# Patient Record
Sex: FEMALE | Race: OTHER | HISPANIC OR LATINO | Employment: UNEMPLOYED | URBAN - METROPOLITAN AREA
[De-identification: names, ages, dates, MRNs, and addresses within clinical notes are randomized per-mention and may not be internally consistent; named-entity substitution may affect disease eponyms.]

---

## 2017-01-03 ENCOUNTER — OFFICE VISIT (OUTPATIENT)
Dept: URGENT CARE | Facility: PHYSICIAN GROUP | Age: 57
End: 2017-01-03
Payer: COMMERCIAL

## 2017-01-03 VITALS
RESPIRATION RATE: 16 BRPM | WEIGHT: 135 LBS | BODY MASS INDEX: 23.92 KG/M2 | HEIGHT: 63 IN | TEMPERATURE: 98.4 F | HEART RATE: 65 BPM | OXYGEN SATURATION: 97 % | SYSTOLIC BLOOD PRESSURE: 132 MMHG | DIASTOLIC BLOOD PRESSURE: 64 MMHG

## 2017-01-03 DIAGNOSIS — H69.92 EUSTACHIAN TUBE DYSFUNCTION, LEFT: ICD-10-CM

## 2017-01-03 PROCEDURE — 99203 OFFICE O/P NEW LOW 30 MIN: CPT | Performed by: NURSE PRACTITIONER

## 2017-01-03 ASSESSMENT — ENCOUNTER SYMPTOMS
MYALGIAS: 0
CHILLS: 0
FEVER: 0
COUGH: 0
PALPITATIONS: 0
DIARRHEA: 0
SORE THROAT: 0
NAUSEA: 0
DIZZINESS: 0
SHORTNESS OF BREATH: 0
VOMITING: 0
HEADACHES: 0

## 2017-01-03 NOTE — PATIENT INSTRUCTIONS
Barotitis media  (Barotitis Media)  La barotitis media es la inflamación del oído medio. Se produce cuando el conducto auditivo (trompa de Valentín) que une la parte posterior de la nariz (nasofaringe) con el tímpano, se obstruye. Esta obstrucción puede ser causada por un resfrío, alergias ambientales o tanya infección en las vías respiratorias superiores. La barotitis media que no se leora puede llevar a tanya lesión o a la pérdida auditiva barotrauma), que podría llegar a ser permanente.  INSTRUCCIONES PARA EL CUIDADO EN EL HOGAR   · [place] todos los medicamentos erin le indicó el médico. Los medicamentos de venta sharonda podrán ayudar a quitar la obstrucción del canal y a favorecer el pasaje de aire.  · No se introduzca nada en el oído para limpiarlo o destaparlo. Las gotas óticas no lo ayudarán.  · No practique natación ni buceo ni viaje en avión hasta que burnham médico le diga que puede hacerlo. Si realizar estas actividades fueran necesario, puede ser útil la goma de mascar, que lo hará tragar con frecuencia. También lo ayudará si se oprime la nariz y sopla suavemente hasta destaparse los oídos para equilibrar los cambios de presión. [place] fuerza el aire en las trompas de Valentín.  · Sólo tome medicamentos de venta sharonda o recetados para calmar el dolor, el malestar o bajar la fiebre, según las indicaciones de burnham médico.  · Un descongestivo puede ayudarlo a descongestionar el oído medio y hacer más fácil el equilibrio de la presión. **sudafed, dayquil**  SOLICITE ATENCIÓN MÉDICA SI:  · Experimenta alguna forma de mareo grave, en el que siente erin si la habitación le diera vueltas y tiene náuseas (vértigo).  · Los síntomas sólo involucran un oído.  SOLICITE ATENCIÓN MÉDICA DE INMEDIATO SI:   · Siente un [place]awanna dolor de stanley, mareos o dolor intenso en el oído.  · Tiene un drenaje sanguinolento o similar a pus por el oído.  · Tiene fiebre.  · Los síntomas no mejoran o empeoran.  ASEGÚRESE DE QUE:   · Comprende estas  instrucciones.  · Controlará burnham afección.  · Recibirá ayuda de inmediato si no mejora o si empeora.     Esta información no tiene erin fin reemplazar el consejo del médico. Asegúrese de hacerle al médico cualquier pregunta que tenga.     Document Released: 12/18/2006 Document Revised: 12/23/2014  Elsevier Interactive Patient Education ©2016 Elsevier Inc.

## 2017-01-03 NOTE — MR AVS SNAPSHOT
"        Savana CANELA CristinaAnaloc   1/3/2017 1:30 PM   Office Visit   MRN: [id number]    Department:  [facility]   Dept Phone:  [phone]    Description:  Female : 1960   Provider:  SHON Child           Reason for Visit     Otalgia x2 weeks. Bilateral ear pain. Popping sensation.      Allergies as of 1/3/2017     No Known Allergies      Vital Signs     Blood Pressure Pulse Temperature Respirations Height Weight    132/64 mmHg 65 36.9 °C (98.4 °F) 16 1.6 m (5' 2.99\") 61.236 kg (135 lb)    Body Mass Index Oxygen Saturation Breastfeeding?             23.92 kg/m2 97% No         Basic Information     Date Of Birth Sex Race Ethnicity Preferred Language    1960 Female  or   Origin (Wolof,Emirati,Iraqi,[place], etc) English      Health Maintenance        Date Due Completion Dates    IMM DTaP/Tdap/Td Vaccine (1 - Tdap) 1980 ---    PAP SMEAR 1982 ---    COLONOSCOPY 2011 ---    IMM INFLUENZA (1) 2016 ---    MAMMOGRAM 2017, 2015, 2014, 2013, 10/3/2011, 2010, 10/29/2007, 10/29/2007            Current Immunizations     Hepatitis B Vaccine Recombivax (Adol/Adult) 2016, 2016, 2016      Below and/or attached are the medications your provider expects you to take. Review all of your home medications and newly ordered medications with your provider and/or pharmacist. Follow medication instructions as directed by your provider and/or pharmacist. Please keep your medication list with you and share with your provider. Update the information when medications are discontinued, doses are changed, or new medications (including over-the-counter products) are added; and carry medication information at all times in the event of emergency situations     Allergies:  No Known Allergies          Medications  Valid as of: 2017 -  2:05 PM    Generic Name Brand Name Tablet Size Instructions for use    Azithromycin " (Tab) ZITHROMAX 250 MG 2 tabs by mouth day 1, 1 tab by mouth days 2-5        Guaifenesin-Codeine (Solution) ROBITUSSIN -10 mg/5mL Take 10 mL by mouth every four hours as needed for Cough.        Ibuprofen (Tab) MOTRIN 200 MG Take 200 mg by mouth every 6 hours as needed.        Pseudoephedrine HCl   Take  by mouth.        .                 Medicines prescribed today were sent to:     [facility] #[id number] - [place], [place] - [address]    [address] [place] [address]    Phone: [phone] Fax: [phone]    Open 24 Hours?: No      Medication refill instructions:       If your prescription bottle indicates you have medication refills left, it is not necessary to call your provider’s office. Please contact your pharmacy and they will refill your medication.    If your prescription bottle indicates you do not have any refills left, you may request refills at any time through one of the following ways: The online [employer] system (except Urgent Care), by calling your provider’s office, or by asking your pharmacy to contact your provider’s office with a refill request. Medication refills are processed only during regular business hours and may not be available until the next business day. Your provider may request additional information or to have a follow-up visit with you prior to refilling your medication.   *Please Note: Medication refills are assigned a new Rx number when refilled electronically. Your pharmacy may indicate that no refills were authorized even though a new prescription for the same medication is available at the pharmacy. Please request the medicine by name with the pharmacy before contacting your provider for a refill.        Instructions    Barotitis media  (Barotitis Media)  La barotitis media es la inflamación del oído medio. Se produce cuando el conducto auditivo (trompa de Valentín) que une la parte posterior de la nariz (nasofaringe) con el tímpano, se obstruye. Esta obstrucción  puede ser causada por un resfrío, alergias ambientales o tanya infección en las vías respiratorias superiores. La barotitis media que no se leora puede llevar a tanya lesión o a la pérdida auditiva barotrauma), que podría llegar a ser permanente.  INSTRUCCIONES PARA EL CUIDADO EN EL HOGAR   · [place] todos los medicamentos erin le indicó el médico. Los medicamentos de venta sharonda podrán ayudar a quitar la obstrucción del canal y a favorecer el pasaje de aire.  · No se introduzca nada en el oído para limpiarlo o destaparlo. Las gotas óticas no lo ayudarán.  · No practique natación ni buceo ni viaje en avión hasta que burnham médico le diga que puede hacerlo. Si realizar estas actividades fueran necesario, puede ser útil la goma de mascar, que lo hará tragar con frecuencia. También lo ayudará si se oprime la nariz y sopla suavemente hasta destaparse los oídos para equilibrar los cambios de presión. [place] fuerza el aire en las trompas de Valentín.  · Sólo tome medicamentos de venta sharonda o recetados para calmar el dolor, el malestar o bajar la fiebre, según las indicaciones de burnham médico.  · Un descongestivo puede ayudarlo a descongestionar el oído medio y hacer más fácil el equilibrio de la presión. **sudafed, dayquil**  SOLICITE ATENCIÓN MÉDICA SI:  · Experimenta alguna forma de mareo grave, en el que siente erin si la habitación le diera vueltas y tiene náuseas (vértigo).  · Los síntomas sólo involucran un oído.  SOLICITE ATENCIÓN MÉDICA DE INMEDIATO SI:   · Siente un [place]awanna dolor de stanley, mareos o dolor intenso en el oído.  · Tiene un drenaje sanguinolento o similar a pus por el oído.  · Tiene fiebre.  · Los síntomas no mejoran o empeoran.  ASEGÚRESE DE QUE:   · Comprende estas instrucciones.  · Controlará burnham afección.  · Recibirá ayuda de inmediato si no mejora o si empeora.     Esta información no tiene erin fin reemplazar el consejo del médico. Asegúrese de hacerle al médico cualquier pregunta que tenga.     Document  Released: 12/18/2006 Document Revised: 12/23/2014  Elsevier Interactive Patient Education ©2016 Elsevier Inc.            MyChart Status: Patient Declined

## 2017-01-03 NOTE — PROGRESS NOTES
"Subjective:      Savana Raza is a 56 y.o. female who presents with Otalgia    Chief Complaint   Patient presents with   • Otalgia     x2 weeks. Bilateral ear pain. Popping sensation.     Having on and off ear pain  Feels her ears pop when she blows her nose  No hearing loss  No hearing drainage            Otalgia   Pertinent negatives include no coughing, diarrhea, headaches, sore throat or vomiting.       Review of Systems   Constitutional: Negative for fever, chills and malaise/fatigue.   HENT: Positive for ear pain. Negative for congestion and sore throat.    Respiratory: Negative for cough and shortness of breath.    Cardiovascular: Negative for chest pain and palpitations.   Gastrointestinal: Negative for nausea, vomiting and diarrhea.   Musculoskeletal: Negative for myalgias.   Neurological: Negative for dizziness and headaches.     No past medical history on file.      Objective:     /64 mmHg  Pulse 65  Temp(Src) 36.9 °C (98.4 °F)  Resp 16  Ht 1.6 m (5' 2.99\")  Wt 61.236 kg (135 lb)  BMI 23.92 kg/m2  SpO2 97%  Breastfeeding? No     Physical Exam   Constitutional: She is oriented to person, place, and time. She appears well-developed and well-nourished. No distress.   HENT:   Head: Normocephalic and atraumatic.   Right Ear: External ear normal.   Left Ear: External ear normal. There is tenderness. Tympanic membrane is not injected. A middle ear effusion is present.   Nose: Nose normal.   Eyes: Conjunctivae are normal.   Pulmonary/Chest: Effort normal.   Musculoskeletal: Normal range of motion.   Neurological: She is alert and oriented to person, place, and time.   Skin: Skin is warm and dry.   Psychiatric: She has a normal mood and affect. Her behavior is normal. Judgment and thought content normal.   Nursing note and vitals reviewed.              Assessment/Plan:      1. Eustachian tube dysfunction, left    Decongestants  Nasal sprays    Please make an appointment for follow up with " your primary care provider or make appointment to establish with a primary care. Return for any perception of change in condition, worsening of symptoms, such as perception of worse pain, worsening fever, not getting better, or any other concerns. Please go to the nearest emergency room for any shortness of breath or chest pain or for any of the emergent conditions we have discussed. Patient and/or family states understanding to plan of care, and discharge instructions. Different diagnosis were discussed and signs/symptoms were discussed to educate on.

## 2017-02-08 ENCOUNTER — HOSPITAL ENCOUNTER (OUTPATIENT)
Dept: LAB | Facility: MEDICAL CENTER | Age: 57
End: 2017-02-08
Attending: PHYSICIAN ASSISTANT
Payer: COMMERCIAL

## 2017-02-08 LAB
25(OH)D3 SERPL-MCNC: 116 NG/ML (ref 30–100)
ALBUMIN SERPL BCP-MCNC: 4.5 G/DL (ref 3.2–4.9)
ALBUMIN/GLOB SERPL: 2 G/DL
ALP SERPL-CCNC: 39 U/L (ref 30–99)
ALT SERPL-CCNC: 11 U/L (ref 2–50)
ANION GAP SERPL CALC-SCNC: 4 MMOL/L (ref 0–11.9)
AST SERPL-CCNC: 17 U/L (ref 12–45)
BASOPHILS # BLD AUTO: 0.03 K/UL (ref 0–0.12)
BASOPHILS NFR BLD AUTO: 0.8 % (ref 0–1.8)
BILIRUB SERPL-MCNC: 1 MG/DL (ref 0.1–1.5)
BUN SERPL-MCNC: 12 MG/DL (ref 8–22)
CALCIUM SERPL-MCNC: 9.4 MG/DL (ref 8.5–10.5)
CHLORIDE SERPL-SCNC: 106 MMOL/L (ref 96–112)
CHOLEST SERPL-MCNC: 133 MG/DL (ref 100–199)
CO2 SERPL-SCNC: 25 MMOL/L (ref 20–33)
CREAT SERPL-MCNC: 0.53 MG/DL (ref 0.5–1.4)
EOSINOPHIL # BLD: 0.1 K/UL (ref 0–0.51)
EOSINOPHIL NFR BLD AUTO: 2.5 % (ref 0–6.9)
ERYTHROCYTE [DISTWIDTH] IN BLOOD BY AUTOMATED COUNT: 43.2 FL (ref 35.9–50)
EST. AVERAGE GLUCOSE BLD GHB EST-MCNC: 108 MG/DL
GLOBULIN SER CALC-MCNC: 2.2 G/DL (ref 1.9–3.5)
GLUCOSE SERPL-MCNC: 86 MG/DL (ref 65–99)
HBA1C MFR BLD: 5.4 % (ref 0–5.6)
HCT VFR BLD AUTO: 41.4 % (ref 37–47)
HDLC SERPL-MCNC: 54 MG/DL
HGB BLD-MCNC: 13.9 G/DL (ref 12–16)
IMM GRANULOCYTES # BLD AUTO: 0 K/UL (ref 0–0.11)
IMM GRANULOCYTES NFR BLD AUTO: 0 % (ref 0–0.9)
LDLC SERPL CALC-MCNC: 66 MG/DL
LYMPHOCYTES # BLD: 1.8 K/UL (ref 1–4.8)
LYMPHOCYTES NFR BLD AUTO: 45.1 % (ref 22–41)
MCH RBC QN AUTO: 32.3 PG (ref 27–33)
MCHC RBC AUTO-ENTMCNC: 33.6 G/DL (ref 33.6–35)
MCV RBC AUTO: 96.3 FL (ref 81.4–97.8)
MONOCYTES # BLD: 0.28 K/UL (ref 0–0.85)
MONOCYTES NFR BLD AUTO: 7 % (ref 0–13.4)
NEUTROPHILS # BLD: 1.78 K/UL (ref 2–7.15)
NEUTROPHILS NFR BLD AUTO: 44.6 % (ref 44–72)
NRBC # BLD AUTO: 0 K/UL
NRBC BLD-RTO: 0 /100 WBC
PLATELET # BLD AUTO: 246 K/UL (ref 164–446)
PMV BLD AUTO: 10.2 FL (ref 9–12.9)
POTASSIUM SERPL-SCNC: 4.3 MMOL/L (ref 3.6–5.5)
PROT SERPL-MCNC: 6.7 G/DL (ref 6–8.2)
RBC # BLD AUTO: 4.3 M/UL (ref 4.2–5.4)
SODIUM SERPL-SCNC: 135 MMOL/L (ref 135–145)
TRIGL SERPL-MCNC: 65 MG/DL (ref 0–149)
WBC # BLD AUTO: 4 K/UL (ref 4.8–10.8)

## 2017-02-08 PROCEDURE — 80061 LIPID PANEL: CPT

## 2017-02-08 PROCEDURE — 82306 VITAMIN D 25 HYDROXY: CPT

## 2017-02-08 PROCEDURE — 85025 COMPLETE CBC W/AUTO DIFF WBC: CPT

## 2017-02-08 PROCEDURE — 80053 COMPREHEN METABOLIC PANEL: CPT

## 2017-02-08 PROCEDURE — 36415 COLL VENOUS BLD VENIPUNCTURE: CPT

## 2017-02-08 PROCEDURE — 83036 HEMOGLOBIN GLYCOSYLATED A1C: CPT

## 2017-05-16 ENCOUNTER — HOSPITAL ENCOUNTER (OUTPATIENT)
Dept: LAB | Facility: MEDICAL CENTER | Age: 57
End: 2017-05-16
Attending: OBSTETRICS & GYNECOLOGY
Payer: COMMERCIAL

## 2017-05-16 PROCEDURE — 87624 HPV HI-RISK TYP POOLED RSLT: CPT

## 2017-05-16 PROCEDURE — 88175 CYTOPATH C/V AUTO FLUID REDO: CPT

## 2017-05-17 LAB
CYTOLOGY REG CYTOL: NORMAL
HPV HR 12 DNA CVX QL NAA+PROBE: NEGATIVE
HPV16 DNA SPEC QL NAA+PROBE: NEGATIVE
HPV18 DNA SPEC QL NAA+PROBE: NEGATIVE
SPECIMEN SOURCE: NORMAL

## 2017-07-17 ENCOUNTER — HOSPITAL ENCOUNTER (OUTPATIENT)
Dept: RADIOLOGY | Facility: MEDICAL CENTER | Age: 57
End: 2017-07-17
Attending: OBSTETRICS & GYNECOLOGY
Payer: COMMERCIAL

## 2017-07-17 DIAGNOSIS — Z12.31 VISIT FOR SCREENING MAMMOGRAM: ICD-10-CM

## 2017-07-17 PROCEDURE — 77063 BREAST TOMOSYNTHESIS BI: CPT

## 2019-03-27 ENCOUNTER — OFFICE VISIT (OUTPATIENT)
Dept: URGENT CARE | Facility: PHYSICIAN GROUP | Age: 59
End: 2019-03-27
Payer: COMMERCIAL

## 2019-03-27 VITALS
HEIGHT: 62 IN | DIASTOLIC BLOOD PRESSURE: 62 MMHG | TEMPERATURE: 99 F | WEIGHT: 136 LBS | SYSTOLIC BLOOD PRESSURE: 108 MMHG | OXYGEN SATURATION: 97 % | RESPIRATION RATE: 14 BRPM | HEART RATE: 74 BPM | BODY MASS INDEX: 25.03 KG/M2

## 2019-03-27 DIAGNOSIS — J02.0 PHARYNGITIS DUE TO STREPTOCOCCUS SPECIES: ICD-10-CM

## 2019-03-27 LAB
INT CON NEG: NORMAL
INT CON POS: NORMAL
S PYO AG THROAT QL: POSITIVE

## 2019-03-27 PROCEDURE — 99214 OFFICE O/P EST MOD 30 MIN: CPT | Performed by: NURSE PRACTITIONER

## 2019-03-27 PROCEDURE — 87880 STREP A ASSAY W/OPTIC: CPT | Performed by: NURSE PRACTITIONER

## 2019-03-27 RX ORDER — ESTRADIOL 0.07 MG/D
1 FILM, EXTENDED RELEASE TRANSDERMAL SEE ADMIN INSTRUCTIONS
COMMUNITY
End: 2023-02-09

## 2019-03-27 RX ORDER — AMOXICILLIN 500 MG/1
500 CAPSULE ORAL 2 TIMES DAILY
Qty: 20 CAP | Refills: 0 | Status: SHIPPED | OUTPATIENT
Start: 2019-03-27 | End: 2019-04-06

## 2019-03-27 RX ORDER — SIMVASTATIN 10 MG
10 TABLET ORAL NIGHTLY
COMMUNITY
End: 2023-02-09

## 2019-03-27 ASSESSMENT — ENCOUNTER SYMPTOMS
MYALGIAS: 0
DIZZINESS: 0
VOMITING: 0
CHILLS: 0
FEVER: 0
SHORTNESS OF BREATH: 0
EYE PAIN: 0
NECK PAIN: 0
TROUBLE SWALLOWING: 0
SORE THROAT: 1
SWOLLEN GLANDS: 0
HEADACHES: 1
COUGH: 0
NAUSEA: 0

## 2019-03-27 NOTE — PROGRESS NOTES
"Subjective:   Savana Raza is a 58 y.o. female who presents for Pharyngitis        Pharyngitis    This is a new problem. Episode onset: 2 days. The problem has been gradually worsening. Neither side of throat is experiencing more pain than the other. There has been no fever. The pain is at a severity of 10/10. The pain is severe. Associated symptoms include ear pain and headaches. Pertinent negatives include no coughing, ear discharge, neck pain, shortness of breath, swollen glands, trouble swallowing or vomiting. She has had no exposure to strep. She has tried acetaminophen for the symptoms. The treatment provided no relief.     Review of Systems   Constitutional: Negative for chills and fever.   HENT: Positive for ear pain and sore throat. Negative for ear discharge and trouble swallowing.    Eyes: Negative for pain.   Respiratory: Negative for cough and shortness of breath.    Cardiovascular: Negative for chest pain.   Gastrointestinal: Negative for nausea and vomiting.   Genitourinary: Negative for hematuria.   Musculoskeletal: Negative for myalgias and neck pain.   Skin: Negative for rash.   Neurological: Positive for headaches. Negative for dizziness.     No Known Allergies   Objective:   /62   Pulse 74   Temp 37.2 °C (99 °F)   Resp 14   Ht 1.575 m (5' 2\")   Wt 61.7 kg (136 lb)   SpO2 97%   BMI 24.87 kg/m²   Physical Exam   Constitutional: She is oriented to person, place, and time. She appears well-developed and well-nourished. No distress.   HENT:   Head: Normocephalic and atraumatic.   Right Ear: Tympanic membrane normal.   Left Ear: Tympanic membrane normal.   Nose: Nose normal. Right sinus exhibits no maxillary sinus tenderness and no frontal sinus tenderness. Left sinus exhibits no maxillary sinus tenderness and no frontal sinus tenderness.   Mouth/Throat: Uvula is midline and mucous membranes are normal. Posterior oropharyngeal erythema present. No posterior oropharyngeal edema " or tonsillar abscesses. No tonsillar exudate.   Eyes: Pupils are equal, round, and reactive to light. Conjunctivae and EOM are normal. Right eye exhibits no discharge. Left eye exhibits no discharge.   Cardiovascular: Normal rate and regular rhythm.    No murmur heard.  Pulmonary/Chest: Effort normal and breath sounds normal. No respiratory distress.   Abdominal: Soft. She exhibits no distension. There is no tenderness.   Neurological: She is alert and oriented to person, place, and time. She has normal reflexes. No sensory deficit.   Skin: Skin is warm, dry and intact.   Psychiatric: She has a normal mood and affect.         Assessment/Plan:     1. Pharyngitis due to Streptococcus species  amoxicillin (AMOXIL) 500 MG Cap    POCT Rapid Strep A     Strep positive  Advised to continue supportive care with Tylenol and/or ibuprofen for fevers and discomfort. Increased fluids and electrolytes.  Patient given precautionary s/sx that mandate immediate follow up and evaluation in the ED. Advised of risks of not doing so.    DDX, Supportive care, and indications for immediate follow-up discussed with patient.    Instructed to return to clinic or nearest emergency department if we are not available for any change in condition, further concerns, or worsening of symptoms.    The patient demonstrated a good understanding and agreed with the treatment plan.

## 2019-07-16 ENCOUNTER — HOSPITAL ENCOUNTER (OUTPATIENT)
Dept: RADIOLOGY | Facility: MEDICAL CENTER | Age: 59
End: 2019-07-16
Attending: FAMILY MEDICINE
Payer: COMMERCIAL

## 2019-07-16 DIAGNOSIS — Z12.31 VISIT FOR SCREENING MAMMOGRAM: ICD-10-CM

## 2019-07-16 PROCEDURE — 77063 BREAST TOMOSYNTHESIS BI: CPT

## 2020-05-29 ENCOUNTER — HOSPITAL ENCOUNTER (OUTPATIENT)
Facility: MEDICAL CENTER | Age: 60
End: 2020-05-29
Attending: INTERNAL MEDICINE
Payer: COMMERCIAL

## 2020-06-02 LAB
SARS-COV-2 RNA SPEC QL NAA+PROBE: NOT DETECTED
SPECIMEN SOURCE: NORMAL

## 2020-07-06 ENCOUNTER — HOSPITAL ENCOUNTER (OUTPATIENT)
Dept: RADIOLOGY | Facility: MEDICAL CENTER | Age: 60
End: 2020-07-06
Attending: FAMILY MEDICINE
Payer: COMMERCIAL

## 2020-07-06 DIAGNOSIS — Z79.890 NEED FOR PROPHYLACTIC HORMONE REPLACEMENT THERAPY (POSTMENOPAUSAL): ICD-10-CM

## 2020-07-06 PROCEDURE — 76830 TRANSVAGINAL US NON-OB: CPT

## 2020-08-03 ENCOUNTER — HOSPITAL ENCOUNTER (OUTPATIENT)
Dept: RADIOLOGY | Facility: MEDICAL CENTER | Age: 60
End: 2020-08-03
Attending: FAMILY MEDICINE
Payer: COMMERCIAL

## 2020-08-03 DIAGNOSIS — Z12.31 ENCOUNTER FOR MAMMOGRAM TO ESTABLISH BASELINE MAMMOGRAM: ICD-10-CM

## 2020-08-03 PROCEDURE — 77067 SCR MAMMO BI INCL CAD: CPT

## 2021-11-23 ENCOUNTER — HOSPITAL ENCOUNTER (OUTPATIENT)
Dept: RADIOLOGY | Facility: MEDICAL CENTER | Age: 61
End: 2021-11-23
Attending: STUDENT IN AN ORGANIZED HEALTH CARE EDUCATION/TRAINING PROGRAM
Payer: COMMERCIAL

## 2021-11-23 DIAGNOSIS — Z12.31 VISIT FOR SCREENING MAMMOGRAM: ICD-10-CM

## 2021-11-23 PROCEDURE — 77063 BREAST TOMOSYNTHESIS BI: CPT

## 2022-08-16 ENCOUNTER — HOSPITAL ENCOUNTER (OUTPATIENT)
Dept: RADIOLOGY | Facility: MEDICAL CENTER | Age: 62
End: 2022-08-16
Attending: STUDENT IN AN ORGANIZED HEALTH CARE EDUCATION/TRAINING PROGRAM
Payer: COMMERCIAL

## 2022-08-16 DIAGNOSIS — M54.6 PAIN IN THORACIC SPINE: ICD-10-CM

## 2022-08-16 PROCEDURE — 72072 X-RAY EXAM THORAC SPINE 3VWS: CPT

## 2023-01-03 ENCOUNTER — HOSPITAL ENCOUNTER (OUTPATIENT)
Dept: RADIOLOGY | Facility: MEDICAL CENTER | Age: 63
End: 2023-01-03
Attending: STUDENT IN AN ORGANIZED HEALTH CARE EDUCATION/TRAINING PROGRAM
Payer: COMMERCIAL

## 2023-01-03 DIAGNOSIS — R60.9 EDEMA, UNSPECIFIED TYPE: ICD-10-CM

## 2023-01-03 PROCEDURE — 93971 EXTREMITY STUDY: CPT | Mod: LT

## 2023-01-12 ENCOUNTER — HOSPITAL ENCOUNTER (OUTPATIENT)
Dept: LAB | Facility: MEDICAL CENTER | Age: 63
End: 2023-01-12
Payer: COMMERCIAL

## 2023-01-12 ENCOUNTER — HOSPITAL ENCOUNTER (OUTPATIENT)
Dept: RADIOLOGY | Facility: MEDICAL CENTER | Age: 63
End: 2023-01-12
Payer: COMMERCIAL

## 2023-01-12 DIAGNOSIS — I82.4Y1 DEEP VEIN THROMBOSIS (DVT) OF PROXIMAL VEIN OF RIGHT LOWER EXTREMITY, UNSPECIFIED CHRONICITY (HCC): ICD-10-CM

## 2023-01-12 DIAGNOSIS — R22.42 MASS OF LOWER LEG, LEFT: ICD-10-CM

## 2023-01-12 LAB
ALBUMIN SERPL BCP-MCNC: 4.3 G/DL (ref 3.2–4.9)
ALBUMIN/GLOB SERPL: 1.7 G/DL
ALP SERPL-CCNC: 53 U/L (ref 30–99)
ALT SERPL-CCNC: 14 U/L (ref 2–50)
ANION GAP SERPL CALC-SCNC: 9 MMOL/L (ref 7–16)
AST SERPL-CCNC: 19 U/L (ref 12–45)
BASOPHILS # BLD AUTO: 0.7 % (ref 0–1.8)
BASOPHILS # BLD: 0.04 K/UL (ref 0–0.12)
BILIRUB SERPL-MCNC: 0.6 MG/DL (ref 0.1–1.5)
BUN SERPL-MCNC: 14 MG/DL (ref 8–22)
CALCIUM ALBUM COR SERPL-MCNC: 9 MG/DL (ref 8.5–10.5)
CALCIUM SERPL-MCNC: 9.2 MG/DL (ref 8.5–10.5)
CHLORIDE SERPL-SCNC: 104 MMOL/L (ref 96–112)
CO2 SERPL-SCNC: 26 MMOL/L (ref 20–33)
CREAT SERPL-MCNC: 0.53 MG/DL (ref 0.5–1.4)
EOSINOPHIL # BLD AUTO: 0.09 K/UL (ref 0–0.51)
EOSINOPHIL NFR BLD: 1.5 % (ref 0–6.9)
ERYTHROCYTE [DISTWIDTH] IN BLOOD BY AUTOMATED COUNT: 43.5 FL (ref 35.9–50)
GFR SERPLBLD CREATININE-BSD FMLA CKD-EPI: 105 ML/MIN/1.73 M 2
GLOBULIN SER CALC-MCNC: 2.5 G/DL (ref 1.9–3.5)
GLUCOSE SERPL-MCNC: 119 MG/DL (ref 65–99)
HCT VFR BLD AUTO: 38.4 % (ref 37–47)
HGB BLD-MCNC: 13.1 G/DL (ref 12–16)
IMM GRANULOCYTES # BLD AUTO: 0.02 K/UL (ref 0–0.11)
IMM GRANULOCYTES NFR BLD AUTO: 0.3 % (ref 0–0.9)
LMWH PPP CHRO-ACNC: 1.5 U/ML
LYMPHOCYTES # BLD AUTO: 1.56 K/UL (ref 1–4.8)
LYMPHOCYTES NFR BLD: 26.9 % (ref 22–41)
MCH RBC QN AUTO: 31.9 PG (ref 27–33)
MCHC RBC AUTO-ENTMCNC: 34.1 G/DL (ref 33.6–35)
MCV RBC AUTO: 93.4 FL (ref 81.4–97.8)
MONOCYTES # BLD AUTO: 0.38 K/UL (ref 0–0.85)
MONOCYTES NFR BLD AUTO: 6.5 % (ref 0–13.4)
NEUTROPHILS # BLD AUTO: 3.72 K/UL (ref 2–7.15)
NEUTROPHILS NFR BLD: 64.1 % (ref 44–72)
NRBC # BLD AUTO: 0 K/UL
NRBC BLD-RTO: 0 /100 WBC
PLATELET # BLD AUTO: 232 K/UL (ref 164–446)
PMV BLD AUTO: 10.8 FL (ref 9–12.9)
POTASSIUM SERPL-SCNC: 4.1 MMOL/L (ref 3.6–5.5)
PROT SERPL-MCNC: 6.8 G/DL (ref 6–8.2)
RBC # BLD AUTO: 4.11 M/UL (ref 4.2–5.4)
SODIUM SERPL-SCNC: 139 MMOL/L (ref 135–145)
WBC # BLD AUTO: 5.8 K/UL (ref 4.8–10.8)

## 2023-01-12 PROCEDURE — 85520 HEPARIN ASSAY: CPT

## 2023-01-12 PROCEDURE — 85025 COMPLETE CBC W/AUTO DIFF WBC: CPT

## 2023-01-12 PROCEDURE — 93971 EXTREMITY STUDY: CPT | Mod: LT

## 2023-01-12 PROCEDURE — 36415 COLL VENOUS BLD VENIPUNCTURE: CPT

## 2023-01-12 PROCEDURE — 80053 COMPREHEN METABOLIC PANEL: CPT

## 2023-01-31 ENCOUNTER — HOSPITAL ENCOUNTER (OUTPATIENT)
Dept: RADIOLOGY | Facility: MEDICAL CENTER | Age: 63
End: 2023-01-31
Payer: COMMERCIAL

## 2023-01-31 ENCOUNTER — APPOINTMENT (OUTPATIENT)
Dept: RADIOLOGY | Facility: MEDICAL CENTER | Age: 63
End: 2023-01-31
Payer: COMMERCIAL

## 2023-01-31 DIAGNOSIS — I82.402 DEEP VEIN THROMBOSIS (DVT) OF LEFT LOWER EXTREMITY, UNSPECIFIED CHRONICITY, UNSPECIFIED VEIN (HCC): ICD-10-CM

## 2023-01-31 DIAGNOSIS — I82.90 THROMBOTIC INFARCTION: ICD-10-CM

## 2023-01-31 PROCEDURE — 93971 EXTREMITY STUDY: CPT | Mod: LT

## 2023-02-08 ENCOUNTER — APPOINTMENT (OUTPATIENT)
Dept: RADIOLOGY | Facility: MEDICAL CENTER | Age: 63
DRG: 841 | End: 2023-02-08
Attending: EMERGENCY MEDICINE
Payer: COMMERCIAL

## 2023-02-08 ENCOUNTER — HOSPITAL ENCOUNTER (INPATIENT)
Facility: MEDICAL CENTER | Age: 63
LOS: 8 days | DRG: 841 | End: 2023-02-17
Attending: EMERGENCY MEDICINE | Admitting: STUDENT IN AN ORGANIZED HEALTH CARE EDUCATION/TRAINING PROGRAM
Payer: COMMERCIAL

## 2023-02-08 DIAGNOSIS — M62.89 MASS OF ILIOPSOAS MUSCLE GROUP: ICD-10-CM

## 2023-02-08 DIAGNOSIS — N13.4 HYDROURETER: ICD-10-CM

## 2023-02-08 DIAGNOSIS — R10.9 FLANK PAIN: ICD-10-CM

## 2023-02-08 DIAGNOSIS — C85.10 B-CELL LYMPHOMA, UNSPECIFIED B-CELL LYMPHOMA TYPE, UNSPECIFIED BODY REGION (HCC): ICD-10-CM

## 2023-02-08 LAB
ALBUMIN SERPL BCP-MCNC: 4.1 G/DL (ref 3.2–4.9)
ALBUMIN/GLOB SERPL: 1.4 G/DL
ALP SERPL-CCNC: 63 U/L (ref 30–99)
ALT SERPL-CCNC: 13 U/L (ref 2–50)
ANION GAP SERPL CALC-SCNC: 13 MMOL/L (ref 7–16)
APPEARANCE UR: ABNORMAL
APTT PPP: 32.4 SEC (ref 24.7–36)
AST SERPL-CCNC: 14 U/L (ref 12–45)
BACTERIA #/AREA URNS HPF: NEGATIVE /HPF
BASOPHILS # BLD AUTO: 0.6 % (ref 0–1.8)
BASOPHILS # BLD: 0.03 K/UL (ref 0–0.12)
BILIRUB SERPL-MCNC: 0.7 MG/DL (ref 0.1–1.5)
BILIRUB UR QL STRIP.AUTO: NEGATIVE
BUN SERPL-MCNC: 17 MG/DL (ref 8–22)
CALCIUM ALBUM COR SERPL-MCNC: 9.3 MG/DL (ref 8.5–10.5)
CALCIUM SERPL-MCNC: 9.4 MG/DL (ref 8.5–10.5)
CHLORIDE SERPL-SCNC: 102 MMOL/L (ref 96–112)
CO2 SERPL-SCNC: 23 MMOL/L (ref 20–33)
COLOR UR: YELLOW
CREAT SERPL-MCNC: 0.94 MG/DL (ref 0.5–1.4)
EOSINOPHIL # BLD AUTO: 0.08 K/UL (ref 0–0.51)
EOSINOPHIL NFR BLD: 1.5 % (ref 0–6.9)
EPI CELLS #/AREA URNS HPF: NORMAL /HPF
ERYTHROCYTE [DISTWIDTH] IN BLOOD BY AUTOMATED COUNT: 41.1 FL (ref 35.9–50)
GFR SERPLBLD CREATININE-BSD FMLA CKD-EPI: 69 ML/MIN/1.73 M 2
GLOBULIN SER CALC-MCNC: 3 G/DL (ref 1.9–3.5)
GLUCOSE SERPL-MCNC: 124 MG/DL (ref 65–99)
GLUCOSE UR STRIP.AUTO-MCNC: NEGATIVE MG/DL
HCT VFR BLD AUTO: 39.3 % (ref 37–47)
HGB BLD-MCNC: 13.4 G/DL (ref 12–16)
HYALINE CASTS #/AREA URNS LPF: NORMAL /LPF
IMM GRANULOCYTES # BLD AUTO: 0.01 K/UL (ref 0–0.11)
IMM GRANULOCYTES NFR BLD AUTO: 0.2 % (ref 0–0.9)
INR PPP: 1.09 (ref 0.87–1.13)
KETONES UR STRIP.AUTO-MCNC: NEGATIVE MG/DL
LEUKOCYTE ESTERASE UR QL STRIP.AUTO: ABNORMAL
LIPASE SERPL-CCNC: 17 U/L (ref 11–82)
LYMPHOCYTES # BLD AUTO: 1.19 K/UL (ref 1–4.8)
LYMPHOCYTES NFR BLD: 22.9 % (ref 22–41)
MCH RBC QN AUTO: 30.5 PG (ref 27–33)
MCHC RBC AUTO-ENTMCNC: 34.1 G/DL (ref 33.6–35)
MCV RBC AUTO: 89.3 FL (ref 81.4–97.8)
MICRO URNS: ABNORMAL
MONOCYTES # BLD AUTO: 0.41 K/UL (ref 0–0.85)
MONOCYTES NFR BLD AUTO: 7.9 % (ref 0–13.4)
NEUTROPHILS # BLD AUTO: 3.48 K/UL (ref 2–7.15)
NEUTROPHILS NFR BLD: 66.9 % (ref 44–72)
NITRITE UR QL STRIP.AUTO: NEGATIVE
NRBC # BLD AUTO: 0 K/UL
NRBC BLD-RTO: 0 /100 WBC
PH UR STRIP.AUTO: 7.5 [PH] (ref 5–8)
PLATELET # BLD AUTO: 233 K/UL (ref 164–446)
PMV BLD AUTO: 10.2 FL (ref 9–12.9)
POTASSIUM SERPL-SCNC: 4.2 MMOL/L (ref 3.6–5.5)
PROT SERPL-MCNC: 7.1 G/DL (ref 6–8.2)
PROT UR QL STRIP: NEGATIVE MG/DL
PROTHROMBIN TIME: 14 SEC (ref 12–14.6)
RBC # BLD AUTO: 4.4 M/UL (ref 4.2–5.4)
RBC # URNS HPF: NORMAL /HPF
RBC UR QL AUTO: NEGATIVE
SODIUM SERPL-SCNC: 138 MMOL/L (ref 135–145)
SP GR UR STRIP.AUTO: 1.01
UROBILINOGEN UR STRIP.AUTO-MCNC: 0.2 MG/DL
WBC # BLD AUTO: 5.2 K/UL (ref 4.8–10.8)
WBC #/AREA URNS HPF: NORMAL /HPF

## 2023-02-08 PROCEDURE — 85025 COMPLETE CBC W/AUTO DIFF WBC: CPT

## 2023-02-08 PROCEDURE — 36415 COLL VENOUS BLD VENIPUNCTURE: CPT

## 2023-02-08 PROCEDURE — 99285 EMERGENCY DEPT VISIT HI MDM: CPT

## 2023-02-08 PROCEDURE — 96375 TX/PRO/DX INJ NEW DRUG ADDON: CPT

## 2023-02-08 PROCEDURE — 700117 HCHG RX CONTRAST REV CODE 255: Performed by: EMERGENCY MEDICINE

## 2023-02-08 PROCEDURE — 85730 THROMBOPLASTIN TIME PARTIAL: CPT

## 2023-02-08 PROCEDURE — 85384 FIBRINOGEN ACTIVITY: CPT

## 2023-02-08 PROCEDURE — 81001 URINALYSIS AUTO W/SCOPE: CPT

## 2023-02-08 PROCEDURE — 85347 COAGULATION TIME ACTIVATED: CPT

## 2023-02-08 PROCEDURE — 85576 BLOOD PLATELET AGGREGATION: CPT | Mod: 91

## 2023-02-08 PROCEDURE — 85610 PROTHROMBIN TIME: CPT

## 2023-02-08 PROCEDURE — 71275 CT ANGIOGRAPHY CHEST: CPT

## 2023-02-08 PROCEDURE — 80053 COMPREHEN METABOLIC PANEL: CPT

## 2023-02-08 PROCEDURE — 96374 THER/PROPH/DIAG INJ IV PUSH: CPT

## 2023-02-08 PROCEDURE — 83690 ASSAY OF LIPASE: CPT

## 2023-02-08 PROCEDURE — 700111 HCHG RX REV CODE 636 W/ 250 OVERRIDE (IP): Performed by: EMERGENCY MEDICINE

## 2023-02-08 PROCEDURE — 74177 CT ABD & PELVIS W/CONTRAST: CPT

## 2023-02-08 PROCEDURE — 96376 TX/PRO/DX INJ SAME DRUG ADON: CPT

## 2023-02-08 RX ORDER — ONDANSETRON 2 MG/ML
4 INJECTION INTRAMUSCULAR; INTRAVENOUS ONCE
Status: COMPLETED | OUTPATIENT
Start: 2023-02-08 | End: 2023-02-08

## 2023-02-08 RX ORDER — ONDANSETRON 2 MG/ML
4 INJECTION INTRAMUSCULAR; INTRAVENOUS ONCE
Status: COMPLETED | OUTPATIENT
Start: 2023-02-09 | End: 2023-02-09

## 2023-02-08 RX ORDER — MORPHINE SULFATE 4 MG/ML
2 INJECTION INTRAVENOUS ONCE
Status: COMPLETED | OUTPATIENT
Start: 2023-02-08 | End: 2023-02-08

## 2023-02-08 RX ORDER — MORPHINE SULFATE 4 MG/ML
4 INJECTION INTRAVENOUS ONCE
Status: COMPLETED | OUTPATIENT
Start: 2023-02-08 | End: 2023-02-08

## 2023-02-08 RX ADMIN — IOHEXOL 90 ML: 350 INJECTION, SOLUTION INTRAVENOUS at 22:30

## 2023-02-08 RX ADMIN — ONDANSETRON 4 MG: 2 INJECTION INTRAMUSCULAR; INTRAVENOUS at 19:21

## 2023-02-08 RX ADMIN — MORPHINE SULFATE 2 MG: 4 INJECTION INTRAVENOUS at 19:21

## 2023-02-08 RX ADMIN — MORPHINE SULFATE 4 MG: 4 INJECTION INTRAVENOUS at 22:33

## 2023-02-08 ASSESSMENT — FIBROSIS 4 INDEX: FIB4 SCORE: 1.34

## 2023-02-08 NOTE — ED TRIAGE NOTES
"Chief Complaint   Patient presents with    Flank Pain     Left sided flank pain starting today. +Nausea. -Fevers. -Dysuria.      Pt notes she is on eliquis for clot in left leg.     Pt amb to triage with steady gait. Protocol ordered. Pt educated to inform staff of any  changes.     BP (!) 144/88   Pulse 72   Temp 37.6 °C (99.6 °F) (Temporal)   Resp 16   Ht 1.575 m (5' 2\")   Wt 61.4 kg (135 lb 5.8 oz)   SpO2 100%   BMI 24.76 kg/m²     "

## 2023-02-09 ENCOUNTER — APPOINTMENT (OUTPATIENT)
Dept: RADIOLOGY | Facility: MEDICAL CENTER | Age: 63
DRG: 841 | End: 2023-02-09
Payer: COMMERCIAL

## 2023-02-09 PROBLEM — N13.30 HYDRONEPHROSIS: Status: ACTIVE | Noted: 2023-02-09

## 2023-02-09 PROBLEM — E78.5 HYPERLIPIDEMIA: Status: ACTIVE | Noted: 2023-02-09

## 2023-02-09 PROBLEM — M62.89: Status: ACTIVE | Noted: 2023-02-09

## 2023-02-09 PROBLEM — E04.1 THYROID NODULE: Status: ACTIVE | Noted: 2023-02-09

## 2023-02-09 PROBLEM — I82.409 DVT (DEEP VENOUS THROMBOSIS) (HCC): Status: ACTIVE | Noted: 2023-02-09

## 2023-02-09 LAB
ALBUMIN SERPL BCP-MCNC: 4.1 G/DL (ref 3.2–4.9)
ALBUMIN/GLOB SERPL: 1.6 G/DL
ALP SERPL-CCNC: 58 U/L (ref 30–99)
ALT SERPL-CCNC: 10 U/L (ref 2–50)
ANION GAP SERPL CALC-SCNC: 12 MMOL/L (ref 7–16)
APTT PPP: 31.1 SEC (ref 24.7–36)
AST SERPL-CCNC: 14 U/L (ref 12–45)
BASOPHILS # BLD AUTO: 0.4 % (ref 0–1.8)
BASOPHILS # BLD: 0.02 K/UL (ref 0–0.12)
BILIRUB SERPL-MCNC: 0.7 MG/DL (ref 0.1–1.5)
BUN SERPL-MCNC: 15 MG/DL (ref 8–22)
CALCIUM ALBUM COR SERPL-MCNC: 8.8 MG/DL (ref 8.5–10.5)
CALCIUM SERPL-MCNC: 8.9 MG/DL (ref 8.5–10.5)
CFT BLD TEG: 5.5 MIN (ref 4.6–9.1)
CFT P HPASE BLD TEG: 5.5 MIN (ref 4.3–8.3)
CHLORIDE SERPL-SCNC: 99 MMOL/L (ref 96–112)
CLOT ANGLE BLD TEG: 73.5 DEGREES (ref 63–78)
CO2 SERPL-SCNC: 22 MMOL/L (ref 20–33)
CREAT SERPL-MCNC: 0.93 MG/DL (ref 0.5–1.4)
CT.EXTRINSIC BLD ROTEM: 1.2 MIN (ref 0.8–2.1)
EOSINOPHIL # BLD AUTO: 0.02 K/UL (ref 0–0.51)
EOSINOPHIL NFR BLD: 0.4 % (ref 0–6.9)
ERYTHROCYTE [DISTWIDTH] IN BLOOD BY AUTOMATED COUNT: 41.1 FL (ref 35.9–50)
GFR SERPLBLD CREATININE-BSD FMLA CKD-EPI: 70 ML/MIN/1.73 M 2
GLOBULIN SER CALC-MCNC: 2.5 G/DL (ref 1.9–3.5)
GLUCOSE SERPL-MCNC: 132 MG/DL (ref 65–99)
HCT VFR BLD AUTO: 37.3 % (ref 37–47)
HGB BLD-MCNC: 12.7 G/DL (ref 12–16)
IMM GRANULOCYTES # BLD AUTO: 0.04 K/UL (ref 0–0.11)
IMM GRANULOCYTES NFR BLD AUTO: 0.7 % (ref 0–0.9)
INR PPP: 1.19 (ref 0.87–1.13)
LDH SERPL L TO P-CCNC: 210 U/L (ref 107–266)
LYMPHOCYTES # BLD AUTO: 0.86 K/UL (ref 1–4.8)
LYMPHOCYTES NFR BLD: 15.2 % (ref 22–41)
MCF BLD TEG: 60.6 MM (ref 52–69)
MCF.PLATELET INHIB BLD ROTEM: 23.7 MM (ref 15–32)
MCH RBC QN AUTO: 30.4 PG (ref 27–33)
MCHC RBC AUTO-ENTMCNC: 34 G/DL (ref 33.6–35)
MCV RBC AUTO: 89.2 FL (ref 81.4–97.8)
MONOCYTES # BLD AUTO: 0.51 K/UL (ref 0–0.85)
MONOCYTES NFR BLD AUTO: 9 % (ref 0–13.4)
NEUTROPHILS # BLD AUTO: 4.22 K/UL (ref 2–7.15)
NEUTROPHILS NFR BLD: 74.3 % (ref 44–72)
NRBC # BLD AUTO: 0 K/UL
NRBC BLD-RTO: 0 /100 WBC
PA AA BLD-ACNC: 4.3 % (ref 0–11)
PA ADP BLD-ACNC: 2.4 % (ref 0–17)
PLATELET # BLD AUTO: 208 K/UL (ref 164–446)
PMV BLD AUTO: 10.5 FL (ref 9–12.9)
POTASSIUM SERPL-SCNC: 3.6 MMOL/L (ref 3.6–5.5)
PROT SERPL-MCNC: 6.6 G/DL (ref 6–8.2)
PROTHROMBIN TIME: 14.9 SEC (ref 12–14.6)
RBC # BLD AUTO: 4.18 M/UL (ref 4.2–5.4)
SODIUM SERPL-SCNC: 133 MMOL/L (ref 135–145)
TEG ALGORITHM TGALG: NORMAL
UFH PPP CHRO-ACNC: 0.4 IU/ML
UFH PPP CHRO-ACNC: 0.7 IU/ML
WBC # BLD AUTO: 5.7 K/UL (ref 4.8–10.8)

## 2023-02-09 PROCEDURE — 80053 COMPREHEN METABOLIC PANEL: CPT

## 2023-02-09 PROCEDURE — 99223 1ST HOSP IP/OBS HIGH 75: CPT | Mod: GC | Performed by: STUDENT IN AN ORGANIZED HEALTH CARE EDUCATION/TRAINING PROGRAM

## 2023-02-09 PROCEDURE — 85610 PROTHROMBIN TIME: CPT

## 2023-02-09 PROCEDURE — 85025 COMPLETE CBC W/AUTO DIFF WBC: CPT

## 2023-02-09 PROCEDURE — A9579 GAD-BASE MR CONTRAST NOS,1ML: HCPCS

## 2023-02-09 PROCEDURE — 700111 HCHG RX REV CODE 636 W/ 250 OVERRIDE (IP)

## 2023-02-09 PROCEDURE — A9270 NON-COVERED ITEM OR SERVICE: HCPCS

## 2023-02-09 PROCEDURE — 700111 HCHG RX REV CODE 636 W/ 250 OVERRIDE (IP): Performed by: INTERNAL MEDICINE

## 2023-02-09 PROCEDURE — 72197 MRI PELVIS W/O & W/DYE: CPT

## 2023-02-09 PROCEDURE — 85730 THROMBOPLASTIN TIME PARTIAL: CPT

## 2023-02-09 PROCEDURE — 83615 LACTATE (LD) (LDH) ENZYME: CPT

## 2023-02-09 PROCEDURE — 770001 HCHG ROOM/CARE - MED/SURG/GYN PRIV*

## 2023-02-09 PROCEDURE — 85520 HEPARIN ASSAY: CPT

## 2023-02-09 PROCEDURE — 700117 HCHG RX CONTRAST REV CODE 255

## 2023-02-09 PROCEDURE — 36415 COLL VENOUS BLD VENIPUNCTURE: CPT

## 2023-02-09 PROCEDURE — 700102 HCHG RX REV CODE 250 W/ 637 OVERRIDE(OP)

## 2023-02-09 RX ORDER — ATORVASTATIN CALCIUM 20 MG/1
20 TABLET, FILM COATED ORAL
Status: DISCONTINUED | OUTPATIENT
Start: 2023-02-09 | End: 2023-02-17 | Stop reason: HOSPADM

## 2023-02-09 RX ORDER — ONDANSETRON 2 MG/ML
4 INJECTION INTRAMUSCULAR; INTRAVENOUS EVERY 4 HOURS PRN
Status: DISCONTINUED | OUTPATIENT
Start: 2023-02-09 | End: 2023-02-17 | Stop reason: HOSPADM

## 2023-02-09 RX ORDER — ACETAMINOPHEN 500 MG
1000 TABLET ORAL EVERY 6 HOURS PRN
Status: ON HOLD | COMMUNITY
End: 2023-04-11

## 2023-02-09 RX ORDER — ACETAMINOPHEN 500 MG
1000 TABLET ORAL EVERY 6 HOURS PRN
Status: DISCONTINUED | OUTPATIENT
Start: 2023-02-14 | End: 2023-02-17 | Stop reason: HOSPADM

## 2023-02-09 RX ORDER — ATORVASTATIN CALCIUM 20 MG/1
20 TABLET, FILM COATED ORAL
COMMUNITY
Start: 2022-11-22

## 2023-02-09 RX ORDER — POLYETHYLENE GLYCOL 3350 17 G/17G
1 POWDER, FOR SOLUTION ORAL
Status: DISCONTINUED | OUTPATIENT
Start: 2023-02-09 | End: 2023-02-17 | Stop reason: HOSPADM

## 2023-02-09 RX ORDER — BISACODYL 10 MG
10 SUPPOSITORY, RECTAL RECTAL
Status: DISCONTINUED | OUTPATIENT
Start: 2023-02-09 | End: 2023-02-17 | Stop reason: HOSPADM

## 2023-02-09 RX ORDER — HEPARIN SODIUM 1000 [USP'U]/ML
40 INJECTION, SOLUTION INTRAVENOUS; SUBCUTANEOUS PRN
Status: DISCONTINUED | OUTPATIENT
Start: 2023-02-09 | End: 2023-02-14

## 2023-02-09 RX ORDER — OXYCODONE HYDROCHLORIDE 5 MG/1
2.5 TABLET ORAL
Status: DISCONTINUED | OUTPATIENT
Start: 2023-02-09 | End: 2023-02-17 | Stop reason: HOSPADM

## 2023-02-09 RX ORDER — AMOXICILLIN 250 MG
2 CAPSULE ORAL 2 TIMES DAILY
Status: DISCONTINUED | OUTPATIENT
Start: 2023-02-09 | End: 2023-02-17 | Stop reason: HOSPADM

## 2023-02-09 RX ORDER — PROMETHAZINE HYDROCHLORIDE 25 MG/1
12.5-25 TABLET ORAL EVERY 4 HOURS PRN
Status: DISCONTINUED | OUTPATIENT
Start: 2023-02-09 | End: 2023-02-17 | Stop reason: HOSPADM

## 2023-02-09 RX ORDER — HEPARIN SODIUM 1000 [USP'U]/ML
30 INJECTION, SOLUTION INTRAVENOUS; SUBCUTANEOUS PRN
Status: DISCONTINUED | OUTPATIENT
Start: 2023-02-09 | End: 2023-02-09

## 2023-02-09 RX ORDER — HEPARIN SODIUM 5000 [USP'U]/100ML
0-30 INJECTION, SOLUTION INTRAVENOUS CONTINUOUS
Status: DISCONTINUED | OUTPATIENT
Start: 2023-02-09 | End: 2023-02-09

## 2023-02-09 RX ORDER — ONDANSETRON 4 MG/1
4 TABLET, ORALLY DISINTEGRATING ORAL EVERY 4 HOURS PRN
Status: DISCONTINUED | OUTPATIENT
Start: 2023-02-09 | End: 2023-02-17 | Stop reason: HOSPADM

## 2023-02-09 RX ORDER — MORPHINE SULFATE 4 MG/ML
2 INJECTION INTRAVENOUS
Status: DISCONTINUED | OUTPATIENT
Start: 2023-02-09 | End: 2023-02-17 | Stop reason: HOSPADM

## 2023-02-09 RX ORDER — HEPARIN SODIUM 5000 [USP'U]/100ML
0-30 INJECTION, SOLUTION INTRAVENOUS CONTINUOUS
Status: DISCONTINUED | OUTPATIENT
Start: 2023-02-09 | End: 2023-02-14

## 2023-02-09 RX ORDER — ACETAMINOPHEN 500 MG
1000 TABLET ORAL EVERY 6 HOURS
Status: DISPENSED | OUTPATIENT
Start: 2023-02-09 | End: 2023-02-13

## 2023-02-09 RX ORDER — CHLORAL HYDRATE 500 MG
1000 CAPSULE ORAL DAILY
COMMUNITY

## 2023-02-09 RX ORDER — PROMETHAZINE HYDROCHLORIDE 25 MG/1
12.5-25 SUPPOSITORY RECTAL EVERY 4 HOURS PRN
Status: DISCONTINUED | OUTPATIENT
Start: 2023-02-09 | End: 2023-02-17 | Stop reason: HOSPADM

## 2023-02-09 RX ORDER — OXYCODONE HYDROCHLORIDE 5 MG/1
5 TABLET ORAL
Status: DISCONTINUED | OUTPATIENT
Start: 2023-02-09 | End: 2023-02-17 | Stop reason: HOSPADM

## 2023-02-09 RX ORDER — PROCHLORPERAZINE EDISYLATE 5 MG/ML
5-10 INJECTION INTRAMUSCULAR; INTRAVENOUS EVERY 4 HOURS PRN
Status: DISCONTINUED | OUTPATIENT
Start: 2023-02-09 | End: 2023-02-17 | Stop reason: HOSPADM

## 2023-02-09 RX ADMIN — PROMETHAZINE HYDROCHLORIDE 25 MG: 25 TABLET ORAL at 04:59

## 2023-02-09 RX ADMIN — ONDANSETRON 4 MG: 2 INJECTION INTRAMUSCULAR; INTRAVENOUS at 01:20

## 2023-02-09 RX ADMIN — HEPARIN SODIUM 18 UNITS/KG/HR: 5000 INJECTION, SOLUTION INTRAVENOUS at 11:51

## 2023-02-09 RX ADMIN — OXYCODONE HYDROCHLORIDE 5 MG: 5 TABLET ORAL at 02:06

## 2023-02-09 RX ADMIN — MORPHINE SULFATE 2 MG: 4 INJECTION INTRAVENOUS at 04:59

## 2023-02-09 RX ADMIN — ACETAMINOPHEN 1000 MG: 500 TABLET, FILM COATED ORAL at 02:06

## 2023-02-09 RX ADMIN — GADOTERIDOL 13 ML: 279.3 INJECTION, SOLUTION INTRAVENOUS at 03:52

## 2023-02-09 RX ADMIN — ATORVASTATIN CALCIUM 20 MG: 20 TABLET, FILM COATED ORAL at 20:37

## 2023-02-09 RX ADMIN — ACETAMINOPHEN 1000 MG: 500 TABLET, FILM COATED ORAL at 14:28

## 2023-02-09 RX ADMIN — ACETAMINOPHEN 1000 MG: 500 TABLET, FILM COATED ORAL at 05:00

## 2023-02-09 ASSESSMENT — COGNITIVE AND FUNCTIONAL STATUS - GENERAL
DAILY ACTIVITIY SCORE: 18
HELP NEEDED FOR BATHING: A LITTLE
PERSONAL GROOMING: A LITTLE
DRESSING REGULAR UPPER BODY CLOTHING: A LITTLE
WALKING IN HOSPITAL ROOM: A LITTLE
TOILETING: A LITTLE
EATING MEALS: A LITTLE
CLIMB 3 TO 5 STEPS WITH RAILING: A LITTLE
MOVING TO AND FROM BED TO CHAIR: A LITTLE
SUGGESTED CMS G CODE MODIFIER DAILY ACTIVITY: CK
STANDING UP FROM CHAIR USING ARMS: A LITTLE
TURNING FROM BACK TO SIDE WHILE IN FLAT BAD: A LITTLE
MOVING FROM LYING ON BACK TO SITTING ON SIDE OF FLAT BED: A LITTLE
SUGGESTED CMS G CODE MODIFIER MOBILITY: CK
DRESSING REGULAR LOWER BODY CLOTHING: A LITTLE
MOBILITY SCORE: 18

## 2023-02-09 ASSESSMENT — ENCOUNTER SYMPTOMS
WHEEZING: 0
CHILLS: 0
BLURRED VISION: 0
ABDOMINAL PAIN: 1
SHORTNESS OF BREATH: 0
VOMITING: 1
NAUSEA: 1
DOUBLE VISION: 0
FEVER: 0

## 2023-02-09 ASSESSMENT — PATIENT HEALTH QUESTIONNAIRE - PHQ9
2. FEELING DOWN, DEPRESSED, IRRITABLE, OR HOPELESS: NOT AT ALL
SUM OF ALL RESPONSES TO PHQ9 QUESTIONS 1 AND 2: 0
1. LITTLE INTEREST OR PLEASURE IN DOING THINGS: NOT AT ALL

## 2023-02-09 ASSESSMENT — FIBROSIS 4 INDEX
FIB4 SCORE: 1.02
FIB4 SCORE: 1.02
FIB4 SCORE: 1.3

## 2023-02-09 NOTE — ASSESSMENT & PLAN NOTE
Obstructive uropathy from left iliac mass.  Urology was consulted, underwent left nephrostomy tube placement by IR on 2/10/2023.  Urology signed off, follow-up with outpatient urology.  Plan for lymphoma as per above

## 2023-02-09 NOTE — CARE PLAN
The patient is Watcher - Medium risk of patient condition declining or worsening    Shift Goals  Clinical Goals: pain management    Progress made toward(s) clinical / shift goals:    Problem: Knowledge Deficit - Standard  Goal: Patient and family/care givers will demonstrate understanding of plan of care, disease process/condition, diagnostic tests and medications  2/9/2023 0624 by Deirdre Soler R.N.  Outcome: Progressing  2/9/2023 0624 by Deirdre Soler R.N.  Outcome: Progressing     Problem: Pain - Standard  Goal: Alleviation of pain or a reduction in pain to the patient’s comfort goal  Outcome: Progressing  Note: Pain medication per MAR       Patient is not progressing towards the following goals:

## 2023-02-09 NOTE — PROGRESS NOTES
I saw Savana Zarco 62 y.o. female who has a history of dyslipidemia on statin, recent DVT on DOAC (she was on estradiol and progesterone which was stopped), presents with Flank Pain (Left sided flank pain starting today. +Nausea. -Fevers. -Dysuria. )   on 2/8/2023.  At the ED afebrile, hemodynamically stable.  CT Abdomen:  1.  Enlargement of the left psoas and iliacus muscle with subtle hyperdensity. Could are present hematoma or early forming abscess versus intramuscular mass. Could be followed up with MRI of the pelvis with contrast for further delineation and   characterization of structures  2.  Left hydronephrosis and hydroureter without visualized obstructing stone with delayed left nephrogram. Appears likely secondary to associated process of the left iliopsoas muscle. There is calcification on the left which appears likely represent   phlebolith, could possibly represent distal ureteral stone within lower inserted ureter.  CTA Chest no PE  No leukocytosis  Normal Cr-  It was felt that it could be a hematoma thus Eliquis was stopped. MRI ordered to clarify pelvic abnormalities.  MRI resulted:  1.  Multilobular LEFT iliac fossa mass extending into the inguinal region.  Additional separate mesenteric masses present in the pelvis and LEFT mid abdomen.  Primary diagnostic considerations are lymphoma and metastatic leiomyosarcoma, however exact   etiology is uncertain.  2.  Heterogeneous mass in the uterine fundus measuring 5.8 cm, fibroid versus leiomyosarcoma.  3.  Moderate LEFT hydroureteronephrosis with apparent compression of the distal LEFT ureter between iliac fossa mass and uterus.  4.  Multiple mildly prominent LEFT groin lymph nodes, nonspecific.  5.  Edema of LEFT thigh musculature suggests venous compression as well.    A/P.  Principal Problem:    Mass of iliopsoas muscle group, L hydronephrosis, h/o DVT/anticoagulation POA: Yes  Active Problems:    DVT (deep venous thrombosis)  (HCC) POA: Unknown    Thyroid nodule POA: Unknown    Hydronephrosis POA: Unknown    Hyperlipidemia POA: Unknown    I spent a lot of time with patient, consultants.  No hematoma so I restarted anticoagulation with hepa gtt  I called Urology myself who will see patient. They are thinking about recommending nephrostomy tubes.  I called GynOnc myself and reviewed the MRI findings  I updated patient     Time spent:  [id number]  [id number]

## 2023-02-09 NOTE — ASSESSMENT & PLAN NOTE
Left-sided  Diagnosed about 3 weeks ago and started on Eliquis.  Apixaban was on hold due to concern for psoas/iliacus trauma.  No hematoma was noted by IR.  She has been on heparin drip for nephrostomy tube placement.   Switched back to apixaban 2/14  DVT likely related to compression from left iliac mass

## 2023-02-09 NOTE — ASSESSMENT & PLAN NOTE
CTA chest showed a right thyroid nodule with recommendations to follow-up with ultrasound.  Recommend outpatient follow-up

## 2023-02-09 NOTE — H&P
[facility] Internal Medicine History & Physical Note    Date of Service  2/9/2023    [facility] Team: Night team  Attending: Iggy Zurita M.d.  Senior Resident: Dr. Martinez  Contact Number: [phone]    Primary Care Physician  Jose Cruz Smith M.D.    Code Status  Full Code    Chief Complaint  Chief Complaint   Patient presents with    Flank Pain     Left sided flank pain starting today. +Nausea. -Fevers. -Dysuria.        History of Presenting Illness (HPI):          Savana Zarco is a 62 y.o. female with past medical history of hyperlipidemia on atorvastatin, recent left lower extremity DVT diagnosed 01/12/2023 on Eliquis who presented 2/8/2023 with left flank pain.  Patient notes left flank pain started day before admission suddenly. No inciting trauma, initially pain was 3/10 progressing to 9/10 left flak pain prompting her to come into the emergency department. She denies any dysuria, urgency or frequency. She notes occasionally feeling cold, but no fever or chills. She has had nausea for 2 days with 1 episode of non-bloody vomiting in the ED. She notes difficulty with deep inhalation because of the left flank pain. She has not missed any doses of her Eliquis. No palpitations, tachycardia, shortness of breath or chest pain.          In the ED, she was found to be afebrile at 99.6F, heart rate of 72, respiratory rate of 16, blood pressure of 144/88 mmHg, satting 100% on room air. White blood cell count normal at 5.2. Hemoglobin at 13.4. Platelets at 233. Complete metabolic panel unremarkable. CT chest showed no pulmonary embolism. It did show right thyroid nodule with recommendation for follow-up sonography due to size of 1.1 cm. CT abdomen/pelvis showed enlargement of left psoas and iliacus muscle (hematoma vs. Early abscess vs. Intramuscular mass) with left hydronephrosis and hydroureter without evidence of a stone. Patient noted to have abdominal tenderness during exam with tenderness to palpation  and rebound tenderness. Discussed case with on-call surgeon, Dr. Schneider, about need for surgical intervention with concern for hematoma. Recommended further imaging with MRI and with location of mass, patient would not go to surgery if found to be a hematoma. Patient to be admitted for further imaging work-up for left flank pain.    I discussed the plan of care with via Frisian interpretor Harshal ([id number]) and with  nocturnist, Dr. Zurita .    Review of Systems  Review of Systems   Constitutional:  Negative for chills and fever.   Eyes:  Negative for blurred vision and double vision.   Respiratory:  Negative for shortness of breath and wheezing.    Cardiovascular:  Negative for chest pain.   Gastrointestinal:  Positive for abdominal pain, nausea and vomiting.   Genitourinary:  Negative for dysuria and urgency.   All other systems reviewed and are negative.    Past Medical History   has no past medical history on file.  DVT of left leg, started on apixaban in January 2023    Surgical History   has no past surgical history on file.     Family History  family history is not on file.   Denies history of blood clots.     Social History  Tobacco: None.  Alcohol: None.  Recreational drugs (illegal or prescription): None.  Employment:  at [employer]    Allergies  No Known Allergies    Medications  Prior to Admission Medications   Prescriptions Last Dose Informant Patient Reported? Taking?   Pseudoephedrine HCl (SUDAFED 12 HOUR PO)   Yes No   Sig: Take  by mouth.   estradiol (VIVELLE-DOT) 0.075 MG/24HR PATCH BIWEEKLY   Yes No   Sig: Apply 1 Patch to skin as directed See Admin Instructions.   ibuprofen (MOTRIN) 200 MG TABS   Yes No   Sig: Take 200 mg by mouth every 6 hours as needed.   simvastatin (ZOCOR) 10 MG Tab   Yes No   Sig: Take 10 mg by mouth every evening.      Facility-Administered Medications: None       Physical Exam  Temp:  [37.6 °C (99.6 °F)] 37.6 °C (99.6 °F)  Pulse:  [58-76] 66  Resp:  [16]  16  BP: (127-144)/(60-88) 130/67  SpO2:  [94 %-100 %] 98 %  Blood Pressure: 130/67   Temperature: 37.6 °C (99.6 °F)   Pulse: 66   Respiration: 16   Pulse Oximetry: 98 %       Physical Exam  Constitutional:       Appearance: Normal appearance. She is ill-appearing. She is not toxic-appearing.   HENT:      Head: Normocephalic and atraumatic.   Cardiovascular:      Rate and Rhythm: Normal rate and regular rhythm.      Heart sounds: No murmur heard.  Pulmonary:      Effort: Pulmonary effort is normal.      Breath sounds: Normal breath sounds. No wheezing.   Abdominal:      Palpations: Abdomen is soft.      Tenderness: There is abdominal tenderness. There is guarding and rebound.   Musculoskeletal:      Right lower leg: No edema.      Left lower leg: No edema.   Skin:     General: Skin is warm and dry.   Neurological:      Mental Status: She is alert and oriented to person, place, and time. Mental status is at baseline.   Psychiatric:         Mood and Affect: Mood normal.         Behavior: Behavior normal.       Laboratory:  Recent Labs     02/08/23  1606   WBC 5.2   RBC 4.40   HEMOGLOBIN 13.4   HEMATOCRIT 39.3   MCV 89.3   MCH 30.5   MCHC 34.1   RDW 41.1   PLATELETCT 233   MPV 10.2     Recent Labs     02/08/23  1606   SODIUM 138   POTASSIUM 4.2   CHLORIDE 102   CO2 23   GLUCOSE 124*   BUN 17   CREATININE 0.94   CALCIUM 9.4     Recent Labs     02/08/23  1606   ALTSGPT 13   ASTSGOT 14   ALKPHOSPHAT 63   TBILIRUBIN 0.7   LIPASE 17   GLUCOSE 124*     Recent Labs     02/08/23  1903   APTT 32.4   INR 1.09     No results for input(s): NTPROBNP in the last 72 hours.      No results for input(s): TROPONINT in the last 72 hours.    Imaging:  CT-CTA CHEST PULMONARY ARTERY W/ RECONS   Final Result         1.  No pulmonary embolus appreciated.   2.  Right thyroid nodule, recommend follow-up thyroid sonography due to nodule size.      CT-ABDOMEN-PELVIS WITH   Final Result         1.  Enlargement of the left psoas and iliacus muscle  with subtle hyperdensity. Could are present hematoma or early forming abscess versus intramuscular mass. Could be followed up with MRI of the pelvis with contrast for further delineation and    characterization of structures   2.  Left hydronephrosis and hydroureter without visualized obstructing stone with delayed left nephrogram. Appears likely secondary to associated process of the left iliopsoas muscle. There is calcification on the left which appears likely represent    phlebolith, could possibly represent distal ureteral stone within lower inserted ureter.      MR-ABDOMEN-WITH & W/O    (Results Pending)     Assessment/Plan:  Problem Representation:  62 year old woman with past medical history of left leg DVT started on apixaban in past 3 weeks who presents with acute left flank pain. Found to have psoas/iliacus mass (hematoma vs. Abscess vs. Mass) on CT. Admit for pain control and further work-up of left flank pain.     I anticipate this patient will require at least two midnights for appropriate medical management, necessitating inpatient admission because imaging work-up for mass and flank pain    Patient will need a Telemetry bed on MEDICAL service .  The need is secondary to medical work-up for left flank pain and mass.    * Mass of iliopsoas muscle group- (present on admission)  Assessment & Plan  Patient notes acute left flank pain started 1 day prior to admission.   No history of trauma. Recently started on apixaban in last 3 weeks for left leg DVT. With sudden flank pain without obvious obstructing stone also showing hydroureter/hydronephrosis concern for impinging mass.   CT difficult to differentiate between intramuscular mass vs. Hematoma vs. Early abscess. Patient currently afebrile without leukocytosis, abscess less likely. Hematoma possible, though no trauma, but has recently started blood thinner. Patient denies any weight loss recently, though did have recent DVT concerning for possible  malignancy.   Plan:  -MRI abdomen/pelvis stat for further work-up.   -Hold apixaban in setting of possible hematoma.   -Pain control oxycodone and morphine    Hyperlipidemia  Assessment & Plan  -Continue statin    Hydronephrosis  Assessment & Plan  Left sided hydronephrosis and hydroureter on CT.   No stone seen. Likely mass impingement.   -MRI to evaluate mass etiology as above.     Thyroid nodule  Assessment & Plan  right thyroid nodule with recommendation for follow-up sonography due to size of 1.1 cm.    DVT (deep venous thrombosis) (HCC)  Assessment & Plan  DVT of left lower extremity diagnosed 3 weeks ago. No pulmonary embolism on CT scan today. Concern for psoas/iliacus hematoma per CT scan.   Plan:  -Hold apixaban  -MRI as above for further evaluation of mass before restarting anticoagulation for DVT        VTE prophylaxis: SCDs/TEDs

## 2023-02-09 NOTE — ED PROVIDER NOTES
"ED Provider Note    CHIEF COMPLAINT  Chief Complaint   Patient presents with    Flank Pain     Left sided flank pain starting today. +Nausea. -Fevers. -Dysuria.        EXTERNAL RECORDS REVIEWED  Outpatient labs & studies ultrasound of the left leg positive for DVT from January 12, 2023    HPI/ROS  LIMITATION TO HISTORY   Select: : None  OUTSIDE HISTORIAN(S):  Family at bedside to help with history    Savana Zarco is a 62 y.o. female who presents with left flank pain and nausea onset today.  Medical history significant for recent DVT currently taking Eliquis as an anticoagulant.  Pain in the left flank increases with breath.  It also is worse with movement.  She denies trauma.  No abnormal bruising.  No hematuria or dysuria, no fever.  Patient denies similar discomfort in the past.    PAST MEDICAL HISTORY   DVT left leg    SURGICAL HISTORY  patient denies any surgical history    FAMILY HISTORY  History reviewed. No pertinent family history.    SOCIAL HISTORY  Social History     Tobacco Use    Smoking status: Never    Smokeless tobacco: Never   Vaping Use    Vaping Use: Never used   Substance and Sexual Activity    Alcohol use: Not Currently    Drug use: Never    Sexual activity: Not on file       CURRENT MEDICATIONS  Home Medications       Reviewed by Sunshine Echeverria R.N. (Registered Nurse) on 02/08/23 at 1558  Med List Status: Not Addressed     Medication Last Dose Status   estradiol (VIVELLE-DOT) 0.075 MG/24HR PATCH BIWEEKLY  Active   ibuprofen (MOTRIN) 200 MG TABS  Active   Pseudoephedrine HCl (SUDAFED 12 HOUR PO)  Active   simvastatin (ZOCOR) 10 MG Tab  Active                    ALLERGIES  No Known Allergies    PHYSICAL EXAM  VITAL SIGNS: BP (!) 144/88   Pulse 72   Temp 37.6 °C (99.6 °F) (Temporal)   Resp 16   Ht 1.575 m (5' 2\")   Wt 61.4 kg (135 lb 5.8 oz)   SpO2 100%   BMI 24.76 kg/m²    Skin: Swelling and mild discoloration left leg she states improved since onset of the DVT.  No " cellulitis, no shingles, no flank bruising  Musculoskeletal: Left flank tenderness.  Vascular: Normal pulse left foot  Respiratory: Clear lung sounds, no crackles or wheezing  Cardiac: Heart is regular no murmur  GI: Abdomen soft, nontender, no guarding  Psychiatric: Patient is calm and cooperative    DIAGNOSTIC STUDIES / PROCEDURES    LABS  Results for orders placed or performed during the hospital encounter of 02/08/23   CBC with Differential   Result Value Ref Range    WBC 5.2 4.8 - 10.8 K/uL    RBC 4.40 4.20 - 5.40 M/uL    Hemoglobin 13.4 12.0 - 16.0 g/dL    Hematocrit 39.3 37.0 - 47.0 %    MCV 89.3 81.4 - 97.8 fL    MCH 30.5 27.0 - 33.0 pg    MCHC 34.1 33.6 - 35.0 g/dL    RDW 41.1 35.9 - 50.0 fL    Platelet Count 233 164 - 446 K/uL    MPV 10.2 9.0 - 12.9 fL    Neutrophils-Polys 66.90 44.00 - 72.00 %    Lymphocytes 22.90 22.00 - 41.00 %    Monocytes 7.90 0.00 - 13.40 %    Eosinophils 1.50 0.00 - 6.90 %    Basophils 0.60 0.00 - 1.80 %    Immature Granulocytes 0.20 0.00 - 0.90 %    Nucleated RBC 0.00 /100 WBC    Neutrophils (Absolute) 3.48 2.00 - 7.15 K/uL    Lymphs (Absolute) 1.19 1.00 - 4.80 K/uL    Monos (Absolute) 0.41 0.00 - 0.85 K/uL    Eos (Absolute) 0.08 0.00 - 0.51 K/uL    Baso (Absolute) 0.03 0.00 - 0.12 K/uL    Immature Granulocytes (abs) 0.01 0.00 - 0.11 K/uL    NRBC (Absolute) 0.00 K/uL   Complete Metabolic Panel   Result Value Ref Range    Sodium 138 135 - 145 mmol/L    Potassium 4.2 3.6 - 5.5 mmol/L    Chloride 102 96 - 112 mmol/L    Co2 23 20 - 33 mmol/L    Anion Gap 13.0 7.0 - 16.0    Glucose 124 (H) 65 - 99 mg/dL    Bun 17 8 - 22 mg/dL    Creatinine 0.94 0.50 - 1.40 mg/dL    Calcium 9.4 8.5 - 10.5 mg/dL    AST(SGOT) 14 12 - 45 U/L    ALT(SGPT) 13 2 - 50 U/L    Alkaline Phosphatase 63 30 - 99 U/L    Total Bilirubin 0.7 0.1 - 1.5 mg/dL    Albumin 4.1 3.2 - 4.9 g/dL    Total Protein 7.1 6.0 - 8.2 g/dL    Globulin 3.0 1.9 - 3.5 g/dL    A-G Ratio 1.4 g/dL   Lipase   Result Value Ref Range    Lipase 17  11 - 82 U/L   Urinalysis    Specimen: Urine, Clean Catch   Result Value Ref Range    Color Yellow     Character Cloudy (A)     Specific Gravity 1.011 <1.035    Ph 7.5 5.0 - 8.0    Glucose Negative Negative mg/dL    Ketones Negative Negative mg/dL    Protein Negative Negative mg/dL    Bilirubin Negative Negative    Urobilinogen, Urine 0.2 Negative    Nitrite Negative Negative    Leukocyte Esterase Moderate (A) Negative    Occult Blood Negative Negative    Micro Urine Req Microscopic    CORRECTED CALCIUM   Result Value Ref Range    Correct Calcium 9.3 8.5 - 10.5 mg/dL   ESTIMATED GFR   Result Value Ref Range    GFR (CKD-EPI) 69 >60 mL/min/1.73 m 2   URINE MICROSCOPIC (W/UA)   Result Value Ref Range    WBC 2-5 /hpf    RBC 0-2 /hpf    Bacteria Negative None /hpf    Epithelial Cells Few /hpf    Hyaline Cast 0-2 /lpf   APTT   Result Value Ref Range    APTT 32.4 24.7 - 36.0 sec   PROTHROMBIN TIME (INR)   Result Value Ref Range    PT 14.0 12.0 - 14.6 sec    INR 1.09 0.87 - 1.13         RADIOLOGY    Radiologist interpretation:   CT-CTA CHEST PULMONARY ARTERY W/ RECONS   Final Result         1.  No pulmonary embolus appreciated.   2.  Right thyroid nodule, recommend follow-up thyroid sonography due to nodule size.      CT-ABDOMEN-PELVIS WITH   Final Result         1.  Enlargement of the left psoas and iliacus muscle with subtle hyperdensity. Could are present hematoma or early forming abscess versus intramuscular mass. Could be followed up with MRI of the pelvis with contrast for further delineation and    characterization of structures   2.  Left hydronephrosis and hydroureter without visualized obstructing stone with delayed left nephrogram. Appears likely secondary to associated process of the left iliopsoas muscle. There is calcification on the left which appears likely represent    phlebolith, could possibly represent distal ureteral stone within lower inserted ureter.            COURSE & MEDICAL DECISION MAKING    ED  Observation Status? Yes; I am placing the patient in to an observation status due to a diagnostic uncertainty as well as therapeutic intensity. Patient placed in observation status at 6:26 PM, 2/8/2023.     Observation plan is as follows: Serial abdominal exam, CT imaging, blood work    Upon Reevaluation, the patient's condition has: not improved; and will be escalated to hospitalization.    Patient discharged from ED Observation status at 11 PM (Time) February 2022 (Date).     INITIAL ASSESSMENT, COURSE AND PLAN  Care Narrative: Patient presents with left flank pain of unknown etiology.  There is a pleuritic component to the pain, concerning given recent DVT.  She states she is compliant with her medication, Eliquis.  CT scan of the chest with contrast was negative for PE.  Also on blood thinners, acute atraumatic flank pain, concerning for retroperitoneal process, CT scan of the abdomen pelvis with IV contrast obtained revealing possible iliopsoas mass or abscess with associated hydroureter of unknown etiology.  No obvious stone.  Patient has normal white blood cell count, normal pulse and blood pressure, no evidence of sepsis.  Radiology has recommended MRI with contrast to better delineate the problem.  Case discussed with R internal medicine admitter's.  Patient is requiring repeat doses of morphine for pain control.        ADDITIONAL PROBLEM LIST  Hydroureter, potentially involved with iliopsoas mass, pending MRI evaluation    Flank pain: Likely secondary to iliopsoas mass, treated with morphine      DISPOSITION AND DISCUSSIONS  I have discussed management of the patient with the following physicians and WOO's: Hospitalist admission service      FINAL DIAGNOSIS  1. Flank pain    2. Mass of iliopsoas muscle group    3. Hydroureter           Electronically signed by: Zoran Richardson M.D., 2/8/2023 6:26 PM

## 2023-02-09 NOTE — ASSESSMENT & PLAN NOTE
MRI pelvis from 2/9/2023 showed a multilobular left iliac fossa mass extending into the inguinal region, with additional separate mesenteric masses in the pelvis and left mid abdomen.  She underwent left pelvic external peritoneal mass biopsy by IR on 2/10/2023.  Pathology showed high-grade B-cell lymphoma  Oncology consulted  Staging CTs reviewed  Status post bone marrow biopsy 2/16

## 2023-02-09 NOTE — DISCHARGE PLANNING
Case Management Discharge Planning    Admission Date: 2/8/2023  GMLOS: 2.5  ALOS: 0    6-Clicks ADL Score: 18  6-Clicks Mobility Score: 18      Anticipated Discharge Dispo: Discharge Disposition: Discharged to home/self care (01)    Medically Clear: No    Next Steps: f/u with pt and medical team to discuss dc needs and barriers.    Barriers to Discharge: Medical clearance    RNCM spoke to pt and son bedside for assessment info: pt states she'll need a work note at DC. Pt son states he will provide transportation to home at DC.    Care Transition Team Assessment    Information Source  Information Given By: Patient  Who is responsible for making decisions for patient? : Patient    Readmission Evaluation  Is this a readmission?: No    Elopement Risk  Legal Hold: No  Ambulatory or Self Mobile in Wheelchair: Yes    Interdisciplinary Discharge Planning  Primary Care Physician: Jose Cruz Miller  Support Systems: Children    Discharge Preparedness  What is your plan after discharge?: Home with help  What are your discharge supports?: Child  Prior Functional Level: Ambulatory, Independent with Activities of Daily Living, Independent with Medication Management, Drives Self  Difficulity with ADLs: None  Difficulity with IADLs: None    Functional Assesment  Prior Functional Level: Ambulatory, Independent with Activities of Daily Living, Independent with Medication Management, Drives Self    Finances  Financial Barriers to Discharge: No  Prescription Coverage: Yes    Vision / Hearing Impairment  Right Eye Vision: Wears Glasses  Left Eye Vision: Wears Glasses    Advance Directive  Advance Directive?: None    Domestic Abuse  Have you ever been the victim of abuse or violence?: No  Physical Abuse or Sexual Abuse: No  Verbal Abuse or Emotional Abuse: No    Psychological Assessment  History of Substance Abuse: None  History of Psychiatric Problems: No    Discharge Risks or Barriers  Discharge risks or barriers?: Complex medical  needs  Patient risk factors: Complex medical needs    Anticipated Discharge Information  Discharge Disposition: Discharged to home/self care (01)

## 2023-02-09 NOTE — PROGRESS NOTES
4 Eyes Skin Assessment Completed by RUSS Gilmore and DEWAYNE Ewing.    Head WDL  Ears WDL  Nose WDL  Mouth WDL  Neck WDL  Breast/Chest WDL  Shoulder Blades WDL  Spine WDL  (R) Arm/Elbow/Hand WDL  (L) Arm/Elbow/Hand WDL  Abdomen WDL  Groin WDL  Scrotum/Coccyx/Buttocks WDL  (R) Leg WDL  (L) Leg WDL  (R) Heel/Foot/Toe WDL  (L) Heel/Foot/Toe WDL          Devices In Places ECG, Tele Box, Blood Pressure Cuff, and Pulse Ox      Interventions In Place N/A    Possible Skin Injury No    Pictures Uploaded Into Epic N/A  Wound Consult Placed N/A  RN Wound Prevention Protocol Ordered No

## 2023-02-09 NOTE — ED NOTES
Med Rec complete per patient with family @ bedside  No oral antibiotics in the last 30 days   Allergies reviewed  Preferred Pharmacy: [facility] on [facility]

## 2023-02-10 ENCOUNTER — APPOINTMENT (OUTPATIENT)
Dept: RADIOLOGY | Facility: MEDICAL CENTER | Age: 63
DRG: 841 | End: 2023-02-10
Attending: STUDENT IN AN ORGANIZED HEALTH CARE EDUCATION/TRAINING PROGRAM
Payer: COMMERCIAL

## 2023-02-10 ENCOUNTER — APPOINTMENT (OUTPATIENT)
Dept: RADIOLOGY | Facility: MEDICAL CENTER | Age: 63
DRG: 841 | End: 2023-02-10
Attending: PHYSICIAN ASSISTANT
Payer: COMMERCIAL

## 2023-02-10 LAB
ALBUMIN SERPL BCP-MCNC: 4 G/DL (ref 3.2–4.9)
BUN SERPL-MCNC: 15 MG/DL (ref 8–22)
CALCIUM ALBUM COR SERPL-MCNC: 8.7 MG/DL (ref 8.5–10.5)
CALCIUM SERPL-MCNC: 8.7 MG/DL (ref 8.5–10.5)
CHLORIDE SERPL-SCNC: 105 MMOL/L (ref 96–112)
CO2 SERPL-SCNC: 26 MMOL/L (ref 20–33)
CREAT SERPL-MCNC: 0.9 MG/DL (ref 0.5–1.4)
ERYTHROCYTE [DISTWIDTH] IN BLOOD BY AUTOMATED COUNT: 42.7 FL (ref 35.9–50)
GFR SERPLBLD CREATININE-BSD FMLA CKD-EPI: 72 ML/MIN/1.73 M 2
GLUCOSE SERPL-MCNC: 106 MG/DL (ref 65–99)
HCT VFR BLD AUTO: 37.8 % (ref 37–47)
HGB BLD-MCNC: 12.5 G/DL (ref 12–16)
LACTATE SERPL-SCNC: 0.8 MMOL/L (ref 0.5–2)
LDH SERPL L TO P-CCNC: 176 U/L (ref 107–266)
MCH RBC QN AUTO: 30.4 PG (ref 27–33)
MCHC RBC AUTO-ENTMCNC: 33.1 G/DL (ref 33.6–35)
MCV RBC AUTO: 92 FL (ref 81.4–97.8)
PHOSPHATE SERPL-MCNC: 4.2 MG/DL (ref 2.5–4.5)
PLATELET # BLD AUTO: 216 K/UL (ref 164–446)
PMV BLD AUTO: 10.2 FL (ref 9–12.9)
POTASSIUM SERPL-SCNC: 4.2 MMOL/L (ref 3.6–5.5)
RBC # BLD AUTO: 4.11 M/UL (ref 4.2–5.4)
SODIUM SERPL-SCNC: 140 MMOL/L (ref 135–145)
UFH PPP CHRO-ACNC: 0.68 IU/ML
WBC # BLD AUTO: 4 K/UL (ref 4.8–10.8)

## 2023-02-10 PROCEDURE — 85027 COMPLETE CBC AUTOMATED: CPT

## 2023-02-10 PROCEDURE — 99232 SBSQ HOSP IP/OBS MODERATE 35: CPT | Performed by: INTERNAL MEDICINE

## 2023-02-10 PROCEDURE — 700102 HCHG RX REV CODE 250 W/ 637 OVERRIDE(OP)

## 2023-02-10 PROCEDURE — 77012 CT SCAN FOR NEEDLE BIOPSY: CPT

## 2023-02-10 PROCEDURE — 85520 HEPARIN ASSAY: CPT

## 2023-02-10 PROCEDURE — 88360 TUMOR IMMUNOHISTOCHEM/MANUAL: CPT

## 2023-02-10 PROCEDURE — 700111 HCHG RX REV CODE 636 W/ 250 OVERRIDE (IP): Performed by: INTERNAL MEDICINE

## 2023-02-10 PROCEDURE — 88305 TISSUE EXAM BY PATHOLOGIST: CPT

## 2023-02-10 PROCEDURE — 700111 HCHG RX REV CODE 636 W/ 250 OVERRIDE (IP)

## 2023-02-10 PROCEDURE — 770001 HCHG ROOM/CARE - MED/SURG/GYN PRIV*

## 2023-02-10 PROCEDURE — 700117 HCHG RX CONTRAST REV CODE 255: Performed by: PHYSICIAN ASSISTANT

## 2023-02-10 PROCEDURE — 83615 LACTATE (LD) (LDH) ENZYME: CPT

## 2023-02-10 PROCEDURE — 88341 IMHCHEM/IMCYTCHM EA ADD ANTB: CPT | Mod: 91

## 2023-02-10 PROCEDURE — 80069 RENAL FUNCTION PANEL: CPT

## 2023-02-10 PROCEDURE — 700111 HCHG RX REV CODE 636 W/ 250 OVERRIDE (IP): Performed by: RADIOLOGY

## 2023-02-10 PROCEDURE — 83605 ASSAY OF LACTIC ACID: CPT

## 2023-02-10 PROCEDURE — 36415 COLL VENOUS BLD VENIPUNCTURE: CPT

## 2023-02-10 PROCEDURE — 99153 MOD SED SAME PHYS/QHP EA: CPT

## 2023-02-10 PROCEDURE — A9270 NON-COVERED ITEM OR SERVICE: HCPCS

## 2023-02-10 PROCEDURE — 0T9430Z DRAINAGE OF LEFT KIDNEY PELVIS WITH DRAINAGE DEVICE, PERCUTANEOUS APPROACH: ICD-10-PCS | Performed by: RADIOLOGY

## 2023-02-10 PROCEDURE — 0WBH3ZX EXCISION OF RETROPERITONEUM, PERCUTANEOUS APPROACH, DIAGNOSTIC: ICD-10-PCS | Performed by: RADIOLOGY

## 2023-02-10 PROCEDURE — 88342 IMHCHEM/IMCYTCHM 1ST ANTB: CPT

## 2023-02-10 RX ORDER — ONDANSETRON 2 MG/ML
4 INJECTION INTRAMUSCULAR; INTRAVENOUS PRN
Status: ACTIVE | OUTPATIENT
Start: 2023-02-10 | End: 2023-02-10

## 2023-02-10 RX ORDER — MIDAZOLAM HYDROCHLORIDE 1 MG/ML
.5-2 INJECTION INTRAMUSCULAR; INTRAVENOUS PRN
Status: ACTIVE | OUTPATIENT
Start: 2023-02-10 | End: 2023-02-10

## 2023-02-10 RX ORDER — MIDAZOLAM HYDROCHLORIDE 1 MG/ML
INJECTION INTRAMUSCULAR; INTRAVENOUS
Status: COMPLETED
Start: 2023-02-10 | End: 2023-02-10

## 2023-02-10 RX ORDER — SODIUM CHLORIDE 9 MG/ML
500 INJECTION, SOLUTION INTRAVENOUS
Status: ACTIVE | OUTPATIENT
Start: 2023-02-10 | End: 2023-02-10

## 2023-02-10 RX ADMIN — FENTANYL CITRATE 50 MCG: 50 INJECTION, SOLUTION INTRAMUSCULAR; INTRAVENOUS at 17:03

## 2023-02-10 RX ADMIN — MIDAZOLAM HYDROCHLORIDE 2 MG: 1 INJECTION, SOLUTION INTRAMUSCULAR; INTRAVENOUS at 17:03

## 2023-02-10 RX ADMIN — FENTANYL CITRATE 25 MCG: 50 INJECTION, SOLUTION INTRAMUSCULAR; INTRAVENOUS at 17:07

## 2023-02-10 RX ADMIN — FENTANYL CITRATE 50 MCG: 50 INJECTION, SOLUTION INTRAMUSCULAR; INTRAVENOUS at 17:58

## 2023-02-10 RX ADMIN — CEFTRIAXONE SODIUM 2000 MG: 10 INJECTION, POWDER, FOR SOLUTION INTRAVENOUS at 20:20

## 2023-02-10 RX ADMIN — ACETAMINOPHEN 1000 MG: 500 TABLET, FILM COATED ORAL at 18:46

## 2023-02-10 RX ADMIN — MIDAZOLAM HYDROCHLORIDE 1 MG: 1 INJECTION, SOLUTION INTRAMUSCULAR; INTRAVENOUS at 17:13

## 2023-02-10 RX ADMIN — FENTANYL CITRATE 25 MCG: 50 INJECTION, SOLUTION INTRAMUSCULAR; INTRAVENOUS at 17:13

## 2023-02-10 RX ADMIN — IOHEXOL 10 ML: 300 INJECTION, SOLUTION INTRAVENOUS at 19:45

## 2023-02-10 RX ADMIN — HEPARIN SODIUM 18 UNITS/KG/HR: 5000 INJECTION, SOLUTION INTRAVENOUS at 13:43

## 2023-02-10 RX ADMIN — MIDAZOLAM HYDROCHLORIDE 1 MG: 1 INJECTION, SOLUTION INTRAMUSCULAR; INTRAVENOUS at 17:07

## 2023-02-10 RX ADMIN — ATORVASTATIN CALCIUM 20 MG: 20 TABLET, FILM COATED ORAL at 20:21

## 2023-02-10 ASSESSMENT — ENCOUNTER SYMPTOMS
INSOMNIA: 0
TREMORS: 0
FLANK PAIN: 1
ABDOMINAL PAIN: 0
PALPITATIONS: 0
HEADACHES: 0
NAUSEA: 0
BLOOD IN STOOL: 0
CHILLS: 0
WEAKNESS: 0
COUGH: 0
FALLS: 0
WHEEZING: 0
MYALGIAS: 0
DIARRHEA: 0
EYE PAIN: 0
VOMITING: 0
SEIZURES: 0
SHORTNESS OF BREATH: 0
LOSS OF CONSCIOUSNESS: 0
NERVOUS/ANXIOUS: 0
DIZZINESS: 0
HEMOPTYSIS: 0
CONSTIPATION: 0
EYE REDNESS: 0
FOCAL WEAKNESS: 0
ABDOMINAL PAIN: 1
FEVER: 0

## 2023-02-10 ASSESSMENT — FIBROSIS 4 INDEX: FIB4 SCORE: 1.25

## 2023-02-10 NOTE — PROGRESS NOTES
[facility] Medicine Daily Progress Note    Date of Service  2/10/2023    Chief Complaint  Savana Zarco is a 62 y.o. female admitted 2/8/2023 with Flank Pain (Left sided flank pain starting today. +Nausea. -Fevers. -Dysuria. )        Hospital Course  No notes on file  I saw Savana Zarco 62 y.o. female who has a history of dyslipidemia on statin, recent DVT on DOAC (she was on estradiol and progesterone which was stopped), presents with Flank Pain (Left sided flank pain starting today. +Nausea. -Fevers. -Dysuria. )   on 2/8/2023.  At the ED afebrile, hemodynamically stable.  CT Abdomen:  1.  Enlargement of the left psoas and iliacus muscle with subtle hyperdensity. Could are present hematoma or early forming abscess versus intramuscular mass. Could be followed up with MRI of the pelvis with contrast for further delineation and   characterization of structures  2.  Left hydronephrosis and hydroureter without visualized obstructing stone with delayed left nephrogram. Appears likely secondary to associated process of the left iliopsoas muscle. There is calcification on the left which appears likely represent   phlebolith, could possibly represent distal ureteral stone within lower inserted ureter.  CTA Chest no PE  No leukocytosis  Normal Cr-  It was felt that it could be a hematoma thus Eliquis was stopped. MRI ordered to clarify pelvic abnormalities.  MRI resulted:  1.  Multilobular LEFT iliac fossa mass extending into the inguinal region.  Additional separate mesenteric masses present in the pelvis and LEFT mid abdomen.  Primary diagnostic considerations are lymphoma and metastatic leiomyosarcoma, however exact   etiology is uncertain.  2.  Heterogeneous mass in the uterine fundus measuring 5.8 cm, fibroid versus leiomyosarcoma.  3.  Moderate LEFT hydroureteronephrosis with apparent compression of the distal LEFT ureter between iliac fossa mass and uterus.  4.  Multiple mildly  prominent LEFT groin lymph nodes, nonspecific.  5.  Edema of LEFT thigh musculature suggests venous compression as well.     Principal Problem:    Mass of iliopsoas muscle group, L hydronephrosis, h/o DVT/anticoagulation POA: Yes  Active Problems:    DVT (deep venous thrombosis) (HCC) POA: Unknown    Thyroid nodule POA: Unknown    Hydronephrosis POA: Unknown    Hyperlipidemia POA: Unknown       No hematoma so I restarted anticoagulation with hepa gtt  I called Urology myself who will see patient. They are thinking about recommending nephrostomy tubes.  I called GynOnc myself and reviewed the MRI findings    Interval Problem Update  2/10. Biopsy of mass scheduled today and L nephrostomy tube tomorrow.  Patient otherwise no other issues or concerns.    I have discussed this patient's plan of care and discharge plan at IDT rounds today with Case Management, Nursing, Nursing leadership, and other members of the IDT team.    Consultants/Specialty  Gyn Onc  Urology  IR    Code Status  Full Code    Disposition  Patient is not medically cleared for discharge.   Anticipate discharge to to home with close outpatient follow-up.  I have placed the appropriate orders for post-discharge needs.    Review of Systems  Review of Systems   Constitutional:  Negative for chills and fever.   HENT:  Negative for congestion, hearing loss and nosebleeds.    Eyes:  Negative for pain and redness.   Respiratory:  Negative for cough, hemoptysis, shortness of breath and wheezing.    Cardiovascular:  Negative for chest pain and palpitations.   Gastrointestinal:  Negative for abdominal pain, blood in stool, constipation, diarrhea, nausea and vomiting.   Genitourinary:  Positive for flank pain. Negative for dysuria, frequency and hematuria.   Musculoskeletal:  Negative for falls, joint pain and myalgias.   Skin:  Negative for rash.   Neurological:  Negative for dizziness, tremors, focal weakness, seizures, loss of consciousness, weakness and  headaches.   Psychiatric/Behavioral:  The patient is not nervous/anxious and does not have insomnia.    All other systems reviewed and are negative.     Physical Exam  Temp:  [36 °C (96.8 °F)-36.8 °C (98.2 °F)] 36.8 °C (98.2 °F)  Pulse:  [61-68] 61  Resp:  [15-18] 15  BP: (102-127)/(46-79) 103/56  SpO2:  [92 %-99 %] 95 %    Physical Exam  Vitals and nursing note reviewed.   Constitutional:       General: She is not in acute distress.  HENT:      Head: Normocephalic and atraumatic.      Right Ear: External ear normal.      Left Ear: External ear normal.      Nose: Nose normal.      Mouth/Throat:      Mouth: Mucous membranes are moist.   Eyes:      General: No scleral icterus.     Conjunctiva/sclera: Conjunctivae normal.   Cardiovascular:      Rate and Rhythm: Normal rate and regular rhythm.      Pulses: Normal pulses.      Heart sounds: No murmur heard.    No friction rub. No gallop.   Pulmonary:      Effort: Pulmonary effort is normal. No respiratory distress.      Breath sounds: Normal breath sounds. No stridor. No wheezing, rhonchi or rales.   Chest:      Chest wall: No tenderness.   Abdominal:      General: Abdomen is flat. Bowel sounds are normal. There is no distension.      Palpations: Abdomen is soft.      Tenderness: There is no abdominal tenderness. There is no guarding or rebound.   Musculoskeletal:         General: No swelling, tenderness or deformity. Normal range of motion.      Cervical back: Normal range of motion and neck supple. No rigidity.   Skin:     General: Skin is warm.      Coloration: Skin is not jaundiced.   Neurological:      General: No focal deficit present.      Mental Status: She is alert and oriented to person, place, and time. Mental status is at baseline.   Psychiatric:         Mood and Affect: Mood normal.         Behavior: Behavior normal.         Thought Content: Thought content normal.         Judgment: Judgment normal.       Fluids    Intake/Output Summary (Last 24 hours) at  2/10/2023 0759  Last data filed at 2/10/2023 0500  Gross per 24 hour   Intake 600 ml   Output 300 ml   Net 300 ml       Laboratory  Recent Labs     02/08/23  1606 02/09/23  0225 02/10/23  0010   WBC 5.2 5.7 4.0*   RBC 4.40 4.18* 4.11*   HEMOGLOBIN 13.4 12.7 12.5   HEMATOCRIT 39.3 37.3 37.8   MCV 89.3 89.2 92.0   MCH 30.5 30.4 30.4   MCHC 34.1 34.0 33.1*   RDW 41.1 41.1 42.7   PLATELETCT 233 208 216   MPV 10.2 10.5 10.2     Recent Labs     02/08/23  1606 02/09/23  0225 02/10/23  0010   SODIUM 138 133* 140   POTASSIUM 4.2 3.6 4.2   CHLORIDE 102 99 105   CO2 23 22 26   GLUCOSE 124* 132* 106*   BUN 17 15 15   CREATININE 0.94 0.93 0.90   CALCIUM 9.4 8.9 8.7     Recent Labs     02/08/23  1903 02/09/23  1010   APTT 32.4 31.1   INR 1.09 1.19*               Imaging  MR-PELVIS-WITH & W/O AND SEQUENCES   Final Result      1.  Multilobular LEFT iliac fossa mass extending into the inguinal region.  Additional separate mesenteric masses present in the pelvis and LEFT mid abdomen.  Primary diagnostic considerations are lymphoma and metastatic leiomyosarcoma, however exact    etiology is uncertain.   2.  Heterogeneous mass in the uterine fundus measuring 5.8 cm, fibroid versus leiomyosarcoma.   3.  Moderate LEFT hydroureteronephrosis with apparent compression of the distal LEFT ureter between iliac fossa mass and uterus.   4.  Multiple mildly prominent LEFT groin lymph nodes, nonspecific.   5.  Edema of LEFT thigh musculature suggests venous compression as well.      CT-CTA CHEST PULMONARY ARTERY W/ RECONS   Final Result         1.  No pulmonary embolus appreciated.   2.  Right thyroid nodule, recommend follow-up thyroid sonography due to nodule size.      CT-ABDOMEN-PELVIS WITH   Final Result         1.  Enlargement of the left psoas and iliacus muscle with subtle hyperdensity. Could are present hematoma or early forming abscess versus intramuscular mass. Could be followed up with MRI of the pelvis with contrast for further  "delineation and    characterization of structures   2.  Left hydronephrosis and hydroureter without visualized obstructing stone with delayed left nephrogram. Appears likely secondary to associated process of the left iliopsoas muscle. There is calcification on the left which appears likely represent    phlebolith, could possibly represent distal ureteral stone within lower inserted ureter.      IR-PERQ NEPH CATH NEW ACCESS (ALL RADIOLOGY) LEFT    (Results Pending)   IR-NEEDLE BX-ABD-RETROPERITONEAL    (Results Pending)        Assessment/Plan  * Mass of iliopsoas muscle group, L hydronephrosis, h/o DVT/anticoagulation- (present on admission)  Assessment & Plan  Patient notes acute left flank pain started 1 day prior to admission.   No history of trauma. Recently started on apixaban in last 3 weeks for left leg DVT. With sudden flank pain without obvious obstructing stone also showing hydroureter/hydronephrosis concern for impinging mass.   CT difficult to differentiate between intramuscular mass vs. Hematoma vs. Early abscess. Patient currently afebrile without leukocytosis, abscess less likely. Hematoma possible, though no trauma, but has recently started blood thinner. Patient denies any weight loss recently, though did have recent DVT concerning for possible malignancy.   Plan:  -MRI abdomen/pelvis stat for further work-up.   -Hold apixaban in setting of possible hematoma.   -Pain control oxycodone and morphine\"    Biopsy of uterine mass today  L nephrostomy tube planned for tomorrow  Urinalysis came back with leuk est, given hydronephrosis and infiltrating mass I will cover empirically with antibiotics.    Hyperlipidemia  Assessment & Plan  -Continue statin    Hydronephrosis  Assessment & Plan  Left sided hydronephrosis and hydroureter on CT.   No stone seen. Likely mass impingement.   -MRI to evaluate mass etiology as above. \"    I consulted Urology  L nephrostomytube planned tomorrow.    Thyroid " "nodule  Assessment & Plan  right thyroid nodule with recommendation for follow-up sonography due to size of 1.1 cm.    DVT (deep venous thrombosis) (HCC)  Assessment & Plan  DVT of left lower extremity diagnosed 3 weeks ago. No pulmonary embolism on CT scan today. Concern for psoas/iliacus hematoma per CT scan.   Plan:  -Hold apixaban  -MRI as above for further evaluation of mass before restarting anticoagulation for DVT\"    No hematoma.  Will need acute anticoagulation so I started heparin gtt bridge which can be held a few hours before procedure.         VTE prophylaxis: therapeutic anticoagulation with hepa gtt    I have performed a physical exam and reviewed and updated ROS and Plan today (2/10/2023). In review of yesterday's note (2/9/2023), there are no changes except as documented above.        "

## 2023-02-10 NOTE — CONSULTS
[facility] Consult/H&P Note      Attending: Tiburcio Falcon M.D.  Patient's Name/MRN: Savana Zarco, [id number]    Admit Date:2/8/2023  Today's Date: 2/10/2023   Length of stay:  LOS: 1 day   Room #: T713/02      Reason for consult/chief complaint: left flank pain  ID/HPI: Savana Zarco is a 62 y.o. female patient presented to the ED 2/8 for worsening 9/10 left flank pain that began a few days prior. CT scan at admission demonstrated enlargement of left psoas and iliacus muscle concerning how abscess versus mass. An MRI was obtained for further evaluation and revealed a multilobular left iliac fossa mass extending into the inguinal region.  Additional separate mesenteric masses present in the pelvis and left abdomen, a heterogeneous mass in the uterine fundus measuring 5.8 cm, fibroid versus leiomyosarcoma, multiple mildly prominent left pelvic lymph nodes and moderate left hydroureteronephrosis with apparent compression of the distal left ureter between iliac fossa mass and uterus. Thus, we were consulted. Her kidney function appears to be within normal limits.     HPI obtained with [facility] , Ricki Mane.        Past Medical History:   History reviewed. No pertinent past medical history.     Past Surgical History:   History reviewed. No pertinent surgical history.     Family History:   History reviewed. No pertinent family history.      Social History:   Social History     Tobacco Use    Smoking status: Never    Smokeless tobacco: Never   Vaping Use    Vaping Use: Never used   Substance Use Topics    Alcohol use: Not Currently    Drug use: Never      Social History     Social History Narrative    Not on file        Allergies: she Patient has no known allergies.    Medications:   Medications Prior to Admission   Medication Sig Dispense Refill Last Dose    apixaban (ELIQUIS) 5mg Tab Take 5 mg by mouth 2 times a day.   2/8/2023 at AM    acetaminophen (TYLENOL)  "500 MG Tab Take 1,000 mg by mouth every 6 hours as needed for Mild Pain.   \"FEW DAYS AGO\" at PRN    CALCIUM PO Take 1 Tablet by mouth every day.   2/8/2023 at AM    B Complex Vitamins (B COMPLEX PO) Take 1 Tablet by mouth every day.   2/8/2023 at AM    vitamin D3 (CHOLECALCIFEROL) 5000 Unit (125 mcg) Tab Take 5,000 Units by mouth every day.   2/8/2023 at AM    Omega-3 Fatty Acids (FISH OIL) 1000 MG Cap capsule Take 1,000 mg by mouth every day.   2/8/2023 at AM    GLUCOSAMINE-CHONDROITIN PO Take 1 Tablet by mouth every day.   2/8/2023 at AM    atorvastatin (LIPITOR) 20 MG Tab Take 20 mg by mouth at bedtime.   2/7/2023 at PM         Review of Systems  Review of Systems   Constitutional:  Negative for chills and fever.   Gastrointestinal:  Positive for abdominal pain. Negative for nausea and vomiting.   Genitourinary:  Positive for flank pain.      Physical Exam  VITAL SIGNS: /56   Pulse 61   Temp 36.8 °C (98.2 °F) (Temporal)   Resp 15   Ht 1.575 m (5' 2\")   Wt 62 kg (136 lb 11 oz)   SpO2 95%   BMI 25.00 kg/m²   Physical Exam  Vitals and nursing note reviewed.   Constitutional:       Appearance: Normal appearance.   HENT:      Head: Normocephalic and atraumatic.      Nose: Nose normal.   Neurological:      General: No focal deficit present.      Mental Status: She is alert and oriented to person, place, and time.   Psychiatric:         Mood and Affect: Mood normal.         Behavior: Behavior normal.         Labs:  Recent Labs     02/08/23  1606 02/09/23  0225 02/10/23  0010   WBC 5.2 5.7 4.0*   RBC 4.40 4.18* 4.11*   HEMOGLOBIN 13.4 12.7 12.5   HEMATOCRIT 39.3 37.3 37.8   MCV 89.3 89.2 92.0   MCH 30.5 30.4 30.4   MCHC 34.1 34.0 33.1*   RDW 41.1 41.1 42.7   PLATELETCT 233 208 216   MPV 10.2 10.5 10.2     Recent Labs     02/08/23  1606 02/09/23  0225 02/10/23  0010   SODIUM 138 133* 140   POTASSIUM 4.2 3.6 4.2   CHLORIDE 102 99 105   CO2 23 22 26   GLUCOSE 124* 132* 106*   BUN 17 15 15   CREATININE 0.94 0.93 " 0.90   CALCIUM 9.4 8.9 8.7     Recent Labs     02/08/23  1903 02/09/23  1010   APTT 32.4 31.1   INR 1.09 1.19*     Glucose:  Recent Labs     02/08/23  1606 02/09/23  0225 02/10/23  0010   GLUCOSE 124* 132* 106*     Coags:  Recent Labs     02/09/23  1010   INR 1.19*         Urinalysis:   Recent Labs     02/08/23  1734   COLORURINE Yellow   CLARITY Cloudy*   SPECGRAVITY 1.011   PHURINE 7.5   GLUCOSEUR Negative   KETONES Negative   NITRITE Negative   OCCULTBLOOD Negative   RBCURINE 0-2   BACTERIA Negative   EPITHELCELL Few       Imaging:  MR-PELVIS-WITH & W/O AND SEQUENCES   Final Result      1.  Multilobular LEFT iliac fossa mass extending into the inguinal region.  Additional separate mesenteric masses present in the pelvis and LEFT mid abdomen.  Primary diagnostic considerations are lymphoma and metastatic leiomyosarcoma, however exact    etiology is uncertain.   2.  Heterogeneous mass in the uterine fundus measuring 5.8 cm, fibroid versus leiomyosarcoma.   3.  Moderate LEFT hydroureteronephrosis with apparent compression of the distal LEFT ureter between iliac fossa mass and uterus.   4.  Multiple mildly prominent LEFT groin lymph nodes, nonspecific.   5.  Edema of LEFT thigh musculature suggests venous compression as well.      CT-CTA CHEST PULMONARY ARTERY W/ RECONS   Final Result         1.  No pulmonary embolus appreciated.   2.  Right thyroid nodule, recommend follow-up thyroid sonography due to nodule size.      CT-ABDOMEN-PELVIS WITH   Final Result         1.  Enlargement of the left psoas and iliacus muscle with subtle hyperdensity. Could are present hematoma or early forming abscess versus intramuscular mass. Could be followed up with MRI of the pelvis with contrast for further delineation and    characterization of structures   2.  Left hydronephrosis and hydroureter without visualized obstructing stone with delayed left nephrogram. Appears likely secondary to associated process of the left iliopsoas  muscle. There is calcification on the left which appears likely represent    phlebolith, could possibly represent distal ureteral stone within lower inserted ureter.      CT-NEEDLE BX-ABD-RETROPERITONL    (Results Pending)       @lastct@     Assessment/Recommendation   62 y.o.F with psoas/illiac mass and resulting left hydronephrosis.     Patient is NPO for placement of left nephrostomy tube with interventional radiology. We would recommend percutaneous biopsy of pelvic mass as well.   We will continue to trend renal function.    Urology will continue to follow. Thank you for this consult. Plan of care directed by Dr. Clancy. Case discussed with patient and urology. Please contact us with questions       Liudmila Noonan P.A.-C.   [id number]  [place] [id number]   [phone]

## 2023-02-10 NOTE — PROGRESS NOTES
Called IR and confirmed they will call me when I need to stop the heparin drip. Pt does not have a confirmed biopsy time for today.

## 2023-02-10 NOTE — CARE PLAN
The patient is Watcher - Medium risk of patient condition declining or worsening    Shift Goals  Clinical Goals: clinical workup  Patient Goals: comfort  Family Goals: plan of care    Progress made toward(s) clinical / shift goals:    Problem: Knowledge Deficit - Standard  Goal: Patient and family/care givers will demonstrate understanding of plan of care, disease process/condition, diagnostic tests and medications  Outcome: Progressing     Problem: Pain - Standard  Goal: Alleviation of pain or a reduction in pain to the patient’s comfort goal  Outcome: Progressing       Patient is not progressing towards the following goals:

## 2023-02-10 NOTE — CARE PLAN
The patient is Stable - Low risk of patient condition declining or worsening    Shift Goals  Clinical Goals: monitor pain, n/v  Patient Goals: comfort  Family Goals: plan of care    Progress made toward(s) clinical / shift goals:    Problem: Knowledge Deficit - Standard  Goal: Patient and family/care givers will demonstrate understanding of plan of care, disease process/condition, diagnostic tests and medications  Outcome: Progressing     Problem: Pain - Standard  Goal: Alleviation of pain or a reduction in pain to the patient’s comfort goal  Outcome: Progressing       Patient is not progressing towards the following goals:

## 2023-02-10 NOTE — CONSULTS
Gynecologic Oncology Consultation    Date: 2023    Requesting Physician: Dr. Tiburcio Falcon    Consulting Physician: Marge Holly P.A.-C. /Dr. Kasi Uribe     Reason for consultation:Pelvic Mass     CC: Flank pain     HPI: This is a  62 y.o. [id number] with a past medical history of hyperlipidemia and LLE DVT on Eliquis, diagnosed on 2023. She has past surgical history of  x 2. She presented to the ED for worsening left flank pain. She states the pain began 3 days ago and then become progressively worse. Her work up was significant for a CT scan with enlargement of left psoas and iliacus muscle, and MRI revealed a multilobular left iliac fossa mass extending into the inguinal region.  Additional separate mesenteric masses present in the pelvis and left abdomen, a heterogeneous mass in the uterine fundus measuring 5.8 cm, fibroid versus leiomyosarcoma, multiple mildly prominent left pelvic lymph nodes and moderate left hydroureteronephrosis with apparent compression of the distal left ureter between iliac fossa mass and uterus. Due to these findings were asked to consult.     She was seen at bedside today with translators service. She endorses the above history. She states she has been in her normal state of health until 3 days ago, when she developed flank pain. She denies pelvic or abdominal pain. She notes foul smelling urine after starting Eliquis, but denies any urinary symptoms. She denies vaginal bleeding or discharge. She is having normal bowel movements. She denies abdominal distension or early satiety. She denies nausea or vomiting. She denies fever or chills. She states she had left leg swelling prior to diagnosis of DVT, but this has since resolved.     Review of Systems:  Constitutional: Negative for fever, chills, weight loss, malaise/fatigue and diaphoresis.   HENT: Negative for hearing loss, ear pain, nosebleeds, congestion, sore throat, neck pain, tinnitus and ear discharge.     Eyes: Negative for blurred vision, double vision, photophobia, pain, discharge and redness.   Respiratory: Negative for cough, hemoptysis, sputum production, shortness of breath, wheezing and stridor.    Cardiovascular: Negative for chest pain, palpitations, orthopnea, claudication, leg swelling and PND.   Gastrointestinal: Negative for heartburn, nausea, vomiting, abdominal pain, diarrhea, constipation, blood in stool and melena.   Genitourinary: Negative for dysuria, urgency, frequency, hematuria. She endorses left flank pain.   Musculoskeletal: Negative for myalgias, back pain, joint pain and falls.   Skin: Negative for itching and rash.  Neurological: Negative for dizziness, tingling, tremors, sensory change, speech change, focal weakness, seizures, loss of consciousness, weakness and headaches.   Endo/Heme/Allergies: Negative for environmental allergies and polydipsia. Does not bruise/bleed easily.   Psychiatric/Behavioral: Negative for depression, suicidal ideas, hallucinations, memory loss and substance abuse. The patient is not nervous/anxious and does not have insomnia.    Past medical history:   Hyperlipidemia     Surgical History:   C - section x 2     OB/GYN History: ,  x  2  LMP at age 55    No history of STD  Last pap in 2022, no history of abnormal PAP  History of fibroids, diagnosed at age 39, asymptomatic     Current Facility-Administered Medications   Medication Dose Route Frequency Provider Last Rate Last Admin    senna-docusate (PERICOLACE or SENOKOT S) 8.6-50 MG per tablet 2 Tablet  2 Tablet Oral BID Vicente Martinez M.D.        And    polyethylene glycol/lytes (MIRALAX) PACKET 1 Packet  1 Packet Oral QDAY PRN Vicente Martinez M.D.        And    magnesium hydroxide (MILK OF MAGNESIA) suspension 30 mL  30 mL Oral QDAY PRN Vicente Martinez M.D.        And    bisacodyl (DULCOLAX) suppository 10 mg  10 mg Rectal QDAY PRN Vicente Martinez M.D.        ondansetron (ZOFRAN)  syringe/vial injection 4 mg  4 mg Intravenous Q4HRS PRN Vicente Martinez M.D.        ondansetron (ZOFRAN ODT) dispertab 4 mg  4 mg Oral Q4HRS PRHALEY Martinez M.D.        promethazine (PHENERGAN) tablet 12.5-25 mg  12.5-25 mg Oral Q4HRS PRHALEY Martinez M.D.   25 mg at 02/09/23 0459    promethazine (PHENERGAN) suppository 12.5-25 mg  12.5-25 mg Rectal Q4HRS PRN Vicente Martinez M.D.        prochlorperazine (COMPAZINE) injection 5-10 mg  5-10 mg Intravenous Q4HRS PRHALEY Martinez M.D.        Pharmacy Consult Request ...Pain Management Review 1 Each  1 Each Other PHARMACY TO DOSE Vicente Martinez M.D.        acetaminophen (TYLENOL) tablet 1,000 mg  1,000 mg Oral Q6HRS Vicente Martinez M.D.   1,000 mg at 02/09/23 1428    Followed by    [START ON 2/14/2023] acetaminophen (TYLENOL) tablet 1,000 mg  1,000 mg Oral Q6HRS PRHALEY Martinez M.D.        oxyCODONE immediate-release (ROXICODONE) tablet 2.5 mg  2.5 mg Oral Q3HRS PRHALEY Martinez M.D.        Or    oxyCODONE immediate-release (ROXICODONE) tablet 5 mg  5 mg Oral Q3HRS PRHALEY Martinez M.D.   5 mg at 02/09/23 0206    Or    morphine 4 MG/ML injection 2 mg  2 mg Intravenous Q3HRS PRHALEY Martinez M.D.   2 mg at 02/09/23 0459    atorvastatin (LIPITOR) tablet 20 mg  20 mg Oral QHS Vicente Martinez M.D.        heparin infusion 25,000 units in 500 mL 0.45% NACL  0-30 Units/kg/hr Intravenous Continuous Tiburcio Falcon M.D. 21.5 mL/hr at 02/09/23 1151 18 Units/kg/hr at 02/09/23 1151    heparin injection 2,400 Units  40 Units/kg Intravenous PRN Tiburcio Falcon M.D.           Allergies:  Patient has no known allergies.    Social History     Socioeconomic History    Marital status:      Spouse name: Not on file    Number of children: Not on file    Years of education: Not on file    Highest education level: Not on file   Occupational History    Not on file   Tobacco Use    Smoking status: Never    Smokeless tobacco: Never   Vaping Use     "Vaping Use: Never used   Substance and Sexual Activity    Alcohol use: Not Currently    Drug use: Never    Sexual activity: Not on file   Other Topics Concern    Not on file   Social History Narrative    Not on file     Social Determinants of Health     Financial Resource Strain: Not on file   Food Insecurity: Not on file   Transportation Needs: Not on file   Physical Activity: Not on file   Stress: Not on file   Social Connections: Not on file   Intimate Partner Violence: Not on file   Housing Stability: Not on file       Family History significant for sister with unknown malignancy       Physical Exam:  /55   Pulse 68   Temp 36.8 °C (98.2 °F) (Temporal)   Resp 18   Ht 1.575 m (5' 2\")   Wt 59.8 kg (131 lb 13.4 oz)   SpO2 99%       Physical Exam  Constitutional:       General: She is not in acute distress.     Appearance: She is not ill-appearing.   HENT:      Head: Normocephalic and atraumatic.      Mouth/Throat:      Mouth: Mucous membranes are moist.   Eyes:      Pupils: Pupils are equal, round, and reactive to light.   Cardiovascular:      Rate and Rhythm: Normal rate and regular rhythm.      Pulses: Normal pulses.   Pulmonary:      Effort: Pulmonary effort is normal.      Breath sounds: Normal breath sounds.   Abdominal:      General: Abdomen is flat. There is no distension.      Palpations: Abdomen is soft. There is no mass.      Tenderness: There is no abdominal tenderness.   Musculoskeletal:         General: Normal range of motion.   Skin:     General: Skin is warm and dry.   Neurological:      Mental Status: She is alert and oriented to person, place, and time.   Psychiatric:         Mood and Affect: Mood normal.        Labs:  Recent Labs     02/08/23  1606 02/09/23  0225   WBC 5.2 5.7   RBC 4.40 4.18*   HEMOGLOBIN 13.4 12.7   HEMATOCRIT 39.3 37.3   MCV 89.3 89.2   MCH 30.5 30.4   MCHC 34.1 34.0   RDW 41.1 41.1   PLATELETCT 233 208   MPV 10.2 10.5     Recent Labs     02/08/23  1606 " 02/09/23  0225   SODIUM 138 133*   POTASSIUM 4.2 3.6   CHLORIDE 102 99   CO2 23 22   GLUCOSE 124* 132*   BUN 17 15   CREATININE 0.94 0.93   CALCIUM 9.4 8.9     Recent Labs     02/08/23  1903 02/09/23  1010   APTT 32.4 31.1   INR 1.09 1.19*     Recent Labs     02/08/23  1606 02/08/23  1903 02/09/23  0225 02/09/23  1010   ASTSGOT 14  --  14  --    ALTSGPT 13  --  10  --    TBILIRUBIN 0.7  --  0.7  --    ALKPHOSPHAT 63  --  58  --    GLOBULIN 3.0  --  2.5  --    INR  --  1.09  --  1.19*       Radiology:    CTA  IMPRESSION:   1.  No pulmonary embolus appreciated.  2.  Right thyroid nodule, recommend follow-up thyroid sonography due to nodule size.      CT abd/pelvis  IMPRESSION:  1.  Enlargement of the left psoas and iliacus muscle with subtle hyperdensity. Could are present hematoma or early forming abscess versus intramuscular mass. Could be followed up with MRI of the pelvis with contrast for further delineation and   characterization of structures  2.  Left hydronephrosis and hydroureter without visualized obstructing stone with delayed left nephrogram. Appears likely secondary to associated process of the left iliopsoas muscle. There is calcification on the left which appears likely represent   phlebolith, could possibly represent distal ureteral stone within lower inserted ureter.    MRI Pelvis:   IMPRESSION:  1.  Multilobular LEFT iliac fossa mass extending into the inguinal region.  Additional separate mesenteric masses present in the pelvis and LEFT mid abdomen.  Primary diagnostic considerations are lymphoma and metastatic leiomyosarcoma, however exact etiology is uncertain.  2.  Heterogeneous mass in the uterine fundus measuring 5.8 cm, fibroid versus leiomyosarcoma.  3.  Moderate LEFT hydroureteronephrosis with apparent compression of the distal LEFT ureter between iliac fossa mass and uterus.  4.  Multiple mildly prominent LEFT groin lymph nodes, nonspecific.  5.  Edema of LEFT thigh musculature suggests  venous compression as well.    Assessment: This is a 62 y.o.  female who presented to the ED with flank pain, found to have a left iliac chantelle mass, mesenteric mass,     Plan:   Left pelvic mass: multilobular iliac fossa mass with mesenteric masses and multiple prominent lymph nodes. Concerning for malignancy, lymphoma vs  less likely leiomyosarcoma. Uterus mass heterogeneous, likely fibroid.   Recommend imaged guided biopsy of left pelvic side wall.  Will order LDH to evaluate as well.   Left Hydronephrosis: likely obstructive secondary to above. Creatinine wnl. Urology consulted, will defer to their assessment.  DVT: in LLE, continue anticoagulation.     Case and plan discussed with Dr. Uribe, who reviewed imaging     Thank you for for allowing us to participate in this patient's care. We will continue to follow. Please feel free to contact us for any questions or if we can be of any further assistance.      Marge Holly PA-C  Gynecologic Oncology  [facility]

## 2023-02-10 NOTE — PROGRESS NOTES
Received call for Tammie in IR and said ok to stop heparin drip. Pt will go to biopsy in about 2 hours and will try to insert nephrostomy tube today.   Pt aware and agreeable

## 2023-02-10 NOTE — CARE PLAN
The patient is Stable - Low risk of patient condition declining or worsening    Shift Goals  Clinical Goals: pain control  Patient Goals: comfort  Family Goals: plan of care    Progress made toward(s) clinical / shift goals: states pain has been tolerable   Problem: Knowledge Deficit - Standard  Goal: Patient and family/care givers will demonstrate understanding of plan of care, disease process/condition, diagnostic tests and medications  Description: Target End Date:  1-3 days or as soon as patient condition allows    Document in Patient Education    1.  Patient and family/caregiver oriented to unit, equipment, visitation policy and means for communicating concern  2.  Complete/review Learning Assessment  3.  Assess knowledge level of disease process/condition, treatment plan, diagnostic tests and medications  4.  Explain disease process/condition, treatment plan, diagnostic tests and medications  Outcome: Progressing     Problem: Pain - Standard  Goal: Alleviation of pain or a reduction in pain to the patient’s comfort goal  Description: Target End Date:  Prior to discharge or change in level of care    Document on Vitals flowsheet    1.  Document pain using the appropriate pain scale per order or unit policy  2.  Educate and implement non-pharmacologic comfort measures (i.e. relaxation, distraction, massage, cold/heat therapy, etc.)  3.  Pain management medications as ordered  4.  Reassess pain after pain med administration per policy  5.  If opiods administered assess patient's response to pain medication is appropriate per POSS sedation scale  6.  Follow pain management plan developed in collaboration with patient and interdisciplinary team (including palliative care or pain specialists if applicable)  Outcome: Progressing     Patient is not progressing towards the following goals:

## 2023-02-10 NOTE — PROGRESS NOTES
Per Urology PA, Liudmila, Pt may eat after biopsy and be NPO again at midnight for nephrostomy tube placement on 2/11.   Pt updated

## 2023-02-11 LAB
ALBUMIN SERPL BCP-MCNC: 3.5 G/DL (ref 3.2–4.9)
BUN SERPL-MCNC: 16 MG/DL (ref 8–22)
CALCIUM ALBUM COR SERPL-MCNC: 8.9 MG/DL (ref 8.5–10.5)
CALCIUM SERPL-MCNC: 8.5 MG/DL (ref 8.5–10.5)
CHLORIDE SERPL-SCNC: 106 MMOL/L (ref 96–112)
CO2 SERPL-SCNC: 22 MMOL/L (ref 20–33)
CREAT SERPL-MCNC: 0.65 MG/DL (ref 0.5–1.4)
ERYTHROCYTE [DISTWIDTH] IN BLOOD BY AUTOMATED COUNT: 41.3 FL (ref 35.9–50)
GFR SERPLBLD CREATININE-BSD FMLA CKD-EPI: 100 ML/MIN/1.73 M 2
GLUCOSE SERPL-MCNC: 105 MG/DL (ref 65–99)
HCT VFR BLD AUTO: 34.5 % (ref 37–47)
HGB BLD-MCNC: 11.8 G/DL (ref 12–16)
MCH RBC QN AUTO: 30.7 PG (ref 27–33)
MCHC RBC AUTO-ENTMCNC: 34.2 G/DL (ref 33.6–35)
MCV RBC AUTO: 89.8 FL (ref 81.4–97.8)
PATHOLOGY CONSULT NOTE: NORMAL
PHOSPHATE SERPL-MCNC: 4.6 MG/DL (ref 2.5–4.5)
PLATELET # BLD AUTO: 206 K/UL (ref 164–446)
PMV BLD AUTO: 10.2 FL (ref 9–12.9)
POTASSIUM SERPL-SCNC: 3.8 MMOL/L (ref 3.6–5.5)
RBC # BLD AUTO: 3.84 M/UL (ref 4.2–5.4)
SODIUM SERPL-SCNC: 137 MMOL/L (ref 135–145)
UFH PPP CHRO-ACNC: 0.28 IU/ML
UFH PPP CHRO-ACNC: 0.52 IU/ML
UFH PPP CHRO-ACNC: 0.98 IU/ML
WBC # BLD AUTO: 3.9 K/UL (ref 4.8–10.8)

## 2023-02-11 PROCEDURE — 80069 RENAL FUNCTION PANEL: CPT

## 2023-02-11 PROCEDURE — 700111 HCHG RX REV CODE 636 W/ 250 OVERRIDE (IP): Performed by: INTERNAL MEDICINE

## 2023-02-11 PROCEDURE — A9270 NON-COVERED ITEM OR SERVICE: HCPCS

## 2023-02-11 PROCEDURE — 700102 HCHG RX REV CODE 250 W/ 637 OVERRIDE(OP)

## 2023-02-11 PROCEDURE — 770004 HCHG ROOM/CARE - ONCOLOGY PRIVATE *

## 2023-02-11 PROCEDURE — 99232 SBSQ HOSP IP/OBS MODERATE 35: CPT | Performed by: INTERNAL MEDICINE

## 2023-02-11 PROCEDURE — 85520 HEPARIN ASSAY: CPT | Mod: 91

## 2023-02-11 PROCEDURE — 85027 COMPLETE CBC AUTOMATED: CPT

## 2023-02-11 PROCEDURE — 36415 COLL VENOUS BLD VENIPUNCTURE: CPT

## 2023-02-11 RX ADMIN — ACETAMINOPHEN 1000 MG: 500 TABLET, FILM COATED ORAL at 00:23

## 2023-02-11 RX ADMIN — ACETAMINOPHEN 1000 MG: 500 TABLET, FILM COATED ORAL at 11:52

## 2023-02-11 RX ADMIN — ATORVASTATIN CALCIUM 20 MG: 20 TABLET, FILM COATED ORAL at 20:44

## 2023-02-11 RX ADMIN — HEPARIN SODIUM 2400 UNITS: 1000 INJECTION INTRAVENOUS; SUBCUTANEOUS at 08:35

## 2023-02-11 RX ADMIN — ACETAMINOPHEN 1000 MG: 500 TABLET, FILM COATED ORAL at 05:05

## 2023-02-11 RX ADMIN — CEFTRIAXONE SODIUM 2000 MG: 10 INJECTION, POWDER, FOR SOLUTION INTRAVENOUS at 18:01

## 2023-02-11 RX ADMIN — ACETAMINOPHEN 1000 MG: 500 TABLET, FILM COATED ORAL at 18:01

## 2023-02-11 RX ADMIN — ACETAMINOPHEN 1000 MG: 500 TABLET, FILM COATED ORAL at 23:33

## 2023-02-11 RX ADMIN — HEPARIN SODIUM 18 UNITS/KG/HR: 5000 INJECTION, SOLUTION INTRAVENOUS at 20:46

## 2023-02-11 RX ADMIN — SENNOSIDES AND DOCUSATE SODIUM 2 TABLET: 50; 8.6 TABLET ORAL at 05:06

## 2023-02-11 ASSESSMENT — ENCOUNTER SYMPTOMS
RESPIRATORY NEGATIVE: 1
SHORTNESS OF BREATH: 0
NEUROLOGICAL NEGATIVE: 1
FEVER: 0
FLANK PAIN: 1
COUGH: 0
PSYCHIATRIC NEGATIVE: 1
CHILLS: 0
MUSCULOSKELETAL NEGATIVE: 1
VOMITING: 0
NAUSEA: 0
CARDIOVASCULAR NEGATIVE: 1
GASTROINTESTINAL NEGATIVE: 1
DIZZINESS: 0
MYALGIAS: 0
EYES NEGATIVE: 1

## 2023-02-11 ASSESSMENT — PAIN DESCRIPTION - PAIN TYPE
TYPE: ACUTE PAIN

## 2023-02-11 NOTE — CARE PLAN
The patient is Stable - Low risk of patient condition declining or worsening    Shift Goals  Clinical Goals: Pain control, Abx  Patient Goals: Comfort, Rest  Family Goals: Pt comfort, Plan of care    Progress made toward(s) clinical / shift goals:    Problem: Knowledge Deficit - Standard  Goal: Patient and family/care givers will demonstrate understanding of plan of care, disease process/condition, diagnostic tests and medications  Outcome: Progressing  Note: POC reviewed with pt who is agreeable and understanding of plan.      Problem: Pain - Standard  Goal: Alleviation of pain or a reduction in pain to the patient’s comfort goal  Outcome: Progressing  Note: Pt able to notify staff of pain, and agreeable to interventions.      Problem: Hemodynamics  Goal: Patient's hemodynamics, fluid balance and neurologic status will be stable or improve  Outcome: Progressing  Note: VS and mental status remain stable throughout shift.      Problem: Urinary - Renal Perfusion  Goal: Ability to achieve and maintain adequate renal perfusion and functioning will improve  Outcome: Progressing  Note: Nephrostomy tube placed on prior shift, showing adequate output during shift.      Problem: Respiratory  Goal: Patient will achieve/maintain optimum respiratory ventilation and gas exchange  Outcome: Progressing  Note: Pt O2 sats remain stable, pt saturations >90% on RA.        Patient is not progressing towards the following goals:

## 2023-02-11 NOTE — PROGRESS NOTES
4 Eyes Skin Assessment Completed by Coby DE LEON, RN and Caprice ORONA RN.    Head WDL  Ears WDL  Nose WDL  Mouth WDL  Neck WDL  Breast/Chest WDL  Shoulder Blades WDL  Spine WDL  (R) Arm/Elbow/Hand Bruising, old IV site  (L) Arm/Elbow/Hand WDL  Abdomen WDL  Groin WDL  Scrotum/Coccyx/Buttocks WDL  (R) Leg WDL  (L) Leg WDL  (R) Heel/Foot/Toe WDL  (L) Heel/Foot/Toe WDL          Devices In Places Blood Pressure Cuff, Left Nephrostomy tube      Interventions In Place N/A    Possible Skin Injury No    Pictures Uploaded Into Epic N/A  Wound Consult Placed N/A  RN Wound Prevention Protocol Ordered Yes

## 2023-02-11 NOTE — PROGRESS NOTES
Hospital Medicine Daily Progress Note    Date of Service  2/11/2023    Chief Complaint  Savana Zarco is a 62 y.o. female admitted 2/8/2023 with left flank pain and nausea. She has a history of recent DVT (started on Eliquis ~ 3 weeks ago), dyslipidemia.     Hospital Course  CT abdomen showed enlargement of the left psoas and iliacus muscle with subtle hypodensity unclear if hematoma or early forming abscess versus intramuscular mass, left hydronephrosis and hydroureter without visualized obstructing stone with delayed left nephrogram.     CTA chest showed no evidence of pulmonary embolism.  She was noted to have a right thyroid nodule with recommendations to follow-up thyroid sonography due to size.    MRI pelvis from 2/9/2023 showed a multilobular left iliac fossa mass extending into the inguinal region, with additional separate mesenteric masses in the pelvis and left mid abdomen.  She has a heterogeneous 5.8 cm mass in the uterine fundus (fibrous versus leiomyosarcoma), moderate left hydroureteronephrosis with compression of the distal left ureter between the iliac fossa mass and uterus, multiple nonspecific mildly prominent left groin lymph nodes.     UA showed moderate leukocyte esterase.  Ceftriaxone was empirically started.    Interval Problem Update  Patient underwent left pelvic extraperitoneal mass biopsy by IR on 2/10/2023, results are pending. Left percutaneous nephrostomy was placed by IR on 2/10/2023. She is afebrile and hemodynamically stable.  WBCs are 3.9.    I have discussed this patient's plan of care and discharge plan at IDT rounds today with Case Management, Nursing, Nursing leadership, and other members of the IDT team.    Consultants/Specialty  urology    Code Status  Full Code    Disposition  Patient is not medically cleared for discharge.   Anticipate discharge to to home with close outpatient follow-up.  I have placed the appropriate orders for post-discharge  needs.    Review of Systems  Review of Systems   Constitutional:  Positive for malaise/fatigue.   HENT: Negative.     Eyes: Negative.    Respiratory: Negative.     Cardiovascular: Negative.    Gastrointestinal: Negative.    Genitourinary: Negative.    Musculoskeletal: Negative.    Skin: Negative.    Neurological: Negative.    Endo/Heme/Allergies: Negative.    Psychiatric/Behavioral: Negative.        Physical Exam  Temp:  [36.6 °C (97.9 °F)-37.1 °C (98.8 °F)] 37.1 °C (98.8 °F)  Pulse:  [51-82] 55  Resp:  [12-23] 16  BP: (100-146)/(51-82) 109/62  SpO2:  [72 %-99 %] 97 %    Physical Exam  HENT:      Head: Normocephalic.      Mouth/Throat:      Mouth: Mucous membranes are moist.   Eyes:      Pupils: Pupils are equal, round, and reactive to light.   Cardiovascular:      Rate and Rhythm: Bradycardia present.   Pulmonary:      Effort: Pulmonary effort is normal.   Abdominal:      Palpations: Abdomen is soft.   Musculoskeletal:      Cervical back: Normal range of motion.      Left lower leg: Edema present.   Skin:     General: Skin is warm.   Neurological:      General: No focal deficit present.      Mental Status: She is alert.   Psychiatric:         Mood and Affect: Mood normal.       Fluids    Intake/Output Summary (Last 24 hours) at 2/11/2023 1138  Last data filed at 2/11/2023 0600  Gross per 24 hour   Intake --   Output 600 ml   Net -600 ml       Laboratory  Recent Labs     02/09/23  0225 02/10/23  0010 02/11/23  0112   WBC 5.7 4.0* 3.9*   RBC 4.18* 4.11* 3.84*   HEMOGLOBIN 12.7 12.5 11.8*   HEMATOCRIT 37.3 37.8 34.5*   MCV 89.2 92.0 89.8   MCH 30.4 30.4 30.7   MCHC 34.0 33.1* 34.2   RDW 41.1 42.7 41.3   PLATELETCT 208 216 206   MPV 10.5 10.2 10.2     Recent Labs     02/09/23  0225 02/10/23  0010 02/11/23  0112   SODIUM 133* 140 137   POTASSIUM 3.6 4.2 3.8   CHLORIDE 99 105 106   CO2 22 26 22   GLUCOSE 132* 106* 105*   BUN 15 15 16   CREATININE 0.93 0.90 0.65   CALCIUM 8.9 8.7 8.5     Recent Labs     02/08/23  1906  02/09/23  1010   APTT 32.4 31.1   INR 1.09 1.19*               Imaging  IR-NEEDLE BX-ABD-RETROPERITONEAL   Final Result      Image guided biopsy of LEFT pelvic sidewall extraperitoneal mass utilizing 3-D rotational cone beam CT in the IR suite.                  INTERPRETING LOCATION: [address] [facility], [place], [address]      IR-PERQ NEPH CATH NEW ACCESS (ALL RADIOLOGY) LEFT   Final Result      1. ULTRASOUND AND FLUOROSCOPIC GUIDED PLACEMENT OF A LEFT 8 Telugu  PIGTAIL LOCKING LOOP PERCUTANEOUS NEPHROSTOMY CATHETER.      2. NEPHROSTOGRAM SHOWING SATISFACTORY CATHETER POSITION WITHOUT EXTRAVASATION.      MR-PELVIS-WITH & W/O AND SEQUENCES   Final Result      1.  Multilobular LEFT iliac fossa mass extending into the inguinal region.  Additional separate mesenteric masses present in the pelvis and LEFT mid abdomen.  Primary diagnostic considerations are lymphoma and metastatic leiomyosarcoma, however exact    etiology is uncertain.   2.  Heterogeneous mass in the uterine fundus measuring 5.8 cm, fibroid versus leiomyosarcoma.   3.  Moderate LEFT hydroureteronephrosis with apparent compression of the distal LEFT ureter between iliac fossa mass and uterus.   4.  Multiple mildly prominent LEFT groin lymph nodes, nonspecific.   5.  Edema of LEFT thigh musculature suggests venous compression as well.      CT-CTA CHEST PULMONARY ARTERY W/ RECONS   Final Result         1.  No pulmonary embolus appreciated.   2.  Right thyroid nodule, recommend follow-up thyroid sonography due to nodule size.      CT-ABDOMEN-PELVIS WITH   Final Result         1.  Enlargement of the left psoas and iliacus muscle with subtle hyperdensity. Could are present hematoma or early forming abscess versus intramuscular mass. Could be followed up with MRI of the pelvis with contrast for further delineation and    characterization of structures   2.  Left hydronephrosis and hydroureter without visualized obstructing stone with delayed left nephrogram. Appears  likely secondary to associated process of the left iliopsoas muscle. There is calcification on the left which appears likely represent    phlebolith, could possibly represent distal ureteral stone within lower inserted ureter.           Assessment/Plan  * Mass of iliopsoas muscle group, L hydronephrosis, h/o DVT/anticoagulation- (present on admission)  Assessment & Plan  MRI pelvis from 2/9/2023 showed a multilobular left iliac fossa mass extending into the inguinal region, with additional separate mesenteric masses in the pelvis and left mid abdomen.  She underwent left pelvic external peritoneal mass biopsy by IR on 2/10/2023.  Pathology is pending.    DVT (deep venous thrombosis) (HCC)  Assessment & Plan  Diagnosed about 3 weeks ago and started on Eliquis.  Apixaban was on hold due to concern for psoas/iliacus trauma.  No hematoma was noted by IR.  On heparin drip for nephrostomy tube placement    Hyperlipidemia  Assessment & Plan  Continue home statin.    Hydronephrosis  Assessment & Plan  Secondary to mass.  Urology were consulted, plan for left nephrostomy tube placement    Thyroid nodule  Assessment & Plan  CTA chest showed a right thyroid nodule with recommendations to follow-up with ultrasound.  Recommend outpatient follow-up         VTE prophylaxis: therapeutic anticoagulation with Heparin

## 2023-02-11 NOTE — PROGRESS NOTES
Report received from Suma SOLANO, assumed care of patient. Pt A&O x 4, Macedonian speaking but able to understand basic conversational English. Pt's family in room, updated on POC and agreeable. Pt's dtr to bring [facility] paper work tomorrow. Meal provided to pt. IV Rocephin administered on this shift, mild nausea during administration. Biopsy puncture site WNL, no drainage noted. Nephrostomy tube draining without issue to gravity. Fall precautions in place, call light and belongings within reach, bed in lowest position. All concerns addressed, no signs of distress.

## 2023-02-11 NOTE — PROGRESS NOTES
Note to reader: this note follows the APSO format rather than the historical SOAP format. Assessment and plan located at the top of the note for ease of use.    Chief Complaint  62 y.o. year old female here with flank pain. Pelvic mass with left hydroureteronephrosis.     Assessment/Plan  Interval History   Left hydroureteronephrosis  Pelvic mass      Left NPT to remain  Await biopsy results      Case discussed with patient and urology-Dr. Young SAXENA  Patient seen and examined    2/11. Biopsy of mass completed 2/10. Left NPT placed. Pain has improved           Review of Systems  Physical Exam   Review of Systems   Constitutional:  Negative for chills and fever.   Genitourinary:  Positive for flank pain. Negative for hematuria.   Vitals:    02/10/23 1921 02/11/23 0454 02/11/23 0628 02/11/23 0810   BP: 133/61 109/54 112/68 109/62   Pulse: (!) 51 (!) 51 (!) 53 (!) 55   Resp: 12 16 16 16   Temp: 36.6 °C (97.9 °F) 36.8 °C (98.2 °F) 36.9 °C (98.5 °F) 37.1 °C (98.8 °F)   TempSrc: Temporal Temporal Temporal Temporal   SpO2: 96% 96% 98% 97%   Weight: 61.7 kg (136 lb 0.4 oz)      Height:         Physical Exam  Vitals and nursing note reviewed.   Constitutional:       Appearance: Normal appearance.   HENT:      Head: Normocephalic and atraumatic.      Nose: Nose normal.      Mouth/Throat:      Mouth: Mucous membranes are dry.   Eyes:      Pupils: Pupils are equal, round, and reactive to light.   Pulmonary:      Effort: Pulmonary effort is normal.   Abdominal:      General: Abdomen is flat. There is no distension.      Palpations: Abdomen is soft.      Tenderness: There is no abdominal tenderness.   Genitourinary:     Comments: Left NPT in place draining light cranberry urine  Skin:     General: Skin is warm and dry.   Neurological:      General: No focal deficit present.      Mental Status: She is alert and oriented to person, place, and time.   Psychiatric:         Mood and Affect: Mood normal.         Behavior: Behavior  normal.        Hematology Chemistry   Lab Results   Component Value Date/Time    WBC 3.9 (L) 02/11/2023 01:12 AM    HEMOGLOBIN 11.8 (L) 02/11/2023 01:12 AM    HEMATOCRIT 34.5 (L) 02/11/2023 01:12 AM    PLATELETCT 206 02/11/2023 01:12 AM     Lab Results   Component Value Date/Time    SODIUM 137 02/11/2023 01:12 AM    POTASSIUM 3.8 02/11/2023 01:12 AM    CHLORIDE 106 02/11/2023 01:12 AM    CO2 22 02/11/2023 01:12 AM    GLUCOSE 105 (H) 02/11/2023 01:12 AM    BUN 16 02/11/2023 01:12 AM    CREATININE 0.65 02/11/2023 01:12 AM         Labs not explicitly included in this progress note were reviewed by the author.   Radiology/imaging not explicitly included in this progress note was reviewed by the author.     Radiology images reviewed, Labs reviewed and Medications reviewed

## 2023-02-11 NOTE — PROGRESS NOTES
IR Nursing Note    Left Pelvic Mass Biopsy by Dr. Stallworth assisted by RT 's Sam and Salvador, left lower quandrant of abdomen access site.  Patient was consented by MD and confirmed by this RN. Procedure site was marked by MD and verified using imaging guidance.  Patient was placed in a supine position.  Patient was prepped and draped in a sterile fashion. Vitals were taken every 5 minutes and remained stable during procedure (see doc flow sheet for results).  CO2 waveform capnography was monitored and remained stable throughout procedure.  A gauze and tegaderm dressing placed over surgical site. 6 cores in formalin, 2 cores placed in RPMI      Report given to RUSS Moise.  Patient transported to  via IR RN monitored then transferred care to report RN.

## 2023-02-11 NOTE — OR SURGEON
Immediate Post- Operative Note        Findings: NEEDLE CONFIRMED IN LEFT PELVIC SIDEWALL EXTRAPERITONEAL MASS      Procedure(s): IR - CONE BEAM CT GUIDED BX PELVIC SIDEWALL EXTRAPERITONEAL MASS    18G CORES X 8. FORMALIN X 6. RPMI X 2      Estimated Blood Loss: Less than 1 ml        Complications: None            2/10/2023     6:22 PM     Desmond Stallworth M.D.

## 2023-02-11 NOTE — PROGRESS NOTES
Ir note:  left nephrostomy tube placed by Dr. Stallworth, pt tolerated well and still in room for second procedure.  BS FLEXIMA 8FR 25CM REF [id number]

## 2023-02-11 NOTE — OR SURGEON
Immediate Post- Operative Note    PostOp Diagnosis: RENAL OBSTRUCTION, LEFT PELVIC MASS PROBABLY CAUSING URETERAL OBSTRUCTION      Procedure(s): LEFT PERCUTANEOUS NEPHROSTOMY PLACEMENT AND NEPHROSTOGRAM WITH U/S AND FLUOROSCOPIC GUIDANCE    8F LOCKING LOOP PIGTAIL IN RENAL PELVIS    MILD-MODERATE LEFT HYDRO      Estimated Blood Loss: <10CC       FINDINGS: 8F LOCKING LOOP PIGTAIL IN RENAL PELVIS    MILD-MODERATE LEFT HYDRO        Complications: NONE          2/10/2023     5:23 PM     Desmond Stallworth M.D.

## 2023-02-11 NOTE — PROGRESS NOTES
Heparin gtt stopped on previous shift at 1423 for procedure. Gtt restarted at same rate, 18u/kg/hr, at 1855. Heparin Xa check at 0100 is therapeutic at 0.52, no changes made to heparin gtt at this time. This will be first therapeutic since restart, next Heparin Xa lab scheduled for 0700.

## 2023-02-12 LAB
ANION GAP SERPL CALC-SCNC: 8 MMOL/L (ref 7–16)
BASOPHILS # BLD AUTO: 0.8 % (ref 0–1.8)
BASOPHILS # BLD: 0.03 K/UL (ref 0–0.12)
BUN SERPL-MCNC: 12 MG/DL (ref 8–22)
CALCIUM SERPL-MCNC: 9.2 MG/DL (ref 8.5–10.5)
CHLORIDE SERPL-SCNC: 106 MMOL/L (ref 96–112)
CO2 SERPL-SCNC: 26 MMOL/L (ref 20–33)
CREAT SERPL-MCNC: 0.66 MG/DL (ref 0.5–1.4)
EOSINOPHIL # BLD AUTO: 0.18 K/UL (ref 0–0.51)
EOSINOPHIL NFR BLD: 4.7 % (ref 0–6.9)
ERYTHROCYTE [DISTWIDTH] IN BLOOD BY AUTOMATED COUNT: 40.8 FL (ref 35.9–50)
GFR SERPLBLD CREATININE-BSD FMLA CKD-EPI: 99 ML/MIN/1.73 M 2
GLUCOSE SERPL-MCNC: 103 MG/DL (ref 65–99)
HCT VFR BLD AUTO: 36.1 % (ref 37–47)
HGB BLD-MCNC: 12 G/DL (ref 12–16)
IMM GRANULOCYTES # BLD AUTO: 0.01 K/UL (ref 0–0.11)
IMM GRANULOCYTES NFR BLD AUTO: 0.3 % (ref 0–0.9)
LYMPHOCYTES # BLD AUTO: 1.56 K/UL (ref 1–4.8)
LYMPHOCYTES NFR BLD: 40.6 % (ref 22–41)
MCH RBC QN AUTO: 30 PG (ref 27–33)
MCHC RBC AUTO-ENTMCNC: 33.2 G/DL (ref 33.6–35)
MCV RBC AUTO: 90.3 FL (ref 81.4–97.8)
MONOCYTES # BLD AUTO: 0.39 K/UL (ref 0–0.85)
MONOCYTES NFR BLD AUTO: 10.2 % (ref 0–13.4)
NEUTROPHILS # BLD AUTO: 1.67 K/UL (ref 2–7.15)
NEUTROPHILS NFR BLD: 43.4 % (ref 44–72)
NRBC # BLD AUTO: 0 K/UL
NRBC BLD-RTO: 0 /100 WBC
PLATELET # BLD AUTO: 245 K/UL (ref 164–446)
PMV BLD AUTO: 10.1 FL (ref 9–12.9)
POTASSIUM SERPL-SCNC: 3.9 MMOL/L (ref 3.6–5.5)
RBC # BLD AUTO: 4 M/UL (ref 4.2–5.4)
SODIUM SERPL-SCNC: 140 MMOL/L (ref 135–145)
UFH PPP CHRO-ACNC: 0.52 IU/ML
UFH PPP CHRO-ACNC: 0.55 IU/ML
UFH PPP CHRO-ACNC: 0.74 IU/ML
UFH PPP CHRO-ACNC: 0.86 IU/ML
WBC # BLD AUTO: 3.8 K/UL (ref 4.8–10.8)

## 2023-02-12 PROCEDURE — 36415 COLL VENOUS BLD VENIPUNCTURE: CPT

## 2023-02-12 PROCEDURE — 700102 HCHG RX REV CODE 250 W/ 637 OVERRIDE(OP)

## 2023-02-12 PROCEDURE — 80048 BASIC METABOLIC PNL TOTAL CA: CPT

## 2023-02-12 PROCEDURE — 700111 HCHG RX REV CODE 636 W/ 250 OVERRIDE (IP): Performed by: INTERNAL MEDICINE

## 2023-02-12 PROCEDURE — 85025 COMPLETE CBC W/AUTO DIFF WBC: CPT

## 2023-02-12 PROCEDURE — 85520 HEPARIN ASSAY: CPT

## 2023-02-12 PROCEDURE — 770004 HCHG ROOM/CARE - ONCOLOGY PRIVATE *

## 2023-02-12 PROCEDURE — A9270 NON-COVERED ITEM OR SERVICE: HCPCS

## 2023-02-12 PROCEDURE — 99232 SBSQ HOSP IP/OBS MODERATE 35: CPT | Performed by: INTERNAL MEDICINE

## 2023-02-12 RX ADMIN — ACETAMINOPHEN 1000 MG: 500 TABLET, FILM COATED ORAL at 11:36

## 2023-02-12 RX ADMIN — ACETAMINOPHEN 1000 MG: 500 TABLET, FILM COATED ORAL at 17:16

## 2023-02-12 RX ADMIN — ACETAMINOPHEN 1000 MG: 500 TABLET, FILM COATED ORAL at 04:19

## 2023-02-12 RX ADMIN — HEPARIN SODIUM 14 UNITS/KG/HR: 5000 INJECTION, SOLUTION INTRAVENOUS at 23:15

## 2023-02-12 RX ADMIN — ACETAMINOPHEN 1000 MG: 500 TABLET, FILM COATED ORAL at 23:13

## 2023-02-12 RX ADMIN — CEFTRIAXONE SODIUM 2000 MG: 10 INJECTION, POWDER, FOR SOLUTION INTRAVENOUS at 17:17

## 2023-02-12 RX ADMIN — ATORVASTATIN CALCIUM 20 MG: 20 TABLET, FILM COATED ORAL at 20:00

## 2023-02-12 ASSESSMENT — ENCOUNTER SYMPTOMS
NEUROLOGICAL NEGATIVE: 1
CHILLS: 0
GASTROINTESTINAL NEGATIVE: 1
RESPIRATORY NEGATIVE: 1
CARDIOVASCULAR NEGATIVE: 1
FEVER: 0
FLANK PAIN: 1
EYES NEGATIVE: 1
MUSCULOSKELETAL NEGATIVE: 1
PSYCHIATRIC NEGATIVE: 1

## 2023-02-12 ASSESSMENT — PAIN DESCRIPTION - PAIN TYPE
TYPE: ACUTE PAIN
TYPE: ACUTE PAIN

## 2023-02-12 NOTE — PROGRESS NOTES
Gynecology Oncology Progress Note               Author: Marge Holly P.A.-C. Date & Time created: 2/11/2023  4:07 PM     Interval History:  Doing well. Had LNT and biospy yesterday. Minimal pain, well controlled. No nausea or vomiting.     Review of Systems:  Review of Systems   Constitutional:  Negative for chills and fever.   Respiratory:  Negative for cough and shortness of breath.    Cardiovascular:  Negative for chest pain and leg swelling.   Gastrointestinal:  Negative for nausea and vomiting.   Genitourinary:  Positive for flank pain.   Musculoskeletal:  Negative for myalgias.   Neurological:  Negative for dizziness.     Physical Exam:  Physical Exam  HENT:      Head: Normocephalic.      Mouth/Throat:      Mouth: Mucous membranes are moist.   Cardiovascular:      Rate and Rhythm: Normal rate.   Pulmonary:      Effort: Pulmonary effort is normal.   Abdominal:      General: Abdomen is flat. Bowel sounds are normal.      Palpations: Abdomen is soft.      Comments: LNT with koolaide colored urine    Skin:     General: Skin is warm and dry.   Neurological:      Mental Status: She is alert and oriented to person, place, and time.       Labs:          Recent Labs     02/09/23  0225 02/10/23  0010 02/11/23  0112   SODIUM 133* 140 137   POTASSIUM 3.6 4.2 3.8   CHLORIDE 99 105 106   CO2 22 26 22   BUN 15 15 16   CREATININE 0.93 0.90 0.65   PHOSPHORUS  --  4.2 4.6*   CALCIUM 8.9 8.7 8.5     Recent Labs     02/09/23  0225 02/10/23  0010 02/11/23  0112   ALTSGPT 10  --   --    ASTSGOT 14  --   --    ALKPHOSPHAT 58  --   --    TBILIRUBIN 0.7  --   --    GLUCOSE 132* 106* 105*     Recent Labs     02/08/23  1903 02/09/23 0225 02/09/23  1010 02/10/23  0010 02/11/23  0112   RBC  --  4.18*  --  4.11* 3.84*   HEMOGLOBIN  --  12.7  --  12.5 11.8*   HEMATOCRIT  --  37.3  --  37.8 34.5*   PLATELETCT  --  208  --  216 206   PROTHROMBTM 14.0  --  14.9*  --   --    APTT 32.4  --  31.1  --   --    INR 1.09  --  1.19*  --   --       Recent Labs     02/09/23  0225 02/10/23  0010 02/11/23  011   WBC 5.7 4.0* 3.9*   NEUTSPOLYS 74.30*  --   --    LYMPHOCYTES 15.20*  --   --    MONOCYTES 9.00  --   --    EOSINOPHILS 0.40  --   --    BASOPHILS 0.40  --   --    ASTSGOT 14  --   --    ALTSGPT 10  --   --    ALKPHOSPHAT 58  --   --    TBILIRUBIN 0.7  --   --      Recent Labs     02/09/23  0225 02/10/23  0010 02/11/23  0112   SODIUM 133* 140 137   POTASSIUM 3.6 4.2 3.8   CHLORIDE 99 105 106   CO2 22 26 22   GLUCOSE 132* 106* 105*   BUN 15 15 16   CREATININE 0.93 0.90 0.65   CALCIUM 8.9 8.7 8.5     Hemodynamics:  Temp (24hrs), Av.9 °C (98.4 °F), Min:36.6 °C (97.9 °F), Max:37.1 °C (98.8 °F)  Temperature: 37.1 °C (98.8 °F)  Pulse  Av.3  Min: 51  Max: 82   Blood Pressure: 109/62     Respiratory:    Respiration: 16, Pulse Oximetry: 97 %           Fluids:    Intake/Output Summary (Last 24 hours) at 2023 1607  Last data filed at 2023 0600  Gross per 24 hour   Intake --   Output 600 ml   Net -600 ml     Weight: 61.7 kg (136 lb 0.4 oz)  GI/Nutrition:  Orders Placed This Encounter   Procedures    Diet Order Diet: Level 7 - Easy to Chew; Liquid level: Level 0 - Thin     Standing Status:   Standing     Number of Occurrences:   1     Order Specific Question:   Diet:     Answer:   Level 7 - Easy to Chew [22]     Order Specific Question:   Liquid level     Answer:   Level 0 - Thin     Medical Decision Making, by Problem:  Active Hospital Problems    Diagnosis     *Mass of iliopsoas muscle group, L hydronephrosis, h/o DVT/anticoagulation [M62.89]     DVT (deep venous thrombosis) (HCC) [I82.409]     Thyroid nodule [E04.1]     Hydronephrosis [N13.30]     Hyperlipidemia [E78.5]        Plan:  This is a 62 y.o.  female who presented to the ED with flank pain, found to have a left iliac chantelle mass, mesenteric mass:      Plan:   Left pelvic mass: multilobular iliac fossa mass with mesenteric masses and multiple prominent lymph nodes. Concerning for  malignancy, lymphoma vs  less likely leiomyosarcoma. Uterus mass heterogeneous, likely fibroid. LDH within normal limits. Biopsy yesterday, pathology pending.   Left Hydronephrosis: likely obstructive secondary to above. Creatinine wnl. Urology consulted, L NT placed yesterday.  DVT: in LLE, continue anticoagulation    Case discussed with

## 2023-02-12 NOTE — CARE PLAN
The patient is Watcher - Medium risk of patient condition declining or worsening    Shift Goals  Clinical Goals:  (abx, pain managment)  Patient Goals: rest  Family Goals: Pt comfort, Plan of care    Progress made toward(s) clinical / shift goals:    Problem: Knowledge Deficit - Standard  Goal: Patient and family/care givers will demonstrate understanding of plan of care, disease process/condition, diagnostic tests and medications  Outcome: Progressing  Note: Patient had complaints of left back discomfort managed with routine tylenol, On heparin drip, Left nephrostomy tube noted with some continued hematuria, Ambulates with standby, tolerating diet, daughter at bedside.        Patient is not progressing towards the following goals:

## 2023-02-12 NOTE — CARE PLAN
The patient is Watcher - Medium risk of patient condition declining or worsening    Shift Goals  Clinical Goals: Monitor Xa labs  Patient Goals: Rest  Family Goals: Pt comfort, Plan of care    Patient was updated on plan of care. New heparin bag hung per MAR. Xa draw was done, results pending. Patient states her pain is well controlled with tylenol at this time.    Progress made toward(s) clinical / shift goals:      Problem: Knowledge Deficit - Standard  Goal: Patient and family/care givers will demonstrate understanding of plan of care, disease process/condition, diagnostic tests and medications  Outcome: Progressing     Problem: Pain - Standard  Goal: Alleviation of pain or a reduction in pain to the patient’s comfort goal  Outcome: Progressing       Patient is not progressing towards the following goals:

## 2023-02-12 NOTE — PROGRESS NOTES
Hospital Medicine Daily Progress Note    Date of Service  2/12/2023    Chief Complaint  Savana Zarco is a 62 y.o. female admitted 2/8/2023 with left flank pain and nausea. She has a history of recent DVT (started on Eliquis ~ 3 weeks ago), dyslipidemia.     Hospital Course  CT abdomen showed enlargement of the left psoas and iliacus muscle with subtle hypodensity unclear if hematoma or early forming abscess versus intramuscular mass, left hydronephrosis and hydroureter without visualized obstructing stone with delayed left nephrogram.     CTA chest showed no evidence of pulmonary embolism.  She was noted to have a right thyroid nodule with recommendations to follow-up thyroid sonography due to size.    MRI pelvis from 2/9/2023 showed a multilobular left iliac fossa mass extending into the inguinal region, with additional separate mesenteric masses in the pelvis and left mid abdomen.  She has a heterogeneous 5.8 cm mass in the uterine fundus (fibrous versus leiomyosarcoma), moderate left hydroureteronephrosis with compression of the distal left ureter between the iliac fossa mass and uterus, multiple nonspecific mildly prominent left groin lymph nodes.     UA showed moderate leukocyte esterase.  Ceftriaxone was empirically started.    Patient underwent left pelvic extraperitoneal mass biopsy and left percutaneous nephrostomy tube placement by IR on 2/10/2023. Extraperitoneal mass biopsy results results are pending.     Interval Problem Update  Afebrile and hemodynamically stable.  Left nephrostomy with hematuria and good urine output.  Hemoglobin is stable (12.0).  Extraperitoneal mass biopsy results are pending.    I have discussed this patient's plan of care and discharge plan at IDT rounds today with Case Management, Nursing, Nursing leadership, and other members of the IDT team.    Consultants/Specialty  urology    Code Status  Full Code    Disposition  Patient is not medically cleared for  discharge.   Anticipate discharge to to home with close outpatient follow-up.  I have placed the appropriate orders for post-discharge needs.    Review of Systems  Review of Systems   Constitutional:  Positive for malaise/fatigue.   HENT: Negative.     Eyes: Negative.    Respiratory: Negative.     Cardiovascular: Negative.    Gastrointestinal: Negative.    Genitourinary: Negative.    Musculoskeletal: Negative.    Skin: Negative.    Neurological: Negative.    Endo/Heme/Allergies: Negative.    Psychiatric/Behavioral: Negative.        Physical Exam  Temp:  [36.6 °C (97.9 °F)-37.1 °C (98.8 °F)] 37 °C (98.6 °F)  Pulse:  [60-76] 60  Resp:  [17-18] 18  BP: (107-133)/(60-67) 133/67  SpO2:  [97 %-99 %] 99 %    Physical Exam  HENT:      Head: Normocephalic.      Mouth/Throat:      Mouth: Mucous membranes are moist.   Eyes:      Pupils: Pupils are equal, round, and reactive to light.   Cardiovascular:      Rate and Rhythm: Bradycardia present.   Pulmonary:      Effort: Pulmonary effort is normal.   Abdominal:      Palpations: Abdomen is soft.   Musculoskeletal:      Cervical back: Normal range of motion.      Left lower leg: Edema present.   Skin:     General: Skin is warm.   Neurological:      General: No focal deficit present.      Mental Status: She is alert.   Psychiatric:         Mood and Affect: Mood normal.       Fluids    Intake/Output Summary (Last 24 hours) at 2/12/2023 1140  Last data filed at 2/12/2023 1133  Gross per 24 hour   Intake 120 ml   Output 1835 ml   Net -1715 ml       Laboratory  Recent Labs     02/10/23  0010 02/11/23 0112 02/12/23  0523   WBC 4.0* 3.9* 3.8*   RBC 4.11* 3.84* 4.00*   HEMOGLOBIN 12.5 11.8* 12.0   HEMATOCRIT 37.8 34.5* 36.1*   MCV 92.0 89.8 90.3   MCH 30.4 30.7 30.0   MCHC 33.1* 34.2 33.2*   RDW 42.7 41.3 40.8   PLATELETCT 216 206 245   MPV 10.2 10.2 10.1     Recent Labs     02/10/23  0010 02/11/23 0112 02/12/23  0523   SODIUM 140 137 140   POTASSIUM 4.2 3.8 3.9   CHLORIDE 105 106 106    CO2 26 22 26   GLUCOSE 106* 105* 103*   BUN 15 16 12   CREATININE 0.90 0.65 0.66   CALCIUM 8.7 8.5 9.2                     Imaging  IR-NEEDLE BX-ABD-RETROPERITONEAL   Final Result      Image guided biopsy of LEFT pelvic sidewall extraperitoneal mass utilizing 3-D rotational cone beam CT in the IR suite.                  INTERPRETING LOCATION: [address] [facility], [place], [address]      IR-PERQ NEPH CATH NEW ACCESS (ALL RADIOLOGY) LEFT   Final Result      1. ULTRASOUND AND FLUOROSCOPIC GUIDED PLACEMENT OF A LEFT 8 [place]  PIGTAIL LOCKING LOOP PERCUTANEOUS NEPHROSTOMY CATHETER.      2. NEPHROSTOGRAM SHOWING SATISFACTORY CATHETER POSITION WITHOUT EXTRAVASATION.      MR-PELVIS-WITH & W/O AND SEQUENCES   Final Result      1.  Multilobular LEFT iliac fossa mass extending into the inguinal region.  Additional separate mesenteric masses present in the pelvis and LEFT mid abdomen.  Primary diagnostic considerations are lymphoma and metastatic leiomyosarcoma, however exact    etiology is uncertain.   2.  Heterogeneous mass in the uterine fundus measuring 5.8 cm, fibroid versus leiomyosarcoma.   3.  Moderate LEFT hydroureteronephrosis with apparent compression of the distal LEFT ureter between iliac fossa mass and uterus.   4.  Multiple mildly prominent LEFT groin lymph nodes, nonspecific.   5.  Edema of LEFT thigh musculature suggests venous compression as well.      CT-CTA CHEST PULMONARY ARTERY W/ RECONS   Final Result         1.  No pulmonary embolus appreciated.   2.  Right thyroid nodule, recommend follow-up thyroid sonography due to nodule size.      CT-ABDOMEN-PELVIS WITH   Final Result         1.  Enlargement of the left psoas and iliacus muscle with subtle hyperdensity. Could are present hematoma or early forming abscess versus intramuscular mass. Could be followed up with MRI of the pelvis with contrast for further delineation and    characterization of structures   2.  Left hydronephrosis and hydroureter without visualized  obstructing stone with delayed left nephrogram. Appears likely secondary to associated process of the left iliopsoas muscle. There is calcification on the left which appears likely represent    phlebolith, could possibly represent distal ureteral stone within lower inserted ureter.           Assessment/Plan  * Mass of iliopsoas muscle group, L hydronephrosis, h/o DVT/anticoagulation- (present on admission)  Assessment & Plan  MRI pelvis from 2/9/2023 showed a multilobular left iliac fossa mass extending into the inguinal region, with additional separate mesenteric masses in the pelvis and left mid abdomen.  She underwent left pelvic external peritoneal mass biopsy by IR on 2/10/2023.  Pathology is pending.    DVT (deep venous thrombosis) (HCC)  Assessment & Plan  Diagnosed about 3 weeks ago and started on Eliquis.  Apixaban was on hold due to concern for psoas/iliacus trauma.  No hematoma was noted by IR.  On heparin drip for nephrostomy tube placement    Hyperlipidemia  Assessment & Plan  Continue home statin.    Hydronephrosis  Assessment & Plan  Secondary to mass.  Urology were consulted, underwent left nephrostomy tube placement by IR on 2/10/2023.    Thyroid nodule  Assessment & Plan  CTA chest showed a right thyroid nodule with recommendations to follow-up with ultrasound.  Recommend outpatient follow-up         VTE prophylaxis: therapeutic anticoagulation with Heparin

## 2023-02-12 NOTE — PROGRESS NOTES
Note to reader: this note follows the APSO format rather than the historical SOAP format. Assessment and plan located at the top of the note for ease of use.    Chief Complaint  62 y.o. year old female here with flank pain. Pelvic mass with left hydroureteronephrosis.     Assessment/Plan  Interval History   Left hydroureteronephrosis  Pelvic mass      Left NPT to remain  Await biopsy results  Plan otherwise per GYN Oncology  Nothing further from  standpoint. Urology signing off      Case discussed with patient and urology-Dr. Young SAXENA  Patient seen and examined    2/12. Awaiting biopsy results. Good UOP via NPT with some hematuria. Mild pain.     2/11. Biopsy of mass completed 2/10. Left NPT placed. Pain has improved           Review of Systems  Physical Exam   Review of Systems   Constitutional:  Negative for chills and fever.   Genitourinary:  Positive for flank pain. Negative for hematuria.   Vitals:    02/11/23 1759 02/11/23 2013 02/12/23 0341 02/12/23 0817   BP: 123/63 107/67 110/60 133/67   Pulse: 60 62 76 60   Resp: 17 18 18 18   Temp: 36.7 °C (98.1 °F) 37.1 °C (98.8 °F) 36.6 °C (97.9 °F) 37 °C (98.6 °F)   TempSrc: Temporal Temporal Temporal Temporal   SpO2: 98% 98% 97% 99%   Weight:       Height:         Physical Exam  Vitals and nursing note reviewed.   Constitutional:       Appearance: Normal appearance.   HENT:      Head: Normocephalic and atraumatic.      Nose: Nose normal.      Mouth/Throat:      Mouth: Mucous membranes are dry.   Eyes:      Pupils: Pupils are equal, round, and reactive to light.   Pulmonary:      Effort: Pulmonary effort is normal.   Abdominal:      General: Abdomen is flat. There is no distension.      Palpations: Abdomen is soft.      Tenderness: There is no abdominal tenderness.   Genitourinary:     Comments: Left NPT in place draining strawberry colored urine  Skin:     General: Skin is warm and dry.   Neurological:      General: No focal deficit present.      Mental Status: She  is alert and oriented to person, place, and time.   Psychiatric:         Mood and Affect: Mood normal.         Behavior: Behavior normal.        Hematology Chemistry   Lab Results   Component Value Date/Time    WBC 3.8 (L) 02/12/2023 05:23 AM    HEMOGLOBIN 12.0 02/12/2023 05:23 AM    HEMATOCRIT 36.1 (L) 02/12/2023 05:23 AM    PLATELETCT 245 02/12/2023 05:23 AM     Lab Results   Component Value Date/Time    SODIUM 140 02/12/2023 05:23 AM    POTASSIUM 3.9 02/12/2023 05:23 AM    CHLORIDE 106 02/12/2023 05:23 AM    CO2 26 02/12/2023 05:23 AM    GLUCOSE 103 (H) 02/12/2023 05:23 AM    BUN 12 02/12/2023 05:23 AM    CREATININE 0.66 02/12/2023 05:23 AM         Labs not explicitly included in this progress note were reviewed by the author.   Radiology/imaging not explicitly included in this progress note was reviewed by the author.     Labs reviewed and Medications reviewed

## 2023-02-13 LAB
ANION GAP SERPL CALC-SCNC: 12 MMOL/L (ref 7–16)
BASOPHILS # BLD AUTO: 0.7 % (ref 0–1.8)
BASOPHILS # BLD: 0.02 K/UL (ref 0–0.12)
BUN SERPL-MCNC: 11 MG/DL (ref 8–22)
CALCIUM SERPL-MCNC: 9.5 MG/DL (ref 8.5–10.5)
CHLORIDE SERPL-SCNC: 105 MMOL/L (ref 96–112)
CO2 SERPL-SCNC: 24 MMOL/L (ref 20–33)
CREAT SERPL-MCNC: 0.65 MG/DL (ref 0.5–1.4)
EOSINOPHIL # BLD AUTO: 0.13 K/UL (ref 0–0.51)
EOSINOPHIL NFR BLD: 4.3 % (ref 0–6.9)
ERYTHROCYTE [DISTWIDTH] IN BLOOD BY AUTOMATED COUNT: 41.4 FL (ref 35.9–50)
GFR SERPLBLD CREATININE-BSD FMLA CKD-EPI: 100 ML/MIN/1.73 M 2
GLUCOSE SERPL-MCNC: 115 MG/DL (ref 65–99)
HCT VFR BLD AUTO: 39.1 % (ref 37–47)
HGB BLD-MCNC: 13.2 G/DL (ref 12–16)
IMM GRANULOCYTES # BLD AUTO: 0.01 K/UL (ref 0–0.11)
IMM GRANULOCYTES NFR BLD AUTO: 0.3 % (ref 0–0.9)
LYMPHOCYTES # BLD AUTO: 1.14 K/UL (ref 1–4.8)
LYMPHOCYTES NFR BLD: 37.7 % (ref 22–41)
MCH RBC QN AUTO: 30.6 PG (ref 27–33)
MCHC RBC AUTO-ENTMCNC: 33.8 G/DL (ref 33.6–35)
MCV RBC AUTO: 90.5 FL (ref 81.4–97.8)
MONOCYTES # BLD AUTO: 0.2 K/UL (ref 0–0.85)
MONOCYTES NFR BLD AUTO: 6.6 % (ref 0–13.4)
NEUTROPHILS # BLD AUTO: 1.52 K/UL (ref 2–7.15)
NEUTROPHILS NFR BLD: 50.4 % (ref 44–72)
NRBC # BLD AUTO: 0 K/UL
NRBC BLD-RTO: 0 /100 WBC
PLATELET # BLD AUTO: 255 K/UL (ref 164–446)
PMV BLD AUTO: 9.8 FL (ref 9–12.9)
POTASSIUM SERPL-SCNC: 3.8 MMOL/L (ref 3.6–5.5)
RBC # BLD AUTO: 4.32 M/UL (ref 4.2–5.4)
SODIUM SERPL-SCNC: 141 MMOL/L (ref 135–145)
UFH PPP CHRO-ACNC: 0.46 IU/ML
WBC # BLD AUTO: 3 K/UL (ref 4.8–10.8)

## 2023-02-13 PROCEDURE — 770004 HCHG ROOM/CARE - ONCOLOGY PRIVATE *

## 2023-02-13 PROCEDURE — 85025 COMPLETE CBC W/AUTO DIFF WBC: CPT

## 2023-02-13 PROCEDURE — A9270 NON-COVERED ITEM OR SERVICE: HCPCS

## 2023-02-13 PROCEDURE — 700102 HCHG RX REV CODE 250 W/ 637 OVERRIDE(OP)

## 2023-02-13 PROCEDURE — 85520 HEPARIN ASSAY: CPT

## 2023-02-13 PROCEDURE — 99232 SBSQ HOSP IP/OBS MODERATE 35: CPT | Performed by: INTERNAL MEDICINE

## 2023-02-13 PROCEDURE — 36415 COLL VENOUS BLD VENIPUNCTURE: CPT

## 2023-02-13 PROCEDURE — 700111 HCHG RX REV CODE 636 W/ 250 OVERRIDE (IP): Performed by: INTERNAL MEDICINE

## 2023-02-13 PROCEDURE — 700111 HCHG RX REV CODE 636 W/ 250 OVERRIDE (IP)

## 2023-02-13 PROCEDURE — 80048 BASIC METABOLIC PNL TOTAL CA: CPT

## 2023-02-13 RX ADMIN — ACETAMINOPHEN 1000 MG: 500 TABLET, FILM COATED ORAL at 12:15

## 2023-02-13 RX ADMIN — ONDANSETRON 4 MG: 4 TABLET, ORALLY DISINTEGRATING ORAL at 18:13

## 2023-02-13 RX ADMIN — ACETAMINOPHEN 1000 MG: 500 TABLET, FILM COATED ORAL at 04:34

## 2023-02-13 RX ADMIN — ACETAMINOPHEN 1000 MG: 500 TABLET, FILM COATED ORAL at 18:10

## 2023-02-13 RX ADMIN — ATORVASTATIN CALCIUM 20 MG: 20 TABLET, FILM COATED ORAL at 20:59

## 2023-02-13 RX ADMIN — CEFTRIAXONE SODIUM 2000 MG: 10 INJECTION, POWDER, FOR SOLUTION INTRAVENOUS at 18:11

## 2023-02-13 ASSESSMENT — ENCOUNTER SYMPTOMS
PSYCHIATRIC NEGATIVE: 1
CARDIOVASCULAR NEGATIVE: 1
EYES NEGATIVE: 1
NEUROLOGICAL NEGATIVE: 1
GASTROINTESTINAL NEGATIVE: 1
RESPIRATORY NEGATIVE: 1
MUSCULOSKELETAL NEGATIVE: 1

## 2023-02-13 ASSESSMENT — PAIN DESCRIPTION - PAIN TYPE
TYPE: ACUTE PAIN
TYPE: ACUTE PAIN;CHRONIC PAIN

## 2023-02-13 NOTE — DISCHARGE PLANNING
Case Management Discharge Planning    Admission Date: 2/8/2023  GMLOS: 2.5  ALOS: 4    6-Clicks ADL Score: 18  6-Clicks Mobility Score: 18      Anticipated Discharge Dispo: Discharge Disposition: Discharged to home/self care (01)    DME Needed: No    Action(s) Taken: Updated Provider/Nurse on Discharge Plan    Escalations Completed: None    Medically Clear: No    Next Steps: RNCM participated in IDR and conducted a chart review.  Per Dr. Padilla, plan is for patient to discharge home with no case management needs identified at this time.      Bedside RN, Nicolette Payne, shared patient's [place] paperwork.  RNCM emailed paperwork to Opal Leos for coordination.      Case management will continue to follow for discharge planning needs.      Barriers to Discharge: Medical clearance

## 2023-02-13 NOTE — PROGRESS NOTES
Hospital Medicine Daily Progress Note    Date of Service  2/13/2023    Chief Complaint  Savana Zarco is a 62 y.o. female admitted 2/8/2023 with left flank pain and nausea. She has a history of recent DVT (started on Eliquis ~ 3 weeks ago), dyslipidemia.     Hospital Course  CT abdomen showed enlargement of the left psoas and iliacus muscle with subtle hypodensity unclear if hematoma or early forming abscess versus intramuscular mass, left hydronephrosis and hydroureter without visualized obstructing stone with delayed left nephrogram.     CTA chest showed no evidence of pulmonary embolism.  She was noted to have a right thyroid nodule with recommendations to follow-up thyroid sonography due to size.    MRI pelvis from 2/9/2023 showed a multilobular left iliac fossa mass extending into the inguinal region, with additional separate mesenteric masses in the pelvis and left mid abdomen.  She has a heterogeneous 5.8 cm mass in the uterine fundus (fibrous versus leiomyosarcoma), moderate left hydroureteronephrosis with compression of the distal left ureter between the iliac fossa mass and uterus, multiple nonspecific mildly prominent left groin lymph nodes.     UA showed moderate leukocyte esterase.  Ceftriaxone was empirically started.    Patient underwent left pelvic extraperitoneal mass biopsy and left percutaneous nephrostomy tube placement by IR on 2/10/2023.    Interval Problem Update  Afebrile and hemodynamically stable.  Left nephrostomy with hematuria and good urine output.  Hemoglobin improved to 13.2.  Extraperitoneal mass biopsy results are pending.    I have discussed this patient's plan of care and discharge plan at IDT rounds today with Case Management, Nursing, Nursing leadership, and other members of the IDT team.    Consultants/Specialty  urology    Code Status  Full Code    Disposition  Patient is not medically cleared for discharge.   Anticipate discharge to to home with close  outpatient follow-up.  I have placed the appropriate orders for post-discharge needs.    Review of Systems  Review of Systems   Constitutional:  Positive for malaise/fatigue.   HENT: Negative.     Eyes: Negative.    Respiratory: Negative.     Cardiovascular: Negative.    Gastrointestinal: Negative.    Genitourinary: Negative.    Musculoskeletal: Negative.    Skin: Negative.    Neurological: Negative.    Endo/Heme/Allergies: Negative.    Psychiatric/Behavioral: Negative.        Physical Exam  Temp:  [36.8 °C (98.3 °F)-37.5 °C (99.5 °F)] 36.8 °C (98.3 °F)  Pulse:  [64-90] 90  Resp:  [16-18] 18  BP: (112-122)/(54-66) 114/61  SpO2:  [95 %-99 %] 97 %    Physical Exam  HENT:      Head: Normocephalic.      Mouth/Throat:      Mouth: Mucous membranes are moist.   Eyes:      Pupils: Pupils are equal, round, and reactive to light.   Cardiovascular:      Rate and Rhythm: Bradycardia present.   Pulmonary:      Effort: Pulmonary effort is normal.   Abdominal:      Palpations: Abdomen is soft.   Musculoskeletal:      Cervical back: Normal range of motion.      Left lower leg: Edema present.   Skin:     General: Skin is warm.   Neurological:      General: No focal deficit present.      Mental Status: She is alert.   Psychiatric:         Mood and Affect: Mood normal.       Fluids    Intake/Output Summary (Last 24 hours) at 2/13/2023 1311  Last data filed at 2/13/2023 1200  Gross per 24 hour   Intake 490 ml   Output 2100 ml   Net -1610 ml       Laboratory  Recent Labs     02/11/23 0112 02/12/23  0523 02/13/23  1103   WBC 3.9* 3.8* 3.0*   RBC 3.84* 4.00* 4.32   HEMOGLOBIN 11.8* 12.0 13.2   HEMATOCRIT 34.5* 36.1* 39.1   MCV 89.8 90.3 90.5   MCH 30.7 30.0 30.6   MCHC 34.2 33.2* 33.8   RDW 41.3 40.8 41.4   PLATELETCT 206 245 255   MPV 10.2 10.1 9.8     Recent Labs     02/11/23  0112 02/12/23  0523 02/13/23  1103   SODIUM 137 140 141   POTASSIUM 3.8 3.9 3.8   CHLORIDE 106 106 105   CO2 22 26 24   GLUCOSE 105* 103* 115*   BUN 16 12 11    CREATININE 0.65 0.66 0.65   CALCIUM 8.5 9.2 9.5                     Imaging  IR-NEEDLE BX-ABD-RETROPERITONEAL   Final Result      Image guided biopsy of LEFT pelvic sidewall extraperitoneal mass utilizing 3-D rotational cone beam CT in the IR suite.                  INTERPRETING LOCATION: [address] [facility], [place], [address]      IR-PERQ NEPH CATH NEW ACCESS (ALL RADIOLOGY) LEFT   Final Result      1. ULTRASOUND AND FLUOROSCOPIC GUIDED PLACEMENT OF A LEFT 8 [place]  PIGTAIL LOCKING LOOP PERCUTANEOUS NEPHROSTOMY CATHETER.      2. NEPHROSTOGRAM SHOWING SATISFACTORY CATHETER POSITION WITHOUT EXTRAVASATION.      MR-PELVIS-WITH & W/O AND SEQUENCES   Final Result      1.  Multilobular LEFT iliac fossa mass extending into the inguinal region.  Additional separate mesenteric masses present in the pelvis and LEFT mid abdomen.  Primary diagnostic considerations are lymphoma and metastatic leiomyosarcoma, however exact    etiology is uncertain.   2.  Heterogeneous mass in the uterine fundus measuring 5.8 cm, fibroid versus leiomyosarcoma.   3.  Moderate LEFT hydroureteronephrosis with apparent compression of the distal LEFT ureter between iliac fossa mass and uterus.   4.  Multiple mildly prominent LEFT groin lymph nodes, nonspecific.   5.  Edema of LEFT thigh musculature suggests venous compression as well.      CT-CTA CHEST PULMONARY ARTERY W/ RECONS   Final Result         1.  No pulmonary embolus appreciated.   2.  Right thyroid nodule, recommend follow-up thyroid sonography due to nodule size.      CT-ABDOMEN-PELVIS WITH   Final Result         1.  Enlargement of the left psoas and iliacus muscle with subtle hyperdensity. Could are present hematoma or early forming abscess versus intramuscular mass. Could be followed up with MRI of the pelvis with contrast for further delineation and    characterization of structures   2.  Left hydronephrosis and hydroureter without visualized obstructing stone with delayed left nephrogram.  Appears likely secondary to associated process of the left iliopsoas muscle. There is calcification on the left which appears likely represent    phlebolith, could possibly represent distal ureteral stone within lower inserted ureter.           Assessment/Plan  * Mass of iliopsoas muscle group, L hydronephrosis, h/o DVT/anticoagulation- (present on admission)  Assessment & Plan  MRI pelvis from 2/9/2023 showed a multilobular left iliac fossa mass extending into the inguinal region, with additional separate mesenteric masses in the pelvis and left mid abdomen.  She underwent left pelvic external peritoneal mass biopsy by IR on 2/10/2023.  Pathology is pending.    DVT (deep venous thrombosis) (HCC)  Assessment & Plan  Diagnosed about 3 weeks ago and started on Eliquis.  Apixaban was on hold due to concern for psoas/iliacus trauma.  No hematoma was noted by IR.  She has been on heparin drip for nephrostomy tube placement.  Hemoglobin improved overnight, switch back to apixaban if hemoglobin remains stable.    Hyperlipidemia  Assessment & Plan  Continue home statin.    Hydronephrosis  Assessment & Plan  Secondary to mass.  Urology were consulted, underwent left nephrostomy tube placement by IR on 2/10/2023.  Urology signed off, follow-up with outpatient urology.    Thyroid nodule  Assessment & Plan  CTA chest showed a right thyroid nodule with recommendations to follow-up with ultrasound.  Recommend outpatient follow-up         VTE prophylaxis: therapeutic anticoagulation with Heparin

## 2023-02-13 NOTE — CARE PLAN
The patient is Watcher - Medium risk of patient condition declining or worsening    Shift Goals  Clinical Goals: IV abx, pain managment  Patient Goals: rest  Family Goals: Pt comfort, Plan of care    Progress made toward(s) clinical / shift goals:    Problem: Knowledge Deficit - Standard  Goal: Patient and family/care givers will demonstrate understanding of plan of care, disease process/condition, diagnostic tests and medications  Outcome: Progressing  Note:   Patient had complaints of left back discomfort managed with routine tylenol, On heparin drip, Left nephrostomy tube noted with some continued hematuria, Ambulates with standby, tolerating diet, daughter at bedside.       Patient is not progressing towards the following goals:

## 2023-02-13 NOTE — CARE PLAN
The patient is Watcher - Medium risk of patient condition declining or worsening    Shift Goals  Clinical Goals: Monitor neph output, Monitor Xa labs  Patient Goals: Rest  Family Goals: Pt comfort, Plan of care    Patient was updated on plan of care. Patient stated her pain is controlled with tylenol per MAR. Patient denies any other concerns at this time.    Progress made toward(s) clinical / shift goals:      Problem: Knowledge Deficit - Standard  Goal: Patient and family/care givers will demonstrate understanding of plan of care, disease process/condition, diagnostic tests and medications  Outcome: Progressing     Problem: Pain - Standard  Goal: Alleviation of pain or a reduction in pain to the patient’s comfort goal  Outcome: Progressing       Patient is not progressing towards the following goals:

## 2023-02-14 LAB
ANION GAP SERPL CALC-SCNC: 8 MMOL/L (ref 7–16)
BASOPHILS # BLD AUTO: 0.8 % (ref 0–1.8)
BASOPHILS # BLD: 0.03 K/UL (ref 0–0.12)
BUN SERPL-MCNC: 12 MG/DL (ref 8–22)
CALCIUM SERPL-MCNC: 9.5 MG/DL (ref 8.5–10.5)
CHLORIDE SERPL-SCNC: 106 MMOL/L (ref 96–112)
CO2 SERPL-SCNC: 27 MMOL/L (ref 20–33)
CREAT SERPL-MCNC: 0.67 MG/DL (ref 0.5–1.4)
EOSINOPHIL # BLD AUTO: 0.21 K/UL (ref 0–0.51)
EOSINOPHIL NFR BLD: 5.6 % (ref 0–6.9)
ERYTHROCYTE [DISTWIDTH] IN BLOOD BY AUTOMATED COUNT: 41.4 FL (ref 35.9–50)
GFR SERPLBLD CREATININE-BSD FMLA CKD-EPI: 99 ML/MIN/1.73 M 2
GLUCOSE SERPL-MCNC: 100 MG/DL (ref 65–99)
HCT VFR BLD AUTO: 36.1 % (ref 37–47)
HGB BLD-MCNC: 12.3 G/DL (ref 12–16)
IMM GRANULOCYTES # BLD AUTO: 0.01 K/UL (ref 0–0.11)
IMM GRANULOCYTES NFR BLD AUTO: 0.3 % (ref 0–0.9)
LYMPHOCYTES # BLD AUTO: 1.64 K/UL (ref 1–4.8)
LYMPHOCYTES NFR BLD: 44 % (ref 22–41)
MCH RBC QN AUTO: 30.7 PG (ref 27–33)
MCHC RBC AUTO-ENTMCNC: 34.1 G/DL (ref 33.6–35)
MCV RBC AUTO: 90 FL (ref 81.4–97.8)
MONOCYTES # BLD AUTO: 0.38 K/UL (ref 0–0.85)
MONOCYTES NFR BLD AUTO: 10.2 % (ref 0–13.4)
NEUTROPHILS # BLD AUTO: 1.46 K/UL (ref 2–7.15)
NEUTROPHILS NFR BLD: 39.1 % (ref 44–72)
NRBC # BLD AUTO: 0 K/UL
NRBC BLD-RTO: 0 /100 WBC
PLATELET # BLD AUTO: 242 K/UL (ref 164–446)
PMV BLD AUTO: 9.7 FL (ref 9–12.9)
POTASSIUM SERPL-SCNC: 5 MMOL/L (ref 3.6–5.5)
RBC # BLD AUTO: 4.01 M/UL (ref 4.2–5.4)
SODIUM SERPL-SCNC: 141 MMOL/L (ref 135–145)
UFH PPP CHRO-ACNC: 0.35 IU/ML
WBC # BLD AUTO: 3.7 K/UL (ref 4.8–10.8)

## 2023-02-14 PROCEDURE — A9270 NON-COVERED ITEM OR SERVICE: HCPCS

## 2023-02-14 PROCEDURE — A9270 NON-COVERED ITEM OR SERVICE: HCPCS | Performed by: INTERNAL MEDICINE

## 2023-02-14 PROCEDURE — 770004 HCHG ROOM/CARE - ONCOLOGY PRIVATE *

## 2023-02-14 PROCEDURE — 700101 HCHG RX REV CODE 250: Performed by: INTERNAL MEDICINE

## 2023-02-14 PROCEDURE — 99232 SBSQ HOSP IP/OBS MODERATE 35: CPT | Performed by: INTERNAL MEDICINE

## 2023-02-14 PROCEDURE — 700111 HCHG RX REV CODE 636 W/ 250 OVERRIDE (IP): Performed by: INTERNAL MEDICINE

## 2023-02-14 PROCEDURE — 85025 COMPLETE CBC W/AUTO DIFF WBC: CPT

## 2023-02-14 PROCEDURE — 700102 HCHG RX REV CODE 250 W/ 637 OVERRIDE(OP): Performed by: INTERNAL MEDICINE

## 2023-02-14 PROCEDURE — 36415 COLL VENOUS BLD VENIPUNCTURE: CPT

## 2023-02-14 PROCEDURE — 700102 HCHG RX REV CODE 250 W/ 637 OVERRIDE(OP)

## 2023-02-14 PROCEDURE — 80048 BASIC METABOLIC PNL TOTAL CA: CPT

## 2023-02-14 PROCEDURE — 85520 HEPARIN ASSAY: CPT

## 2023-02-14 RX ORDER — METHOCARBAMOL 500 MG/1
500 TABLET, FILM COATED ORAL 4 TIMES DAILY
Status: DISCONTINUED | OUTPATIENT
Start: 2023-02-14 | End: 2023-02-17 | Stop reason: HOSPADM

## 2023-02-14 RX ADMIN — METHOCARBAMOL 500 MG: 500 TABLET ORAL at 17:08

## 2023-02-14 RX ADMIN — CEFTRIAXONE SODIUM 2000 MG: 10 INJECTION, POWDER, FOR SOLUTION INTRAVENOUS at 17:08

## 2023-02-14 RX ADMIN — HEPARIN SODIUM 14 UNITS/KG/HR: 5000 INJECTION, SOLUTION INTRAVENOUS at 04:54

## 2023-02-14 RX ADMIN — APIXABAN 5 MG: 5 TABLET, FILM COATED ORAL at 17:08

## 2023-02-14 RX ADMIN — ATORVASTATIN CALCIUM 20 MG: 20 TABLET, FILM COATED ORAL at 20:50

## 2023-02-14 RX ADMIN — METHOCARBAMOL 500 MG: 500 TABLET ORAL at 20:50

## 2023-02-14 ASSESSMENT — ENCOUNTER SYMPTOMS
RESPIRATORY NEGATIVE: 1
GASTROINTESTINAL NEGATIVE: 1
MUSCULOSKELETAL NEGATIVE: 1
CARDIOVASCULAR NEGATIVE: 1
EYES NEGATIVE: 1
PSYCHIATRIC NEGATIVE: 1
NEUROLOGICAL NEGATIVE: 1

## 2023-02-14 ASSESSMENT — COGNITIVE AND FUNCTIONAL STATUS - GENERAL
SUGGESTED CMS G CODE MODIFIER MOBILITY: CH
DAILY ACTIVITIY SCORE: 24
MOBILITY SCORE: 24
SUGGESTED CMS G CODE MODIFIER DAILY ACTIVITY: CH

## 2023-02-14 ASSESSMENT — PATIENT HEALTH QUESTIONNAIRE - PHQ9
1. LITTLE INTEREST OR PLEASURE IN DOING THINGS: NOT AT ALL
2. FEELING DOWN, DEPRESSED, IRRITABLE, OR HOPELESS: NOT AT ALL
SUM OF ALL RESPONSES TO PHQ9 QUESTIONS 1 AND 2: 0

## 2023-02-14 ASSESSMENT — PAIN DESCRIPTION - PAIN TYPE: TYPE: ACUTE PAIN

## 2023-02-14 NOTE — CARE PLAN
The patient is Watcher - Medium risk of patient condition declining or worsening    Shift Goals  Clinical Goals: Monitor nephrostomy output, Monitor labs  Patient Goals: Rest  Family Goals: Pt comfort, Plan of care    Patient states her pain is controlled. Nephrostomy tube output is pink-tinged.    Progress made toward(s) clinical / shift goals:      Problem: Pain - Standard  Goal: Alleviation of pain or a reduction in pain to the patient’s comfort goal  Outcome: Progressing     Problem: Hemodynamics  Goal: Patient's hemodynamics, fluid balance and neurologic status will be stable or improve  Outcome: Progressing       Patient is not progressing towards the following goals:

## 2023-02-14 NOTE — PROGRESS NOTES
Hospital Medicine Daily Progress Note    Date of Service  2/14/2023    Chief Complaint  Savana Zarco is a 62 y.o. female admitted 2/8/2023 with left flank pain and nausea. She has a history of recent DVT (started on Eliquis ~ 3 weeks ago), dyslipidemia.     Hospital Course  CT abdomen showed enlargement of the left psoas and iliacus muscle with subtle hypodensity unclear if hematoma or early forming abscess versus intramuscular mass, left hydronephrosis and hydroureter without visualized obstructing stone with delayed left nephrogram.     CTA chest showed no evidence of pulmonary embolism.  She was noted to have a right thyroid nodule with recommendations to follow-up thyroid sonography due to size.    MRI pelvis from 2/9/2023 showed a multilobular left iliac fossa mass extending into the inguinal region, with additional separate mesenteric masses in the pelvis and left mid abdomen.  She has a heterogeneous 5.8 cm mass in the uterine fundus (fibrous versus leiomyosarcoma), moderate left hydroureteronephrosis with compression of the distal left ureter between the iliac fossa mass and uterus, multiple nonspecific mildly prominent left groin lymph nodes.     UA showed moderate leukocyte esterase.  Ceftriaxone was empirically started.    Patient underwent left pelvic extraperitoneal mass biopsy and left percutaneous nephrostomy tube placement by IR on 2/10/2023.    Interval Problem Update  Patient was seen and examined at bedside.  I have personally reviewed and interpreted vitals, labs, and imaging.    2/14.  Afebrile.  Stable vitals.  On room air.  Denies fevers, chills, chest pains, shortness of breath.  She does report some left heel pain which is acute on chronic.  Also reports some back pain when she puts her feet up.  Completes course of ceftriaxone today.  Discussed with GYN oncology.  Switch from heparin drip back to apixaban.  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   Discussed with oncology.  Ordered echocardiogram.    I have discussed this patient's plan of care and discharge plan at IDT rounds today with Case Management, Nursing, Nursing leadership, and other members of the IDT team.    Consultants/Specialty  urology    Code Status  Full Code    Disposition  Patient is not medically cleared for discharge.   Anticipate discharge to to home with close outpatient follow-up.  I have placed the appropriate orders for post-discharge needs.    Review of Systems  Review of Systems   Constitutional:  Positive for malaise/fatigue.   HENT: Negative.     Eyes: Negative.    Respiratory: Negative.     Cardiovascular: Negative.    Gastrointestinal: Negative.    Genitourinary: Negative.    Musculoskeletal: Negative.    Skin: Negative.    Neurological: Negative.    Endo/Heme/Allergies: Negative.    Psychiatric/Behavioral: Negative.        Physical Exam  Temp:  [36.6 °C (97.9 °F)-37.3 °C (99.2 °F)] 36.9 °C (98.4 °F)  Pulse:  [62-98] 62  Resp:  [16-18] 18  BP: ()/(57-75) 107/57  SpO2:  [93 %-98 %] 96 %    Physical Exam  HENT:      Head: Normocephalic.      Mouth/Throat:      Mouth: Mucous membranes are moist.   Eyes:      Pupils: Pupils are equal, round, and reactive to light.   Cardiovascular:      Rate and Rhythm: Bradycardia present.   Pulmonary:      Effort: Pulmonary effort is normal.   Abdominal:      Palpations: Abdomen is soft.   Musculoskeletal:      Cervical back: Normal range of motion.      Left lower leg: Edema present.      Comments: Left nephrostomy tube.   Skin:     General: Skin is warm.   Neurological:      General: No focal deficit present.      Mental Status: She is alert.   Psychiatric:         Mood and Affect: Mood normal.       Fluids    Intake/Output Summary (Last 24 hours) at 2/14/2023 0923  Last data filed at 2/14/2023 0600  Gross per 24 hour   Intake 370 ml   Output 1675 ml   Net -1305 ml         Laboratory  Recent Labs     02/12/23  0523 02/13/23  1103  02/14/23  0601   WBC 3.8* 3.0* 3.7*   RBC 4.00* 4.32 4.01*   HEMOGLOBIN 12.0 13.2 12.3   HEMATOCRIT 36.1* 39.1 36.1*   MCV 90.3 90.5 90.0   MCH 30.0 30.6 30.7   MCHC 33.2* 33.8 34.1   RDW 40.8 41.4 41.4   PLATELETCT 245 255 242   MPV 10.1 9.8 9.7       Recent Labs     02/12/23  0523 02/13/23  1103 02/14/23  0601   SODIUM 140 141 141   POTASSIUM 3.9 3.8 5.0   CHLORIDE 106 105 106   CO2 26 24 27   GLUCOSE 103* 115* 100*   BUN 12 11 12   CREATININE 0.66 0.65 0.67   CALCIUM 9.2 9.5 9.5                       Imaging  IR-NEEDLE BX-ABD-RETROPERITONEAL   Final Result      Image guided biopsy of LEFT pelvic sidewall extraperitoneal mass utilizing 3-D rotational cone beam CT in the IR suite.                  INTERPRETING LOCATION: [address] [place] [address], [facility]      IR-PERQ NEPH CATH NEW ACCESS (ALL RADIOLOGY) LEFT   Final Result      1. ULTRASOUND AND FLUOROSCOPIC GUIDED PLACEMENT OF A LEFT 8 [place]  PIGTAIL LOCKING LOOP PERCUTANEOUS NEPHROSTOMY CATHETER.      2. NEPHROSTOGRAM SHOWING SATISFACTORY CATHETER POSITION WITHOUT EXTRAVASATION.      MR-PELVIS-WITH & W/O AND SEQUENCES   Final Result      1.  Multilobular LEFT iliac fossa mass extending into the inguinal region.  Additional separate mesenteric masses present in the pelvis and LEFT mid abdomen.  Primary diagnostic considerations are lymphoma and metastatic leiomyosarcoma, however exact    etiology is uncertain.   2.  Heterogeneous mass in the uterine fundus measuring 5.8 cm, fibroid versus leiomyosarcoma.   3.  Moderate LEFT hydroureteronephrosis with apparent compression of the distal LEFT ureter between iliac fossa mass and uterus.   4.  Multiple mildly prominent LEFT groin lymph nodes, nonspecific.   5.  Edema of LEFT thigh musculature suggests venous compression as well.      CT-CTA CHEST PULMONARY ARTERY W/ RECONS   Final Result         1.  No pulmonary embolus appreciated.   2.  Right thyroid nodule, recommend follow-up thyroid sonography due to nodule size.       CT-ABDOMEN-PELVIS WITH   Final Result         1.  Enlargement of the left psoas and iliacus muscle with subtle hyperdensity. Could are present hematoma or early forming abscess versus intramuscular mass. Could be followed up with MRI of the pelvis with contrast for further delineation and    characterization of structures   2.  Left hydronephrosis and hydroureter without visualized obstructing stone with delayed left nephrogram. Appears likely secondary to associated process of the left iliopsoas muscle. There is calcification on the left which appears likely represent    phlebolith, could possibly represent distal ureteral stone within lower inserted ureter.             Assessment/Plan  * Mass of iliopsoas muscle group, L hydronephrosis, h/o DVT/anticoagulation- (present on admission)  Assessment & Plan  MRI pelvis from 2/9/2023 showed a multilobular left iliac fossa mass extending into the inguinal region, with additional separate mesenteric masses in the pelvis and left mid abdomen.  She underwent left pelvic external peritoneal mass biopsy by IR on 2/10/2023.  Pathology showed high-grade B-cell lymphoma    Hyperlipidemia  Assessment & Plan  Continue home statin.    Hydronephrosis  Assessment & Plan  Secondary to mass.  Urology were consulted, underwent left nephrostomy tube placement by IR on 2/10/2023.  Urology signed off, follow-up with outpatient urology.    Thyroid nodule  Assessment & Plan  CTA chest showed a right thyroid nodule with recommendations to follow-up with ultrasound.  Recommend outpatient follow-up    DVT (deep venous thrombosis) (HCC)  Assessment & Plan  Diagnosed about 3 weeks ago and started on Eliquis.  Apixaban was on hold due to concern for psoas/iliacus trauma.  No hematoma was noted by IR.  She has been on heparin drip for nephrostomy tube placement.  Hemoglobin improved overnight, switch back to apixaban if hemoglobin remains stable.    Switched back to apixaban 2/14         VTE  prophylaxis: therapeutic anticoagulation with apixaban

## 2023-02-14 NOTE — CARE PLAN
The patient is Stable - Low risk of patient condition declining or worsening    Shift Goals  Clinical Goals: Monitor nephrostomy output, Monitor labs  Patient Goals: REst  Family Goals: Pt comfort, Plan of care    Progress made toward(s) clinical / shift goals:  pain well controlled, education done on POC, all questions and concerns were addressed.     Patient is not progressing towards the following goals:      Problem: Knowledge Deficit - Standard  Goal: Patient and family/care givers will demonstrate understanding of plan of care, disease process/condition, diagnostic tests and medications  Outcome: Progressing     Problem: Pain - Standard  Goal: Alleviation of pain or a reduction in pain to the patient’s comfort goal  Outcome: Progressing     Problem: Fluid Volume  Goal: Fluid volume balance will be maintained  Outcome: Progressing

## 2023-02-15 ENCOUNTER — APPOINTMENT (OUTPATIENT)
Dept: CARDIOLOGY | Facility: MEDICAL CENTER | Age: 63
DRG: 841 | End: 2023-02-15
Attending: INTERNAL MEDICINE
Payer: COMMERCIAL

## 2023-02-15 LAB
ALBUMIN SERPL BCP-MCNC: 3.8 G/DL (ref 3.2–4.9)
ALBUMIN/GLOB SERPL: 1.6 G/DL
ALP SERPL-CCNC: 60 U/L (ref 30–99)
ALT SERPL-CCNC: 76 U/L (ref 2–50)
ANION GAP SERPL CALC-SCNC: 13 MMOL/L (ref 7–16)
AST SERPL-CCNC: 81 U/L (ref 12–45)
BASOPHILS # BLD AUTO: 0.3 % (ref 0–1.8)
BASOPHILS # BLD: 0.01 K/UL (ref 0–0.12)
BILIRUB SERPL-MCNC: 0.3 MG/DL (ref 0.1–1.5)
BUN SERPL-MCNC: 14 MG/DL (ref 8–22)
CALCIUM ALBUM COR SERPL-MCNC: 9.7 MG/DL (ref 8.5–10.5)
CALCIUM SERPL-MCNC: 9.5 MG/DL (ref 8.5–10.5)
CHLORIDE SERPL-SCNC: 105 MMOL/L (ref 96–112)
CO2 SERPL-SCNC: 22 MMOL/L (ref 20–33)
CREAT SERPL-MCNC: 0.57 MG/DL (ref 0.5–1.4)
EOSINOPHIL # BLD AUTO: 0.21 K/UL (ref 0–0.51)
EOSINOPHIL NFR BLD: 5.5 % (ref 0–6.9)
ERYTHROCYTE [DISTWIDTH] IN BLOOD BY AUTOMATED COUNT: 42.5 FL (ref 35.9–50)
GFR SERPLBLD CREATININE-BSD FMLA CKD-EPI: 103 ML/MIN/1.73 M 2
GLOBULIN SER CALC-MCNC: 2.4 G/DL (ref 1.9–3.5)
GLUCOSE SERPL-MCNC: 93 MG/DL (ref 65–99)
HAV IGM SERPL QL IA: NORMAL
HBV CORE IGM SER QL: NORMAL
HBV SURFACE AG SER QL: NORMAL
HCT VFR BLD AUTO: 36.7 % (ref 37–47)
HCV AB SER QL: NORMAL
HGB BLD-MCNC: 12.1 G/DL (ref 12–16)
HIV 1+2 AB+HIV1 P24 AG SERPL QL IA: NORMAL
IMM GRANULOCYTES # BLD AUTO: 0.01 K/UL (ref 0–0.11)
IMM GRANULOCYTES NFR BLD AUTO: 0.3 % (ref 0–0.9)
LDH SERPL L TO P-CCNC: 214 U/L (ref 107–266)
LV EJECT FRACT  99904: 65
LV EJECT FRACT MOD 2C 99903: 60.44
LV EJECT FRACT MOD 4C 99902: 66.25
LV EJECT FRACT MOD BP 99901: 62.5
LYMPHOCYTES # BLD AUTO: 1.45 K/UL (ref 1–4.8)
LYMPHOCYTES NFR BLD: 37.7 % (ref 22–41)
MAGNESIUM SERPL-MCNC: 1.8 MG/DL (ref 1.5–2.5)
MCH RBC QN AUTO: 30.3 PG (ref 27–33)
MCHC RBC AUTO-ENTMCNC: 33 G/DL (ref 33.6–35)
MCV RBC AUTO: 91.8 FL (ref 81.4–97.8)
MONOCYTES # BLD AUTO: 0.38 K/UL (ref 0–0.85)
MONOCYTES NFR BLD AUTO: 9.9 % (ref 0–13.4)
NEUTROPHILS # BLD AUTO: 1.79 K/UL (ref 2–7.15)
NEUTROPHILS NFR BLD: 46.3 % (ref 44–72)
NRBC # BLD AUTO: 0 K/UL
NRBC BLD-RTO: 0 /100 WBC
PHOSPHATE SERPL-MCNC: 5.4 MG/DL (ref 2.5–4.5)
PLATELET # BLD AUTO: 240 K/UL (ref 164–446)
PMV BLD AUTO: 9.9 FL (ref 9–12.9)
POTASSIUM SERPL-SCNC: 4.1 MMOL/L (ref 3.6–5.5)
PROT SERPL-MCNC: 6.2 G/DL (ref 6–8.2)
RBC # BLD AUTO: 4 M/UL (ref 4.2–5.4)
SODIUM SERPL-SCNC: 140 MMOL/L (ref 135–145)
URATE SERPL-MCNC: 3.7 MG/DL (ref 1.9–8.2)
WBC # BLD AUTO: 3.9 K/UL (ref 4.8–10.8)

## 2023-02-15 PROCEDURE — 700101 HCHG RX REV CODE 250: Performed by: INTERNAL MEDICINE

## 2023-02-15 PROCEDURE — 83735 ASSAY OF MAGNESIUM: CPT

## 2023-02-15 PROCEDURE — 85025 COMPLETE CBC W/AUTO DIFF WBC: CPT

## 2023-02-15 PROCEDURE — 93306 TTE W/DOPPLER COMPLETE: CPT | Mod: 26 | Performed by: INTERNAL MEDICINE

## 2023-02-15 PROCEDURE — 93306 TTE W/DOPPLER COMPLETE: CPT

## 2023-02-15 PROCEDURE — 700102 HCHG RX REV CODE 250 W/ 637 OVERRIDE(OP)

## 2023-02-15 PROCEDURE — 80053 COMPREHEN METABOLIC PANEL: CPT

## 2023-02-15 PROCEDURE — 80074 ACUTE HEPATITIS PANEL: CPT

## 2023-02-15 PROCEDURE — 84550 ASSAY OF BLOOD/URIC ACID: CPT

## 2023-02-15 PROCEDURE — 36415 COLL VENOUS BLD VENIPUNCTURE: CPT

## 2023-02-15 PROCEDURE — 87389 HIV-1 AG W/HIV-1&-2 AB AG IA: CPT

## 2023-02-15 PROCEDURE — 84100 ASSAY OF PHOSPHORUS: CPT

## 2023-02-15 PROCEDURE — 770004 HCHG ROOM/CARE - ONCOLOGY PRIVATE *

## 2023-02-15 PROCEDURE — 99232 SBSQ HOSP IP/OBS MODERATE 35: CPT | Performed by: INTERNAL MEDICINE

## 2023-02-15 PROCEDURE — A9270 NON-COVERED ITEM OR SERVICE: HCPCS

## 2023-02-15 PROCEDURE — A9270 NON-COVERED ITEM OR SERVICE: HCPCS | Performed by: INTERNAL MEDICINE

## 2023-02-15 PROCEDURE — 83615 LACTATE (LD) (LDH) ENZYME: CPT

## 2023-02-15 PROCEDURE — 700102 HCHG RX REV CODE 250 W/ 637 OVERRIDE(OP): Performed by: INTERNAL MEDICINE

## 2023-02-15 RX ORDER — LANOLIN ALCOHOL/MO/W.PET/CERES
400 CREAM (GRAM) TOPICAL 2 TIMES DAILY
Status: COMPLETED | OUTPATIENT
Start: 2023-02-15 | End: 2023-02-16

## 2023-02-15 RX ADMIN — METHOCARBAMOL 500 MG: 500 TABLET ORAL at 20:37

## 2023-02-15 RX ADMIN — DICLOFENAC SODIUM 2 G: 10 GEL TOPICAL at 21:43

## 2023-02-15 RX ADMIN — SENNOSIDES AND DOCUSATE SODIUM 2 TABLET: 50; 8.6 TABLET ORAL at 17:15

## 2023-02-15 RX ADMIN — METHOCARBAMOL 500 MG: 500 TABLET ORAL at 13:49

## 2023-02-15 RX ADMIN — METHOCARBAMOL 500 MG: 500 TABLET ORAL at 08:42

## 2023-02-15 RX ADMIN — APIXABAN 5 MG: 5 TABLET, FILM COATED ORAL at 04:51

## 2023-02-15 RX ADMIN — SENNOSIDES AND DOCUSATE SODIUM 2 TABLET: 50; 8.6 TABLET ORAL at 04:51

## 2023-02-15 RX ADMIN — Medication 400 MG: at 17:15

## 2023-02-15 RX ADMIN — METHOCARBAMOL 500 MG: 500 TABLET ORAL at 17:15

## 2023-02-15 RX ADMIN — ATORVASTATIN CALCIUM 20 MG: 20 TABLET, FILM COATED ORAL at 20:37

## 2023-02-15 RX ADMIN — OXYCODONE HYDROCHLORIDE 5 MG: 5 TABLET ORAL at 01:59

## 2023-02-15 ASSESSMENT — ENCOUNTER SYMPTOMS
DIZZINESS: 0
FEVER: 0
HEADACHES: 0
EYES NEGATIVE: 1
NAUSEA: 0
SHORTNESS OF BREATH: 0
ABDOMINAL PAIN: 0
CARDIOVASCULAR NEGATIVE: 1
NEUROLOGICAL NEGATIVE: 1
VOMITING: 0
MUSCULOSKELETAL NEGATIVE: 1
COUGH: 0
RESPIRATORY NEGATIVE: 1
PSYCHIATRIC NEGATIVE: 1
GASTROINTESTINAL NEGATIVE: 1
CHILLS: 0

## 2023-02-15 ASSESSMENT — PAIN DESCRIPTION - PAIN TYPE
TYPE: ACUTE PAIN

## 2023-02-15 NOTE — CARE PLAN
The patient is Watcher - Medium risk of patient condition declining or worsening    Shift Goals  Clinical Goals: monitor neph output, safety, echo  Patient Goals: Rest  Family Goals: Pt comfort, Plan of care    Progress made toward(s) clinical / shift goals:    Problem: Knowledge Deficit - Standard  Goal: Patient and family/care givers will demonstrate understanding of plan of care, disease process/condition, diagnostic tests and medications  Outcome: Progressing  Patient A&Ox4. Patient and family updated on plan of care, agreeable to plan at this time. Patient calling to make needs known appropriately.   Problem: Pain - Standard  Goal: Alleviation of pain or a reduction in pain to the patient’s comfort goal  Outcome: Progressing  Patient reports L flank pain, medicated per MAR. Patient reports relief with intervention. Hourly rounding in place.    Problem: Hemodynamics  Goal: Patient's hemodynamics, fluid balance and neurologic status will be stable or improve  Outcome: Progressing       Patient is not progressing towards the following goals:

## 2023-02-15 NOTE — PROGRESS NOTES
Hospital Medicine Daily Progress Note    Date of Service  2/15/2023    Chief Complaint  Savana Zarco is a 62 y.o. female admitted 2/8/2023 with left flank pain and nausea. She has a history of recent DVT (started on Eliquis ~ 3 weeks ago), dyslipidemia.     Hospital Course  CT abdomen showed enlargement of the left psoas and iliacus muscle with subtle hypodensity unclear if hematoma or early forming abscess versus intramuscular mass, left hydronephrosis and hydroureter without visualized obstructing stone with delayed left nephrogram.     CTA chest showed no evidence of pulmonary embolism.  She was noted to have a right thyroid nodule with recommendations to follow-up thyroid sonography due to size.    MRI pelvis from 2/9/2023 showed a multilobular left iliac fossa mass extending into the inguinal region, with additional separate mesenteric masses in the pelvis and left mid abdomen.  She has a heterogeneous 5.8 cm mass in the uterine fundus (fibrous versus leiomyosarcoma), moderate left hydroureteronephrosis with compression of the distal left ureter between the iliac fossa mass and uterus, multiple nonspecific mildly prominent left groin lymph nodes.     UA showed moderate leukocyte esterase.  Ceftriaxone was empirically started.    Patient underwent left pelvic extraperitoneal mass biopsy and left percutaneous nephrostomy tube placement by IR on 2/10/2023.    Interval Problem Update  Patient was seen and examined at bedside.  I have personally reviewed and interpreted vitals, labs, and imaging.    2/14.  Afebrile.  Stable vitals.  On room air.  Denies fevers, chills, chest pains, shortness of breath.  She does report some left heel pain which is acute on chronic.  Also reports some back pain when she puts her feet up.  Completes course of ceftriaxone today.  Discussed with GYN oncology.  Switch from heparin drip back to apixaban.  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   Discussed with oncology.  Ordered echocardiogram.  2/15.  Afebrile.  Stable vitals.  On room air.  Denies fevers, chills, chest pains, shortness of breath.  Back pain is improved.  Discussed with oncology.  We will try for inpatient bone marrow biopsy followed by outpatient PET scan to complete work-up prior to chemotherapy.  Hold apixaban for now.  Had extensive discussion about plan of care and overall prognosis of lymphoma.  Final pathology pending    I have discussed this patient's plan of care and discharge plan at IDT rounds today with Case Management, Nursing, Nursing leadership, and other members of the IDT team.    Consultants/Specialty  urology    Code Status  Full Code    Disposition  Patient is not medically cleared for discharge.   Anticipate discharge to to home with close outpatient follow-up.  I have placed the appropriate orders for post-discharge needs.    Review of Systems  Review of Systems   Constitutional:  Positive for malaise/fatigue.   HENT: Negative.     Eyes: Negative.    Respiratory: Negative.     Cardiovascular: Negative.    Gastrointestinal: Negative.    Genitourinary: Negative.    Musculoskeletal: Negative.    Skin: Negative.    Neurological: Negative.    Endo/Heme/Allergies: Negative.    Psychiatric/Behavioral: Negative.        Physical Exam  Temp:  [36.8 °C (98.3 °F)-37 °C (98.6 °F)] 36.8 °C (98.3 °F)  Pulse:  [62-70] 64  Resp:  [14-18] 14  BP: (104-136)/(45-62) 111/58  SpO2:  [96 %-98 %] 96 %    Physical Exam  HENT:      Head: Normocephalic.      Mouth/Throat:      Mouth: Mucous membranes are moist.   Eyes:      Pupils: Pupils are equal, round, and reactive to light.   Cardiovascular:      Rate and Rhythm: Bradycardia present.   Pulmonary:      Effort: Pulmonary effort is normal.   Abdominal:      Palpations: Abdomen is soft.   Musculoskeletal:      Cervical back: Normal range of motion.      Left lower leg: Edema present.      Comments: Left nephrostomy tube.   Skin:     General:  Skin is warm.   Neurological:      General: No focal deficit present.      Mental Status: She is alert.   Psychiatric:         Mood and Affect: Mood normal.       Fluids    Intake/Output Summary (Last 24 hours) at 2/15/2023 0812  Last data filed at 2/15/2023 0400  Gross per 24 hour   Intake 490 ml   Output 1750 ml   Net -1260 ml         Laboratory  Recent Labs     02/13/23  1103 02/14/23  0601 02/15/23  0658   WBC 3.0* 3.7* 3.9*   RBC 4.32 4.01* 4.00*   HEMOGLOBIN 13.2 12.3 12.1   HEMATOCRIT 39.1 36.1* 36.7*   MCV 90.5 90.0 91.8   MCH 30.6 30.7 30.3   MCHC 33.8 34.1 33.0*   RDW 41.4 41.4 42.5   PLATELETCT 255 242 240   MPV 9.8 9.7 9.9       Recent Labs     02/13/23  1103 02/14/23  0601   SODIUM 141 141   POTASSIUM 3.8 5.0   CHLORIDE 105 106   CO2 24 27   GLUCOSE 115* 100*   BUN 11 12   CREATININE 0.65 0.67   CALCIUM 9.5 9.5                       Imaging  IR-NEEDLE BX-ABD-RETROPERITONEAL   Final Result      Image guided biopsy of LEFT pelvic sidewall extraperitoneal mass utilizing 3-D rotational cone beam CT in the IR suite.                  INTERPRETING LOCATION: [address] [place] [address], [facility]      IR-PERQ NEPH CATH NEW ACCESS (ALL RADIOLOGY) LEFT   Final Result      1. ULTRASOUND AND FLUOROSCOPIC GUIDED PLACEMENT OF A LEFT 8 [place]  PIGTAIL LOCKING LOOP PERCUTANEOUS NEPHROSTOMY CATHETER.      2. NEPHROSTOGRAM SHOWING SATISFACTORY CATHETER POSITION WITHOUT EXTRAVASATION.      MR-PELVIS-WITH & W/O AND SEQUENCES   Final Result      1.  Multilobular LEFT iliac fossa mass extending into the inguinal region.  Additional separate mesenteric masses present in the pelvis and LEFT mid abdomen.  Primary diagnostic considerations are lymphoma and metastatic leiomyosarcoma, however exact    etiology is uncertain.   2.  Heterogeneous mass in the uterine fundus measuring 5.8 cm, fibroid versus leiomyosarcoma.   3.  Moderate LEFT hydroureteronephrosis with apparent compression of the distal LEFT ureter between iliac fossa mass and  uterus.   4.  Multiple mildly prominent LEFT groin lymph nodes, nonspecific.   5.  Edema of LEFT thigh musculature suggests venous compression as well.      CT-CTA CHEST PULMONARY ARTERY W/ RECONS   Final Result         1.  No pulmonary embolus appreciated.   2.  Right thyroid nodule, recommend follow-up thyroid sonography due to nodule size.      CT-ABDOMEN-PELVIS WITH   Final Result         1.  Enlargement of the left psoas and iliacus muscle with subtle hyperdensity. Could are present hematoma or early forming abscess versus intramuscular mass. Could be followed up with MRI of the pelvis with contrast for further delineation and    characterization of structures   2.  Left hydronephrosis and hydroureter without visualized obstructing stone with delayed left nephrogram. Appears likely secondary to associated process of the left iliopsoas muscle. There is calcification on the left which appears likely represent    phlebolith, could possibly represent distal ureteral stone within lower inserted ureter.      EC-ECHOCARDIOGRAM COMPLETE W/O CONT    (Results Pending)          Assessment/Plan  * Mass of iliopsoas muscle group, L hydronephrosis, h/o DVT/anticoagulation- (present on admission)  Assessment & Plan  MRI pelvis from 2/9/2023 showed a multilobular left iliac fossa mass extending into the inguinal region, with additional separate mesenteric masses in the pelvis and left mid abdomen.  She underwent left pelvic external peritoneal mass biopsy by IR on 2/10/2023.  Pathology showed high-grade B-cell lymphoma    Hyperlipidemia  Assessment & Plan  Continue home statin.    Hydronephrosis  Assessment & Plan  Obstructive uropathy from left iliac mass.  Urology was consulted, underwent left nephrostomy tube placement by IR on 2/10/2023.  Urology signed off, follow-up with outpatient urology.  Plan for lymphoma as per above    Thyroid nodule  Assessment & Plan  CTA chest showed a right thyroid nodule with recommendations  to follow-up with ultrasound.  Recommend outpatient follow-up    DVT (deep venous thrombosis) (HCC)  Assessment & Plan  Left-sided  Diagnosed about 3 weeks ago and started on Eliquis.  Apixaban was on hold due to concern for psoas/iliacus trauma.  No hematoma was noted by IR.  She has been on heparin drip for nephrostomy tube placement.   Switched back to apixaban 2/14  DVT likely related to compression from left iliac mass         VTE prophylaxis: therapeutic anticoagulation with apixaban hold with plan for bone marrow

## 2023-02-15 NOTE — PROGRESS NOTES
Gynecologic Oncology Progress Note    Author: Sam Clark P.A.-C. Date & Time created: 2/15/2023   9:08 AM     Interval Events:  Patient tearful upon my evaluation. She has no complaints today, stating the muscle relaxer given by primary team has helped with the calf/heel pain she was experiencing.   Review of Systems   Constitutional:  Negative for chills and fever.   Respiratory:  Negative for cough and shortness of breath.    Cardiovascular:  Negative for chest pain.   Gastrointestinal:  Negative for abdominal pain, nausea and vomiting.   Skin:  Negative for rash.   Neurological:  Negative for dizziness and headaches.   Hemodynamics:  Temp (24hrs), Av.9 °C (98.4 °F), Min:36.7 °C (98.1 °F), Max:37 °C (98.6 °F)  Temperature: 36.7 °C (98.1 °F)  Pulse  Av  Min: 51  Max: 98   Blood Pressure: 106/56     Respiratory:    Respiration: 18, Pulse Oximetry: 97 %                Fluids:    Intake/Output Summary (Last 24 hours) at 2/15/2023 0908  Last data filed at 2/15/2023 0400  Gross per 24 hour   Intake 490 ml   Output 1750 ml   Net -1260 ml        Current Diet Order   Procedures    Diet Order Diet: Level 7 - Easy to Chew; Liquid level: Level 0 - Thin     Physical Exam  Constitutional:       Comments: tearful   HENT:      Right Ear: External ear normal.      Left Ear: External ear normal.      Nose: Nose normal.      Mouth/Throat:      Mouth: Mucous membranes are moist.      Pharynx: Oropharynx is clear.   Eyes:      Pupils: Pupils are equal, round, and reactive to light.   Cardiovascular:      Rate and Rhythm: Normal rate.      Pulses: Normal pulses.   Pulmonary:      Effort: Pulmonary effort is normal.   Abdominal:      General: Abdomen is flat. There is no distension.      Palpations: Abdomen is soft.      Tenderness: There is no abdominal tenderness.   Musculoskeletal:         General: Normal range of motion.   Skin:     General: Skin is warm and dry.      Capillary Refill: Capillary refill takes 2 to 3  seconds.   Neurological:      Mental Status: She is alert and oriented to person, place, and time.   Psychiatric:         Behavior: Behavior normal.     Labs:  Recent Results (from the past 24 hour(s))   CBC WITH DIFFERENTIAL    Collection Time: 02/15/23  6:58 AM   Result Value Ref Range    WBC 3.9 (L) 4.8 - 10.8 K/uL    RBC 4.00 (L) 4.20 - 5.40 M/uL    Hemoglobin 12.1 12.0 - 16.0 g/dL    Hematocrit 36.7 (L) 37.0 - 47.0 %    MCV 91.8 81.4 - 97.8 fL    MCH 30.3 27.0 - 33.0 pg    MCHC 33.0 (L) 33.6 - 35.0 g/dL    RDW 42.5 35.9 - 50.0 fL    Platelet Count 240 164 - 446 K/uL    MPV 9.9 9.0 - 12.9 fL    Neutrophils-Polys 46.30 44.00 - 72.00 %    Lymphocytes 37.70 22.00 - 41.00 %    Monocytes 9.90 0.00 - 13.40 %    Eosinophils 5.50 0.00 - 6.90 %    Basophils 0.30 0.00 - 1.80 %    Immature Granulocytes 0.30 0.00 - 0.90 %    Nucleated RBC 0.00 /100 WBC    Neutrophils (Absolute) 1.79 (L) 2.00 - 7.15 K/uL    Lymphs (Absolute) 1.45 1.00 - 4.80 K/uL    Monos (Absolute) 0.38 0.00 - 0.85 K/uL    Eos (Absolute) 0.21 0.00 - 0.51 K/uL    Baso (Absolute) 0.01 0.00 - 0.12 K/uL    Immature Granulocytes (abs) 0.01 0.00 - 0.11 K/uL    NRBC (Absolute) 0.00 K/uL   Comp Metabolic Panel    Collection Time: 02/15/23  6:58 AM   Result Value Ref Range    Sodium 140 135 - 145 mmol/L    Potassium 4.1 3.6 - 5.5 mmol/L    Chloride 105 96 - 112 mmol/L    Co2 22 20 - 33 mmol/L    Anion Gap 13.0 7.0 - 16.0    Glucose 93 65 - 99 mg/dL    Bun 14 8 - 22 mg/dL    Creatinine 0.57 0.50 - 1.40 mg/dL    Calcium 9.5 8.5 - 10.5 mg/dL    AST(SGOT) 81 (H) 12 - 45 U/L    ALT(SGPT) 76 (H) 2 - 50 U/L    Alkaline Phosphatase 60 30 - 99 U/L    Total Bilirubin 0.3 0.1 - 1.5 mg/dL    Albumin 3.8 3.2 - 4.9 g/dL    Total Protein 6.2 6.0 - 8.2 g/dL    Globulin 2.4 1.9 - 3.5 g/dL    A-G Ratio 1.6 g/dL   MAGNESIUM    Collection Time: 02/15/23  6:58 AM   Result Value Ref Range    Magnesium 1.8 1.5 - 2.5 mg/dL   PHOSPHORUS    Collection Time: 02/15/23  6:58 AM   Result Value  Ref Range    Phosphorus 5.4 (H) 2.5 - 4.5 mg/dL   HIV AG/AB COMBO ASSAY SCREENING    Collection Time: 02/15/23  6:58 AM   Result Value Ref Range    HIV Ag/Ab Combo Assay Non-Reactive Non Reactive   HEPATITIS PANEL ACUTE(4 COMPONENTS)    Collection Time: 02/15/23  6:58 AM   Result Value Ref Range    Hepatitis B Surface Antigen Non-Reactive Non-Reactive    Hepatitis B Cors Ab,IgM Non-Reactive Non-Reactive    Hepatitis A Virus Ab, IgM Non-Reactive Non-Reactive    Hepatitis C Antibody Non-Reactive Non-Reactive   URIC ACID    Collection Time: 02/15/23  6:58 AM   Result Value Ref Range    Uric Acid 3.7 1.9 - 8.2 mg/dL   LDH    Collection Time: 02/15/23  6:58 AM   Result Value Ref Range    LDH Total 214 107 - 266 U/L   CORRECTED CALCIUM    Collection Time: 02/15/23  6:58 AM   Result Value Ref Range    Correct Calcium 9.7 8.5 - 10.5 mg/dL   ESTIMATED GFR    Collection Time: 02/15/23  6:58 AM   Result Value Ref Range    GFR (CKD-EPI) 103 >60 mL/min/1.73 m 2     Medical Decision Making, by Problem:  Active Hospital Problems    Diagnosis     Mass of iliopsoas muscle group, L hydronephrosis, h/o DVT/anticoagulation [M62.89]     DVT (deep venous thrombosis) (HCC) [I82.409]     Thyroid nodule [E04.1]     Hydronephrosis [N13.30]     Hyperlipidemia [E78.5]      Plan:    This is a 62 y.o.  female who presented to the ED with flank pain, found to have a left iliac chantelle mass, mesenteric mass    Left pelvic mass: multilobular iliac fossa mass with mesenteric masses and multiple prominent lymph nodes. Concerning for malignancy, lymphoma vs  less likely leiomyosarcoma. Uterus mass heterogeneous, likely fibroid. S/p biopsy: high grade B-cell lymphoma, CD30 and CD20+, FISH to follow.  2.   Left Hydronephrosis: urology rec Lt NT. S/p Lt NT, Cr:0.57, per urology.   3.   DVT: in LLE, continue anticoagulation.      Case and plan discussed with Dr. Uribe, due to the results of the biopsy revealing high grade B-cell lymphoma, we would  recommend the patient follow up with Medical Oncology for further management.     Thank you for for allowing us to participate in this patient's care. We will sign off at this time, with the above recommendations. Please feel free to contact us for any questions or if we can be of any further assistance.     Sam Clark PA-C  Gynecologic Oncology  [facility]

## 2023-02-15 NOTE — CONSULTS
Consult Note: Hematology/Oncology    Date of consultation: 2/15/2023 11:07 AM    Referring provider: Dr. James    Reason for consultation: Lymphoma      History of presenting illness:     62-year-old postmenopausal female [place] who does speak some English but the nurses for translation.  It seems as though some of her symptoms in his left leg started prior to January.  Patient did come to [facility] and was diagnosed with a left lower extremity DVT back on January 12, 2023.  She was placed on Eliquis.  She came back to [facility] on 2/8/2023 with left flank pain.  This it started a couple days prior to admission.  She was found to have a temperature in the emergency room.  She had CT scan of the chest abdomen pelvis.  She had no pulmonary embolus.  She did have a thyroid nodule.  It was 1.1 cm.  CT of the abdomen pelvis did show enlargement of the left psoas muscle as well as iliac us.  They are questioning mass versus abscess versus hematoma.  She also had left-sided hydronephrosis.  She did not have any stones.  she did have a MRI on 2/9/2023 to further evaluate this area.  This showed a mass in the uterine fundus measuring 5.8 cm fibroid versus leiomyosarcoma she also had additional separate mesenteric masses in the left pelvis mid abdomen.  She also had some left groin nodes.  She had edema in the left thigh musculature.  She has had a known history of fibroids documented on ultrasounds back in 2020 and 2007.  She ended up having a needle biopsy on 2/10/2023.  This has come back preliminarily as a high-grade B-cell lymphoma.  Final testing is pending.          Past Medical History:      She does not report any other medical history      Past surgical history:      She does not report any other surgical history    Allergies:  Patient has no known allergies.    Medications:    Current Facility-Administered Medications   Medication Dose Route Frequency Provider Last Rate Last Admin     apixaban (ELIQUIS) tablet 5 mg  5 mg Oral BID Santiago James D.O.   5 mg at 02/15/23 0451    diclofenac sodium (Voltaren) 1 % gel 2 g  2 g Topical 4X/DAY PRN Santiago James D.O.        methocarbamol (ROBAXIN) tablet 500 mg  500 mg Oral 4X/DAY Santiago James D.O.   500 mg at 02/15/23 0842    senna-docusate (PERICOLACE or SENOKOT S) 8.6-50 MG per tablet 2 Tablet  2 Tablet Oral BID Vicente Martinez M.D.   2 Tablet at 02/15/23 0451    And    polyethylene glycol/lytes (MIRALAX) PACKET 1 Packet  1 Packet Oral QDAY PRN Vicente Martinez M.D.        And    magnesium hydroxide (MILK OF MAGNESIA) suspension 30 mL  30 mL Oral QDAY PRN Vicente Martinez M.D.        And    bisacodyl (DULCOLAX) suppository 10 mg  10 mg Rectal QDAY PRN Vicente Martinez M.D.        ondansetron (ZOFRAN) syringe/vial injection 4 mg  4 mg Intravenous Q4HRS PRN Vicente Martinez M.D.        ondansetron (ZOFRAN ODT) dispertab 4 mg  4 mg Oral Q4HRS PRN Vicente Martinez M.D.   4 mg at 02/13/23 1813    promethazine (PHENERGAN) tablet 12.5-25 mg  12.5-25 mg Oral Q4HRS PRN Vicente Martinez M.D.   25 mg at 02/09/23 0459    promethazine (PHENERGAN) suppository 12.5-25 mg  12.5-25 mg Rectal Q4HRS PRN Vicente Martinez M.D.        prochlorperazine (COMPAZINE) injection 5-10 mg  5-10 mg Intravenous Q4HRS PRN Vicente Martinez M.D.        Pharmacy Consult Request ...Pain Management Review 1 Each  1 Each Other PHARMACY TO DOSE Vicente Martinez M.D.        acetaminophen (TYLENOL) tablet 1,000 mg  1,000 mg Oral Q6HRS PRN Vicente Martinez M.D.        oxyCODONE immediate-release (ROXICODONE) tablet 2.5 mg  2.5 mg Oral Q3HRS PRN Vicente Martinez M.D.        Or    oxyCODONE immediate-release (ROXICODONE) tablet 5 mg  5 mg Oral Q3HRS PRN Vicente Martinez M.D.   5 mg at 02/15/23 0159    Or    morphine 4 MG/ML injection 2 mg  2 mg Intravenous Q3HRS PRHALEY Martinez M.D.   2 mg at 02/09/23 0459    atorvastatin (LIPITOR) tablet 20 mg  20 mg Oral QHS Vicente Martinez M.D.  "  20 mg at 02/14/23 2050       Social History:     Social History     Socioeconomic History    Marital status:      Spouse name: Not on file    Number of children: Not on file    Years of education: Not on file    Highest education level: Not on file   Occupational History    Not on file   Tobacco Use    Smoking status: Never    Smokeless tobacco: Never   Vaping Use    Vaping Use: Never used   Substance and Sexual Activity    Alcohol use: Not Currently    Drug use: Never    Sexual activity: Not on file   Other Topics Concern    Not on file   Social History Narrative    Not on file     Social Determinants of Health     Financial Resource Strain: Not on file   Food Insecurity: Not on file   Transportation Needs: Not on file   Physical Activity: Not on file   Stress: Not on file   Social Connections: Not on file   Intimate Partner Violence: Not on file   Housing Stability: Not on file       Family History:   History reviewed. No pertinent family history.    Review of Systems:   All other review of systems are negative except what was mentioned above in the HPI.    Constitutional: No fever, chills, weight loss ,malaise/fatigue.    HEENT: No new auditory or visual complaints. No sore throat and neck pain.     Respiratory:No new cough, sputum production, shortness of breath and wheezing.    Cardiovascular: No new chest pain, palpitations, orthopnea and leg swelling.    Gastrointestinal: No heartburn, nausea, vomiting ,abdominal pain, hematochezia or melena     Genitourinary: she did have flank pain   Musculoskeletal: No new arthralgias or myalgias   Skin: Negative for rash and itching.    Neurological: Negative for focal weakness or headaches.    Endo/Heme/Allergies: No abnormal bleed/bruise.    Psychiatric/Behavioral: depression/anxiety.    Physical Exam:   Vitals:   /56   Pulse 75   Temp 36.7 °C (98.1 °F) (Temporal)   Resp 18   Ht 1.575 m (5' 2\")   Wt 61.7 kg (136 lb 0.4 oz)   SpO2 97%   " Breastfeeding No   BMI 24.88 kg/m²     General: Not in acute distress, alert and oriented x 3  HEENT: No pallor, icterus.  Neck: Supple, no palpable masses.  Lymph nodes: No palpable cervical, supraclavicular, axillary   CVS: regular rate and rhythm, no rubs or gallops  RESP: Clear to auscultate bilaterally, no wheezing or crackles.   ABD: Soft, non tender, non distended, positive bowel sounds, no palpable organomegaly  EXT:  left leg edema with no cyanosis  CNS: Alert and oriented x3, No focal deficits.  Skin- No rash      Labs:   Recent Labs     02/13/23  1103 02/14/23  0601 02/15/23  0658   RBC 4.32 4.01* 4.00*   HEMOGLOBIN 13.2 12.3 12.1   HEMATOCRIT 39.1 36.1* 36.7*   PLATELETCT 255 242 240     Lab Results   Component Value Date/Time    SODIUM 140 02/15/2023 06:58 AM    POTASSIUM 4.1 02/15/2023 06:58 AM    CHLORIDE 105 02/15/2023 06:58 AM    CO2 22 02/15/2023 06:58 AM    GLUCOSE 93 02/15/2023 06:58 AM    BUN 14 02/15/2023 06:58 AM    CREATININE 0.57 02/15/2023 06:58 AM        Assessment and Plan:    High-grade B-cell lymphoma pending final reports to see if that double expresser, double hit, or otherwise.  I will discuss with pathology.  Left lower extremity DVT on Eliquis  Thyroid nodule: Outpatient ultrasound  Hydronephrosis left status post nephrostomy    I had a long discussion with the patient.  She understands that lymphoma is a malignancy.  We talked about she needs further staging.  She will need a bone marrow biopsy.  She will need a PET/CT as an outpatient.  She understands that lymphoma is a cancer that can originate from the lymph nodes.  She understands that a PET/CT and bone marrow will help to stage her better.  We discussed in generalities that she will require chemotherapy.  She did get mildly emotional after that.  She understands that the primary service feels she is stable.  We discussed that typically once we have all the staging and final pathology we can decide on the best treatment for  her.  I told her it could potentially be inpatient versus outpatient.  She will also eventually need a port versus a PICC line.    We can see if we can get her bone marrow biopsy done while she is here since she is on anticoagulation.      Please note that this dictation was created using voice recognition software. I have made every reasonable attempt to correct obvious errors, but I expect that there are errors of grammar and possibly content that I did not discover before finalizing the note.      SIGNATURES:  Poli Macias M.D.    CC:  Jose Cruz Smith M.D.

## 2023-02-16 LAB
ALBUMIN SERPL BCP-MCNC: 4.1 G/DL (ref 3.2–4.9)
ALBUMIN/GLOB SERPL: 1.6 G/DL
ALP SERPL-CCNC: 59 U/L (ref 30–99)
ALT SERPL-CCNC: 92 U/L (ref 2–50)
ANION GAP SERPL CALC-SCNC: 12 MMOL/L (ref 7–16)
AST SERPL-CCNC: 82 U/L (ref 12–45)
BASOPHILS # BLD AUTO: 0.8 % (ref 0–1.8)
BASOPHILS # BLD: 0.04 K/UL (ref 0–0.12)
BILIRUB SERPL-MCNC: 0.4 MG/DL (ref 0.1–1.5)
BUN SERPL-MCNC: 13 MG/DL (ref 8–22)
CALCIUM ALBUM COR SERPL-MCNC: 9.2 MG/DL (ref 8.5–10.5)
CALCIUM SERPL-MCNC: 9.3 MG/DL (ref 8.5–10.5)
CHLORIDE SERPL-SCNC: 105 MMOL/L (ref 96–112)
CO2 SERPL-SCNC: 23 MMOL/L (ref 20–33)
CREAT SERPL-MCNC: 0.47 MG/DL (ref 0.5–1.4)
EOSINOPHIL # BLD AUTO: 0.17 K/UL (ref 0–0.51)
EOSINOPHIL NFR BLD: 3.5 % (ref 0–6.9)
ERYTHROCYTE [DISTWIDTH] IN BLOOD BY AUTOMATED COUNT: 42.4 FL (ref 35.9–50)
GFR SERPLBLD CREATININE-BSD FMLA CKD-EPI: 108 ML/MIN/1.73 M 2
GLOBULIN SER CALC-MCNC: 2.6 G/DL (ref 1.9–3.5)
GLUCOSE SERPL-MCNC: 105 MG/DL (ref 65–99)
HCT VFR BLD AUTO: 36.4 % (ref 37–47)
HGB BLD-MCNC: 12.3 G/DL (ref 12–16)
HGB RETIC QN AUTO: 38.6 PG/CELL (ref 29–35)
IMM GRANULOCYTES # BLD AUTO: 0.02 K/UL (ref 0–0.11)
IMM GRANULOCYTES NFR BLD AUTO: 0.4 % (ref 0–0.9)
IMM RETICS NFR: 8.6 % (ref 9.3–17.4)
LYMPHOCYTES # BLD AUTO: 1.3 K/UL (ref 1–4.8)
LYMPHOCYTES NFR BLD: 26.6 % (ref 22–41)
MAGNESIUM SERPL-MCNC: 2 MG/DL (ref 1.5–2.5)
MCH RBC QN AUTO: 30.1 PG (ref 27–33)
MCHC RBC AUTO-ENTMCNC: 33.8 G/DL (ref 33.6–35)
MCV RBC AUTO: 89.2 FL (ref 81.4–97.8)
MONOCYTES # BLD AUTO: 0.44 K/UL (ref 0–0.85)
MONOCYTES NFR BLD AUTO: 9 % (ref 0–13.4)
NEUTROPHILS # BLD AUTO: 2.92 K/UL (ref 2–7.15)
NEUTROPHILS NFR BLD: 59.7 % (ref 44–72)
NRBC # BLD AUTO: 0 K/UL
NRBC BLD-RTO: 0 /100 WBC
PATHOLOGY CONSULT NOTE: NORMAL
PHOSPHATE SERPL-MCNC: 5.1 MG/DL (ref 2.5–4.5)
PLATELET # BLD AUTO: 257 K/UL (ref 164–446)
PMV BLD AUTO: 10.1 FL (ref 9–12.9)
POTASSIUM SERPL-SCNC: 3.9 MMOL/L (ref 3.6–5.5)
PROT SERPL-MCNC: 6.7 G/DL (ref 6–8.2)
RBC # BLD AUTO: 4.08 M/UL (ref 4.2–5.4)
RETICS # AUTO: 0.04 M/UL (ref 0.04–0.06)
RETICS/RBC NFR: 1.1 % (ref 0.8–2.1)
SODIUM SERPL-SCNC: 140 MMOL/L (ref 135–145)
WBC # BLD AUTO: 4.9 K/UL (ref 4.8–10.8)

## 2023-02-16 PROCEDURE — 700102 HCHG RX REV CODE 250 W/ 637 OVERRIDE(OP): Performed by: INTERNAL MEDICINE

## 2023-02-16 PROCEDURE — 88311 DECALCIFY TISSUE: CPT

## 2023-02-16 PROCEDURE — 160002 HCHG RECOVERY MINUTES (STAT): Performed by: HOSPITALIST

## 2023-02-16 PROCEDURE — 770004 HCHG ROOM/CARE - ONCOLOGY PRIVATE *

## 2023-02-16 PROCEDURE — 38222 DX BONE MARROW BX & ASPIR: CPT | Performed by: HOSPITALIST

## 2023-02-16 PROCEDURE — A9270 NON-COVERED ITEM OR SERVICE: HCPCS | Performed by: INTERNAL MEDICINE

## 2023-02-16 PROCEDURE — 88313 SPECIAL STAINS GROUP 2: CPT

## 2023-02-16 PROCEDURE — 99152 MOD SED SAME PHYS/QHP 5/>YRS: CPT | Performed by: HOSPITALIST

## 2023-02-16 PROCEDURE — 700101 HCHG RX REV CODE 250: Performed by: INTERNAL MEDICINE

## 2023-02-16 PROCEDURE — 88342 IMHCHEM/IMCYTCHM 1ST ANTB: CPT

## 2023-02-16 PROCEDURE — 88305 TISSUE EXAM BY PATHOLOGIST: CPT

## 2023-02-16 PROCEDURE — 85025 COMPLETE CBC W/AUTO DIFF WBC: CPT

## 2023-02-16 PROCEDURE — 700102 HCHG RX REV CODE 250 W/ 637 OVERRIDE(OP)

## 2023-02-16 PROCEDURE — 83735 ASSAY OF MAGNESIUM: CPT

## 2023-02-16 PROCEDURE — 160027 HCHG SURGERY MINUTES - 1ST 30 MINS LEVEL 2: Performed by: HOSPITALIST

## 2023-02-16 PROCEDURE — 99232 SBSQ HOSP IP/OBS MODERATE 35: CPT | Mod: 25 | Performed by: INTERNAL MEDICINE

## 2023-02-16 PROCEDURE — 85046 RETICYTE/HGB CONCENTRATE: CPT

## 2023-02-16 PROCEDURE — 36415 COLL VENOUS BLD VENIPUNCTURE: CPT

## 2023-02-16 PROCEDURE — 88341 IMHCHEM/IMCYTCHM EA ADD ANTB: CPT | Mod: 91

## 2023-02-16 PROCEDURE — 160048 HCHG OR STATISTICAL LEVEL 1-5: Performed by: HOSPITALIST

## 2023-02-16 PROCEDURE — 07DR3ZX EXTRACTION OF ILIAC BONE MARROW, PERCUTANEOUS APPROACH, DIAGNOSTIC: ICD-10-PCS | Performed by: STUDENT IN AN ORGANIZED HEALTH CARE EDUCATION/TRAINING PROGRAM

## 2023-02-16 PROCEDURE — 80053 COMPREHEN METABOLIC PANEL: CPT

## 2023-02-16 PROCEDURE — 700105 HCHG RX REV CODE 258: Performed by: HOSPITALIST

## 2023-02-16 PROCEDURE — 700111 HCHG RX REV CODE 636 W/ 250 OVERRIDE (IP): Performed by: HOSPITALIST

## 2023-02-16 PROCEDURE — 84100 ASSAY OF PHOSPHORUS: CPT

## 2023-02-16 PROCEDURE — 160035 HCHG PACU - 1ST 60 MINS PHASE I: Performed by: HOSPITALIST

## 2023-02-16 PROCEDURE — A9270 NON-COVERED ITEM OR SERVICE: HCPCS

## 2023-02-16 RX ORDER — SODIUM CHLORIDE, SODIUM LACTATE, POTASSIUM CHLORIDE, CALCIUM CHLORIDE 600; 310; 30; 20 MG/100ML; MG/100ML; MG/100ML; MG/100ML
INJECTION, SOLUTION INTRAVENOUS CONTINUOUS
Status: ACTIVE | OUTPATIENT
Start: 2023-02-16 | End: 2023-02-16

## 2023-02-16 RX ORDER — SODIUM CHLORIDE 9 MG/ML
500 INJECTION, SOLUTION INTRAVENOUS
Status: DISCONTINUED | OUTPATIENT
Start: 2023-02-16 | End: 2023-02-16 | Stop reason: HOSPADM

## 2023-02-16 RX ORDER — MIDAZOLAM HYDROCHLORIDE 1 MG/ML
INJECTION INTRAMUSCULAR; INTRAVENOUS
Status: DISPENSED
Start: 2023-02-16 | End: 2023-02-16

## 2023-02-16 RX ORDER — MIDAZOLAM HYDROCHLORIDE 1 MG/ML
.5-2 INJECTION INTRAMUSCULAR; INTRAVENOUS PRN
Status: DISCONTINUED | OUTPATIENT
Start: 2023-02-16 | End: 2023-02-16 | Stop reason: HOSPADM

## 2023-02-16 RX ADMIN — SENNOSIDES AND DOCUSATE SODIUM 2 TABLET: 50; 8.6 TABLET ORAL at 04:45

## 2023-02-16 RX ADMIN — DICLOFENAC SODIUM 2 G: 10 GEL TOPICAL at 20:06

## 2023-02-16 RX ADMIN — DICLOFENAC SODIUM 2 G: 10 GEL TOPICAL at 09:03

## 2023-02-16 RX ADMIN — ACETAMINOPHEN 1000 MG: 500 TABLET, FILM COATED ORAL at 15:31

## 2023-02-16 RX ADMIN — SENNOSIDES AND DOCUSATE SODIUM 2 TABLET: 50; 8.6 TABLET ORAL at 17:16

## 2023-02-16 RX ADMIN — ATORVASTATIN CALCIUM 20 MG: 20 TABLET, FILM COATED ORAL at 20:06

## 2023-02-16 RX ADMIN — Medication 400 MG: at 04:44

## 2023-02-16 RX ADMIN — APIXABAN 5 MG: 5 TABLET, FILM COATED ORAL at 17:16

## 2023-02-16 RX ADMIN — POLYETHYLENE GLYCOL 3350 1 PACKET: 17 POWDER, FOR SOLUTION ORAL at 04:44

## 2023-02-16 RX ADMIN — DICLOFENAC SODIUM 2 G: 10 GEL TOPICAL at 15:25

## 2023-02-16 RX ADMIN — METHOCARBAMOL 500 MG: 500 TABLET ORAL at 17:16

## 2023-02-16 RX ADMIN — METHOCARBAMOL 500 MG: 500 TABLET ORAL at 09:02

## 2023-02-16 RX ADMIN — METHOCARBAMOL 500 MG: 500 TABLET ORAL at 20:06

## 2023-02-16 ASSESSMENT — ENCOUNTER SYMPTOMS
CARDIOVASCULAR NEGATIVE: 1
PSYCHIATRIC NEGATIVE: 1
GASTROINTESTINAL NEGATIVE: 1
RESPIRATORY NEGATIVE: 1
EYES NEGATIVE: 1
NEUROLOGICAL NEGATIVE: 1
MUSCULOSKELETAL NEGATIVE: 1

## 2023-02-16 ASSESSMENT — PAIN DESCRIPTION - PAIN TYPE
TYPE: SURGICAL PAIN
TYPE: ACUTE PAIN
TYPE: ACUTE PAIN
TYPE: SURGICAL PAIN
TYPE: ACUTE PAIN
TYPE: SURGICAL PAIN

## 2023-02-16 NOTE — PROGRESS NOTES
[facility] Medicine Daily Progress Note    Date of Service  2/16/2023    Chief Complaint  Savana Zarco is a 62 y.o. female admitted 2/8/2023 with left flank pain and nausea. She has a history of recent DVT (started on Eliquis ~ 3 weeks ago), dyslipidemia.     Hospital Course  CT abdomen showed enlargement of the left psoas and iliacus muscle with subtle hypodensity unclear if hematoma or early forming abscess versus intramuscular mass, left hydronephrosis and hydroureter without visualized obstructing stone with delayed left nephrogram.     CTA chest showed no evidence of pulmonary embolism.  She was noted to have a right thyroid nodule with recommendations to follow-up thyroid sonography due to size.    MRI pelvis from 2/9/2023 showed a multilobular left iliac fossa mass extending into the inguinal region, with additional separate mesenteric masses in the pelvis and left mid abdomen.  She has a heterogeneous 5.8 cm mass in the uterine fundus (fibrous versus leiomyosarcoma), moderate left hydroureteronephrosis with compression of the distal left ureter between the iliac fossa mass and uterus, multiple nonspecific mildly prominent left groin lymph nodes.     UA showed moderate leukocyte esterase.  Patient completed 5 days of ceftriaxone.    Patient underwent left pelvic extraperitoneal mass biopsy and left percutaneous nephrostomy tube placement by IR on 2/10/2023.  Pathology showed lymphoma.  Oncology was consulted.    Echocardiogram showed ejection fraction 65%, mild aortic insufficiency.    Status post bone marrow biopsy on 2/16.  Pathology is pending.    Patient was started on apixaban for left lower extremity deep venous thrombosis.  This is likely related to compressive symptoms from mass.  She will need an outpatient ultrasound for her thyroid nodule.  Also plan for an outpatient PET/CT.  A port versus PICC can be placed as outpatient.  Oncology    Patient can follow with urology as  outpatient for her nephrostomy tube.  Obstructive uropathy likely to improve once started on chemotherapy    Interval Problem Update  Patient was seen and examined at bedside.  I have personally reviewed and interpreted vitals, labs, and imaging.    2/14.  Afebrile.  Stable vitals.  On room air.  Denies fevers, chills, chest pains, shortness of breath.  She does report some left heel pain which is acute on chronic.  Also reports some back pain when she puts her feet up.  Completes course of ceftriaxone today.  Discussed with GYN oncology.  Switch from heparin drip back to apixaban.  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  Discussed with oncology.  Ordered echocardiogram.  2/15.  Afebrile.  Stable vitals.  On room air.  Denies fevers, chills, chest pains, shortness of breath.  Back pain is improved.  Discussed with oncology.  We will try for inpatient bone marrow biopsy followed by outpatient PET scan to complete work-up prior to chemotherapy.  Hold apixaban for now.  Had extensive discussion about plan of care and overall prognosis of lymphoma.  Final pathology pending  2/16.  Afebrile.  Stable vitals.  On room air.  Denies fevers, chills, chest pain, shortness of breath.  Back pain and hip pain are improved.  Plan for bone marrow today.  Restart anticoagulation    I have discussed this patient's plan of care and discharge plan at IDT rounds today with Case Management, Nursing, Nursing leadership, and other members of the IDT team.    Consultants/Specialty  urology    Code Status  Full Code    Disposition  Patient is not medically cleared for discharge.   Anticipate discharge to to home with close outpatient follow-up.  I have placed the appropriate orders for post-discharge needs.    Review of Systems  Review of Systems   Constitutional:  Positive for malaise/fatigue.   HENT: Negative.     Eyes: Negative.    Respiratory: Negative.     Cardiovascular: Negative.    Gastrointestinal: Negative.     Genitourinary: Negative.    Musculoskeletal: Negative.    Skin: Negative.    Neurological: Negative.    Endo/Heme/Allergies: Negative.    Psychiatric/Behavioral: Negative.        Physical Exam  Temp:  [37.1 °C (98.8 °F)-37.7 °C (99.8 °F)] 37.2 °C (99 °F)  Pulse:  [66-87] 66  Resp:  [16-18] 16  BP: (117-127)/(63-71) 127/70  SpO2:  [94 %-99 %] 95 %    Physical Exam  HENT:      Head: Normocephalic.      Mouth/Throat:      Mouth: Mucous membranes are moist.   Eyes:      Pupils: Pupils are equal, round, and reactive to light.   Cardiovascular:      Rate and Rhythm: Bradycardia present.   Pulmonary:      Effort: Pulmonary effort is normal.   Abdominal:      Palpations: Abdomen is soft.   Musculoskeletal:      Cervical back: Normal range of motion.      Left lower leg: Edema present.      Comments: Left nephrostomy tube.   Skin:     General: Skin is warm.   Neurological:      General: No focal deficit present.      Mental Status: She is alert.   Psychiatric:         Mood and Affect: Mood normal.       Fluids    Intake/Output Summary (Last 24 hours) at 2/16/2023 0830  Last data filed at 2/15/2023 1819  Gross per 24 hour   Intake 200 ml   Output 1375 ml   Net -1175 ml         Laboratory  Recent Labs     02/14/23  0601 02/15/23  0658 02/16/23  0125   WBC 3.7* 3.9* 4.9   RBC 4.01* 4.00* 4.08*   HEMOGLOBIN 12.3 12.1 12.3   HEMATOCRIT 36.1* 36.7* 36.4*   MCV 90.0 91.8 89.2   MCH 30.7 30.3 30.1   MCHC 34.1 33.0* 33.8   RDW 41.4 42.5 42.4   PLATELETCT 242 240 257   MPV 9.7 9.9 10.1       Recent Labs     02/14/23  0601 02/15/23  0658 02/16/23  0125   SODIUM 141 140 140   POTASSIUM 5.0 4.1 3.9   CHLORIDE 106 105 105   CO2 27 22 23   GLUCOSE 100* 93 105*   BUN 12 14 13   CREATININE 0.67 0.57 0.47*   CALCIUM 9.5 9.5 9.3                       Imaging  EC-ECHOCARDIOGRAM COMPLETE W/O CONT   Final Result      IR-NEEDLE BX-ABD-RETROPERITONEAL   Final Result      Image guided biopsy of LEFT pelvic sidewall extraperitoneal mass utilizing  3-D rotational cone beam CT in the IR suite.                  INTERPRETING LOCATION: [address] [facility], [place], [address]      IR-PERQ NEPH CATH NEW ACCESS (ALL RADIOLOGY) LEFT   Final Result      1. ULTRASOUND AND FLUOROSCOPIC GUIDED PLACEMENT OF A LEFT 8 Frisian  PIGTAIL LOCKING LOOP PERCUTANEOUS NEPHROSTOMY CATHETER.      2. NEPHROSTOGRAM SHOWING SATISFACTORY CATHETER POSITION WITHOUT EXTRAVASATION.      MR-PELVIS-WITH & W/O AND SEQUENCES   Final Result      1.  Multilobular LEFT iliac fossa mass extending into the inguinal region.  Additional separate mesenteric masses present in the pelvis and LEFT mid abdomen.  Primary diagnostic considerations are lymphoma and metastatic leiomyosarcoma, however exact    etiology is uncertain.   2.  Heterogeneous mass in the uterine fundus measuring 5.8 cm, fibroid versus leiomyosarcoma.   3.  Moderate LEFT hydroureteronephrosis with apparent compression of the distal LEFT ureter between iliac fossa mass and uterus.   4.  Multiple mildly prominent LEFT groin lymph nodes, nonspecific.   5.  Edema of LEFT thigh musculature suggests venous compression as well.      CT-CTA CHEST PULMONARY ARTERY W/ RECONS   Final Result         1.  No pulmonary embolus appreciated.   2.  Right thyroid nodule, recommend follow-up thyroid sonography due to nodule size.      CT-ABDOMEN-PELVIS WITH   Final Result         1.  Enlargement of the left psoas and iliacus muscle with subtle hyperdensity. Could are present hematoma or early forming abscess versus intramuscular mass. Could be followed up with MRI of the pelvis with contrast for further delineation and    characterization of structures   2.  Left hydronephrosis and hydroureter without visualized obstructing stone with delayed left nephrogram. Appears likely secondary to associated process of the left iliopsoas muscle. There is calcification on the left which appears likely represent    phlebolith, could possibly represent distal ureteral stone within  lower inserted ureter.             Assessment/Plan  * Mass of iliopsoas muscle group, L hydronephrosis, h/o DVT/anticoagulation- (present on admission)  Assessment & Plan  MRI pelvis from 2/9/2023 showed a multilobular left iliac fossa mass extending into the inguinal region, with additional separate mesenteric masses in the pelvis and left mid abdomen.  She underwent left pelvic external peritoneal mass biopsy by IR on 2/10/2023.  Pathology showed high-grade B-cell lymphoma  Oncology consulted  Staging CTs reviewed  Status post bone marrow biopsy 2/16    Hyperlipidemia- (present on admission)  Assessment & Plan  Continue home statin.    Hydronephrosis- (present on admission)  Assessment & Plan  Obstructive uropathy from left iliac mass.  Urology was consulted, underwent left nephrostomy tube placement by IR on 2/10/2023.  Urology signed off, follow-up with outpatient urology.  Plan for lymphoma as per above    Thyroid nodule- (present on admission)  Assessment & Plan  CTA chest showed a right thyroid nodule with recommendations to follow-up with ultrasound.  Recommend outpatient follow-up    DVT (deep venous thrombosis) (HCC)- (present on admission)  Assessment & Plan  Left-sided  Diagnosed about 3 weeks ago and started on Eliquis.  Apixaban was on hold due to concern for psoas/iliacus trauma.  No hematoma was noted by IR.  She has been on heparin drip for nephrostomy tube placement.   Switched back to apixaban 2/14  DVT likely related to compression from left iliac mass         VTE prophylaxis: therapeutic anticoagulation with apixaban

## 2023-02-16 NOTE — OR NURSING
1241 received patient from OR. Report from Anesthesiologist and OR RN. Patient on RA at 97 % O2. VSS. Monitor connected. No airway in place. S/P BMA, site on L hip dressing clean and dry, states no pain or nausea.     1302 Report given to Coby SOLANO on CNU. Awaiting transport     1310 Escorted back to room with patient transport

## 2023-02-16 NOTE — PROCEDURES
Bone Marrow Biopsy/Aspiration    Date/Time: 2/16/2023 1:15 PM  Performed by: Miguel Braswell M.D.  Authorized by: Chris León M.D.     Consent:     Consent obtained:  Written and verbal    Consent given by:  Patient    Risks discussed:  Bleeding, infection, nerve damage and pain    Alternatives discussed:  No treatment  Universal protocol:     Procedure explained and questions answered to patient or proxy's satisfaction: yes      Immediately prior to procedure a time out was called: yes      Site/side marked: yes      Patient identity confirmed:  Verbally with patient  Pre-procedure details:     Procedure type:  Aspiration and biopsy    Requesting physician:  Poli Macias M.D.    Indications:  High-grade B-cell lymphoma    Position:  Prone    Buttock laterality:  Left    Local anesthetic:  1% Lidocaine    Subcutaneous volume:  1 mL    Periosteum anesthetic volume:  4 mL    Preparation: Patient was prepped and draped in usual sterile fashion    Sedation:     Patient Sedated: Yes      Sedation type: anxiolysis      Sedation type comment:  Versed 3 mg, Fentanyl 75 mcg  Procedure details:     Aspirate obtained:  5 mL followed by 5 mL    Blood cultures collected:  None    Biopsy performed:  2 cores    Number of attempts:  2    Estimated blood loss (mL):  15  Post-procedure:     Puncture site:  Adhesive bandage applied and direct pressure applied    Patient tolerance of procedure:  Tolerated well, no immediate complications  Comments:      Proctored by Dr. Chris León.

## 2023-02-16 NOTE — CARE PLAN
The patient is Watcher - Medium risk of patient condition declining or worsening    Shift Goals  Clinical Goals: monitor neph output, safety, echo  Patient Goals: rest  Family Goals: Pt comfort, Plan of care    Progress made toward(s) clinical / shift goals:    Problem: Knowledge Deficit - Standard  Goal: Patient and family/care givers will demonstrate understanding of plan of care, disease process/condition, diagnostic tests and medications  Outcome: Progressing  Note: Patient is tearful d/t new diagnosis, son at bedside. Denied pain and discomfort, ambulates with standby assist, Nephrostomy noted with clear yellow output. Tolerating diet. Eliquis on hold d/t pending bone marrow biopsy.        Patient is not progressing towards the following goals:

## 2023-02-16 NOTE — CARE PLAN
The patient is Watcher - Medium risk of patient condition declining or worsening    Shift Goals  Clinical Goals: safety, monitor nephtube output, pain control, emotional support  Patient Goals: Rest  Family Goals: Pt comfort, Plan of care    Progress made toward(s) clinical / shift goals:    Problem: Knowledge Deficit - Standard  Goal: Patient and family/care givers will demonstrate understanding of plan of care, disease process/condition, diagnostic tests and medications  Outcome: Progressing  Patient A&Ox4, tearful. Patient updated on plan of care. Patient demonstrates understanding. All questions answered at this time.    Problem: Pain - Standard  Goal: Alleviation of pain or a reduction in pain to the patient’s comfort goal  Outcome: Progressing  Patient denies pain at this time.   Problem: Hemodynamics  Goal: Patient's hemodynamics, fluid balance and neurologic status will be stable or improve  Outcome: Progressing  Problem: Respiratory  Goal: Patient will achieve/maintain optimum respiratory ventilation and gas exchange  Outcome: Progressing       Patient is not progressing towards the following goals:

## 2023-02-17 VITALS
HEIGHT: 62 IN | RESPIRATION RATE: 17 BRPM | DIASTOLIC BLOOD PRESSURE: 63 MMHG | WEIGHT: 136.02 LBS | SYSTOLIC BLOOD PRESSURE: 110 MMHG | OXYGEN SATURATION: 99 % | TEMPERATURE: 99 F | HEART RATE: 70 BPM | BODY MASS INDEX: 25.03 KG/M2

## 2023-02-17 LAB
ANION GAP SERPL CALC-SCNC: 13 MMOL/L (ref 7–16)
BASOPHILS # BLD AUTO: 0.8 % (ref 0–1.8)
BASOPHILS # BLD: 0.03 K/UL (ref 0–0.12)
BUN SERPL-MCNC: 13 MG/DL (ref 8–22)
CALCIUM SERPL-MCNC: 10 MG/DL (ref 8.5–10.5)
CHLORIDE SERPL-SCNC: 102 MMOL/L (ref 96–112)
CO2 SERPL-SCNC: 26 MMOL/L (ref 20–33)
CREAT SERPL-MCNC: 0.59 MG/DL (ref 0.5–1.4)
EOSINOPHIL # BLD AUTO: 0.19 K/UL (ref 0–0.51)
EOSINOPHIL NFR BLD: 4.9 % (ref 0–6.9)
ERYTHROCYTE [DISTWIDTH] IN BLOOD BY AUTOMATED COUNT: 42 FL (ref 35.9–50)
GFR SERPLBLD CREATININE-BSD FMLA CKD-EPI: 102 ML/MIN/1.73 M 2
GLUCOSE SERPL-MCNC: 108 MG/DL (ref 65–99)
HCT VFR BLD AUTO: 41.2 % (ref 37–47)
HGB BLD-MCNC: 13.9 G/DL (ref 12–16)
IMM GRANULOCYTES # BLD AUTO: 0.01 K/UL (ref 0–0.11)
IMM GRANULOCYTES NFR BLD AUTO: 0.3 % (ref 0–0.9)
LYMPHOCYTES # BLD AUTO: 1.33 K/UL (ref 1–4.8)
LYMPHOCYTES NFR BLD: 34.5 % (ref 22–41)
MAGNESIUM SERPL-MCNC: 2.4 MG/DL (ref 1.5–2.5)
MCH RBC QN AUTO: 30.4 PG (ref 27–33)
MCHC RBC AUTO-ENTMCNC: 33.7 G/DL (ref 33.6–35)
MCV RBC AUTO: 90.2 FL (ref 81.4–97.8)
MONOCYTES # BLD AUTO: 0.32 K/UL (ref 0–0.85)
MONOCYTES NFR BLD AUTO: 8.3 % (ref 0–13.4)
NEUTROPHILS # BLD AUTO: 1.98 K/UL (ref 2–7.15)
NEUTROPHILS NFR BLD: 51.2 % (ref 44–72)
NRBC # BLD AUTO: 0 K/UL
NRBC BLD-RTO: 0 /100 WBC
PHOSPHATE SERPL-MCNC: 3.7 MG/DL (ref 2.5–4.5)
PLATELET # BLD AUTO: 296 K/UL (ref 164–446)
PMV BLD AUTO: 9.7 FL (ref 9–12.9)
POTASSIUM SERPL-SCNC: 3.8 MMOL/L (ref 3.6–5.5)
RBC # BLD AUTO: 4.57 M/UL (ref 4.2–5.4)
SODIUM SERPL-SCNC: 141 MMOL/L (ref 135–145)
WBC # BLD AUTO: 3.9 K/UL (ref 4.8–10.8)

## 2023-02-17 PROCEDURE — 700102 HCHG RX REV CODE 250 W/ 637 OVERRIDE(OP): Performed by: INTERNAL MEDICINE

## 2023-02-17 PROCEDURE — 700102 HCHG RX REV CODE 250 W/ 637 OVERRIDE(OP)

## 2023-02-17 PROCEDURE — 99239 HOSP IP/OBS DSCHRG MGMT >30: CPT | Performed by: INTERNAL MEDICINE

## 2023-02-17 PROCEDURE — 36415 COLL VENOUS BLD VENIPUNCTURE: CPT

## 2023-02-17 PROCEDURE — 83735 ASSAY OF MAGNESIUM: CPT

## 2023-02-17 PROCEDURE — A9270 NON-COVERED ITEM OR SERVICE: HCPCS

## 2023-02-17 PROCEDURE — 80048 BASIC METABOLIC PNL TOTAL CA: CPT

## 2023-02-17 PROCEDURE — 85025 COMPLETE CBC W/AUTO DIFF WBC: CPT

## 2023-02-17 PROCEDURE — A9270 NON-COVERED ITEM OR SERVICE: HCPCS | Performed by: INTERNAL MEDICINE

## 2023-02-17 PROCEDURE — 84100 ASSAY OF PHOSPHORUS: CPT

## 2023-02-17 RX ADMIN — SENNOSIDES AND DOCUSATE SODIUM 2 TABLET: 50; 8.6 TABLET ORAL at 04:58

## 2023-02-17 RX ADMIN — MAGNESIUM HYDROXIDE 30 ML: 400 SUSPENSION ORAL at 04:57

## 2023-02-17 RX ADMIN — APIXABAN 5 MG: 5 TABLET, FILM COATED ORAL at 04:58

## 2023-02-17 RX ADMIN — METHOCARBAMOL 500 MG: 500 TABLET ORAL at 09:09

## 2023-02-17 RX ADMIN — ACETAMINOPHEN 1000 MG: 500 TABLET, FILM COATED ORAL at 00:32

## 2023-02-17 RX ADMIN — DICLOFENAC SODIUM 2 G: 10 GEL TOPICAL at 09:35

## 2023-02-17 NOTE — PROGRESS NOTES
Hematology/oncology progress note    Patient has no new issues.  She is done okay overnight.  Her daughter Ela's on the telephone.    Review of systems negative for anything new  Vitals are stable she is alert and x3  No new swelling  Neurologically nonfocal    Impression high-grade B-cell lymphoma pending further FISH testing, bone marrow biopsy pending, PET scan stat as an outpatient, laboratory stable.  She continues on anticoagulation for thrombosis.    Had a long discussion with her and her daughter.  I gave them clear instructions if they do get discharged.  She knows to call the office on Tuesday.  We will get her set up for outpatient follow-up.  I spoke with the primary service we will order a PET/CT so it could be ordered stat for outpatient.  The patient and her daughter understand we are trying to stage her.  We will have some of the bone marrow results and the final pathology outpatient.  We can then decide best treatment for her.  They know to come back to the hospital if anything changes.    High complexity

## 2023-02-17 NOTE — CARE PLAN
The patient is Stable - Low risk of patient condition declining or worsening    Shift Goals  Clinical Goals: monitor neph output, emotional support  Patient Goals: sleep  Family Goals: Pt comfort, Plan of care    Progress made toward(s) clinical / shift goals:      Problem: Knowledge Deficit - Standard  Goal: Patient and family/care givers will demonstrate understanding of plan of care, disease process/condition, diagnostic tests and medications  Outcome: Progressing  Note: A/Ox4, pt is able to understand plan of care. All questions answered at the moment.       Problem: Pain - Standard  Goal: Alleviation of pain or a reduction in pain to the patient’s comfort goal  Outcome: Progressing     Problem: Hemodynamics  Goal: Patient's hemodynamics, fluid balance and neurologic status will be stable or improve  Outcome: Progressing     Problem: Respiratory  Goal: Patient will achieve/maintain optimum respiratory ventilation and gas exchange  Outcome: Progressing       Patient is not progressing towards the following goals:

## 2023-02-17 NOTE — CARE PLAN
The patient is Watcher - Medium risk of patient condition declining or worsening    Shift Goals  Clinical Goals: safety, monitor nephtube output, bone marrow bx, emotional support  Patient Goals: rest  Family Goals: Pt comfort, Plan of care    Progress made toward(s) clinical / shift goals:    Problem: Knowledge Deficit - Standard  Goal: Patient and family/care givers will demonstrate understanding of plan of care, disease process/condition, diagnostic tests and medications  Outcome: Progressing  Note: Patient had bone marrow Bx site is CDI with complaints of discomfort PRN tylenol given per request 3/10 pain. Educated patient and son on how to drain nephrostomy tube, noted with clear yellow urine.. Patient is Aox4, stand by assist, tearful over current diagnosis but hopeful, has strong home support system.        Patient is not progressing towards the following goals:

## 2023-02-17 NOTE — DISCHARGE INSTRUCTIONS
Discharge Instructions per QUINN WilkesO.    DIET: Diet Order Diet: Level 7 - Easy to Chew; Liquid level: Level 0 - Thin    ACTIVITY: As tolerated    A proper diet that is low in grease, fat, and salt, along with 30 minutes of exercise per day will lead to weight loss, and better controlled blood sugar and blood pressure.    DIAGNOSIS: Mass of iliopsoas muscle group    Follow up with your Primary Care Provider Jose Cruz Smith M.D. as scheduled or sooner if your symptoms persist or worsen.  Return to Emergency Room for sever chest pain, shortness of breath, signs of a stroke, or any other emergencies.

## 2023-02-26 ENCOUNTER — APPOINTMENT (OUTPATIENT)
Dept: RADIOLOGY | Facility: MEDICAL CENTER | Age: 63
DRG: 698 | End: 2023-02-26
Attending: EMERGENCY MEDICINE
Payer: COMMERCIAL

## 2023-02-26 ENCOUNTER — APPOINTMENT (OUTPATIENT)
Dept: RADIOLOGY | Facility: MEDICAL CENTER | Age: 63
DRG: 698 | End: 2023-02-26
Attending: INTERNAL MEDICINE
Payer: COMMERCIAL

## 2023-02-26 ENCOUNTER — HOSPITAL ENCOUNTER (INPATIENT)
Facility: MEDICAL CENTER | Age: 63
LOS: 3 days | DRG: 698 | End: 2023-03-01
Attending: EMERGENCY MEDICINE | Admitting: INTERNAL MEDICINE
Payer: COMMERCIAL

## 2023-02-26 DIAGNOSIS — N10 ACUTE PYELONEPHRITIS: ICD-10-CM

## 2023-02-26 DIAGNOSIS — M62.89 MASS OF ILIOPSOAS MUSCLE GROUP: ICD-10-CM

## 2023-02-26 DIAGNOSIS — D64.9 NORMOCYTIC ANEMIA: ICD-10-CM

## 2023-02-26 DIAGNOSIS — N39.0 ENTEROCOCCUS UTI: ICD-10-CM

## 2023-02-26 DIAGNOSIS — C85.16 B-CELL LYMPHOMA OF INTRAPELVIC LYMPH NODES, UNSPECIFIED B-CELL LYMPHOMA TYPE (HCC): ICD-10-CM

## 2023-02-26 DIAGNOSIS — B95.2 ENTEROCOCCUS UTI: ICD-10-CM

## 2023-02-26 DIAGNOSIS — Q62.11 HYDRONEPHROSIS WITH URETEROPELVIC JUNCTION (UPJ) OBSTRUCTION: ICD-10-CM

## 2023-02-26 DIAGNOSIS — A41.9 SEPSIS WITHOUT ACUTE ORGAN DYSFUNCTION, DUE TO UNSPECIFIED ORGANISM (HCC): ICD-10-CM

## 2023-02-26 PROBLEM — E87.6 HYPOKALEMIA: Status: ACTIVE | Noted: 2023-02-26

## 2023-02-26 LAB
ALBUMIN SERPL BCP-MCNC: 4.1 G/DL (ref 3.2–4.9)
ALBUMIN/GLOB SERPL: 1.5 G/DL
ALP SERPL-CCNC: 53 U/L (ref 30–99)
ALT SERPL-CCNC: 22 U/L (ref 2–50)
ANION GAP SERPL CALC-SCNC: 12 MMOL/L (ref 7–16)
APPEARANCE UR: CLEAR
APPEARANCE UR: CLEAR
APTT PPP: 39.8 SEC (ref 24.7–36)
AST SERPL-CCNC: 21 U/L (ref 12–45)
BACTERIA #/AREA URNS HPF: ABNORMAL /HPF
BACTERIA #/AREA URNS HPF: ABNORMAL /HPF
BASOPHILS # BLD AUTO: 0.3 % (ref 0–1.8)
BASOPHILS # BLD: 0.02 K/UL (ref 0–0.12)
BILIRUB SERPL-MCNC: 0.8 MG/DL (ref 0.1–1.5)
BILIRUB UR QL STRIP.AUTO: NEGATIVE
BILIRUB UR QL STRIP.AUTO: NEGATIVE
BUN SERPL-MCNC: 11 MG/DL (ref 8–22)
CALCIUM ALBUM COR SERPL-MCNC: 9 MG/DL (ref 8.5–10.5)
CALCIUM SERPL-MCNC: 9.1 MG/DL (ref 8.5–10.5)
CHLORIDE SERPL-SCNC: 102 MMOL/L (ref 96–112)
CO2 SERPL-SCNC: 19 MMOL/L (ref 20–33)
COLOR UR: YELLOW
COLOR UR: YELLOW
CREAT SERPL-MCNC: 0.53 MG/DL (ref 0.5–1.4)
EOSINOPHIL # BLD AUTO: 0 K/UL (ref 0–0.51)
EOSINOPHIL NFR BLD: 0 % (ref 0–6.9)
EPI CELLS #/AREA URNS HPF: ABNORMAL /HPF
EPI CELLS #/AREA URNS HPF: ABNORMAL /HPF
ERYTHROCYTE [DISTWIDTH] IN BLOOD BY AUTOMATED COUNT: 42.2 FL (ref 35.9–50)
FLUAV RNA SPEC QL NAA+PROBE: NEGATIVE
FLUBV RNA SPEC QL NAA+PROBE: NEGATIVE
GFR SERPLBLD CREATININE-BSD FMLA CKD-EPI: 105 ML/MIN/1.73 M 2
GLOBULIN SER CALC-MCNC: 2.8 G/DL (ref 1.9–3.5)
GLUCOSE SERPL-MCNC: 130 MG/DL (ref 65–99)
GLUCOSE UR STRIP.AUTO-MCNC: NEGATIVE MG/DL
GLUCOSE UR STRIP.AUTO-MCNC: NEGATIVE MG/DL
HCT VFR BLD AUTO: 35.1 % (ref 37–47)
HGB BLD-MCNC: 12.1 G/DL (ref 12–16)
HYALINE CASTS #/AREA URNS LPF: ABNORMAL /LPF
HYALINE CASTS #/AREA URNS LPF: ABNORMAL /LPF
IMM GRANULOCYTES # BLD AUTO: 0.03 K/UL (ref 0–0.11)
IMM GRANULOCYTES NFR BLD AUTO: 0.4 % (ref 0–0.9)
INR PPP: 1.46 (ref 0.87–1.13)
KETONES UR STRIP.AUTO-MCNC: NEGATIVE MG/DL
KETONES UR STRIP.AUTO-MCNC: NEGATIVE MG/DL
LACTATE SERPL-SCNC: 1.2 MMOL/L (ref 0.5–2)
LACTATE SERPL-SCNC: 2.3 MMOL/L (ref 0.5–2)
LEUKOCYTE ESTERASE UR QL STRIP.AUTO: ABNORMAL
LEUKOCYTE ESTERASE UR QL STRIP.AUTO: ABNORMAL
LYMPHOCYTES # BLD AUTO: 0.59 K/UL (ref 1–4.8)
LYMPHOCYTES NFR BLD: 8 % (ref 22–41)
MCH RBC QN AUTO: 30.3 PG (ref 27–33)
MCHC RBC AUTO-ENTMCNC: 34.5 G/DL (ref 33.6–35)
MCV RBC AUTO: 88 FL (ref 81.4–97.8)
MICRO URNS: ABNORMAL
MICRO URNS: ABNORMAL
MONOCYTES # BLD AUTO: 0.5 K/UL (ref 0–0.85)
MONOCYTES NFR BLD AUTO: 6.8 % (ref 0–13.4)
NEUTROPHILS # BLD AUTO: 6.25 K/UL (ref 2–7.15)
NEUTROPHILS NFR BLD: 84.5 % (ref 44–72)
NITRITE UR QL STRIP.AUTO: NEGATIVE
NITRITE UR QL STRIP.AUTO: NEGATIVE
NRBC # BLD AUTO: 0 K/UL
NRBC BLD-RTO: 0 /100 WBC
PH UR STRIP.AUTO: 6.5 [PH] (ref 5–8)
PH UR STRIP.AUTO: 7 [PH] (ref 5–8)
PLATELET # BLD AUTO: 240 K/UL (ref 164–446)
PMV BLD AUTO: 9.8 FL (ref 9–12.9)
POTASSIUM SERPL-SCNC: 3.4 MMOL/L (ref 3.6–5.5)
PROT SERPL-MCNC: 6.9 G/DL (ref 6–8.2)
PROT UR QL STRIP: 30 MG/DL
PROT UR QL STRIP: NEGATIVE MG/DL
PROTHROMBIN TIME: 17.5 SEC (ref 12–14.6)
RBC # BLD AUTO: 3.99 M/UL (ref 4.2–5.4)
RBC # URNS HPF: ABNORMAL /HPF
RBC # URNS HPF: ABNORMAL /HPF
RBC UR QL AUTO: ABNORMAL
RBC UR QL AUTO: ABNORMAL
RENAL EPI CELLS #/AREA URNS HPF: ABNORMAL /HPF
RSV RNA SPEC QL NAA+PROBE: NEGATIVE
SARS-COV-2 RNA RESP QL NAA+PROBE: NOTDETECTED
SODIUM SERPL-SCNC: 133 MMOL/L (ref 135–145)
SP GR UR STRIP.AUTO: 1.01
SP GR UR STRIP.AUTO: 1.01
SPECIMEN SOURCE: NORMAL
UROBILINOGEN UR STRIP.AUTO-MCNC: 0.2 MG/DL
UROBILINOGEN UR STRIP.AUTO-MCNC: 0.2 MG/DL
WBC # BLD AUTO: 7.4 K/UL (ref 4.8–10.8)
WBC #/AREA URNS HPF: ABNORMAL /HPF
WBC #/AREA URNS HPF: ABNORMAL /HPF

## 2023-02-26 PROCEDURE — 85025 COMPLETE CBC W/AUTO DIFF WBC: CPT

## 2023-02-26 PROCEDURE — 700102 HCHG RX REV CODE 250 W/ 637 OVERRIDE(OP): Performed by: INTERNAL MEDICINE

## 2023-02-26 PROCEDURE — 700101 HCHG RX REV CODE 250: Performed by: INTERNAL MEDICINE

## 2023-02-26 PROCEDURE — 85730 THROMBOPLASTIN TIME PARTIAL: CPT

## 2023-02-26 PROCEDURE — 700102 HCHG RX REV CODE 250 W/ 637 OVERRIDE(OP): Performed by: EMERGENCY MEDICINE

## 2023-02-26 PROCEDURE — A9270 NON-COVERED ITEM OR SERVICE: HCPCS | Performed by: EMERGENCY MEDICINE

## 2023-02-26 PROCEDURE — 700111 HCHG RX REV CODE 636 W/ 250 OVERRIDE (IP): Performed by: EMERGENCY MEDICINE

## 2023-02-26 PROCEDURE — 87186 SC STD MICRODIL/AGAR DIL: CPT

## 2023-02-26 PROCEDURE — 81001 URINALYSIS AUTO W/SCOPE: CPT | Mod: 91

## 2023-02-26 PROCEDURE — 770020 HCHG ROOM/CARE - TELE (206)

## 2023-02-26 PROCEDURE — 74177 CT ABD & PELVIS W/CONTRAST: CPT

## 2023-02-26 PROCEDURE — 96375 TX/PRO/DX INJ NEW DRUG ADDON: CPT

## 2023-02-26 PROCEDURE — 87077 CULTURE AEROBIC IDENTIFY: CPT

## 2023-02-26 PROCEDURE — 99285 EMERGENCY DEPT VISIT HI MDM: CPT

## 2023-02-26 PROCEDURE — 700111 HCHG RX REV CODE 636 W/ 250 OVERRIDE (IP)

## 2023-02-26 PROCEDURE — 700117 HCHG RX CONTRAST REV CODE 255: Performed by: INTERNAL MEDICINE

## 2023-02-26 PROCEDURE — 87086 URINE CULTURE/COLONY COUNT: CPT | Mod: 91

## 2023-02-26 PROCEDURE — 0241U HCHG SARS-COV-2 COVID-19 NFCT DS RESP RNA 4 TRGT MIC: CPT

## 2023-02-26 PROCEDURE — 99223 1ST HOSP IP/OBS HIGH 75: CPT | Performed by: INTERNAL MEDICINE

## 2023-02-26 PROCEDURE — 71045 X-RAY EXAM CHEST 1 VIEW: CPT

## 2023-02-26 PROCEDURE — 700105 HCHG RX REV CODE 258: Performed by: EMERGENCY MEDICINE

## 2023-02-26 PROCEDURE — 83605 ASSAY OF LACTIC ACID: CPT

## 2023-02-26 PROCEDURE — 85610 PROTHROMBIN TIME: CPT

## 2023-02-26 PROCEDURE — A9270 NON-COVERED ITEM OR SERVICE: HCPCS | Performed by: INTERNAL MEDICINE

## 2023-02-26 PROCEDURE — 87040 BLOOD CULTURE FOR BACTERIA: CPT

## 2023-02-26 PROCEDURE — 80053 COMPREHEN METABOLIC PANEL: CPT

## 2023-02-26 PROCEDURE — C9803 HOPD COVID-19 SPEC COLLECT: HCPCS | Performed by: EMERGENCY MEDICINE

## 2023-02-26 PROCEDURE — 36415 COLL VENOUS BLD VENIPUNCTURE: CPT

## 2023-02-26 PROCEDURE — 700105 HCHG RX REV CODE 258: Performed by: INTERNAL MEDICINE

## 2023-02-26 PROCEDURE — 96365 THER/PROPH/DIAG IV INF INIT: CPT

## 2023-02-26 RX ORDER — SODIUM CHLORIDE, SODIUM LACTATE, POTASSIUM CHLORIDE, AND CALCIUM CHLORIDE .6; .31; .03; .02 G/100ML; G/100ML; G/100ML; G/100ML
1000 INJECTION, SOLUTION INTRAVENOUS ONCE
Status: COMPLETED | OUTPATIENT
Start: 2023-02-26 | End: 2023-02-26

## 2023-02-26 RX ORDER — ATORVASTATIN CALCIUM 20 MG/1
20 TABLET, FILM COATED ORAL
Status: DISCONTINUED | OUTPATIENT
Start: 2023-02-26 | End: 2023-03-01 | Stop reason: HOSPADM

## 2023-02-26 RX ORDER — ACETAMINOPHEN 325 MG/1
650 TABLET ORAL ONCE
Status: COMPLETED | OUTPATIENT
Start: 2023-02-26 | End: 2023-02-26

## 2023-02-26 RX ORDER — CEFTRIAXONE 2 G/1
2000 INJECTION, POWDER, FOR SOLUTION INTRAMUSCULAR; INTRAVENOUS ONCE
Status: DISCONTINUED | OUTPATIENT
Start: 2023-02-26 | End: 2023-02-26

## 2023-02-26 RX ORDER — AMOXICILLIN 250 MG
2 CAPSULE ORAL 2 TIMES DAILY
Status: DISCONTINUED | OUTPATIENT
Start: 2023-02-27 | End: 2023-03-01 | Stop reason: HOSPADM

## 2023-02-26 RX ORDER — MORPHINE SULFATE 4 MG/ML
2 INJECTION INTRAVENOUS
Status: DISCONTINUED | OUTPATIENT
Start: 2023-02-26 | End: 2023-03-01 | Stop reason: HOSPADM

## 2023-02-26 RX ORDER — SODIUM CHLORIDE 9 MG/ML
1000 INJECTION, SOLUTION INTRAVENOUS ONCE
Status: COMPLETED | OUTPATIENT
Start: 2023-02-26 | End: 2023-02-26

## 2023-02-26 RX ORDER — ACETAMINOPHEN 325 MG/1
650 TABLET ORAL EVERY 6 HOURS PRN
Status: DISCONTINUED | OUTPATIENT
Start: 2023-02-26 | End: 2023-03-01 | Stop reason: HOSPADM

## 2023-02-26 RX ORDER — POLYETHYLENE GLYCOL 3350 17 G/17G
1 POWDER, FOR SOLUTION ORAL
Status: DISCONTINUED | OUTPATIENT
Start: 2023-02-26 | End: 2023-03-01 | Stop reason: HOSPADM

## 2023-02-26 RX ORDER — PROMETHAZINE HYDROCHLORIDE 25 MG/1
12.5-25 SUPPOSITORY RECTAL EVERY 4 HOURS PRN
Status: DISCONTINUED | OUTPATIENT
Start: 2023-02-26 | End: 2023-03-01 | Stop reason: HOSPADM

## 2023-02-26 RX ORDER — SODIUM CHLORIDE, SODIUM LACTATE, POTASSIUM CHLORIDE, AND CALCIUM CHLORIDE .6; .31; .03; .02 G/100ML; G/100ML; G/100ML; G/100ML
1000 INJECTION, SOLUTION INTRAVENOUS
Status: DISCONTINUED | OUTPATIENT
Start: 2023-02-26 | End: 2023-03-01 | Stop reason: HOSPADM

## 2023-02-26 RX ORDER — PROMETHAZINE HYDROCHLORIDE 25 MG/1
12.5-25 TABLET ORAL EVERY 4 HOURS PRN
Status: DISCONTINUED | OUTPATIENT
Start: 2023-02-26 | End: 2023-03-01 | Stop reason: HOSPADM

## 2023-02-26 RX ORDER — OXYCODONE HYDROCHLORIDE 5 MG/1
2.5 TABLET ORAL
Status: DISCONTINUED | OUTPATIENT
Start: 2023-02-26 | End: 2023-03-01 | Stop reason: HOSPADM

## 2023-02-26 RX ORDER — ONDANSETRON 2 MG/ML
4 INJECTION INTRAMUSCULAR; INTRAVENOUS EVERY 4 HOURS PRN
Status: DISCONTINUED | OUTPATIENT
Start: 2023-02-26 | End: 2023-03-01 | Stop reason: HOSPADM

## 2023-02-26 RX ORDER — ENOXAPARIN SODIUM 100 MG/ML
40 INJECTION SUBCUTANEOUS DAILY
Status: DISCONTINUED | OUTPATIENT
Start: 2023-02-26 | End: 2023-02-26

## 2023-02-26 RX ORDER — POTASSIUM CHLORIDE 20 MEQ/1
40 TABLET, EXTENDED RELEASE ORAL ONCE
Status: COMPLETED | OUTPATIENT
Start: 2023-02-26 | End: 2023-02-26

## 2023-02-26 RX ORDER — PROCHLORPERAZINE EDISYLATE 5 MG/ML
5-10 INJECTION INTRAMUSCULAR; INTRAVENOUS EVERY 4 HOURS PRN
Status: DISCONTINUED | OUTPATIENT
Start: 2023-02-26 | End: 2023-03-01 | Stop reason: HOSPADM

## 2023-02-26 RX ORDER — IBUPROFEN 600 MG/1
600 TABLET ORAL ONCE
Status: COMPLETED | OUTPATIENT
Start: 2023-02-26 | End: 2023-02-26

## 2023-02-26 RX ORDER — VITAMIN B COMPLEX
1 CAPSULE ORAL DAILY
Status: DISCONTINUED | OUTPATIENT
Start: 2023-02-27 | End: 2023-02-26

## 2023-02-26 RX ORDER — CEFTRIAXONE 2 G/1
2000 INJECTION, POWDER, FOR SOLUTION INTRAMUSCULAR; INTRAVENOUS ONCE
Status: COMPLETED | OUTPATIENT
Start: 2023-02-26 | End: 2023-02-26

## 2023-02-26 RX ORDER — SODIUM CHLORIDE AND POTASSIUM CHLORIDE 300; 900 MG/100ML; MG/100ML
INJECTION, SOLUTION INTRAVENOUS CONTINUOUS
Status: DISCONTINUED | OUTPATIENT
Start: 2023-02-26 | End: 2023-02-28

## 2023-02-26 RX ORDER — OXYCODONE HYDROCHLORIDE 5 MG/1
5 TABLET ORAL
Status: DISCONTINUED | OUTPATIENT
Start: 2023-02-26 | End: 2023-03-01 | Stop reason: HOSPADM

## 2023-02-26 RX ORDER — ONDANSETRON 2 MG/ML
4 INJECTION INTRAMUSCULAR; INTRAVENOUS ONCE
Status: COMPLETED | OUTPATIENT
Start: 2023-02-26 | End: 2023-02-26

## 2023-02-26 RX ORDER — ONDANSETRON 4 MG/1
4 TABLET, ORALLY DISINTEGRATING ORAL EVERY 4 HOURS PRN
Status: DISCONTINUED | OUTPATIENT
Start: 2023-02-26 | End: 2023-03-01 | Stop reason: HOSPADM

## 2023-02-26 RX ORDER — BISACODYL 10 MG
10 SUPPOSITORY, RECTAL RECTAL
Status: DISCONTINUED | OUTPATIENT
Start: 2023-02-26 | End: 2023-03-01 | Stop reason: HOSPADM

## 2023-02-26 RX ADMIN — APIXABAN 5 MG: 5 TABLET, FILM COATED ORAL at 22:25

## 2023-02-26 RX ADMIN — SODIUM CHLORIDE, POTASSIUM CHLORIDE, SODIUM LACTATE AND CALCIUM CHLORIDE 1000 ML: 600; 310; 30; 20 INJECTION, SOLUTION INTRAVENOUS at 18:04

## 2023-02-26 RX ADMIN — CEFEPIME 2 G: 2 INJECTION, POWDER, FOR SOLUTION INTRAVENOUS at 17:20

## 2023-02-26 RX ADMIN — SODIUM CHLORIDE, POTASSIUM CHLORIDE, SODIUM LACTATE AND CALCIUM CHLORIDE 1000 ML: 600; 310; 30; 20 INJECTION, SOLUTION INTRAVENOUS at 19:38

## 2023-02-26 RX ADMIN — IBUPROFEN 600 MG: 600 TABLET, FILM COATED ORAL at 17:36

## 2023-02-26 RX ADMIN — POTASSIUM CHLORIDE 40 MEQ: 1500 TABLET, EXTENDED RELEASE ORAL at 21:43

## 2023-02-26 RX ADMIN — SODIUM CHLORIDE 1000 ML: 9 INJECTION, SOLUTION INTRAVENOUS at 20:38

## 2023-02-26 RX ADMIN — ATORVASTATIN CALCIUM 20 MG: 20 TABLET, FILM COATED ORAL at 22:25

## 2023-02-26 RX ADMIN — ACETAMINOPHEN 650 MG: 325 TABLET, FILM COATED ORAL at 17:36

## 2023-02-26 RX ADMIN — CEFTRIAXONE SODIUM 2000 MG: 2 INJECTION, POWDER, FOR SOLUTION INTRAMUSCULAR; INTRAVENOUS at 19:34

## 2023-02-26 RX ADMIN — POTASSIUM CHLORIDE AND SODIUM CHLORIDE: 900; 300 INJECTION, SOLUTION INTRAVENOUS at 22:32

## 2023-02-26 RX ADMIN — SODIUM CHLORIDE 1000 ML: 9 INJECTION, SOLUTION INTRAVENOUS at 17:35

## 2023-02-26 RX ADMIN — ONDANSETRON 4 MG: 2 INJECTION INTRAMUSCULAR; INTRAVENOUS at 17:35

## 2023-02-26 RX ADMIN — IOHEXOL 98 ML: 350 INJECTION, SOLUTION INTRAVENOUS at 20:45

## 2023-02-26 ASSESSMENT — ENCOUNTER SYMPTOMS
COUGH: 0
HALLUCINATIONS: 0
NECK PAIN: 0
HEMOPTYSIS: 0
DOUBLE VISION: 0
PALPITATIONS: 0
FOCAL WEAKNESS: 0
SPUTUM PRODUCTION: 0
BRUISES/BLEEDS EASILY: 0
TREMORS: 0
POLYDIPSIA: 0
FEVER: 0
CHILLS: 0
NERVOUS/ANXIOUS: 0
HEADACHES: 0
NAUSEA: 0
FLANK PAIN: 0
VOMITING: 0
BLURRED VISION: 0
ORTHOPNEA: 0
SPEECH CHANGE: 0
HEARTBURN: 0
PHOTOPHOBIA: 0
BACK PAIN: 0
WEIGHT LOSS: 0

## 2023-02-26 ASSESSMENT — FIBROSIS 4 INDEX: FIB4 SCORE: 1.76

## 2023-02-26 ASSESSMENT — LIFESTYLE VARIABLES: SUBSTANCE_ABUSE: 0

## 2023-02-27 PROBLEM — D64.9 NORMOCYTIC ANEMIA: Status: ACTIVE | Noted: 2023-02-27

## 2023-02-27 PROBLEM — N39.0 ENTEROCOCCUS UTI: Status: ACTIVE | Noted: 2023-02-27

## 2023-02-27 PROBLEM — B95.2 ENTEROCOCCUS UTI: Status: ACTIVE | Noted: 2023-02-27

## 2023-02-27 LAB
ALBUMIN SERPL BCP-MCNC: 3.3 G/DL (ref 3.2–4.9)
ALBUMIN/GLOB SERPL: 1.4 G/DL
ALP SERPL-CCNC: 43 U/L (ref 30–99)
ALT SERPL-CCNC: 20 U/L (ref 2–50)
ANION GAP SERPL CALC-SCNC: 9 MMOL/L (ref 7–16)
AST SERPL-CCNC: 19 U/L (ref 12–45)
BASOPHILS # BLD AUTO: 0.5 % (ref 0–1.8)
BASOPHILS # BLD: 0.03 K/UL (ref 0–0.12)
BILIRUB SERPL-MCNC: 0.4 MG/DL (ref 0.1–1.5)
BUN SERPL-MCNC: 7 MG/DL (ref 8–22)
CALCIUM ALBUM COR SERPL-MCNC: 8.7 MG/DL (ref 8.5–10.5)
CALCIUM SERPL-MCNC: 8.1 MG/DL (ref 8.5–10.5)
CHLORIDE SERPL-SCNC: 111 MMOL/L (ref 96–112)
CO2 SERPL-SCNC: 19 MMOL/L (ref 20–33)
CREAT SERPL-MCNC: 0.34 MG/DL (ref 0.5–1.4)
EOSINOPHIL # BLD AUTO: 0 K/UL (ref 0–0.51)
EOSINOPHIL NFR BLD: 0 % (ref 0–6.9)
ERYTHROCYTE [DISTWIDTH] IN BLOOD BY AUTOMATED COUNT: 44.2 FL (ref 35.9–50)
GFR SERPLBLD CREATININE-BSD FMLA CKD-EPI: 116 ML/MIN/1.73 M 2
GLOBULIN SER CALC-MCNC: 2.4 G/DL (ref 1.9–3.5)
GLUCOSE SERPL-MCNC: 128 MG/DL (ref 65–99)
HCT VFR BLD AUTO: 31 % (ref 37–47)
HGB BLD-MCNC: 10.5 G/DL (ref 12–16)
IMM GRANULOCYTES # BLD AUTO: 0.03 K/UL (ref 0–0.11)
IMM GRANULOCYTES NFR BLD AUTO: 0.5 % (ref 0–0.9)
LACTATE SERPL-SCNC: 0.8 MMOL/L (ref 0.5–2)
LYMPHOCYTES # BLD AUTO: 0.54 K/UL (ref 1–4.8)
LYMPHOCYTES NFR BLD: 8.4 % (ref 22–41)
MCH RBC QN AUTO: 30.4 PG (ref 27–33)
MCHC RBC AUTO-ENTMCNC: 33.9 G/DL (ref 33.6–35)
MCV RBC AUTO: 89.9 FL (ref 81.4–97.8)
MONOCYTES # BLD AUTO: 0.48 K/UL (ref 0–0.85)
MONOCYTES NFR BLD AUTO: 7.5 % (ref 0–13.4)
NEUTROPHILS # BLD AUTO: 5.34 K/UL (ref 2–7.15)
NEUTROPHILS NFR BLD: 83.1 % (ref 44–72)
NRBC # BLD AUTO: 0 K/UL
NRBC BLD-RTO: 0 /100 WBC
PLATELET # BLD AUTO: 185 K/UL (ref 164–446)
PMV BLD AUTO: 9.7 FL (ref 9–12.9)
POTASSIUM SERPL-SCNC: 4 MMOL/L (ref 3.6–5.5)
PROT SERPL-MCNC: 5.7 G/DL (ref 6–8.2)
RBC # BLD AUTO: 3.45 M/UL (ref 4.2–5.4)
SCCMEC + MECA PNL NOSE NAA+PROBE: NEGATIVE
SODIUM SERPL-SCNC: 139 MMOL/L (ref 135–145)
VIT B12 SERPL-MCNC: 587 PG/ML (ref 211–911)
WBC # BLD AUTO: 6.4 K/UL (ref 4.8–10.8)

## 2023-02-27 PROCEDURE — A9270 NON-COVERED ITEM OR SERVICE: HCPCS | Performed by: INTERNAL MEDICINE

## 2023-02-27 PROCEDURE — 700102 HCHG RX REV CODE 250 W/ 637 OVERRIDE(OP): Performed by: INTERNAL MEDICINE

## 2023-02-27 PROCEDURE — 84425 ASSAY OF VITAMIN B-1: CPT

## 2023-02-27 PROCEDURE — 36415 COLL VENOUS BLD VENIPUNCTURE: CPT

## 2023-02-27 PROCEDURE — 700101 HCHG RX REV CODE 250: Performed by: INTERNAL MEDICINE

## 2023-02-27 PROCEDURE — 96366 THER/PROPH/DIAG IV INF ADDON: CPT

## 2023-02-27 PROCEDURE — 700111 HCHG RX REV CODE 636 W/ 250 OVERRIDE (IP): Performed by: INTERNAL MEDICINE

## 2023-02-27 PROCEDURE — 87641 MR-STAPH DNA AMP PROBE: CPT

## 2023-02-27 PROCEDURE — 99233 SBSQ HOSP IP/OBS HIGH 50: CPT | Performed by: NURSE PRACTITIONER

## 2023-02-27 PROCEDURE — 80053 COMPREHEN METABOLIC PANEL: CPT

## 2023-02-27 PROCEDURE — 83605 ASSAY OF LACTIC ACID: CPT

## 2023-02-27 PROCEDURE — 770020 HCHG ROOM/CARE - TELE (206)

## 2023-02-27 PROCEDURE — 82607 VITAMIN B-12: CPT

## 2023-02-27 PROCEDURE — 85025 COMPLETE CBC W/AUTO DIFF WBC: CPT

## 2023-02-27 RX ADMIN — ATORVASTATIN CALCIUM 20 MG: 20 TABLET, FILM COATED ORAL at 21:09

## 2023-02-27 RX ADMIN — ACETAMINOPHEN 650 MG: 325 TABLET, FILM COATED ORAL at 05:56

## 2023-02-27 RX ADMIN — ACETAMINOPHEN 650 MG: 325 TABLET, FILM COATED ORAL at 12:58

## 2023-02-27 RX ADMIN — APIXABAN 5 MG: 5 TABLET, FILM COATED ORAL at 05:10

## 2023-02-27 RX ADMIN — ONDANSETRON 4 MG: 2 INJECTION INTRAMUSCULAR; INTRAVENOUS at 21:13

## 2023-02-27 RX ADMIN — APIXABAN 5 MG: 5 TABLET, FILM COATED ORAL at 16:28

## 2023-02-27 RX ADMIN — CEFTRIAXONE SODIUM 1000 MG: 10 INJECTION, POWDER, FOR SOLUTION INTRAVENOUS at 16:20

## 2023-02-27 RX ADMIN — POTASSIUM CHLORIDE AND SODIUM CHLORIDE: 900; 300 INJECTION, SOLUTION INTRAVENOUS at 19:20

## 2023-02-27 RX ADMIN — CHOLECALCIFEROL TAB 125 MCG (5000 UNIT) 5000 UNITS: 125 TAB at 05:10

## 2023-02-27 RX ADMIN — ACETAMINOPHEN 650 MG: 325 TABLET, FILM COATED ORAL at 21:13

## 2023-02-27 RX ADMIN — POTASSIUM CHLORIDE AND SODIUM CHLORIDE: 900; 300 INJECTION, SOLUTION INTRAVENOUS at 09:10

## 2023-02-27 ASSESSMENT — ENCOUNTER SYMPTOMS
FLANK PAIN: 1
HEADACHES: 0
WHEEZING: 0
CHILLS: 1
DIARRHEA: 0
VOMITING: 0
SORE THROAT: 0
SHORTNESS OF BREATH: 0
INSOMNIA: 0
PALPITATIONS: 0
MYALGIAS: 0
NAUSEA: 0
FEVER: 1
COUGH: 0
ABDOMINAL PAIN: 0
DIZZINESS: 0
EYE PAIN: 0
DEPRESSION: 0
WEAKNESS: 0

## 2023-02-27 ASSESSMENT — LIFESTYLE VARIABLES
HOW MANY TIMES IN THE PAST YEAR HAVE YOU HAD 5 OR MORE DRINKS IN A DAY: 0
HAVE PEOPLE ANNOYED YOU BY CRITICIZING YOUR DRINKING: NO
ALCOHOL_USE: NO
TOTAL SCORE: 0
TOTAL SCORE: 0
ON A TYPICAL DAY WHEN YOU DRINK ALCOHOL HOW MANY DRINKS DO YOU HAVE: 0
HAVE YOU EVER FELT YOU SHOULD CUT DOWN ON YOUR DRINKING: NO
TOTAL SCORE: 0
EVER FELT BAD OR GUILTY ABOUT YOUR DRINKING: NO
AVERAGE NUMBER OF DAYS PER WEEK YOU HAVE A DRINK CONTAINING ALCOHOL: 0
EVER HAD A DRINK FIRST THING IN THE MORNING TO STEADY YOUR NERVES TO GET RID OF A HANGOVER: NO
CONSUMPTION TOTAL: NEGATIVE

## 2023-02-27 ASSESSMENT — PATIENT HEALTH QUESTIONNAIRE - PHQ9
1. LITTLE INTEREST OR PLEASURE IN DOING THINGS: NOT AT ALL
SUM OF ALL RESPONSES TO PHQ9 QUESTIONS 1 AND 2: 0
SUM OF ALL RESPONSES TO PHQ9 QUESTIONS 1 AND 2: 0
2. FEELING DOWN, DEPRESSED, IRRITABLE, OR HOPELESS: NOT AT ALL
2. FEELING DOWN, DEPRESSED, IRRITABLE, OR HOPELESS: NOT AT ALL
1. LITTLE INTEREST OR PLEASURE IN DOING THINGS: NOT AT ALL
1. LITTLE INTEREST OR PLEASURE IN DOING THINGS: NOT AT ALL
SUM OF ALL RESPONSES TO PHQ9 QUESTIONS 1 AND 2: 0

## 2023-02-27 ASSESSMENT — FIBROSIS 4 INDEX
FIB4 SCORE: 1.4
FIB4 SCORE: 1.4

## 2023-02-27 NOTE — ED TRIAGE NOTES
Chief Complaint   Patient presents with    Fever    Flank Pain     Pt with recent diagnosis of lymphoma. Nephrostomy tube in place. Reports increased fever, weakness.  +sepsis protocol, + neutropenia fever protocol.  Charge RN notified.   Pt to family room.

## 2023-02-27 NOTE — CARE PLAN
Problem: Knowledge Deficit - Standard  Goal: Patient and family/care givers will demonstrate understanding of plan of care, disease process/condition, diagnostic tests and medications  Outcome: Progressing     Problem: Pain - Standard  Goal: Alleviation of pain or a reduction in pain to the patient’s comfort goal  Outcome: Progressing   The patient is Stable - Low risk of patient condition declining or worsening    Shift Goals  Clinical Goals: monitor s/s of infection, iv antibiotics  Patient Goals: rest and food  Family Goals: YAO    Progress made toward(s) clinical / shift goals:      Pt educated on plan of care verbalizes understanding; fall risk precautions in place, pt oriented to surroundings and educated on fall precautions, pt remains free of falls; pt educated on pain scales and pain relieving measures, pain is controlled at this time. Pt educated on importance to turn and reposition, pt verbalizes understanding, pt remains free of further skin breakdown.

## 2023-02-27 NOTE — ED NOTES
Med Rec complete per patient with family translating  No oral antibiotics in the last 30 days per patient  Allergies reviewed  Preferred Pharmacy: [facility]

## 2023-02-27 NOTE — H&P
Hospital Medicine History & Physical Note    Date of Service  2/26/2023    Primary Care Physician  Jose Cruz Smith M.D.        Code Status  Full Code    Chief Complaint  Chief Complaint   Patient presents with    Fever    Flank Pain       History of Presenting Illness  Savana Zarco is a 62 y.o. 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  Nephrostomy tube was draining clear light urine.  Vitals in ER temperature 100.4, heart rate initially 117, blood pressure 110/63  Lactic acid 2.3, improved to 1.2.    Patient started on fluids and antibiotics for sepsis.  UA positive for moderate leukocyte esterase, 5-10 WBC, few bacteria, small occult blood, RBC 2-5.  Fluid boluses and ceftriaxone is being administered.  I discussed the plan of care with patient.    Review of Systems  Review of Systems   Constitutional:  Negative for chills, fever and weight loss.   HENT:  Negative for ear pain, hearing loss and tinnitus.    Eyes:  Negative for blurred vision, double vision and photophobia.   Respiratory:  Negative for cough, hemoptysis and sputum production.    Cardiovascular:  Negative for chest pain, palpitations and orthopnea.   Gastrointestinal:  Negative for heartburn, nausea and vomiting.   Genitourinary:  Negative for dysuria, flank pain, frequency and hematuria.   Musculoskeletal:  Positive for joint pain. Negative for back pain and neck pain.   Skin:  Negative for itching and rash.   Neurological:  Negative for tremors, speech change, focal weakness and headaches.   Endo/Heme/Allergies:  Negative for environmental allergies and polydipsia. Does not bruise/bleed easily.    Psychiatric/Behavioral:  Negative for hallucinations and substance abuse. The patient is not nervous/anxious.      Past Medical History   has no past medical history on file.    Surgical History   has a past surgical history that includes pr dx bone marrow aspirations (Left, 2/16/2023) and pr dx bone marrow biopsies (Left, 2/16/2023).     Family History     Family history reviewed with patient. There is no family history that is pertinent to the chief complaint.     Social History   reports that she has never smoked. She has never used smokeless tobacco. She reports that she does not currently use alcohol. She reports that she does not use drugs.    Allergies  No Known Allergies    Medications  Prior to Admission Medications   Prescriptions Last Dose Informant Patient Reported? Taking?   B Complex Vitamins (B COMPLEX PO)  Patient Yes No   Sig: Take 1 Tablet by mouth every day.   CALCIUM PO  Patient Yes No   Sig: Take 1 Tablet by mouth every day.   GLUCOSAMINE-CHONDROITIN PO  Patient Yes No   Sig: Take 1 Tablet by mouth every day.   Omega-3 Fatty Acids (FISH OIL) 1000 MG Cap capsule  Patient Yes No   Sig: Take 1,000 mg by mouth every day.   acetaminophen (TYLENOL) 500 MG Tab  Patient Yes No   Sig: Take 1,000 mg by mouth every 6 hours as needed for Mild Pain.   apixaban (ELIQUIS) 5mg Tab  Patient Yes No   Sig: Take 5 mg by mouth 2 times a day.   atorvastatin (LIPITOR) 20 MG Tab  Patient Yes No   Sig: Take 20 mg by mouth at bedtime.   vitamin D3 (CHOLECALCIFEROL) 5000 Unit (125 mcg) Tab  Patient Yes No   Sig: Take 5,000 Units by mouth every day.      Facility-Administered Medications: None       Physical Exam  Temp:  [37.8 °C (100.1 °F)-38 °C (100.4 °F)] 37.8 °C (100.1 °F)  Pulse:  [] 82  Resp:  [16-26] 16  BP: ()/(52-63) 80/52  SpO2:  [91 %-98 %] 96 %  Blood Pressure: (!) 80/52   Temperature: 37.8 °C (100.1 °F)   Pulse: 82   Respiration: 16   Pulse Oximetry: 96 %       Physical Exam  Vitals and nursing  note reviewed.   Constitutional:       General: She is not in acute distress.     Appearance: Normal appearance.   HENT:      Head: Normocephalic and atraumatic.      Nose: Nose normal.      Mouth/Throat:      Mouth: Mucous membranes are moist.   Eyes:      Extraocular Movements: Extraocular movements intact.      Pupils: Pupils are equal, round, and reactive to light.   Cardiovascular:      Rate and Rhythm: Regular rhythm. Tachycardia present.   Pulmonary:      Effort: Pulmonary effort is normal.      Breath sounds: Normal breath sounds.   Abdominal:      General: Abdomen is flat. There is no distension.      Tenderness: There is no abdominal tenderness.   Genitourinary:     Comments: Nephrostomy tube on the left side draining clear light yellow urine  Musculoskeletal:         General: No swelling or deformity. Normal range of motion.      Cervical back: Normal range of motion and neck supple.      Left lower leg: Edema present.   Skin:     General: Skin is warm and dry.   Neurological:      General: No focal deficit present.      Mental Status: She is alert and oriented to person, place, and time.   Psychiatric:         Mood and Affect: Mood normal.         Behavior: Behavior normal.       Laboratory:  Recent Labs     02/26/23  1700   WBC 7.4   RBC 3.99*   HEMOGLOBIN 12.1   HEMATOCRIT 35.1*   MCV 88.0   MCH 30.3   MCHC 34.5   RDW 42.2   PLATELETCT 240   MPV 9.8     Recent Labs     02/26/23  1700   SODIUM 133*   POTASSIUM 3.4*   CHLORIDE 102   CO2 19*   GLUCOSE 130*   BUN 11   CREATININE 0.53   CALCIUM 9.1     Recent Labs     02/26/23  1700   ALTSGPT 22   ASTSGOT 21   ALKPHOSPHAT 53   TBILIRUBIN 0.8   GLUCOSE 130*     Recent Labs     02/26/23  1700   APTT 39.8*   INR 1.46*     No results for input(s): NTPROBNP in the last 72 hours.      No results for input(s): TROPONINT in the last 72 hours.    Imaging:  DX-CHEST-PORTABLE (1 VIEW)   Final Result      No acute cardiopulmonary disease evident.      CT-ABDOMEN-PELVIS  WITH    (Results Pending)       X-Ray:  I have personally reviewed the images and compared with prior images.    Assessment/Plan:  Justification for Admission Status  I anticipate this patient will require at least two midnights for appropriate medical management, necessitating inpatient admission because sepsis, UTI    Patient will need a Med/Surg bed on MEDICAL service .  The need is secondary to sepsis, UTI.    * Sepsis (HCC)- (present on admission)  Assessment & Plan  This is Sepsis Present on admission  SIRS criteria identified on my evaluation include: Fever, with temperature greater than 101 deg F and Tachycardia, with heart rate greater than 90 BPM  Source is UTI, rule out intra-abdominal abscess  Sepsis protocol initiated  Fluid resuscitation ordered per protocol  Crystalloid Fluid Administration: Fluid resuscitation ordered per standard protocol - 30 mL/kg per current or ideal body weight  IV antibiotics as appropriate for source of sepsis  Reassessment: I have reassessed the patient's hemodynamic status    CT of the abdomen pelvis with contrast pending        Hypokalemia  Assessment & Plan  Replaced    DVT (deep venous thrombosis) (HCC)- (present on admission)  Assessment & Plan  Mild left lower extremity swelling slightly improved according to the patient  Continue apixaban    Mass of iliopsoas muscle group, L hydronephrosis, h/o DVT/anticoagulation- (present on admission)  Assessment & Plan  Follow-up with oncology for treatment of lymphoma.  Treatment has not been started yet  Follow-up with repeat CT, pending        VTE prophylaxis: therapeutic anticoagulation with apixaban

## 2023-02-27 NOTE — PROGRESS NOTES
4 Eyes Skin Assessment Completed by RUSS Fleming and RUSS Baker.    Head WDL  Ears WDL  Nose WDL  Mouth WDL  Neck WDL  Breast/Chest WDL  Shoulder Blades WDL  Spine WDL  (R) Arm/Elbow/Hand WDL  (L) Arm/Elbow/Hand WDL  Abdomen WDL  Groin WDL  Scrotum/Coccyx/Buttocks WDL  (R) Leg Edema  (L) Leg Edema  (R) Heel/Foot/Toe Edema  (L) Heel/Foot/Toe Edema          Devices In Places Tele Box and Pulse Ox      Interventions In Place Pillows, Heels Loaded W/Pillows, and Pressure Redistribution Mattress    Possible Skin Injury No    Pictures Uploaded Into Epic N/A  Wound Consult Placed N/A  RN Wound Prevention Protocol Ordered No

## 2023-02-27 NOTE — ED NOTES
Patient ambulatory to restroom and back with a steady gait and provided urine sample that was sent to lab. Second fluid bolus running, will continue to monitor

## 2023-02-27 NOTE — ASSESSMENT & PLAN NOTE
-Stable.  - iron deficient, denies any blood in stool   - will need outpatient colonoscopy, is due this year per pt   - hold on IV iron given active infection   - stool occult blood ordered   -Closely monitor.

## 2023-02-27 NOTE — ED NOTES
PT WHEELED TO RESTROOM. PLACED BACK IN BED.  BREATHING IS EVEN AND UNLABORED, SKIN IS WARM AND DRY, VSS, NAD, WILL CONTINUE TO MONITOR. FAMILY REMAINS AT BEDSIDE.

## 2023-02-27 NOTE — ED NOTES
Patient medicated per MAR. COVID swab collected and sent to lab, urine sample from nephrostomy tube sent to lab. Family at bedside, call bell within reach

## 2023-02-27 NOTE — PROGRESS NOTES
Received in bed, aaox4, thirsty, denies any pain, left neph tube dressing CDI, bag to dd with adeq clear UO, POC discussed, needs attended.

## 2023-02-27 NOTE — ED NOTES
PT AMBULATED TO RESTROOM WITH STAND BY ASSIST. PATIENT WITH STEADY GAIT. PATIENT REPOSITIONED IN BED AND FAMILY REMAINS AT BEDSIDE. BREATHING IS EVEN AND UNLABORED, SKIN IS WARM AND DRY, VSS, NAD, WILL CONTINUE TO MONITOR.

## 2023-02-27 NOTE — HOSPITAL COURSE
Savana Zarco is a 62 y.o. 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     Vitals in ER temperature 100.4, heart rate initially 117, blood pressure 110/63  Lactic acid 2.3, improved to 1.2.  Patient started on fluids and antibiotics for sepsis.  UA positive for moderate leukocyte esterase, 5-10 WBC, few bacteria, small occult blood, RBC 2-5.      Sepsis protocol was initiated at presentation.  IV fluids and IV Rocephin initiated.  No acute findings on CT abdomen pelvis, previously noted left hydro has resolved and no abscess seen.  Chest x-ray negative.  COVID, influenza A/B, and RSV negative.  No evidence of erythema or drainage from nephrostomy tube insertion site.

## 2023-02-27 NOTE — ED PROVIDER NOTES
ER Provider Note    Scribed for Santiago Heard M.d. by Augustus Vincent. 2/26/2023  4:55 PM    Primary Care Provider: Jose Cruz Smith M.D.    CHIEF COMPLAINT  Chief Complaint   Patient presents with    Fever    Flank Pain     LIMITATION TO HISTORY   Select: : None    HPI/ROS  OUTSIDE HISTORIAN(S):  Family patient's daughter states patient was recently diagnosed with lymphoma.  That she has not started chemotherapy.    EXTERNAL RECORDS REVIEWED  Inpatient Notes  She was seen for flank pain in mid-February and found to have lymphoma with obstruction of ureter because a mass in her pelvis was pushing against ureter and uterus. Nephrostomy was placed.     Savana Zarco is a 62 y.o. female with recent diagnosis of lymphoma who presents to the ED for fever (100.4 °F) and flank pain onset early this morning. Per triage note, she has a nephrostomy tube in place. Family member at bedside states she woke up in the middle of the night sweating with chills and shakes. She admits to associated symptoms of increased weakness, generalized body aches, nausea, dry heaving, but denies cough, sore throat, vomiting, diarrhea, dysuria, or hematuria. Family member reports she last took Tylenol at 9:00 AM this morning. No alleviating factors were reported. Family member at bedside reports she has not yet started treatment for her lymphoma and notes they have an appointment this week to find out more. She denies any known drug allergies.       PAST MEDICAL HISTORY  History reviewed. No pertinent past medical history.    SURGICAL HISTORY  Past Surgical History:   Procedure Laterality Date    ME DX BONE MARROW ASPIRATIONS Left 2/16/2023    Procedure: BONE MARROW ASPIRATION -DR. LEÓN;  Surgeon: Chris León M.D.;  Location: SURGERY SAME DAY [facility];  Service: Orthopedics    ME DX BONE MARROW BIOPSIES Left 2/16/2023    Procedure: BIOPSY, BONE MARROW, USING NEEDLE OR TROCAR;  Surgeon: Chris  VICKEY León;  Location: SURGERY SAME DAY [facility];  Service: Orthopedics       FAMILY HISTORY  None reported    SOCIAL HISTORY   reports that she has never smoked. She has never used smokeless tobacco. She reports that she does not currently use alcohol. She reports that she does not use drugs.    CURRENT MEDICATIONS  Previous Medications    ACETAMINOPHEN (TYLENOL) 500 MG TAB    Take 1,000 mg by mouth every 6 hours as needed for Mild Pain.    APIXABAN (ELIQUIS) 5MG TAB    Take 5 mg by mouth 2 times a day.    ATORVASTATIN (LIPITOR) 20 MG TAB    Take 20 mg by mouth at bedtime.    B COMPLEX VITAMINS (B COMPLEX PO)    Take 1 Tablet by mouth every day.    CALCIUM PO    Take 1 Tablet by mouth every day.    GLUCOSAMINE-CHONDROITIN PO    Take 1 Tablet by mouth every day.    OMEGA-3 FATTY ACIDS (FISH OIL) 1000 MG CAP CAPSULE    Take 1,000 mg by mouth every day.    VITAMIN D3 (CHOLECALCIFEROL) 5000 UNIT (125 MCG) TAB    Take 5,000 Units by mouth every day.       ALLERGIES  Patient has no known allergies.    PHYSICAL EXAM  /63   Pulse (!) 117   Temp 38 °C (100.4 °F) (Temporal)   Resp 18   Wt 60.1 kg (132 lb 7.9 oz)   SpO2 98%   BMI 24.23 kg/m²     Constitutional: Well developed, Well nourished, mild distress.   HENT: Normocephalic, Atraumatic, Oropharynx moist, No oral exudates.   Eyes: Conjunctiva normal, No discharge.   Cardiovascular: Tachycardic heart rate, Normal rhythm, No murmurs, equal pulses.   Pulmonary: Normal breath sounds, No respiratory distress, No wheezing, No rales, No rhonchi.  Chest: No chest wall tenderness or deformity.   Abdomen:Soft, No tenderness, No masses, no rebound, no guarding.   Back: No CVA tenderness. Left nephrostomy site clean, dry, and intact. No surrounding erythema.  : Urine is yellow. Not cloudy  Musculoskeletal: No major deformities noted, No tenderness.   Skin: Warm, Dry, No erythema, No rash.   Neurologic: Alert & oriented x 3, Normal motor function,  No focal  deficits noted.   Psychiatric: Affect normal, Judgment normal, Mood normal.       DIAGNOSTIC STUDIES & PROCEDURES    Labs:   Results for orders placed or performed during the hospital encounter of 02/26/23   Lactic acid (lactate)   Result Value Ref Range    Lactic Acid 2.3 (H) 0.5 - 2.0 mmol/L   CBC With Differential   Result Value Ref Range    WBC 7.4 4.8 - 10.8 K/uL    RBC 3.99 (L) 4.20 - 5.40 M/uL    Hemoglobin 12.1 12.0 - 16.0 g/dL    Hematocrit 35.1 (L) 37.0 - 47.0 %    MCV 88.0 81.4 - 97.8 fL    MCH 30.3 27.0 - 33.0 pg    MCHC 34.5 33.6 - 35.0 g/dL    RDW 42.2 35.9 - 50.0 fL    Platelet Count 240 164 - 446 K/uL    MPV 9.8 9.0 - 12.9 fL    Neutrophils-Polys 84.50 (H) 44.00 - 72.00 %    Lymphocytes 8.00 (L) 22.00 - 41.00 %    Monocytes 6.80 0.00 - 13.40 %    Eosinophils 0.00 0.00 - 6.90 %    Basophils 0.30 0.00 - 1.80 %    Immature Granulocytes 0.40 0.00 - 0.90 %    Nucleated RBC 0.00 /100 WBC    Neutrophils (Absolute) 6.25 2.00 - 7.15 K/uL    Lymphs (Absolute) 0.59 (L) 1.00 - 4.80 K/uL    Monos (Absolute) 0.50 0.00 - 0.85 K/uL    Eos (Absolute) 0.00 0.00 - 0.51 K/uL    Baso (Absolute) 0.02 0.00 - 0.12 K/uL    Immature Granulocytes (abs) 0.03 0.00 - 0.11 K/uL    NRBC (Absolute) 0.00 K/uL   Comp Metabolic Panel   Result Value Ref Range    Sodium 133 (L) 135 - 145 mmol/L    Potassium 3.4 (L) 3.6 - 5.5 mmol/L    Chloride 102 96 - 112 mmol/L    Co2 19 (L) 20 - 33 mmol/L    Anion Gap 12.0 7.0 - 16.0    Glucose 130 (H) 65 - 99 mg/dL    Bun 11 8 - 22 mg/dL    Creatinine 0.53 0.50 - 1.40 mg/dL    Calcium 9.1 8.5 - 10.5 mg/dL    AST(SGOT) 21 12 - 45 U/L    ALT(SGPT) 22 2 - 50 U/L    Alkaline Phosphatase 53 30 - 99 U/L    Total Bilirubin 0.8 0.1 - 1.5 mg/dL    Albumin 4.1 3.2 - 4.9 g/dL    Total Protein 6.9 6.0 - 8.2 g/dL    Globulin 2.8 1.9 - 3.5 g/dL    A-G Ratio 1.5 g/dL   Urinalysis    Specimen: Urine   Result Value Ref Range    Color Yellow     Character Clear     Specific Gravity 1.007 <1.035    Ph 7.0 5.0 - 8.0     Glucose Negative Negative mg/dL    Ketones Negative Negative mg/dL    Protein 30 (A) Negative mg/dL    Bilirubin Negative Negative    Urobilinogen, Urine 0.2 Negative    Nitrite Negative Negative    Leukocyte Esterase Moderate (A) Negative    Occult Blood Small (A) Negative    Micro Urine Req Microscopic    Lactic acid (lactate) 2 hours after STAT X1   Result Value Ref Range    Lactic Acid 1.2 0.5 - 2.0 mmol/L   Prothrombin Time (INR)   Result Value Ref Range    PT 17.5 (H) 12.0 - 14.6 sec    INR 1.46 (H) 0.87 - 1.13   APTT (PTT)   Result Value Ref Range    APTT 39.8 (H) 24.7 - 36.0 sec   CoV-2, FLU A/B, and RSV by PCR (2-4 Hours CEPHEID) : Collect NP swab in VTM    Specimen: Respirate   Result Value Ref Range    Influenza virus A RNA Negative Negative    Influenza virus B, PCR Negative Negative    RSV, PCR Negative Negative    SARS-CoV-2 by PCR NotDetected     SARS-CoV-2 Source NP Swab    ESTIMATED GFR   Result Value Ref Range    GFR (CKD-EPI) 105 >60 mL/min/1.73 m 2   CORRECTED CALCIUM   Result Value Ref Range    Correct Calcium 9.0 8.5 - 10.5 mg/dL   URINE MICROSCOPIC (W/UA)   Result Value Ref Range    WBC 5-10 (A) /hpf    RBC 5-10 (A) /hpf    Bacteria Few (A) None /hpf    Epithelial Cells Few /hpf    Hyaline Cast 0-2 /lpf   URINALYSIS,CULTURE IF INDICATED    Specimen: Urine, Clean Catch   Result Value Ref Range    Color Yellow     Character Clear     Specific Gravity 1.009 <1.035    Ph 6.5 5.0 - 8.0    Glucose Negative Negative mg/dL    Ketones Negative Negative mg/dL    Protein Negative Negative mg/dL    Bilirubin Negative Negative    Urobilinogen, Urine 0.2 Negative    Nitrite Negative Negative    Leukocyte Esterase Moderate (A) Negative    Occult Blood Small (A) Negative    Micro Urine Req Microscopic    URINE MICROSCOPIC (W/UA)   Result Value Ref Range    WBC 5-10 (A) /hpf    RBC 2-5 (A) /hpf    Bacteria Few (A) None /hpf    Epithelial Cells Few /hpf    Epithelial Cells Renal Few /hpf    Hyaline Cast 0-2  /lpf       All labs reviewed by me.      Radiology:   The attending Emergency Physician has independently interpreted the diagnostic imaging associated with this visit and is awaiting the final reading from the radiologist, which will be displayed below.    Preliminary interpretation is a follows: Chest x-ray is negative for pneumonia  Radiologist interpretation:   CT-ABDOMEN-PELVIS WITH   Final Result      1.  There is no evidence of abdominal or pelvic abscess.   2.  Interval placement of left nephrostomy tube satisfactory in position with relatively symmetric enhancement of the kidneys and no hydronephrosis.   3.  No change in the enlarged left iliopsoas muscle possibly related to the history of lymphoma.   4.  There is a left common iliac enlarged lymph node which could be reactive or could be related to lymphoma. Other retroperitoneal nodes are normal in size.      DX-CHEST-PORTABLE (1 VIEW)   Final Result      No acute cardiopulmonary disease evident.           COURSE & MEDICAL DECISION MAKING    ED Observation Status? No; Patient does not meet criteria for ED Observation.     INITIAL ASSESSMENT AND PLAN  Care Narrative:       4:55 PM - Patient seen and evaluated at bedside.  Patient will be treated with cefepime 2g INJ, acetaminophen 650 mg tablet, ibuprofen 600 mg tablet, ondansetron 4 mg INJ, and NS infusion 1,000 mL for her symptoms. Ordered DX-Chest, blood culture x2, urine culture, UA, CMP, CBC w/ diff, lactic acid (lactate) once and every 2 hours, APTT (PTT), Prothrombin Time (INR) to evaluate. She understands and agrees to the plan of care. Differential diagnoses include but are not limited to: sepsis, urinary tract infection, pyelonephritis, COVID.      Reexamined the patient ed imaging results as well as labs.  At this point time patient's blood pressure did come down to 90 systolic.  Given this I was concerned about sepsis from a urinary tract infection.  Discussed patient staying in the hospital  which she agreed to.    Discussed with Dr. Aguilar agreed to consult on hospitalization    HYDRATION: Based on the patient's presentation of Tachycardia and Other possible sepsis the patient was given IV fluids. IV Hydration was used because oral hydration was not as rapid as required. Upon recheck following hydration, the patient was in his heart rate slightly improved her blood pressure also improved.    PROBLEM LIST AND DISPOSITION  #1 fever.  Patient presents with fever at this point time I think this is secondary to pyelonephritis from a urinary tract infection.  Patient has been started on broad-spectrum antibiotics.  Given her tachycardia, tachypnea and urinary tract infection she meets criteria for sepsis.  She has been started on IV bolus for sepsis.  She does not show any signs of end organ damage.    #2 B-cell lymphoma.  Patient has not started chemotherapy.  No signs of worsening disease on CT.  No intra-abdominal abscess               DISPOSITION AND DISCUSSIONS  I have discussed management of the patient with the following physicians and WOO's: Dr. Aguilar    Decision tools and prescription drugs considered including, but not limited to: Antibiotics for sepsis and urinary tract infection .    DISPOSITION:  Patient will be hospitalized by Dr. Aguilar in guarded condition.      FINAL IMPRESSION   1. Acute pyelonephritis    2. Sepsis without acute organ dysfunction, due to unspecified organism (HCC)    3. B-cell lymphoma of intrapelvic lymph nodes, unspecified B-cell lymphoma type (HCC)         Augustus CRUZ (Maneibanne), am scribing for, and in the presence of, TERESA Garnica*.    Electronically signed by: Augustus Vincent (Lisandro), 2/26/2023    Santiago CRUZ M.* personally performed the services described in this documentation, as scribed by Augustus Vincent in my presence, and it is both accurate and complete.    The note accurately reflects work and decisions made by me.   Santiago Heard M.D.  2/27/2023  12:20 AM

## 2023-02-27 NOTE — ASSESSMENT & PLAN NOTE
- nephrostomy prior to admission for hydro, CT neg for any new findings   -Positive UA with culture growing Enterococcus antibiotics changed to ampicillin  -Likely discharge with Augmentin tomorrow

## 2023-02-27 NOTE — ED NOTES
ERP NOTIFIED THAT PATIENT BP DROPPED, BOLUS PLACED ON PRESSURE BAG AND PATIENT REPOSITIONED. HOSPITALIST WANTS PATIENT TO EAT TO AID WITH BP SO PATIENT GIVEN DINNER TRAY AND SHE IS ATTEMPTING TO EAT.     PT STATES SHE FEELS OK AND DENIES ANY DIZZINESS. PATIENT IS AAOX3, BREATHING IS EVEN AND UNLABORED, SKIN IS WARM AND DRY FAMILY REMAINS AT BEDSIDE, WILL CONTINUE TO MONITOR.

## 2023-02-27 NOTE — ASSESSMENT & PLAN NOTE
-Sepsis Present on admission.  -SIRS criteria identified on my evaluation include: Fever, with temperature greater than 101 deg F and Tachycardia, with heart rate greater than 90 BPM.  -Source is UTI.  Culture positive for Enterococcus faecalis     -No acute findings on CT A/P.   -CXR negative.     -COVID, influenza A/B, and RSV negative.   -No evidence of erythema or drainage from nephrostomy tube insertion site.  -Sepsis protocol initiated.  -Fluid resuscitation ordered per protocol.  -Crystalloid Fluid Administration: Fluid resuscitation ordered per standard protocol-30 mL/kg per current or ideal body weight.  -IV abx changed to ampicillin to cover enterococcus, likely change to augmentin on dc   -Reassessment: I have reassessed the patient's hemodynamic status.  -Lactic acidosis resolved.

## 2023-02-27 NOTE — PROGRESS NOTES
[facility] Medicine Daily Progress Note    Date of Service  2/27/2023    Chief Complaint  Savana Zarco is a 62 y.o. female admitted 2/26/2023 with chills, fever, generalized weakness, and mild discomfort to left flank.    Hospital Course  Savana Zarco is a 62 y.o. 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  Nephrostomy tube was draining clear light urine.    Vitals in ER temperature 100.4, heart rate initially 117, blood pressure 110/63  Lactic acid 2.3, improved to 1.2.  Patient started on fluids and antibiotics for sepsis.  UA positive for moderate leukocyte esterase, 5-10 WBC, few bacteria, small occult blood, RBC 2-5.      Sepsis protocol was initiated at presentation.  IV fluids and IV Rocephin initiated.  No acute findings on CT abdomen pelvis.  Chest x-ray negative.  COVID, influenza AMB, and RSV negative.  No evidence of erythema or drainage from nephrostomy tube insertion site.    Urine culture showed enterococcus faecalis.  Sensitivities pending.      Normocytic anemia stable.  Pending stool culture and anemia panel.      Interval Problem Update  -Urine culture showed Enterococcus Faecalis.  -Sensitivities pending.  -Continue IV Rocephin.    I have discussed this patient's plan of care and discharge plan at IDT rounds today with Case Management, Nursing, Nursing leadership, and other members of the IDT team.    Consultants/Specialty  None    Code Status  Full Code    Disposition  Patient is not medically cleared for discharge.   Anticipate discharge to to home with close outpatient follow-up.  I have placed the appropriate orders for post-discharge  needs.    Review of Systems  Review of Systems   Constitutional:  Positive for chills, fever and malaise/fatigue.   HENT:  Negative for congestion and sore throat.    Eyes:  Negative for pain.   Respiratory:  Negative for cough, shortness of breath and wheezing.    Cardiovascular:  Negative for chest pain, palpitations and leg swelling.   Gastrointestinal:  Negative for abdominal pain, diarrhea, nausea and vomiting.   Genitourinary:  Positive for flank pain. Negative for dysuria, frequency and urgency.   Musculoskeletal:  Negative for myalgias.        Left lower extremity pain.   Skin:  Negative for rash.   Neurological:  Negative for dizziness, weakness and headaches.   Psychiatric/Behavioral:  Negative for depression. The patient does not have insomnia.       Physical Exam  Temp:  [36.8 °C (98.2 °F)-38.3 °C (100.9 °F)] 38.1 °C (100.5 °F)  Pulse:  [] 101  Resp:  [15-26] 22  BP: ()/(52-71) 133/55  SpO2:  [91 %-98 %] 92 %    Physical Exam  Vitals and nursing note reviewed.   Constitutional:       General: She is not in acute distress.     Appearance: Normal appearance. She is not toxic-appearing.   HENT:      Head: Normocephalic and atraumatic.      Right Ear: External ear normal.      Left Ear: External ear normal.      Nose: Nose normal.      Mouth/Throat:      Mouth: Mucous membranes are moist.      Pharynx: Oropharynx is clear.   Eyes:      General: No scleral icterus.     Conjunctiva/sclera: Conjunctivae normal.   Cardiovascular:      Rate and Rhythm: Regular rhythm. Tachycardia present.      Pulses: Normal pulses.      Heart sounds: No murmur heard.  Pulmonary:      Effort: Pulmonary effort is normal. No respiratory distress.      Breath sounds: Normal breath sounds.   Abdominal:      General: Bowel sounds are normal. There is no distension.      Palpations: Abdomen is soft.      Tenderness: There is no abdominal tenderness. There is no rebound.   Musculoskeletal:         General: Normal range of  motion.      Cervical back: Normal range of motion.      Right lower leg: No edema.      Left lower leg: Tenderness present. Edema present.      Left ankle: Swelling present. Tenderness present.      Comments: Nephrostomy tube on the left side draining clear, light yellow urine.   Skin:     General: Skin is warm and dry.      Coloration: Skin is not jaundiced.   Neurological:      General: No focal deficit present.      Mental Status: She is alert and oriented to person, place, and time. Mental status is at baseline.   Psychiatric:         Mood and Affect: Mood normal.         Behavior: Behavior normal.         Thought Content: Thought content normal.         Judgment: Judgment normal.     Fluids    Intake/Output Summary (Last 24 hours) at 2/27/2023 1301  Last data filed at 2/27/2023 1200  Gross per 24 hour   Intake 4000 ml   Output 1000 ml   Net 3000 ml     Laboratory  Recent Labs     02/26/23  1700 02/27/23  0410   WBC 7.4 6.4   RBC 3.99* 3.45*   HEMOGLOBIN 12.1 10.5*   HEMATOCRIT 35.1* 31.0*   MCV 88.0 89.9   MCH 30.3 30.4   MCHC 34.5 33.9   RDW 42.2 44.2   PLATELETCT 240 185   MPV 9.8 9.7     Recent Labs     02/26/23  1700 02/27/23  0410   SODIUM 133* 139   POTASSIUM 3.4* 4.0   CHLORIDE 102 111   CO2 19* 19*   GLUCOSE 130* 128*   BUN 11 7*   CREATININE 0.53 0.34*   CALCIUM 9.1 8.1*     Recent Labs     02/26/23  1700   APTT 39.8*   INR 1.46*     Imaging  CT-ABDOMEN-PELVIS WITH   Final Result      1.  There is no evidence of abdominal or pelvic abscess.   2.  Interval placement of left nephrostomy tube satisfactory in position with relatively symmetric enhancement of the kidneys and no hydronephrosis.   3.  No change in the enlarged left iliopsoas muscle possibly related to the history of lymphoma.   4.  There is a left common iliac enlarged lymph node which could be reactive or could be related to lymphoma. Other retroperitoneal nodes are normal in size.      DX-CHEST-PORTABLE (1 VIEW)   Final Result      No  acute cardiopulmonary disease evident.         Assessment/Plan  * Sepsis (HCC)- (present on admission)  Assessment & Plan  -Sepsis Present on admission.  -SIRS criteria identified on my evaluation include: Fever, with temperature greater than 101 deg F and Tachycardia, with heart rate greater than 90 BPM.  -Source is UTI.  Culture positive for Enterococcus faecalis (awaiting sensitivities).       -No acute findings on CT A/P.   -CXR negative.     -COVID, influenza A/B, and RSV negative.   -No evidence of erythema or drainage from nephrostomy tube insertion site.  -Sepsis protocol initiated.  -Fluid resuscitation ordered per protocol.  -Crystalloid Fluid Administration: Fluid resuscitation ordered per standard protocol-30 mL/kg per current or ideal body weight.  -IV antibiotics as appropriate-Rocephin.  -Reassessment: I have reassessed the patient's hemodynamic status.  -Lactic acidosis resolved.    Enterococcus UTI  Assessment & Plan  -S/p nephrostomy.  -Continue IV Rocephin.  -Sensitivities pending.    Normocytic anemia  Assessment & Plan  -Stable.  -Pending stool occult and anemia panel.  -Closely monitor.    Hypokalemia  Assessment & Plan  -Resolved with repletion.    DVT (deep venous thrombosis) (HCC)- (present on admission)  Assessment & Plan  -Diagnosed in Jan 2023.    -Left lower extremity.    -Continue outpatient Eliquis.    Mass of iliopsoas muscle group, L hydronephrosis, h/o DVT/anticoagulation- (present on admission)  Assessment & Plan  -Has not initiated treatment at this time.    -CT A/P showed enlarged left iliopsoas muscle (possibly related to the history of lymphoma) and left common iliac enlarged lymph node which (could be reactive or could be related to lymphoma).   -Recommend close follow-up with Oncology.      VTE prophylaxis: therapeutic anticoagulation with Eliquis.    I have performed a physical exam and reviewed and updated ROS and Plan today (2/27/2023). In review of yesterday's note  (2/26/2023), there are no changes except as documented above.

## 2023-02-27 NOTE — ASSESSMENT & PLAN NOTE
-Has not initiated treatment at this time.    -CT A/P showed enlarged left iliopsoas muscle (possibly related to the history of lymphoma) and left common iliac enlarged lymph node which (could be reactive or could be related to lymphoma).   -Recommend close follow-up with Oncology.

## 2023-02-27 NOTE — PROGRESS NOTES
Pt retrieved from ED at 1400 via bed on Zoll monitor. Report was called to writer by ED RN prior to picking up pt. PT was A&Ox4, and febrile 100.5F per ED, when pt arrived to floor T: 102.5 but retake was done and pt was 100.6. Pt was SR on the monitor. PT was placed on unit telemetry monitor upon arrival to unit. Pt stated she had mild pain of 2/10 in her left leg.

## 2023-02-28 LAB
ANION GAP SERPL CALC-SCNC: 9 MMOL/L (ref 7–16)
BACTERIA UR CULT: ABNORMAL
BACTERIA UR CULT: ABNORMAL
BACTERIA UR CULT: NORMAL
BASOPHILS # BLD AUTO: 0.7 % (ref 0–1.8)
BASOPHILS # BLD: 0.03 K/UL (ref 0–0.12)
BUN SERPL-MCNC: 6 MG/DL (ref 8–22)
CALCIUM SERPL-MCNC: 8.5 MG/DL (ref 8.5–10.5)
CHLORIDE SERPL-SCNC: 111 MMOL/L (ref 96–112)
CO2 SERPL-SCNC: 20 MMOL/L (ref 20–33)
CREAT SERPL-MCNC: 0.42 MG/DL (ref 0.5–1.4)
EOSINOPHIL # BLD AUTO: 0.09 K/UL (ref 0–0.51)
EOSINOPHIL NFR BLD: 2.2 % (ref 0–6.9)
ERYTHROCYTE [DISTWIDTH] IN BLOOD BY AUTOMATED COUNT: 45.3 FL (ref 35.9–50)
FERRITIN SERPL-MCNC: 163 NG/ML (ref 10–291)
GFR SERPLBLD CREATININE-BSD FMLA CKD-EPI: 111 ML/MIN/1.73 M 2
GLUCOSE SERPL-MCNC: 106 MG/DL (ref 65–99)
HCT VFR BLD AUTO: 30.6 % (ref 37–47)
HGB BLD-MCNC: 10.2 G/DL (ref 12–16)
IMM GRANULOCYTES # BLD AUTO: 0.02 K/UL (ref 0–0.11)
IMM GRANULOCYTES NFR BLD AUTO: 0.5 % (ref 0–0.9)
IRON SATN MFR SERPL: 7 % (ref 15–55)
IRON SERPL-MCNC: 16 UG/DL (ref 40–170)
LYMPHOCYTES # BLD AUTO: 0.95 K/UL (ref 1–4.8)
LYMPHOCYTES NFR BLD: 22.8 % (ref 22–41)
MCH RBC QN AUTO: 30.4 PG (ref 27–33)
MCHC RBC AUTO-ENTMCNC: 33.3 G/DL (ref 33.6–35)
MCV RBC AUTO: 91.1 FL (ref 81.4–97.8)
MONOCYTES # BLD AUTO: 0.44 K/UL (ref 0–0.85)
MONOCYTES NFR BLD AUTO: 10.6 % (ref 0–13.4)
NEUTROPHILS # BLD AUTO: 2.63 K/UL (ref 2–7.15)
NEUTROPHILS NFR BLD: 63.2 % (ref 44–72)
NRBC # BLD AUTO: 0 K/UL
NRBC BLD-RTO: 0 /100 WBC
PLATELET # BLD AUTO: 190 K/UL (ref 164–446)
PMV BLD AUTO: 9.9 FL (ref 9–12.9)
POTASSIUM SERPL-SCNC: 4.3 MMOL/L (ref 3.6–5.5)
RBC # BLD AUTO: 3.36 M/UL (ref 4.2–5.4)
SIGNIFICANT IND 70042: ABNORMAL
SIGNIFICANT IND 70042: NORMAL
SITE SITE: ABNORMAL
SITE SITE: NORMAL
SODIUM SERPL-SCNC: 140 MMOL/L (ref 135–145)
SOURCE SOURCE: ABNORMAL
SOURCE SOURCE: NORMAL
TIBC SERPL-MCNC: 223 UG/DL (ref 250–450)
TRANSFERRIN SERPL-MCNC: 192 MG/DL (ref 200–370)
UIBC SERPL-MCNC: 207 UG/DL (ref 110–370)
WBC # BLD AUTO: 4.2 K/UL (ref 4.8–10.8)

## 2023-02-28 PROCEDURE — 99232 SBSQ HOSP IP/OBS MODERATE 35: CPT | Performed by: STUDENT IN AN ORGANIZED HEALTH CARE EDUCATION/TRAINING PROGRAM

## 2023-02-28 PROCEDURE — 770020 HCHG ROOM/CARE - TELE (206)

## 2023-02-28 PROCEDURE — 84466 ASSAY OF TRANSFERRIN: CPT

## 2023-02-28 PROCEDURE — 700102 HCHG RX REV CODE 250 W/ 637 OVERRIDE(OP): Performed by: INTERNAL MEDICINE

## 2023-02-28 PROCEDURE — 80048 BASIC METABOLIC PNL TOTAL CA: CPT

## 2023-02-28 PROCEDURE — 700105 HCHG RX REV CODE 258: Performed by: STUDENT IN AN ORGANIZED HEALTH CARE EDUCATION/TRAINING PROGRAM

## 2023-02-28 PROCEDURE — 83550 IRON BINDING TEST: CPT

## 2023-02-28 PROCEDURE — A9270 NON-COVERED ITEM OR SERVICE: HCPCS | Performed by: INTERNAL MEDICINE

## 2023-02-28 PROCEDURE — 82728 ASSAY OF FERRITIN: CPT

## 2023-02-28 PROCEDURE — 85025 COMPLETE CBC W/AUTO DIFF WBC: CPT

## 2023-02-28 PROCEDURE — 83540 ASSAY OF IRON: CPT

## 2023-02-28 PROCEDURE — 700111 HCHG RX REV CODE 636 W/ 250 OVERRIDE (IP): Performed by: STUDENT IN AN ORGANIZED HEALTH CARE EDUCATION/TRAINING PROGRAM

## 2023-02-28 PROCEDURE — 700101 HCHG RX REV CODE 250: Performed by: INTERNAL MEDICINE

## 2023-02-28 RX ADMIN — AMPICILLIN SODIUM 2000 MG: 2 INJECTION, POWDER, FOR SOLUTION INTRAMUSCULAR; INTRAVENOUS at 17:17

## 2023-02-28 RX ADMIN — APIXABAN 5 MG: 5 TABLET, FILM COATED ORAL at 04:46

## 2023-02-28 RX ADMIN — AMPICILLIN SODIUM 2000 MG: 2 INJECTION, POWDER, FOR SOLUTION INTRAMUSCULAR; INTRAVENOUS at 13:55

## 2023-02-28 RX ADMIN — ATORVASTATIN CALCIUM 20 MG: 20 TABLET, FILM COATED ORAL at 22:30

## 2023-02-28 RX ADMIN — APIXABAN 5 MG: 5 TABLET, FILM COATED ORAL at 17:11

## 2023-02-28 RX ADMIN — POTASSIUM CHLORIDE AND SODIUM CHLORIDE: 900; 300 INJECTION, SOLUTION INTRAVENOUS at 05:06

## 2023-02-28 RX ADMIN — AMPICILLIN AND SULBACTAM 3 G: 1; 2 INJECTION, POWDER, FOR SOLUTION INTRAMUSCULAR; INTRAVENOUS at 08:18

## 2023-02-28 RX ADMIN — CHOLECALCIFEROL TAB 125 MCG (5000 UNIT) 5000 UNITS: 125 TAB at 04:46

## 2023-02-28 RX ADMIN — AMPICILLIN SODIUM 2000 MG: 2 INJECTION, POWDER, FOR SOLUTION INTRAMUSCULAR; INTRAVENOUS at 22:29

## 2023-02-28 RX ADMIN — SENNOSIDES AND DOCUSATE SODIUM 2 TABLET: 50; 8.6 TABLET ORAL at 04:46

## 2023-02-28 ASSESSMENT — ENCOUNTER SYMPTOMS
SHORTNESS OF BREATH: 0
HEADACHES: 0
FLANK PAIN: 1
CHILLS: 0
NAUSEA: 0
VOMITING: 0
ABDOMINAL PAIN: 0
NERVOUS/ANXIOUS: 0
DIARRHEA: 0
FEVER: 0
ROS GI COMMENTS: DECREASED APPETITE
BLOOD IN STOOL: 0

## 2023-02-28 ASSESSMENT — LIFESTYLE VARIABLES: SUBSTANCE_ABUSE: 0

## 2023-02-28 ASSESSMENT — FIBROSIS 4 INDEX: FIB4 SCORE: 1.36

## 2023-02-28 ASSESSMENT — PATIENT HEALTH QUESTIONNAIRE - PHQ9
SUM OF ALL RESPONSES TO PHQ9 QUESTIONS 1 AND 2: 0
1. LITTLE INTEREST OR PLEASURE IN DOING THINGS: NOT AT ALL
2. FEELING DOWN, DEPRESSED, IRRITABLE, OR HOPELESS: NOT AT ALL

## 2023-02-28 NOTE — PROGRESS NOTES
Bedside report received. No overnight events per night RN. Patient A&O x 4. Primarily [place] speaking only. VS'S. RA. SR on telemetry monitor. No complaints of pain at this time. Nephrostomy tube with good output. POC discussed with patient. Pt verbalizes understanding. Call light and belongings with in reach. Bed locked and in lowest position, alarm and fall precautions in place.

## 2023-02-28 NOTE — DISCHARGE PLANNING
Case Management Discharge Planning    Admission Date: 2/26/2023  GMLOS: 3.5  ALOS: 2    6-Clicks ADL Score: 24  6-Clicks Mobility Score: 24  DX: Sepsis     Anticipated Discharge Dispo:  Home with family .    DME Needed: No    Action(s) Taken: Updated Provider/Nurse on Discharge Plan    Escalations Completed: None    Medically Clear: No    Next Steps: Continue to follow for dc needs.     Barriers to Discharge: Medical clearance    Is the patient up for discharge tomorrow: No

## 2023-02-28 NOTE — CARE PLAN
The patient is Stable - Low risk of patient condition declining or worsening    Shift Goals  Clinical Goals: IV abx, fluids  Patient Goals: rest  Family Goals: YAO    Progress made toward(s) clinical / shift goals:    Problem: Psychosocial  Goal: Patient's level of anxiety will decrease  Outcome: Progressing  Goal: Patient's ability to verbalize feelings about condition will improve  Outcome: Progressing  Goal: Patient's ability to re-evaluate and adapt role responsibilities will improve  Outcome: Progressing  Goal: Patient and family will demonstrate ability to cope with life altering diagnosis and/or procedure  Outcome: Progressing  Goal: Spiritual and cultural needs incorporated into hospitalization  Outcome: Progressing     Problem: Communication  Goal: The ability to communicate needs accurately and effectively will improve  Outcome: Progressing     Problem: Discharge Barriers/Planning  Goal: Patient's continuum of care needs are met  Outcome: Progressing       Patient is not progressing towards the following goals:

## 2023-03-01 ENCOUNTER — PHARMACY VISIT (OUTPATIENT)
Dept: PHARMACY | Facility: MEDICAL CENTER | Age: 63
End: 2023-03-01
Payer: COMMERCIAL

## 2023-03-01 VITALS
BODY MASS INDEX: 24.75 KG/M2 | TEMPERATURE: 98.1 F | OXYGEN SATURATION: 94 % | SYSTOLIC BLOOD PRESSURE: 95 MMHG | WEIGHT: 134.48 LBS | RESPIRATION RATE: 16 BRPM | HEART RATE: 68 BPM | HEIGHT: 62 IN | DIASTOLIC BLOOD PRESSURE: 54 MMHG

## 2023-03-01 LAB
ALBUMIN SERPL BCP-MCNC: 3.3 G/DL (ref 3.2–4.9)
ALBUMIN/GLOB SERPL: 1.3 G/DL
ALP SERPL-CCNC: 46 U/L (ref 30–99)
ALT SERPL-CCNC: 19 U/L (ref 2–50)
ANION GAP SERPL CALC-SCNC: 10 MMOL/L (ref 7–16)
AST SERPL-CCNC: 19 U/L (ref 12–45)
BASOPHILS # BLD AUTO: 0.9 % (ref 0–1.8)
BASOPHILS # BLD: 0.03 K/UL (ref 0–0.12)
BILIRUB SERPL-MCNC: 0.3 MG/DL (ref 0.1–1.5)
BUN SERPL-MCNC: 9 MG/DL (ref 8–22)
CALCIUM ALBUM COR SERPL-MCNC: 9.1 MG/DL (ref 8.5–10.5)
CALCIUM SERPL-MCNC: 8.5 MG/DL (ref 8.5–10.5)
CHLORIDE SERPL-SCNC: 109 MMOL/L (ref 96–112)
CO2 SERPL-SCNC: 24 MMOL/L (ref 20–33)
CREAT SERPL-MCNC: 0.42 MG/DL (ref 0.5–1.4)
EOSINOPHIL # BLD AUTO: 0.22 K/UL (ref 0–0.51)
EOSINOPHIL NFR BLD: 6.9 % (ref 0–6.9)
ERYTHROCYTE [DISTWIDTH] IN BLOOD BY AUTOMATED COUNT: 43.9 FL (ref 35.9–50)
GFR SERPLBLD CREATININE-BSD FMLA CKD-EPI: 111 ML/MIN/1.73 M 2
GLOBULIN SER CALC-MCNC: 2.5 G/DL (ref 1.9–3.5)
GLUCOSE SERPL-MCNC: 99 MG/DL (ref 65–99)
HCT VFR BLD AUTO: 29.9 % (ref 37–47)
HGB BLD-MCNC: 10 G/DL (ref 12–16)
IMM GRANULOCYTES # BLD AUTO: 0.01 K/UL (ref 0–0.11)
IMM GRANULOCYTES NFR BLD AUTO: 0.3 % (ref 0–0.9)
LYMPHOCYTES # BLD AUTO: 1.23 K/UL (ref 1–4.8)
LYMPHOCYTES NFR BLD: 38.4 % (ref 22–41)
MCH RBC QN AUTO: 30.4 PG (ref 27–33)
MCHC RBC AUTO-ENTMCNC: 33.4 G/DL (ref 33.6–35)
MCV RBC AUTO: 90.9 FL (ref 81.4–97.8)
MONOCYTES # BLD AUTO: 0.47 K/UL (ref 0–0.85)
MONOCYTES NFR BLD AUTO: 14.7 % (ref 0–13.4)
NEUTROPHILS # BLD AUTO: 1.24 K/UL (ref 2–7.15)
NEUTROPHILS NFR BLD: 38.8 % (ref 44–72)
NRBC # BLD AUTO: 0 K/UL
NRBC BLD-RTO: 0 /100 WBC
PLATELET # BLD AUTO: 218 K/UL (ref 164–446)
PMV BLD AUTO: 10.1 FL (ref 9–12.9)
POTASSIUM SERPL-SCNC: 4 MMOL/L (ref 3.6–5.5)
PROCALCITONIN SERPL-MCNC: 0.11 NG/ML
PROT SERPL-MCNC: 5.8 G/DL (ref 6–8.2)
RBC # BLD AUTO: 3.29 M/UL (ref 4.2–5.4)
SODIUM SERPL-SCNC: 143 MMOL/L (ref 135–145)
WBC # BLD AUTO: 3.2 K/UL (ref 4.8–10.8)

## 2023-03-01 PROCEDURE — 700102 HCHG RX REV CODE 250 W/ 637 OVERRIDE(OP): Performed by: INTERNAL MEDICINE

## 2023-03-01 PROCEDURE — 700105 HCHG RX REV CODE 258: Performed by: STUDENT IN AN ORGANIZED HEALTH CARE EDUCATION/TRAINING PROGRAM

## 2023-03-01 PROCEDURE — 85025 COMPLETE CBC W/AUTO DIFF WBC: CPT

## 2023-03-01 PROCEDURE — A9270 NON-COVERED ITEM OR SERVICE: HCPCS | Performed by: STUDENT IN AN ORGANIZED HEALTH CARE EDUCATION/TRAINING PROGRAM

## 2023-03-01 PROCEDURE — 99239 HOSP IP/OBS DSCHRG MGMT >30: CPT | Performed by: STUDENT IN AN ORGANIZED HEALTH CARE EDUCATION/TRAINING PROGRAM

## 2023-03-01 PROCEDURE — 84145 PROCALCITONIN (PCT): CPT

## 2023-03-01 PROCEDURE — A9270 NON-COVERED ITEM OR SERVICE: HCPCS | Performed by: INTERNAL MEDICINE

## 2023-03-01 PROCEDURE — 700111 HCHG RX REV CODE 636 W/ 250 OVERRIDE (IP): Performed by: STUDENT IN AN ORGANIZED HEALTH CARE EDUCATION/TRAINING PROGRAM

## 2023-03-01 PROCEDURE — 80053 COMPREHEN METABOLIC PANEL: CPT

## 2023-03-01 PROCEDURE — RXMED WILLOW AMBULATORY MEDICATION CHARGE: Performed by: STUDENT IN AN ORGANIZED HEALTH CARE EDUCATION/TRAINING PROGRAM

## 2023-03-01 PROCEDURE — 700102 HCHG RX REV CODE 250 W/ 637 OVERRIDE(OP): Performed by: STUDENT IN AN ORGANIZED HEALTH CARE EDUCATION/TRAINING PROGRAM

## 2023-03-01 RX ORDER — FERROUS SULFATE 325(65) MG
325 TABLET ORAL
Qty: 30 TABLET | Refills: 1 | Status: SHIPPED | OUTPATIENT
Start: 2023-03-01 | End: 2023-06-02

## 2023-03-01 RX ORDER — AMOXICILLIN 875 MG/1
875 TABLET, COATED ORAL 2 TIMES DAILY
Qty: 10 TABLET | Refills: 0 | Status: ACTIVE | OUTPATIENT
Start: 2023-03-01 | End: 2023-03-06

## 2023-03-01 RX ORDER — FERROUS SULFATE 325(65) MG
325 TABLET ORAL
Status: DISCONTINUED | OUTPATIENT
Start: 2023-03-01 | End: 2023-03-01 | Stop reason: HOSPADM

## 2023-03-01 RX ADMIN — FERROUS SULFATE TAB 325 MG (65 MG ELEMENTAL FE) 325 MG: 325 (65 FE) TAB at 09:09

## 2023-03-01 RX ADMIN — AMPICILLIN SODIUM 2000 MG: 2 INJECTION, POWDER, FOR SOLUTION INTRAMUSCULAR; INTRAVENOUS at 05:44

## 2023-03-01 RX ADMIN — SENNOSIDES AND DOCUSATE SODIUM 2 TABLET: 50; 8.6 TABLET ORAL at 05:39

## 2023-03-01 RX ADMIN — CHOLECALCIFEROL TAB 125 MCG (5000 UNIT) 5000 UNITS: 125 TAB at 05:38

## 2023-03-01 RX ADMIN — AMPICILLIN SODIUM 2000 MG: 2 INJECTION, POWDER, FOR SOLUTION INTRAMUSCULAR; INTRAVENOUS at 02:20

## 2023-03-01 RX ADMIN — APIXABAN 5 MG: 5 TABLET, FILM COATED ORAL at 05:39

## 2023-03-01 NOTE — DISCHARGE SUMMARY
Discharge Summary    CHIEF COMPLAINT ON ADMISSION  Chief Complaint   Patient presents with    Fever    Flank Pain       Reason for Admission  Fever;Wound Check     Admission Date  2/26/2023    CODE STATUS  Full Code    HPI & HOSPITAL COURSE    Savana Zarco is a 62 y.o. 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     Vitals in ER temperature 100.4, heart rate initially 117, blood pressure 110/63  Lactic acid 2.3, improved to 1.2.  Patient started on fluids and antibiotics for sepsis.  UA positive for moderate leukocyte esterase, 5-10 WBC, few bacteria, small occult blood, RBC 2-5.      Sepsis protocol was initiated at presentation.  IV fluids and IV Rocephin initiated.  No acute findings on CT abdomen pelvis, previously noted left hydro has resolved and no abscess seen.  Chest x-ray negative.  COVID, influenza A/B, and RSV negative.  No evidence of erythema or drainage from nephrostomy tube insertion site.  Urine analysis was positive and culture did come back with Enterococcus her antibiotics were adjusted per sensitivities.  And her clinical status improved.  She has been afebrile for greater than 48 hours and is feeling improved.  That she will be discharged with oral antibiotics to complete total course.  She was recommended to follow-up with urology for ongoing management of her nephrostomy tube as well as follow-up with her oncologist.  At the time of discharge patient is hemodynamically stable and comfortable discharge plan.        Therefore, she is discharged in fair and stable condition to home with close outpatient follow-up.    The patient met 2-midnight criteria for an inpatient  stay at the time of discharge.    Discharge Date  3/1/2023    FOLLOW UP ITEMS POST DISCHARGE  Urology for management of nephrostomy tube  Oncology for follow-up on recently diagnosed lymphoma  Chronic medical conditions    DISCHARGE DIAGNOSES  Principal Problem:    Sepsis (HCC) POA: Yes  Active Problems:    Mass of iliopsoas muscle group, L hydronephrosis, h/o DVT/anticoagulation POA: Yes    DVT (deep venous thrombosis) (HCC) POA: Yes    Hypokalemia POA: Unknown    Normocytic anemia POA: Unknown    Enterococcus UTI POA: Unknown  Resolved Problems:    * No resolved hospital problems. *      FOLLOW UP  No future appointments.  No follow-up provider specified.    MEDICATIONS ON DISCHARGE     Medication List        START taking these medications        Instructions   amoxicillin 875 MG tablet  Commonly known as: AMOXIL   Take 1 Tablet by mouth 2 times a day for 5 days.  Dose: 875 mg     ferrous sulfate 325 (65 Fe) MG tablet   Take 1 Tablet by mouth every morning with breakfast.  Dose: 325 mg            CONTINUE taking these medications        Instructions   acetaminophen 500 MG Tabs  Commonly known as: TYLENOL   Take 1,000 mg by mouth every 6 hours as needed for Mild Pain.  Dose: 1,000 mg     apixaban 5mg Tabs  Commonly known as: ELIQUIS   Take 5 mg by mouth 2 times a day.  Dose: 5 mg     atorvastatin 20 MG Tabs  Commonly known as: LIPITOR   Take 20 mg by mouth at bedtime.  Dose: 20 mg     B COMPLEX PO   Take 1 Tablet by mouth every day.  Dose: 1 Tablet     CALCIUM PO   Take 1 Tablet by mouth every day.  Dose: 1 Tablet     fish oil 1000 MG Caps capsule   Take 1,000 mg by mouth every day.  Dose: 1,000 mg     GLUCOSAMINE-CHONDROITIN PO   Take 1 Tablet by mouth every day.  Dose: 1 Tablet     vitamin D3 5000 Unit (125 mcg) Tabs  Commonly known as: cholecalciferol   Take 5,000 Units by mouth every day.  Dose: 5,000 Units              Allergies  No Known Allergies    DIET  Orders Placed This Encounter   Procedures    Diet  Order Diet: Regular     Standing Status:   Standing     Number of Occurrences:   1     Order Specific Question:   Diet:     Answer:   Regular [1]       ACTIVITY  As tolerated.      CONSULTATIONS  N/A    PROCEDURES  N/A    LABORATORY  Lab Results   Component Value Date    SODIUM 143 03/01/2023    POTASSIUM 4.0 03/01/2023    CHLORIDE 109 03/01/2023    CO2 24 03/01/2023    GLUCOSE 99 03/01/2023    BUN 9 03/01/2023    CREATININE 0.42 (L) 03/01/2023        Lab Results   Component Value Date    WBC 3.2 (L) 03/01/2023    HEMOGLOBIN 10.0 (L) 03/01/2023    HEMATOCRIT 29.9 (L) 03/01/2023    PLATELETCT 218 03/01/2023        Total time of the discharge process exceeds 35 minutes.

## 2023-03-01 NOTE — PROGRESS NOTES
[facility] Medicine Daily Progress Note    Date of Service  2/28/2023    Chief Complaint  Savana Zarco is a 62 y.o. female admitted 2/26/2023 with fever and left flank pain    Hospital Course  Savana Zarco is a 62 y.o. 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     Vitals in ER temperature 100.4, heart rate initially 117, blood pressure 110/63  Lactic acid 2.3, improved to 1.2.  Patient started on fluids and antibiotics for sepsis.  UA positive for moderate leukocyte esterase, 5-10 WBC, few bacteria, small occult blood, RBC 2-5.      Sepsis protocol was initiated at presentation.  IV fluids and IV Rocephin initiated.  No acute findings on CT abdomen pelvis, previously noted left hydro has resolved and no abscess seen.  Chest x-ray negative.  COVID, influenza A/B, and RSV negative.  No evidence of erythema or drainage from nephrostomy tube insertion site.        Interval Problem Update  Patient seen and examined at bedside her left flank pain has improved and almost resolved, she did have some mild dysuria on admission however this too has resolved.  Nephrostomy tube is draining clear urine  -Urine culture growing Enterococcus antibiotics changed to ampicillin for improved coverage  -If patient remained stable overnight likely transition to oral antibiotics and discharge home tomorrow  -We will need to follow-up with urology for management of her nephrostomy tube. There is no clear evidence wether her NPT needs to be exchanged given infection. Discussed with urology (no formal consult) if fails to improve or recurrent infection recommend exchange. Will hold for ow given  improvement   - anemic, stable, iron deficient, will need OP follow up, pt states she is do for colonoscopy this year     I have discussed this patient's plan of care and discharge plan at IDT rounds today with Case Management, Nursing, Nursing leadership, and other members of the IDT team.    Consultants/Specialty  NA    Code Status  Full Code    Disposition  Patient is not medically cleared for discharge.   Anticipate discharge to to home with close outpatient follow-up.  I have placed the appropriate orders for post-discharge needs.    Review of Systems  Review of Systems   Constitutional:  Positive for malaise/fatigue. Negative for chills and fever.   Respiratory:  Negative for shortness of breath.    Cardiovascular:  Negative for chest pain.   Gastrointestinal:  Negative for abdominal pain, blood in stool, diarrhea, melena, nausea and vomiting.        Decreased appetite   Genitourinary:  Positive for flank pain (left sided, almost resolved). Negative for dysuria and urgency.   Neurological:  Negative for headaches.   Psychiatric/Behavioral:  Negative for substance abuse. The patient is not nervous/anxious.       Physical Exam  Temp:  [36.6 °C (97.8 °F)-37.1 °C (98.8 °F)] 36.6 °C (97.8 °F)  Pulse:  [63-80] 79  Resp:  [18-20] 18  BP: (104-118)/(53-69) 116/69  SpO2:  [96 %-98 %] 96 %    Physical Exam  Vitals and nursing note reviewed.   Constitutional:       Appearance: Normal appearance. She is not ill-appearing or toxic-appearing.   HENT:      Head: Normocephalic and atraumatic.      Mouth/Throat:      Mouth: Mucous membranes are moist.      Pharynx: Oropharynx is clear.   Eyes:      Extraocular Movements: Extraocular movements intact.      Conjunctiva/sclera: Conjunctivae normal.   Cardiovascular:      Rate and Rhythm: Normal rate and regular rhythm.   Pulmonary:      Breath sounds: Normal breath sounds.   Abdominal:      General: Bowel sounds are normal. There is no distension.      Palpations: Abdomen is  soft.      Tenderness: There is no abdominal tenderness. There is no right CVA tenderness or left CVA tenderness.      Comments: Left nephrostomy tube in place, draining well, clear urine   Musculoskeletal:         General: Normal range of motion.      Cervical back: Neck supple.   Skin:     General: Skin is warm and dry.   Neurological:      General: No focal deficit present.      Mental Status: She is alert and oriented to person, place, and time. Mental status is at baseline.   Psychiatric:         Mood and Affect: Mood normal.         Behavior: Behavior normal.         Thought Content: Thought content normal.         Judgment: Judgment normal.       Fluids    Intake/Output Summary (Last 24 hours) at 2/28/2023 1849  Last data filed at 2/28/2023 0506  Gross per 24 hour   Intake --   Output 1000 ml   Net -1000 ml       Laboratory  Recent Labs     02/26/23 1700 02/27/23 0410 02/28/23  0322   WBC 7.4 6.4 4.2*   RBC 3.99* 3.45* 3.36*   HEMOGLOBIN 12.1 10.5* 10.2*   HEMATOCRIT 35.1* 31.0* 30.6*   MCV 88.0 89.9 91.1   MCH 30.3 30.4 30.4   MCHC 34.5 33.9 33.3*   RDW 42.2 44.2 45.3   PLATELETCT 240 185 190   MPV 9.8 9.7 9.9     Recent Labs     02/26/23 1700 02/27/23 0410 02/28/23  0322   SODIUM 133* 139 140   POTASSIUM 3.4* 4.0 4.3   CHLORIDE 102 111 111   CO2 19* 19* 20   GLUCOSE 130* 128* 106*   BUN 11 7* 6*   CREATININE 0.53 0.34* 0.42*   CALCIUM 9.1 8.1* 8.5     Recent Labs     02/26/23 1700   APTT 39.8*   INR 1.46*               Imaging  CT-ABDOMEN-PELVIS WITH   Final Result      1.  There is no evidence of abdominal or pelvic abscess.   2.  Interval placement of left nephrostomy tube satisfactory in position with relatively symmetric enhancement of the kidneys and no hydronephrosis.   3.  No change in the enlarged left iliopsoas muscle possibly related to the history of lymphoma.   4.  There is a left common iliac enlarged lymph node which could be reactive or could be related to lymphoma. Other retroperitoneal  nodes are normal in size.      DX-CHEST-PORTABLE (1 VIEW)   Final Result      No acute cardiopulmonary disease evident.           Assessment/Plan  * Sepsis (HCC)- (present on admission)  Assessment & Plan  -Sepsis Present on admission.  -SIRS criteria identified on my evaluation include: Fever, with temperature greater than 101 deg F and Tachycardia, with heart rate greater than 90 BPM.  -Source is UTI.  Culture positive for Enterococcus faecalis     -No acute findings on CT A/P.   -CXR negative.     -COVID, influenza A/B, and RSV negative.   -No evidence of erythema or drainage from nephrostomy tube insertion site.  -Sepsis protocol initiated.  -Fluid resuscitation ordered per protocol.  -Crystalloid Fluid Administration: Fluid resuscitation ordered per standard protocol-30 mL/kg per current or ideal body weight.  -IV abx changed to ampicillin to cover enterococcus, likely change to augmentin on dc   -Reassessment: I have reassessed the patient's hemodynamic status.  -Lactic acidosis resolved.    Enterococcus UTI  Assessment & Plan  - nephrostomy prior to admission for hydro, CT neg for any new findings   -Positive UA with culture growing Enterococcus antibiotics changed to ampicillin  -Likely discharge with Augmentin tomorrow      Normocytic anemia  Assessment & Plan  -Stable.  - iron deficient, denies any blood in stool   - will need outpatient colonoscopy, is due this year per pt   - hold on IV iron given active infection   - stool occult blood ordered   -Closely monitor.    Hypokalemia  Assessment & Plan  -Resolved with repletion.    DVT (deep venous thrombosis) ([facility])- (present on admission)  Assessment & Plan  -Diagnosed in Jan 2023.    -Left lower extremity.    -Continue outpatient Eliquis.    Mass of iliopsoas muscle group, L hydronephrosis, h/o DVT/anticoagulation- (present on admission)  Assessment & Plan  -Has not initiated treatment at this time.    -CT A/P showed enlarged left iliopsoas muscle (possibly  related to the history of lymphoma) and left common iliac enlarged lymph node which (could be reactive or could be related to lymphoma).   -Recommend close follow-up with Oncology.           VTE prophylaxis: therapeutic anticoagulation with eliquis    I have performed a physical exam and reviewed and updated ROS and Plan today (2/28/2023). In review of yesterday's note (2/27/2023), there are no changes except as documented above.

## 2023-03-01 NOTE — CARE PLAN
The patient is Stable - Low risk of patient condition declining or worsening    Shift Goals  Clinical Goals: IV ABX  Patient Goals: D/C Home  Family Goals: YAO    Progress made toward(s) clinical / shift goals:    Problem: Psychosocial  Goal: Patient's level of anxiety will decrease  Outcome: Progressing  Goal: Patient's ability to verbalize feelings about condition will improve  Outcome: Progressing  Goal: Patient's ability to re-evaluate and adapt role responsibilities will improve  Outcome: Progressing  Goal: Patient and family will demonstrate ability to cope with life altering diagnosis and/or procedure  Outcome: Progressing  Goal: Spiritual and cultural needs incorporated into hospitalization  Outcome: Progressing     Problem: Communication  Goal: The ability to communicate needs accurately and effectively will improve  Outcome: Progressing     Problem: Discharge Barriers/Planning  Goal: Patient's continuum of care needs are met  Outcome: Progressing       Patient is not progressing towards the following goals:

## 2023-03-01 NOTE — DOCUMENTATION QUERY
"                                                                         [facility]                                                                       Query Response Note      PATIENT:               SUSAN GRIFFIN  ACCT #:                  [id number]  MRN:                     [id number]  :                      1960  ADMIT DATE:       2023 4:38 PM  DISCH DATE:          RESPONDING  PROVIDER #:        [id number]           QUERY TEXT:    Pyonephrosis and left nephrostomy tube are both documented in the medical record.  Can a correlation between the two be determined?    The patient's Clinical Indicators include:  63yo with dx of pyonephrosis, sepsis, acidosis, B-cell lymphoma intrapelvic lymph nodes, s/p left nephrostomy tube placement     ED note \"lymphoma with obstruction of ureter because a mass in her pelvis was pushing against ureter and uterus. Nephrostomy was placed.\"   Esposito PN \"mild discomfort in the left flank since yesterday\" \"Urine culture showed Enterococcus Faecalis.\"    Risk factors: B-cell lymphoma intrapelvic lymph nodes, left nephrostomy tube  Treatments: IVF, labwork, IV antibiotics, monitoring    Contact me with questions.     Thank you,  COREY LopezP, CDI  [email or web address]  Options provided:   -- Pyonephrosis is due to or associated with left nephrostomy tube   -- Pyonephrosis is not due to or associated with left nephrostomy tube   -- Other explanation:Other explanation-, please specify   -- Unable to determine      Query created by: Frances Gant on 2023 6:34 AM    RESPONSE TEXT:    Pyonephrosis is due to or associated with left nephrostomy tube          Electronically signed by:  JM CAO MD 3/1/2023 10:14 AM              "

## 2023-03-01 NOTE — PROGRESS NOTES
IV dc'd.  Discharge instructions given to patient; patient verbalizes understanding, all questions answered.  Copy of DC summary provided, signed copy in chart.   prescriptions electronically sent to pt's pharmacy/provided to patient, copies in chart.  Pt states personal belongings are in possession.

## 2023-03-01 NOTE — PROGRESS NOTES
Bedside report received. No overnight events per night RN. Patient A&O x 4. Soft BP with systolic in the 90's. RA. SR on telemetry monitor. No complaints of pain at this time. POC discussed with patient. Pt verbalizes understanding. Call light and belongings with in reach. Bed locked and in lowest position, alarm and fall precautions in place.

## 2023-03-03 LAB
BACTERIA BLD CULT: NORMAL
BACTERIA BLD CULT: NORMAL
SIGNIFICANT IND 70042: NORMAL
SIGNIFICANT IND 70042: NORMAL
SITE SITE: NORMAL
SITE SITE: NORMAL
SOURCE SOURCE: NORMAL
SOURCE SOURCE: NORMAL
VIT B1 BLD-MCNC: 114 NMOL/L (ref 70–180)

## 2023-03-06 ENCOUNTER — HOSPITAL ENCOUNTER (OUTPATIENT)
Dept: RADIOLOGY | Facility: MEDICAL CENTER | Age: 63
End: 2023-03-06
Attending: INTERNAL MEDICINE
Payer: COMMERCIAL

## 2023-03-07 ENCOUNTER — HOSPITAL ENCOUNTER (OUTPATIENT)
Facility: MEDICAL CENTER | Age: 63
End: 2023-03-07
Attending: INTERNAL MEDICINE
Payer: COMMERCIAL

## 2023-03-07 LAB
ALBUMIN SERPL BCP-MCNC: 4.6 G/DL (ref 3.2–4.9)
ALBUMIN/GLOB SERPL: 1.9 G/DL
ALP SERPL-CCNC: 55 U/L (ref 30–99)
ALT SERPL-CCNC: 21 U/L (ref 2–50)
ANION GAP SERPL CALC-SCNC: 12 MMOL/L (ref 7–16)
AST SERPL-CCNC: 18 U/L (ref 12–45)
BASOPHILS # BLD AUTO: 0.7 % (ref 0–1.8)
BASOPHILS # BLD: 0.03 K/UL (ref 0–0.12)
BILIRUB SERPL-MCNC: 0.5 MG/DL (ref 0.1–1.5)
BUN SERPL-MCNC: 19 MG/DL (ref 8–22)
CALCIUM ALBUM COR SERPL-MCNC: 9 MG/DL (ref 8.5–10.5)
CALCIUM SERPL-MCNC: 9.5 MG/DL (ref 8.5–10.5)
CHLORIDE SERPL-SCNC: 105 MMOL/L (ref 96–112)
CO2 SERPL-SCNC: 24 MMOL/L (ref 20–33)
CREAT SERPL-MCNC: 0.58 MG/DL (ref 0.5–1.4)
EOSINOPHIL # BLD AUTO: 0.17 K/UL (ref 0–0.51)
EOSINOPHIL NFR BLD: 3.7 % (ref 0–6.9)
ERYTHROCYTE [DISTWIDTH] IN BLOOD BY AUTOMATED COUNT: 43.7 FL (ref 35.9–50)
GFR SERPLBLD CREATININE-BSD FMLA CKD-EPI: 102 ML/MIN/1.73 M 2
GLOBULIN SER CALC-MCNC: 2.4 G/DL (ref 1.9–3.5)
GLUCOSE SERPL-MCNC: 101 MG/DL (ref 65–99)
HAV IGM SERPL QL IA: NORMAL
HBV CORE IGM SER QL: NORMAL
HBV SURFACE AG SER QL: NORMAL
HCT VFR BLD AUTO: 36.4 % (ref 37–47)
HCV AB SER QL: NORMAL
HGB BLD-MCNC: 12.1 G/DL (ref 12–16)
HIV 1+2 AB+HIV1 P24 AG SERPL QL IA: NORMAL
IMM GRANULOCYTES # BLD AUTO: 0.02 K/UL (ref 0–0.11)
IMM GRANULOCYTES NFR BLD AUTO: 0.4 % (ref 0–0.9)
LDH SERPL L TO P-CCNC: 180 U/L (ref 107–266)
LYMPHOCYTES # BLD AUTO: 1.37 K/UL (ref 1–4.8)
LYMPHOCYTES NFR BLD: 29.9 % (ref 22–41)
MCH RBC QN AUTO: 30.3 PG (ref 27–33)
MCHC RBC AUTO-ENTMCNC: 33.2 G/DL (ref 33.6–35)
MCV RBC AUTO: 91 FL (ref 81.4–97.8)
MONOCYTES # BLD AUTO: 0.35 K/UL (ref 0–0.85)
MONOCYTES NFR BLD AUTO: 7.6 % (ref 0–13.4)
NEUTROPHILS # BLD AUTO: 2.64 K/UL (ref 2–7.15)
NEUTROPHILS NFR BLD: 57.7 % (ref 44–72)
NRBC # BLD AUTO: 0 K/UL
NRBC BLD-RTO: 0 /100 WBC
PLATELET # BLD AUTO: 360 K/UL (ref 164–446)
PMV BLD AUTO: 9.5 FL (ref 9–12.9)
POTASSIUM SERPL-SCNC: 4.2 MMOL/L (ref 3.6–5.5)
PROT SERPL-MCNC: 7 G/DL (ref 6–8.2)
RBC # BLD AUTO: 4 M/UL (ref 4.2–5.4)
SODIUM SERPL-SCNC: 141 MMOL/L (ref 135–145)
URATE SERPL-MCNC: 3.9 MG/DL (ref 1.9–8.2)
WBC # BLD AUTO: 4.6 K/UL (ref 4.8–10.8)

## 2023-03-07 PROCEDURE — 87389 HIV-1 AG W/HIV-1&-2 AB AG IA: CPT

## 2023-03-07 PROCEDURE — 85025 COMPLETE CBC W/AUTO DIFF WBC: CPT

## 2023-03-07 PROCEDURE — 80074 ACUTE HEPATITIS PANEL: CPT

## 2023-03-07 PROCEDURE — 83615 LACTATE (LD) (LDH) ENZYME: CPT

## 2023-03-07 PROCEDURE — 80053 COMPREHEN METABOLIC PANEL: CPT

## 2023-03-07 PROCEDURE — 84550 ASSAY OF BLOOD/URIC ACID: CPT

## 2023-03-15 ENCOUNTER — HOSPITAL ENCOUNTER (OUTPATIENT)
Dept: RADIOLOGY | Facility: MEDICAL CENTER | Age: 63
End: 2023-03-15
Attending: INTERNAL MEDICINE
Payer: COMMERCIAL

## 2023-03-15 DIAGNOSIS — C83.39 LYMPHOID GRANULOMATOSIS OF THE LUNG (HCC): ICD-10-CM

## 2023-03-15 PROCEDURE — C1751 CATH, INF, PER/CENT/MIDLINE: HCPCS

## 2023-03-15 NOTE — PROCEDURES
Vascular Access Team     Date of Insertion: 3/15/2023  Arm Circumference: 28.5  Internal length: 43  External Length: 2  Vein Occupancy %: 40   Reason for PICC: CHEMOTHERAPY  Labs: WBC 4.6, , BUN 7, Cr 0.34,      Consents confirmed, vessel patency confirmed with ultrasound. Risks and benefits of procedure explained to patient and family member and education regarding central line associated bloodstream infections provided. Questions answered.      PICC placed in LUE per licensed provider order with ultrasound guidance.  5 Fr, 2 lumen PICC placed in BASILIC vein after 1 attempt(s). 2 mL of 1% lidocaine injected intradermally at the insertion site. A 21 gauge microintroducer needle was visualized entering the vein and modified Seldinger technique was used to obtain access to the vein. 43 cm catheter inserted and brisk blood return was observed from each lumen upon aspiration. Line secured at the 2 cm marker. TCS stylet removed and observed to be fully intact. Each lumen flushed using pulsatile method without resistance with 10 mL 0.9% normal saline. PICC line secured with Biopatch and Tegaderm.     PICC tip placement location is confirmed by nurse to be in the Superior Vena Cava (SVC) utilizing 3CG technology. PICC line is appropriate for use at this time. Patient tolerated procedure well, without complications.  Patient condition relayed to primary RN or ordering physician via this post procedure note in the EMR.      Ultrasound images uploaded to PACS and viewable in the EMR - yes  Ultrasound imaged printed and placed in paper chart - no     BARD Power PICC ref # [id number], Lot # [id number], Expiration Date 2023-08-31

## 2023-03-23 ENCOUNTER — HOSPITAL ENCOUNTER (OUTPATIENT)
Facility: MEDICAL CENTER | Age: 63
End: 2023-03-23
Attending: STUDENT IN AN ORGANIZED HEALTH CARE EDUCATION/TRAINING PROGRAM | Admitting: STUDENT IN AN ORGANIZED HEALTH CARE EDUCATION/TRAINING PROGRAM
Payer: COMMERCIAL

## 2023-03-24 ENCOUNTER — HOSPITAL ENCOUNTER (OUTPATIENT)
Facility: MEDICAL CENTER | Age: 63
End: 2023-03-24
Attending: INTERNAL MEDICINE
Payer: COMMERCIAL

## 2023-03-24 LAB
ALBUMIN SERPL BCP-MCNC: 3.9 G/DL (ref 3.2–4.9)
ALBUMIN/GLOB SERPL: 1.6 G/DL
ALP SERPL-CCNC: 67 U/L (ref 30–99)
ALT SERPL-CCNC: 15 U/L (ref 2–50)
ANION GAP SERPL CALC-SCNC: 12 MMOL/L (ref 7–16)
AST SERPL-CCNC: 15 U/L (ref 12–45)
BILIRUB SERPL-MCNC: 0.3 MG/DL (ref 0.1–1.5)
BUN SERPL-MCNC: 9 MG/DL (ref 8–22)
CALCIUM ALBUM COR SERPL-MCNC: 9.3 MG/DL (ref 8.5–10.5)
CALCIUM SERPL-MCNC: 9.2 MG/DL (ref 8.5–10.5)
CHLORIDE SERPL-SCNC: 105 MMOL/L (ref 96–112)
CO2 SERPL-SCNC: 21 MMOL/L (ref 20–33)
CREAT SERPL-MCNC: 0.56 MG/DL (ref 0.5–1.4)
GFR SERPLBLD CREATININE-BSD FMLA CKD-EPI: 103 ML/MIN/1.73 M 2
GLOBULIN SER CALC-MCNC: 2.5 G/DL (ref 1.9–3.5)
GLUCOSE SERPL-MCNC: 123 MG/DL (ref 65–99)
LDH SERPL L TO P-CCNC: 290 U/L (ref 107–266)
POTASSIUM SERPL-SCNC: 4.2 MMOL/L (ref 3.6–5.5)
PROT SERPL-MCNC: 6.4 G/DL (ref 6–8.2)
SODIUM SERPL-SCNC: 138 MMOL/L (ref 135–145)
URATE SERPL-MCNC: 2.1 MG/DL (ref 1.9–8.2)

## 2023-03-24 PROCEDURE — 84550 ASSAY OF BLOOD/URIC ACID: CPT

## 2023-03-24 PROCEDURE — 80053 COMPREHEN METABOLIC PANEL: CPT

## 2023-03-24 PROCEDURE — 83615 LACTATE (LD) (LDH) ENZYME: CPT

## 2023-03-28 ENCOUNTER — PRE-ADMISSION TESTING (OUTPATIENT)
Dept: ADMISSIONS | Facility: MEDICAL CENTER | Age: 63
End: 2023-03-28
Attending: STUDENT IN AN ORGANIZED HEALTH CARE EDUCATION/TRAINING PROGRAM
Payer: COMMERCIAL

## 2023-03-29 ENCOUNTER — PHARMACY VISIT (OUTPATIENT)
Dept: PHARMACY | Facility: MEDICAL CENTER | Age: 63
End: 2023-03-29
Payer: MEDICARE

## 2023-03-29 ENCOUNTER — HOSPITAL ENCOUNTER (OUTPATIENT)
Facility: MEDICAL CENTER | Age: 63
End: 2023-03-29
Attending: INTERNAL MEDICINE
Payer: COMMERCIAL

## 2023-03-29 LAB
ALBUMIN SERPL BCP-MCNC: 4 G/DL (ref 3.2–4.9)
ALBUMIN/GLOB SERPL: 1.9 G/DL
ALP SERPL-CCNC: 65 U/L (ref 30–99)
ALT SERPL-CCNC: 13 U/L (ref 2–50)
ANION GAP SERPL CALC-SCNC: 12 MMOL/L (ref 7–16)
AST SERPL-CCNC: 17 U/L (ref 12–45)
BILIRUB SERPL-MCNC: 0.2 MG/DL (ref 0.1–1.5)
BUN SERPL-MCNC: 9 MG/DL (ref 8–22)
CALCIUM ALBUM COR SERPL-MCNC: 8.9 MG/DL (ref 8.5–10.5)
CALCIUM SERPL-MCNC: 8.9 MG/DL (ref 8.5–10.5)
CHLORIDE SERPL-SCNC: 106 MMOL/L (ref 96–112)
CO2 SERPL-SCNC: 22 MMOL/L (ref 20–33)
CREAT SERPL-MCNC: 0.53 MG/DL (ref 0.5–1.4)
GFR SERPLBLD CREATININE-BSD FMLA CKD-EPI: 105 ML/MIN/1.73 M 2
GLOBULIN SER CALC-MCNC: 2.1 G/DL (ref 1.9–3.5)
GLUCOSE SERPL-MCNC: 111 MG/DL (ref 65–99)
LDH SERPL L TO P-CCNC: 248 U/L (ref 107–266)
POTASSIUM SERPL-SCNC: 4 MMOL/L (ref 3.6–5.5)
PROT SERPL-MCNC: 6.1 G/DL (ref 6–8.2)
SODIUM SERPL-SCNC: 140 MMOL/L (ref 135–145)
URATE SERPL-MCNC: 2.4 MG/DL (ref 1.9–8.2)

## 2023-03-29 PROCEDURE — 80053 COMPREHEN METABOLIC PANEL: CPT

## 2023-03-29 PROCEDURE — RXMED WILLOW AMBULATORY MEDICATION CHARGE: Performed by: STUDENT IN AN ORGANIZED HEALTH CARE EDUCATION/TRAINING PROGRAM

## 2023-03-29 PROCEDURE — 84550 ASSAY OF BLOOD/URIC ACID: CPT

## 2023-03-29 PROCEDURE — 83615 LACTATE (LD) (LDH) ENZYME: CPT

## 2023-03-31 ENCOUNTER — PRE-ADMISSION TESTING (OUTPATIENT)
Dept: ADMISSIONS | Facility: MEDICAL CENTER | Age: 63
End: 2023-03-31
Attending: STUDENT IN AN ORGANIZED HEALTH CARE EDUCATION/TRAINING PROGRAM
Payer: COMMERCIAL

## 2023-03-31 RX ORDER — PREDNISONE 20 MG/1
20 TABLET ORAL DAILY
COMMUNITY
Start: 2023-03-10 | End: 2023-03-31

## 2023-03-31 RX ORDER — ALLOPURINOL 300 MG/1
300 TABLET ORAL DAILY
COMMUNITY
Start: 2023-03-08 | End: 2023-06-02

## 2023-03-31 RX ORDER — ONDANSETRON HYDROCHLORIDE 8 MG/1
8 TABLET, FILM COATED ORAL 2 TIMES DAILY PRN
COMMUNITY
Start: 2023-03-08 | End: 2024-01-31

## 2023-03-31 RX ORDER — PROCHLORPERAZINE MALEATE 10 MG
10 TABLET ORAL EVERY 8 HOURS PRN
COMMUNITY
Start: 2023-03-08 | End: 2024-01-31

## 2023-04-05 ENCOUNTER — HOSPITAL ENCOUNTER (OUTPATIENT)
Facility: MEDICAL CENTER | Age: 63
End: 2023-04-05
Payer: COMMERCIAL

## 2023-04-05 LAB
APPEARANCE UR: CLEAR
BACTERIA #/AREA URNS HPF: NEGATIVE /HPF
BILIRUB UR QL STRIP.AUTO: NEGATIVE
COLOR UR: YELLOW
EPI CELLS #/AREA URNS HPF: ABNORMAL /HPF
GLUCOSE UR STRIP.AUTO-MCNC: NEGATIVE MG/DL
HYALINE CASTS #/AREA URNS LPF: ABNORMAL /LPF
KETONES UR STRIP.AUTO-MCNC: NEGATIVE MG/DL
LEUKOCYTE ESTERASE UR QL STRIP.AUTO: ABNORMAL
MICRO URNS: ABNORMAL
NITRITE UR QL STRIP.AUTO: NEGATIVE
PH UR STRIP.AUTO: 6.5 [PH] (ref 5–8)
PROT UR QL STRIP: NEGATIVE MG/DL
RBC # URNS HPF: ABNORMAL /HPF
RBC UR QL AUTO: ABNORMAL
RENAL EPI CELLS #/AREA URNS HPF: ABNORMAL /HPF
SP GR UR STRIP.AUTO: 1.01
UROBILINOGEN UR STRIP.AUTO-MCNC: 0.2 MG/DL
WBC #/AREA URNS HPF: ABNORMAL /HPF

## 2023-04-05 PROCEDURE — 87086 URINE CULTURE/COLONY COUNT: CPT

## 2023-04-05 PROCEDURE — 81001 URINALYSIS AUTO W/SCOPE: CPT

## 2023-04-07 ENCOUNTER — APPOINTMENT (OUTPATIENT)
Dept: RADIOLOGY | Facility: MEDICAL CENTER | Age: 63
End: 2023-04-07
Attending: STUDENT IN AN ORGANIZED HEALTH CARE EDUCATION/TRAINING PROGRAM
Payer: COMMERCIAL

## 2023-04-07 LAB
BACTERIA UR CULT: NORMAL
SIGNIFICANT IND 70042: NORMAL
SITE SITE: NORMAL
SOURCE SOURCE: NORMAL

## 2023-04-11 ENCOUNTER — APPOINTMENT (OUTPATIENT)
Dept: RADIOLOGY | Facility: MEDICAL CENTER | Age: 63
End: 2023-04-11
Attending: STUDENT IN AN ORGANIZED HEALTH CARE EDUCATION/TRAINING PROGRAM
Payer: COMMERCIAL

## 2023-04-11 ENCOUNTER — HOSPITAL ENCOUNTER (OUTPATIENT)
Facility: MEDICAL CENTER | Age: 63
End: 2023-04-11
Attending: STUDENT IN AN ORGANIZED HEALTH CARE EDUCATION/TRAINING PROGRAM | Admitting: STUDENT IN AN ORGANIZED HEALTH CARE EDUCATION/TRAINING PROGRAM
Payer: COMMERCIAL

## 2023-04-11 VITALS
HEART RATE: 59 BPM | DIASTOLIC BLOOD PRESSURE: 50 MMHG | SYSTOLIC BLOOD PRESSURE: 98 MMHG | BODY MASS INDEX: 24.66 KG/M2 | WEIGHT: 134 LBS | OXYGEN SATURATION: 99 % | HEIGHT: 62 IN | RESPIRATION RATE: 11 BRPM

## 2023-04-11 DIAGNOSIS — N13.30 HYDRONEPHROSIS, UNSPECIFIED HYDRONEPHROSIS TYPE: ICD-10-CM

## 2023-04-11 PROCEDURE — C1729 CATH, DRAINAGE: HCPCS

## 2023-04-11 PROCEDURE — 700105 HCHG RX REV CODE 258: Performed by: RADIOLOGY

## 2023-04-11 PROCEDURE — 700117 HCHG RX CONTRAST REV CODE 255: Performed by: RADIOLOGY

## 2023-04-11 PROCEDURE — 700111 HCHG RX REV CODE 636 W/ 250 OVERRIDE (IP)

## 2023-04-11 RX ORDER — CEFAZOLIN SODIUM 1 G/3ML
INJECTION, POWDER, FOR SOLUTION INTRAMUSCULAR; INTRAVENOUS
Status: COMPLETED
Start: 2023-04-11 | End: 2023-04-11

## 2023-04-11 RX ORDER — MIDAZOLAM HYDROCHLORIDE 1 MG/ML
INJECTION INTRAMUSCULAR; INTRAVENOUS
Status: COMPLETED
Start: 2023-04-11 | End: 2023-04-11

## 2023-04-11 RX ORDER — CEFAZOLIN SODIUM 1 G/50ML
1 INJECTION, SOLUTION INTRAVENOUS ONCE
Status: DISCONTINUED | OUTPATIENT
Start: 2023-04-11 | End: 2023-04-11 | Stop reason: HOSPADM

## 2023-04-11 RX ORDER — MIDAZOLAM HYDROCHLORIDE 1 MG/ML
.5-2 INJECTION INTRAMUSCULAR; INTRAVENOUS PRN
Status: DISCONTINUED | OUTPATIENT
Start: 2023-04-11 | End: 2023-04-11 | Stop reason: HOSPADM

## 2023-04-11 RX ORDER — ONDANSETRON 2 MG/ML
4 INJECTION INTRAMUSCULAR; INTRAVENOUS PRN
Status: DISCONTINUED | OUTPATIENT
Start: 2023-04-11 | End: 2023-04-11 | Stop reason: HOSPADM

## 2023-04-11 RX ORDER — SODIUM CHLORIDE 9 MG/ML
500 INJECTION, SOLUTION INTRAVENOUS
Status: DISCONTINUED | OUTPATIENT
Start: 2023-04-11 | End: 2023-04-11 | Stop reason: HOSPADM

## 2023-04-11 RX ADMIN — IOHEXOL 10 ML: 300 INJECTION, SOLUTION INTRAVENOUS at 14:00

## 2023-04-11 RX ADMIN — MIDAZOLAM HYDROCHLORIDE 1 MG: 1 INJECTION, SOLUTION INTRAMUSCULAR; INTRAVENOUS at 14:15

## 2023-04-11 RX ADMIN — MIDAZOLAM HYDROCHLORIDE 1 MG: 1 INJECTION INTRAMUSCULAR; INTRAVENOUS at 14:15

## 2023-04-11 RX ADMIN — FENTANYL CITRATE 50 MCG: 50 INJECTION, SOLUTION INTRAMUSCULAR; INTRAVENOUS at 14:14

## 2023-04-11 RX ADMIN — CEFAZOLIN: 330 INJECTION, POWDER, FOR SOLUTION INTRAMUSCULAR; INTRAVENOUS at 14:00

## 2023-04-11 ASSESSMENT — FIBROSIS 4 INDEX: FIB4 SCORE: 0.8

## 2023-04-11 NOTE — OR SURGEON
Immediate Post- Operative Note        Findings: Lerft nephrostomy tube exchanged for a new 8 Fr tube.      Procedure(s): Left nephrostomy exchange      Estimated Blood Loss: Less than 5 ml      Complications: None      4/11/2023     2:20 PM     Meg Barker M.D.

## 2023-04-11 NOTE — PROGRESS NOTES
Pt presents to OPIR. Pt was consented by MD at bedside, confirmed by this RN and consent at bedside. Pt transferred to OPIR table in prone position. Patient underwent a left nephrostomy tube exchange by Dr. Barker. Procedure site was marked by MD and verified using imaging guidance. Pt placed on monitor, prepped and draped in a sterile fashion. Vitals were taken every 5 minutes and remained stable during procedure (see doc flow sheet for results). CO2 waveform capnography was monitored and remained WNL throughout procedure.      [employer] Flexima Regular 8F x 25cm  REF: [id number]  LOT: [id number]  Exp: 01/30/2026

## 2023-04-13 ENCOUNTER — HOSPITAL ENCOUNTER (EMERGENCY)
Facility: MEDICAL CENTER | Age: 63
End: 2023-04-14
Attending: EMERGENCY MEDICINE
Payer: COMMERCIAL

## 2023-04-13 ENCOUNTER — APPOINTMENT (OUTPATIENT)
Dept: RADIOLOGY | Facility: MEDICAL CENTER | Age: 63
End: 2023-04-13
Attending: EMERGENCY MEDICINE
Payer: COMMERCIAL

## 2023-04-13 DIAGNOSIS — R31.0 GROSS HEMATURIA: ICD-10-CM

## 2023-04-13 DIAGNOSIS — N39.0 URINARY TRACT INFECTION ASSOCIATED WITH NEPHROSTOMY CATHETER, INITIAL ENCOUNTER (HCC): ICD-10-CM

## 2023-04-13 DIAGNOSIS — T83.512A URINARY TRACT INFECTION ASSOCIATED WITH NEPHROSTOMY CATHETER, INITIAL ENCOUNTER (HCC): ICD-10-CM

## 2023-04-13 DIAGNOSIS — N12 PYELONEPHRITIS: ICD-10-CM

## 2023-04-13 LAB
ALBUMIN SERPL BCP-MCNC: 4 G/DL (ref 3.2–4.9)
ALBUMIN/GLOB SERPL: 1.5 G/DL
ALP SERPL-CCNC: 80 U/L (ref 30–99)
ALT SERPL-CCNC: 11 U/L (ref 2–50)
ANION GAP SERPL CALC-SCNC: 12 MMOL/L (ref 7–16)
ANISOCYTOSIS BLD QL SMEAR: ABNORMAL
APPEARANCE UR: CLEAR
AST SERPL-CCNC: 14 U/L (ref 12–45)
BACTERIA #/AREA URNS HPF: NEGATIVE /HPF
BASOPHILS # BLD AUTO: 0 % (ref 0–1.8)
BASOPHILS # BLD: 0 K/UL (ref 0–0.12)
BILIRUB SERPL-MCNC: 0.2 MG/DL (ref 0.1–1.5)
BILIRUB UR QL STRIP.AUTO: NEGATIVE
BUN SERPL-MCNC: 14 MG/DL (ref 8–22)
CALCIUM ALBUM COR SERPL-MCNC: 9.2 MG/DL (ref 8.5–10.5)
CALCIUM SERPL-MCNC: 9.2 MG/DL (ref 8.5–10.5)
CHLORIDE SERPL-SCNC: 104 MMOL/L (ref 96–112)
CO2 SERPL-SCNC: 20 MMOL/L (ref 20–33)
COLOR UR: ABNORMAL
CREAT SERPL-MCNC: 0.59 MG/DL (ref 0.5–1.4)
EOSINOPHIL # BLD AUTO: 0.27 K/UL (ref 0–0.51)
EOSINOPHIL NFR BLD: 2.6 % (ref 0–6.9)
EPI CELLS #/AREA URNS HPF: ABNORMAL /HPF
ERYTHROCYTE [DISTWIDTH] IN BLOOD BY AUTOMATED COUNT: 50.3 FL (ref 35.9–50)
GFR SERPLBLD CREATININE-BSD FMLA CKD-EPI: 102 ML/MIN/1.73 M 2
GLOBULIN SER CALC-MCNC: 2.6 G/DL (ref 1.9–3.5)
GLUCOSE SERPL-MCNC: 109 MG/DL (ref 65–99)
GLUCOSE UR STRIP.AUTO-MCNC: NEGATIVE MG/DL
HCT VFR BLD AUTO: 35.1 % (ref 37–47)
HGB BLD-MCNC: 11.6 G/DL (ref 12–16)
HYALINE CASTS #/AREA URNS LPF: ABNORMAL /LPF
KETONES UR STRIP.AUTO-MCNC: NEGATIVE MG/DL
LEUKOCYTE ESTERASE UR QL STRIP.AUTO: ABNORMAL
LIPASE SERPL-CCNC: 16 U/L (ref 11–82)
LYMPHOCYTES # BLD AUTO: 2.2 K/UL (ref 1–4.8)
LYMPHOCYTES NFR BLD: 21.4 % (ref 22–41)
MACROCYTES BLD QL SMEAR: ABNORMAL
MANUAL DIFF BLD: NORMAL
MCH RBC QN AUTO: 31.4 PG (ref 27–33)
MCHC RBC AUTO-ENTMCNC: 33 G/DL (ref 33.6–35)
MCV RBC AUTO: 94.9 FL (ref 81.4–97.8)
METAMYELOCYTES NFR BLD MANUAL: 0.8 %
MICRO URNS: ABNORMAL
MONOCYTES # BLD AUTO: 0.7 K/UL (ref 0–0.85)
MONOCYTES NFR BLD AUTO: 6.8 % (ref 0–13.4)
MORPHOLOGY BLD-IMP: NORMAL
NEUTROPHILS # BLD AUTO: 7.05 K/UL (ref 2–7.15)
NEUTROPHILS NFR BLD: 68.4 % (ref 44–72)
NITRITE UR QL STRIP.AUTO: NEGATIVE
NRBC # BLD AUTO: 0.04 K/UL
NRBC BLD-RTO: 0.4 /100 WBC
PH UR STRIP.AUTO: 5.5 [PH] (ref 5–8)
PLATELET # BLD AUTO: 259 K/UL (ref 164–446)
PLATELET BLD QL SMEAR: NORMAL
PMV BLD AUTO: 10.6 FL (ref 9–12.9)
POTASSIUM SERPL-SCNC: 4 MMOL/L (ref 3.6–5.5)
PROT SERPL-MCNC: 6.6 G/DL (ref 6–8.2)
PROT UR QL STRIP: NEGATIVE MG/DL
RBC # BLD AUTO: 3.7 M/UL (ref 4.2–5.4)
RBC # URNS HPF: ABNORMAL /HPF
RBC BLD AUTO: PRESENT
RBC UR QL AUTO: ABNORMAL
RENAL EPI CELLS #/AREA URNS HPF: ABNORMAL /HPF
SODIUM SERPL-SCNC: 136 MMOL/L (ref 135–145)
SP GR UR STRIP.AUTO: <=1.005
UROBILINOGEN UR STRIP.AUTO-MCNC: 0.2 MG/DL
WBC # BLD AUTO: 10.3 K/UL (ref 4.8–10.8)
WBC #/AREA URNS HPF: ABNORMAL /HPF

## 2023-04-13 PROCEDURE — 81001 URINALYSIS AUTO W/SCOPE: CPT

## 2023-04-13 PROCEDURE — 80053 COMPREHEN METABOLIC PANEL: CPT

## 2023-04-13 PROCEDURE — 36415 COLL VENOUS BLD VENIPUNCTURE: CPT

## 2023-04-13 PROCEDURE — 83690 ASSAY OF LIPASE: CPT

## 2023-04-13 PROCEDURE — 85007 BL SMEAR W/DIFF WBC COUNT: CPT

## 2023-04-13 PROCEDURE — 99284 EMERGENCY DEPT VISIT MOD MDM: CPT

## 2023-04-13 PROCEDURE — 85025 COMPLETE CBC W/AUTO DIFF WBC: CPT

## 2023-04-13 RX ORDER — SULFAMETHOXAZOLE AND TRIMETHOPRIM 800; 160 MG/1; MG/1
1 TABLET ORAL ONCE
Status: COMPLETED | OUTPATIENT
Start: 2023-04-13 | End: 2023-04-14

## 2023-04-13 RX ORDER — SULFAMETHOXAZOLE AND TRIMETHOPRIM 800; 160 MG/1; MG/1
1 TABLET ORAL EVERY 12 HOURS
Qty: 14 TABLET | Refills: 0 | Status: ACTIVE | OUTPATIENT
Start: 2023-04-13 | End: 2023-04-20

## 2023-04-13 ASSESSMENT — FIBROSIS 4 INDEX: FIB4 SCORE: 0.8

## 2023-04-14 VITALS
RESPIRATION RATE: 16 BRPM | TEMPERATURE: 98 F | HEIGHT: 62 IN | SYSTOLIC BLOOD PRESSURE: 91 MMHG | WEIGHT: 128.53 LBS | HEART RATE: 94 BPM | BODY MASS INDEX: 23.65 KG/M2 | DIASTOLIC BLOOD PRESSURE: 53 MMHG | OXYGEN SATURATION: 98 %

## 2023-04-14 PROCEDURE — 76775 US EXAM ABDO BACK WALL LIM: CPT

## 2023-04-14 PROCEDURE — 700102 HCHG RX REV CODE 250 W/ 637 OVERRIDE(OP): Performed by: EMERGENCY MEDICINE

## 2023-04-14 PROCEDURE — A9270 NON-COVERED ITEM OR SERVICE: HCPCS | Performed by: EMERGENCY MEDICINE

## 2023-04-14 RX ADMIN — SULFAMETHOXAZOLE AND TRIMETHOPRIM 1 TABLET: 800; 160 TABLET ORAL at 00:07

## 2023-04-14 NOTE — ED TRIAGE NOTES
"Chief Complaint   Patient presents with    Blood in Urine     Reports that her left neph tube was replaced on Tuesday. States that she noticed blood in her neph tube drainage bag around 2000.      Pt ambulatory to triage for above complaint. Denies any fevers at home, denies any new n/v. Reports that she is currently on Chemo for Lymphoma, last round was 4/5.    /87   Pulse 88   Temp 36.7 °C (98 °F) (Temporal)   Resp 20   Ht 1.575 m (5' 2\")   Wt 58.3 kg (128 lb 8.5 oz)   SpO2 99%     "

## 2023-04-14 NOTE — ED PROVIDER NOTES
ER Provider Note    Scribed for Santiago Heard M.d. by Ninoska Rubi. 4/13/2023  10:36 PM    Primary Care Provider: LINWOOD Tesfaye    CHIEF COMPLAINT  Chief Complaint   Patient presents with    Blood in Urine     Reports that her left neph tube was replaced on Tuesday. States that she noticed blood in her neph tube drainage bag around 2000.      LIMITATION TO HISTORY   Select: : None    HPI/ROS  OUTSIDE HISTORIAN(S):  None    EXTERNAL RECORDS REVIEWED  Inpatient Notes reviewed patient's recent nephrostomy placement notes as well as urine sent on that date    Savana Manzanares is a 62 y.o. female with a history of cancer who presents to the ED for blood in urine onset 8 pm. The patient states she has a nephrostomy bag due to kidney issues, which was changed out two days ago. She reports having blood both in her urine and in the bag. Her last chemotherapy treatment was on 3/5. She also experiences chills. Patient denies fever. No alleviating or exacerbating factors reported.    PAST MEDICAL HISTORY  Past Medical History:   Diagnosis Date    Blood clotting disorder (HCC) 02/2023    DVT    Cancer (HCC)     Lymphoma    Disorder of thyroid     Thyroid nodule    Fatty liver     Heart burn     High cholesterol     Hypokalemia     Normocytic anemia     Renal disorder      SURGICAL HISTORY  Past Surgical History:   Procedure Laterality Date    [place] DX BONE MARROW ASPIRATIONS Left 2/16/2023    Procedure: BONE MARROW ASPIRATION -DR. LEÓN;  Surgeon: Chris León M.D.;  Location: SURGERY SAME DAY [facility];  Service: Orthopedics    AZ DX BONE MARROW BIOPSIES Left 2/16/2023    Procedure: BIOPSY, BONE MARROW, USING NEEDLE OR TROCAR;  Surgeon: Chris León M.D.;  Location: SURGERY SAME DAY [facility];  Service: Orthopedics     FAMILY HISTORY  History reviewed. No pertinent family history.    SOCIAL HISTORY   reports that she has never smoked. She has never used smokeless tobacco.  "She reports that she does not currently use alcohol. She reports that she does not use drugs.    CURRENT MEDICATIONS  Previous Medications    ALLOPURINOL (ZYLOPRIM) 300 MG TAB    Take 300 mg by mouth every day.    APIXABAN (ELIQUIS) 5MG TAB    Take 5 mg by mouth 2 times a day.    ATORVASTATIN (LIPITOR) 20 MG TAB    Take 20 mg by mouth at bedtime.    B COMPLEX VITAMINS (B COMPLEX PO)    Take 1 Tablet by mouth every day.    CALCIUM PO    Take 1 Tablet by mouth every day.    DOCUSATE SODIUM (COLACE PO)    Take  by mouth as needed.    FERROUS SULFATE 325 (65 FE) MG TABLET    Take 1 Tablet by mouth every morning with breakfast.    GLUCOSAMINE-CHONDROITIN PO    Take 1 Tablet by mouth every day.    OMEGA-3 FATTY ACIDS (FISH OIL) 1000 MG CAP CAPSULE    Take 1,000 mg by mouth every day.    ONDANSETRON (ZOFRAN) 8 MG TAB    Take 8 mg by mouth 2 times a day as needed.    PROCHLORPERAZINE (COMPAZINE) 10 MG TAB    Take 10 mg by mouth every 8 hours as needed.    VITAMIN D3 (CHOLECALCIFEROL) 5000 UNIT (125 MCG) TAB    Take 5,000 Units by mouth every day.     ALLERGIES  Patient has no known allergies.    PHYSICAL EXAM  /87   Pulse 88   Temp 36.7 °C (98 °F) (Temporal)   Resp 20   Ht 1.575 m (5' 2\")   Wt 58.3 kg (128 lb 8.5 oz)   SpO2 99%   BMI 23.51 kg/m²     Constitutional: Well developed, Well nourished, Mild distress.   HENT: Normocephalic, Atraumatic, Oropharynx moist, No oral exudates.   Eyes: Conjunctiva normal, No discharge.   Neck: Supple, No stridor.  Cardiovascular: Normal heart rate, Normal rhythm, No murmurs, equal pulses.   Pulmonary: Normal breath sounds, No respiratory distress, No wheezing, No rales, No rhonchi.  Chest: No chest wall tenderness or deformity.   Abdomen:Soft, No tenderness, No masses, no rebound, no guarding. Left nephrostomy tube.  Back: No CVA tenderness.  Patient has a left sided nephrostomy tube there dressing is clean dry and intact there is no surrounding erythema warmth or discharge " from it  Musculoskeletal: No major deformities noted, No tenderness.   Skin: Warm, Dry, No erythema, No rash.   Neurologic: Alert & oriented x 3, Normal motor function,  No focal deficits noted.   Psychiatric: Affect normal, Judgment normal, Mood normal.     DIAGNOSTIC STUDIES & PROCEDURES    Labs:   Results for orders placed or performed during the hospital encounter of 04/13/23   CBC with Differential   Result Value Ref Range    WBC 10.3 4.8 - 10.8 K/uL    RBC 3.70 (L) 4.20 - 5.40 M/uL    Hemoglobin 11.6 (L) 12.0 - 16.0 g/dL    Hematocrit 35.1 (L) 37.0 - 47.0 %    MCV 94.9 81.4 - 97.8 fL    MCH 31.4 27.0 - 33.0 pg    MCHC 33.0 (L) 33.6 - 35.0 g/dL    RDW 50.3 (H) 35.9 - 50.0 fL    Platelet Count 259 164 - 446 K/uL    MPV 10.6 9.0 - 12.9 fL    Neutrophils-Polys 68.40 44.00 - 72.00 %    Lymphocytes 21.40 (L) 22.00 - 41.00 %    Monocytes 6.80 0.00 - 13.40 %    Eosinophils 2.60 0.00 - 6.90 %    Basophils 0.00 0.00 - 1.80 %    Nucleated RBC 0.40 /100 WBC    Neutrophils (Absolute) 7.05 2.00 - 7.15 K/uL    Lymphs (Absolute) 2.20 1.00 - 4.80 K/uL    Monos (Absolute) 0.70 0.00 - 0.85 K/uL    Eos (Absolute) 0.27 0.00 - 0.51 K/uL    Baso (Absolute) 0.00 0.00 - 0.12 K/uL    NRBC (Absolute) 0.04 K/uL    Anisocytosis 1+     Macrocytosis 1+    Complete Metabolic Panel   Result Value Ref Range    Sodium 136 135 - 145 mmol/L    Potassium 4.0 3.6 - 5.5 mmol/L    Chloride 104 96 - 112 mmol/L    Co2 20 20 - 33 mmol/L    Anion Gap 12.0 7.0 - 16.0    Glucose 109 (H) 65 - 99 mg/dL    Bun 14 8 - 22 mg/dL    Creatinine 0.59 0.50 - 1.40 mg/dL    Calcium 9.2 8.5 - 10.5 mg/dL    AST(SGOT) 14 12 - 45 U/L    ALT(SGPT) 11 2 - 50 U/L    Alkaline Phosphatase 80 30 - 99 U/L    Total Bilirubin 0.2 0.1 - 1.5 mg/dL    Albumin 4.0 3.2 - 4.9 g/dL    Total Protein 6.6 6.0 - 8.2 g/dL    Globulin 2.6 1.9 - 3.5 g/dL    A-G Ratio 1.5 g/dL   Lipase   Result Value Ref Range    Lipase 16 11 - 82 U/L   Urinalysis    Specimen: Urine   Result Value Ref Range     Color Red (A)     Character Clear     Specific Gravity <=1.005 <1.035    Ph 5.5 5.0 - 8.0    Glucose Negative Negative mg/dL    Ketones Negative Negative mg/dL    Protein Negative Negative mg/dL    Bilirubin Negative Negative    Urobilinogen, Urine 0.2 Negative    Nitrite Negative Negative    Leukocyte Esterase Small (A) Negative    Occult Blood Small (A) Negative    Micro Urine Req Microscopic    URINE MICROSCOPIC (W/UA)   Result Value Ref Range    WBC 10-20 (A) /hpf    RBC 5-10 (A) /hpf    Bacteria Negative None /hpf    Epithelial Cells Few /hpf    Epithelial Cells Renal Few /hpf    Hyaline Cast 0-2 /lpf   CORRECTED CALCIUM   Result Value Ref Range    Correct Calcium 9.2 8.5 - 10.5 mg/dL   ESTIMATED GFR   Result Value Ref Range    GFR (CKD-EPI) 102 >60 mL/min/1.73 m 2   DIFFERENTIAL MANUAL   Result Value Ref Range    Metamyelocytes 0.80 %    Manual Diff Status PERFORMED    PERIPHERAL SMEAR REVIEW   Result Value Ref Range    Peripheral Smear Review see below    PLATELET ESTIMATE   Result Value Ref Range    Plt Estimation Normal    MORPHOLOGY   Result Value Ref Range    RBC Morphology Present      All labs reviewed by me.    Radiology:   Radiologist interpretation:   US-RENAL   Final Result         1.  Left upper pole cyst without visualized obstructive changes.   2.  Echogenic heterogeneous liver, appearance compatible with fatty change and/or fibrosis.         COURSE & MEDICAL DECISION MAKING    ED Observation Status? Yes; I am placing the patient in to an observation status due to a diagnostic uncertainty as well as therapeutic intensity. Patient placed in observation status at 10:42 PM, 4/13/2023.     Observation plan is as follows: Monitor patient and observe diagnostic studies.    Upon Reevaluation, the patient's condition has: Improved; and will be discharged.    Patient discharged from ED Observation status at 12:15 AM (Time) 4/14/2023  (Date).     INITIAL ASSESSMENT AND PLAN  Care Narrative:       10:36 PM  - Patient seen and evaluated at bedside. Savana Manzanares is a 62 y.o. female with a history of cancer, who presents with blood in urine. Ordered US-Renal. Urine Microscopic, UA, Lipase, CMP, and CBC w/ Diff to evaluate. She understands and agrees to the plan of care. Differential diagnoses include but are not limited to: Hematuria, urinary tract infection, sepsis, mass    ADDITIONAL PROBLEM LIST AND DISPOSITION  Problem #1 hematuria at this point time I think this is secondary to urinary tract infection.  Ultrasound was done and shows good line placement in the patient's left kidney.  We will start the patient on Bactrim for her UTI.  Her labs are otherwise unremarkable I think she can be discharged home and follow-up with her doctor.               DISPOSITION AND DISCUSSIONS  I have discussed management of the patient with the following physicians and WOO's: None    Discussion of management with other QHP or appropriate source(s): None     Escalation of care considered, and ultimately not performed: acute inpatient care management, however at this time, the patient is most appropriate for outpatient management.      Decision tools and prescription drugs considered including, but not limited to: Antibiotics we will start the patient on Bactrim .     The patient will return for new or worsening symptoms and is stable at the time of discharge.    The patient is referred to a primary physician for blood pressure management, diabetic screening, and for all other preventative health concerns.        DISPOSITION:  Patient will be discharged home in stable condition.    FOLLOW UP:  Jose Cruz Smith, ERIK.P.R.N.  [address] [place] [address]  [phone]    Schedule an appointment as soon as possible for a visit in 4 days        OUTPATIENT MEDICATIONS:  New Prescriptions    SULFAMETHOXAZOLE-TRIMETHOPRIM (BACTRIM DS) 800-160 MG TABLET    Take 1 Tablet by mouth every 12 hours for 7 days.         FINAL  IMPRESSION   1. Gross hematuria    2. Pyelonephritis    3. Urinary tract infection associated with nephrostomy catheter, initial encounter ([facility])        INinoska (Scribe), am scribing for, and in the presence of, TERESA Garnica*.    Electronically signed by: Ninoska Rubi (Lisandro), 4/13/2023    Santiago CRUZ M.* personally performed the services described in this documentation, as scribed by Ninoska Rubi in my presence, and it is both accurate and complete.    The note accurately reflects work and decisions made by me.  Santiago Heard M.D.  4/14/2023  12:22 AM

## 2023-04-14 NOTE — ED NOTES
Report received from RUSS Gil. Assumed care of patient and she is changed into gown, placed on the monitor. Bed locked and in lowest position. Call light within reach. ERP to see. No distress noted.

## 2023-04-14 NOTE — DISCHARGE INSTRUCTIONS
Take all your antibiotics until gone.  Return if you have no urine output from your nephrostomy tube for greater than 6 hours, severe vomiting, fever, persistently rapid heart rate or unable to take your antibiotics.

## 2023-04-14 NOTE — ED NOTES
Patient provided discharge instructions and medication information. Patient verbalizes understanding and denies any further questions. Patient instructed to return to the ED if condition worsens.

## 2023-04-19 ENCOUNTER — HOSPITAL ENCOUNTER (OUTPATIENT)
Facility: MEDICAL CENTER | Age: 63
End: 2023-04-19
Attending: INTERNAL MEDICINE
Payer: COMMERCIAL

## 2023-04-19 LAB
ALBUMIN SERPL BCP-MCNC: 4.1 G/DL (ref 3.2–4.9)
ALBUMIN/GLOB SERPL: 2.2 G/DL
ALP SERPL-CCNC: 59 U/L (ref 30–99)
ALT SERPL-CCNC: 21 U/L (ref 2–50)
ANION GAP SERPL CALC-SCNC: 8 MMOL/L (ref 7–16)
AST SERPL-CCNC: 19 U/L (ref 12–45)
BILIRUB SERPL-MCNC: 0.2 MG/DL (ref 0.1–1.5)
BUN SERPL-MCNC: 11 MG/DL (ref 8–22)
CALCIUM ALBUM COR SERPL-MCNC: 8.7 MG/DL (ref 8.5–10.5)
CALCIUM SERPL-MCNC: 8.8 MG/DL (ref 8.5–10.5)
CHLORIDE SERPL-SCNC: 106 MMOL/L (ref 96–112)
CO2 SERPL-SCNC: 24 MMOL/L (ref 20–33)
CREAT SERPL-MCNC: 0.53 MG/DL (ref 0.5–1.4)
GFR SERPLBLD CREATININE-BSD FMLA CKD-EPI: 105 ML/MIN/1.73 M 2
GLOBULIN SER CALC-MCNC: 1.9 G/DL (ref 1.9–3.5)
GLUCOSE SERPL-MCNC: 99 MG/DL (ref 65–99)
LDH SERPL L TO P-CCNC: 222 U/L (ref 107–266)
POTASSIUM SERPL-SCNC: 4.2 MMOL/L (ref 3.6–5.5)
PROT SERPL-MCNC: 6 G/DL (ref 6–8.2)
SODIUM SERPL-SCNC: 138 MMOL/L (ref 135–145)
URATE SERPL-MCNC: 3.3 MG/DL (ref 1.9–8.2)

## 2023-04-19 PROCEDURE — 83615 LACTATE (LD) (LDH) ENZYME: CPT

## 2023-04-19 PROCEDURE — 84550 ASSAY OF BLOOD/URIC ACID: CPT

## 2023-04-19 PROCEDURE — 80053 COMPREHEN METABOLIC PANEL: CPT

## 2023-04-27 ENCOUNTER — HOSPITAL ENCOUNTER (OUTPATIENT)
Facility: MEDICAL CENTER | Age: 63
End: 2023-04-27
Attending: INTERNAL MEDICINE
Payer: COMMERCIAL

## 2023-04-27 LAB
APPEARANCE UR: CLEAR
BACTERIA #/AREA URNS HPF: NEGATIVE /HPF
BILIRUB UR QL STRIP.AUTO: NEGATIVE
COLOR UR: YELLOW
EPI CELLS #/AREA URNS HPF: ABNORMAL /HPF
GLUCOSE UR STRIP.AUTO-MCNC: NEGATIVE MG/DL
HYALINE CASTS #/AREA URNS LPF: ABNORMAL /LPF
KETONES UR STRIP.AUTO-MCNC: NEGATIVE MG/DL
LEUKOCYTE ESTERASE UR QL STRIP.AUTO: ABNORMAL
MICRO URNS: ABNORMAL
NITRITE UR QL STRIP.AUTO: NEGATIVE
PH UR STRIP.AUTO: 6 [PH] (ref 5–8)
PROT UR QL STRIP: NEGATIVE MG/DL
RBC # URNS HPF: ABNORMAL /HPF
RBC UR QL AUTO: ABNORMAL
RENAL EPI CELLS #/AREA URNS HPF: ABNORMAL /HPF
SP GR UR STRIP.AUTO: 1.01
UROBILINOGEN UR STRIP.AUTO-MCNC: 0.2 MG/DL
WBC #/AREA URNS HPF: ABNORMAL /HPF

## 2023-04-27 PROCEDURE — 87086 URINE CULTURE/COLONY COUNT: CPT

## 2023-04-27 PROCEDURE — 81001 URINALYSIS AUTO W/SCOPE: CPT

## 2023-04-29 LAB
BACTERIA UR CULT: NORMAL
SIGNIFICANT IND 70042: NORMAL
SITE SITE: NORMAL
SOURCE SOURCE: NORMAL

## 2023-05-04 ENCOUNTER — HOSPITAL ENCOUNTER (OUTPATIENT)
Dept: LAB | Facility: MEDICAL CENTER | Age: 63
End: 2023-05-04
Attending: INTERNAL MEDICINE
Payer: COMMERCIAL

## 2023-05-04 LAB
APPEARANCE UR: CLEAR
BACTERIA #/AREA URNS HPF: ABNORMAL /HPF
BILIRUB UR QL STRIP.AUTO: NEGATIVE
COLOR UR: YELLOW
EPI CELLS #/AREA URNS HPF: ABNORMAL /HPF
GLUCOSE UR STRIP.AUTO-MCNC: NEGATIVE MG/DL
KETONES UR STRIP.AUTO-MCNC: NEGATIVE MG/DL
LEUKOCYTE ESTERASE UR QL STRIP.AUTO: ABNORMAL
MICRO URNS: ABNORMAL
NITRITE UR QL STRIP.AUTO: NEGATIVE
PH UR STRIP.AUTO: 7.5 [PH] (ref 5–8)
PROT UR QL STRIP: NEGATIVE MG/DL
RBC # URNS HPF: ABNORMAL /HPF
RBC UR QL AUTO: ABNORMAL
SP GR UR STRIP.AUTO: 1.01
TRANS CELLS #/AREA URNS HPF: ABNORMAL /HPF
UROBILINOGEN UR STRIP.AUTO-MCNC: 0.2 MG/DL
WBC #/AREA URNS HPF: ABNORMAL /HPF

## 2023-05-04 PROCEDURE — 81001 URINALYSIS AUTO W/SCOPE: CPT

## 2023-05-09 ENCOUNTER — HOSPITAL ENCOUNTER (OUTPATIENT)
Facility: MEDICAL CENTER | Age: 63
End: 2023-05-09
Attending: STUDENT IN AN ORGANIZED HEALTH CARE EDUCATION/TRAINING PROGRAM
Payer: COMMERCIAL

## 2023-05-09 PROCEDURE — 87660 TRICHOMONAS VAGIN DIR PROBE: CPT

## 2023-05-09 PROCEDURE — 87480 CANDIDA DNA DIR PROBE: CPT

## 2023-05-09 PROCEDURE — 87510 GARDNER VAG DNA DIR PROBE: CPT

## 2023-05-10 LAB
CANDIDA DNA VAG QL PROBE+SIG AMP: NEGATIVE
G VAGINALIS DNA VAG QL PROBE+SIG AMP: NEGATIVE
T VAGINALIS DNA VAG QL PROBE+SIG AMP: NEGATIVE

## 2023-05-11 ENCOUNTER — APPOINTMENT (OUTPATIENT)
Dept: RADIOLOGY | Facility: MEDICAL CENTER | Age: 63
End: 2023-05-11
Attending: INTERNAL MEDICINE
Payer: COMMERCIAL

## 2023-05-12 ENCOUNTER — HOSPITAL ENCOUNTER (OUTPATIENT)
Facility: MEDICAL CENTER | Age: 63
End: 2023-05-12
Attending: STUDENT IN AN ORGANIZED HEALTH CARE EDUCATION/TRAINING PROGRAM
Payer: COMMERCIAL

## 2023-05-12 PROCEDURE — 87086 URINE CULTURE/COLONY COUNT: CPT

## 2023-05-15 LAB
BACTERIA UR CULT: NORMAL
SIGNIFICANT IND 70042: NORMAL
SITE SITE: NORMAL
SOURCE SOURCE: NORMAL

## 2023-05-18 ENCOUNTER — HOSPITAL ENCOUNTER (OUTPATIENT)
Facility: MEDICAL CENTER | Age: 63
End: 2023-05-18
Attending: NURSE PRACTITIONER
Payer: COMMERCIAL

## 2023-05-18 LAB
ALBUMIN SERPL BCP-MCNC: 4.1 G/DL (ref 3.2–4.9)
ALBUMIN/GLOB SERPL: 1.9 G/DL
ALP SERPL-CCNC: 48 U/L (ref 30–99)
ALT SERPL-CCNC: 22 U/L (ref 2–50)
ANION GAP SERPL CALC-SCNC: 12 MMOL/L (ref 7–16)
AST SERPL-CCNC: 22 U/L (ref 12–45)
BILIRUB SERPL-MCNC: 0.3 MG/DL (ref 0.1–1.5)
BUN SERPL-MCNC: 17 MG/DL (ref 8–22)
CALCIUM ALBUM COR SERPL-MCNC: 8.6 MG/DL (ref 8.5–10.5)
CALCIUM SERPL-MCNC: 8.7 MG/DL (ref 8.5–10.5)
CHLORIDE SERPL-SCNC: 109 MMOL/L (ref 96–112)
CO2 SERPL-SCNC: 23 MMOL/L (ref 20–33)
CREAT SERPL-MCNC: 0.38 MG/DL (ref 0.5–1.4)
GFR SERPLBLD CREATININE-BSD FMLA CKD-EPI: 113 ML/MIN/1.73 M 2
GLOBULIN SER CALC-MCNC: 2.2 G/DL (ref 1.9–3.5)
GLUCOSE SERPL-MCNC: 88 MG/DL (ref 65–99)
LDH SERPL L TO P-CCNC: 187 U/L (ref 107–266)
POTASSIUM SERPL-SCNC: 3.9 MMOL/L (ref 3.6–5.5)
PROT SERPL-MCNC: 6.3 G/DL (ref 6–8.2)
SODIUM SERPL-SCNC: 144 MMOL/L (ref 135–145)
URATE SERPL-MCNC: 3.8 MG/DL (ref 1.9–8.2)

## 2023-05-18 PROCEDURE — 84550 ASSAY OF BLOOD/URIC ACID: CPT

## 2023-05-18 PROCEDURE — 80053 COMPREHEN METABOLIC PANEL: CPT

## 2023-05-18 PROCEDURE — 83615 LACTATE (LD) (LDH) ENZYME: CPT

## 2023-05-24 ENCOUNTER — HOSPITAL ENCOUNTER (OUTPATIENT)
Facility: MEDICAL CENTER | Age: 63
End: 2023-05-24
Attending: OBSTETRICS & GYNECOLOGY
Payer: COMMERCIAL

## 2023-05-24 PROCEDURE — 87077 CULTURE AEROBIC IDENTIFY: CPT

## 2023-05-24 PROCEDURE — 81001 URINALYSIS AUTO W/SCOPE: CPT

## 2023-05-24 PROCEDURE — 87086 URINE CULTURE/COLONY COUNT: CPT

## 2023-05-24 PROCEDURE — 87186 SC STD MICRODIL/AGAR DIL: CPT

## 2023-05-25 LAB
AMBIGUOUS DTTM AMBI4: NORMAL
APPEARANCE UR: CLEAR
BACTERIA #/AREA URNS HPF: NEGATIVE /HPF
BILIRUB UR QL STRIP.AUTO: NEGATIVE
COLOR UR: YELLOW
EPI CELLS #/AREA URNS HPF: NEGATIVE /HPF
GLUCOSE UR STRIP.AUTO-MCNC: NEGATIVE MG/DL
HYALINE CASTS #/AREA URNS LPF: NORMAL /LPF
KETONES UR STRIP.AUTO-MCNC: NEGATIVE MG/DL
LEUKOCYTE ESTERASE UR QL STRIP.AUTO: ABNORMAL
MICRO URNS: ABNORMAL
NITRITE UR QL STRIP.AUTO: NEGATIVE
PH UR STRIP.AUTO: 6.5 [PH] (ref 5–8)
PROT UR QL STRIP: NEGATIVE MG/DL
RBC # URNS HPF: NORMAL /HPF
RBC UR QL AUTO: NEGATIVE
SP GR UR STRIP.AUTO: 1.01
UROBILINOGEN UR STRIP.AUTO-MCNC: 0.2 MG/DL
WBC #/AREA URNS HPF: NORMAL /HPF

## 2023-05-26 LAB
AMBIGUOUS DTTM AMBI4: NORMAL
SIGNIFICANT IND 70042: NORMAL
SITE SITE: NORMAL
SOURCE SOURCE: NORMAL

## 2023-05-28 LAB
BACTERIA UR CULT: ABNORMAL
BACTERIA UR CULT: ABNORMAL
SIGNIFICANT IND 70042: ABNORMAL
SITE SITE: ABNORMAL
SOURCE SOURCE: ABNORMAL

## 2023-05-31 ENCOUNTER — HOSPITAL ENCOUNTER (OUTPATIENT)
Facility: MEDICAL CENTER | Age: 63
End: 2023-05-31
Attending: NURSE PRACTITIONER
Payer: COMMERCIAL

## 2023-05-31 LAB
ALBUMIN SERPL BCP-MCNC: 4.2 G/DL (ref 3.2–4.9)
ALBUMIN/GLOB SERPL: 2.3 G/DL
ALP SERPL-CCNC: 75 U/L (ref 30–99)
ALT SERPL-CCNC: 12 U/L (ref 2–50)
ANION GAP SERPL CALC-SCNC: 10 MMOL/L (ref 7–16)
AST SERPL-CCNC: 12 U/L (ref 12–45)
BILIRUB SERPL-MCNC: 0.3 MG/DL (ref 0.1–1.5)
BUN SERPL-MCNC: 10 MG/DL (ref 8–22)
CALCIUM ALBUM COR SERPL-MCNC: 8.8 MG/DL (ref 8.5–10.5)
CALCIUM SERPL-MCNC: 9 MG/DL (ref 8.5–10.5)
CHLORIDE SERPL-SCNC: 104 MMOL/L (ref 96–112)
CO2 SERPL-SCNC: 25 MMOL/L (ref 20–33)
CREAT SERPL-MCNC: 0.41 MG/DL (ref 0.5–1.4)
GFR SERPLBLD CREATININE-BSD FMLA CKD-EPI: 111 ML/MIN/1.73 M 2
GLOBULIN SER CALC-MCNC: 1.8 G/DL (ref 1.9–3.5)
GLUCOSE SERPL-MCNC: 98 MG/DL (ref 65–99)
POTASSIUM SERPL-SCNC: 3.6 MMOL/L (ref 3.6–5.5)
PROT SERPL-MCNC: 6 G/DL (ref 6–8.2)
SODIUM SERPL-SCNC: 139 MMOL/L (ref 135–145)

## 2023-05-31 PROCEDURE — 80053 COMPREHEN METABOLIC PANEL: CPT

## 2023-05-31 PROCEDURE — 83615 LACTATE (LD) (LDH) ENZYME: CPT

## 2023-05-31 PROCEDURE — 84550 ASSAY OF BLOOD/URIC ACID: CPT

## 2023-06-01 LAB
LDH SERPL L TO P-CCNC: 185 U/L (ref 107–266)
URATE SERPL-MCNC: 2.9 MG/DL (ref 1.9–8.2)

## 2023-06-02 ENCOUNTER — HOSPITAL ENCOUNTER (INPATIENT)
Facility: MEDICAL CENTER | Age: 63
LOS: 5 days | DRG: 699 | End: 2023-06-07
Attending: EMERGENCY MEDICINE | Admitting: GENERAL PRACTICE
Payer: COMMERCIAL

## 2023-06-02 DIAGNOSIS — B95.2 ENTEROCOCCUS UTI: ICD-10-CM

## 2023-06-02 DIAGNOSIS — K59.01 SLOW TRANSIT CONSTIPATION: ICD-10-CM

## 2023-06-02 DIAGNOSIS — Z93.6 NEPHROSTOMY STATUS (HCC): ICD-10-CM

## 2023-06-02 DIAGNOSIS — A49.8 ENTEROCOCCUS FAECALIS INFECTION: ICD-10-CM

## 2023-06-02 DIAGNOSIS — C85.90 LYMPHOMA, UNSPECIFIED BODY REGION, UNSPECIFIED LYMPHOMA TYPE (HCC): ICD-10-CM

## 2023-06-02 DIAGNOSIS — N39.0 ENTEROCOCCUS UTI: ICD-10-CM

## 2023-06-02 DIAGNOSIS — N12 PYELONEPHRITIS: ICD-10-CM

## 2023-06-02 PROBLEM — R19.7 DIARRHEA OF PRESUMED INFECTIOUS ORIGIN: Status: ACTIVE | Noted: 2023-06-02

## 2023-06-02 PROBLEM — C85.99: Status: ACTIVE | Noted: 2023-06-02

## 2023-06-02 LAB
ALBUMIN SERPL BCP-MCNC: 4.1 G/DL (ref 3.2–4.9)
ALBUMIN/GLOB SERPL: 2.2 G/DL
ALP SERPL-CCNC: 73 U/L (ref 30–99)
ALT SERPL-CCNC: 18 U/L (ref 2–50)
ANION GAP SERPL CALC-SCNC: 13 MMOL/L (ref 7–16)
APPEARANCE UR: ABNORMAL
APPEARANCE UR: CLEAR
AST SERPL-CCNC: 15 U/L (ref 12–45)
BACTERIA #/AREA URNS HPF: ABNORMAL /HPF
BACTERIA #/AREA URNS HPF: ABNORMAL /HPF
BASOPHILS # BLD AUTO: 0.8 % (ref 0–1.8)
BASOPHILS # BLD: 0.08 K/UL (ref 0–0.12)
BILIRUB SERPL-MCNC: 0.2 MG/DL (ref 0.1–1.5)
BILIRUB UR QL STRIP.AUTO: NEGATIVE
BILIRUB UR QL STRIP.AUTO: NEGATIVE
BUN SERPL-MCNC: 11 MG/DL (ref 8–22)
CALCIUM ALBUM COR SERPL-MCNC: 8.5 MG/DL (ref 8.5–10.5)
CALCIUM SERPL-MCNC: 8.6 MG/DL (ref 8.5–10.5)
CHLORIDE SERPL-SCNC: 108 MMOL/L (ref 96–112)
CO2 SERPL-SCNC: 21 MMOL/L (ref 20–33)
COLOR UR: YELLOW
COLOR UR: YELLOW
CREAT SERPL-MCNC: 0.46 MG/DL (ref 0.5–1.4)
EOSINOPHIL # BLD AUTO: 0.07 K/UL (ref 0–0.51)
EOSINOPHIL NFR BLD: 0.7 % (ref 0–6.9)
EPI CELLS #/AREA URNS HPF: NEGATIVE /HPF
EPI CELLS #/AREA URNS HPF: NEGATIVE /HPF
ERYTHROCYTE [DISTWIDTH] IN BLOOD BY AUTOMATED COUNT: 47.1 FL (ref 35.9–50)
GFR SERPLBLD CREATININE-BSD FMLA CKD-EPI: 108 ML/MIN/1.73 M 2
GLOBULIN SER CALC-MCNC: 1.9 G/DL (ref 1.9–3.5)
GLUCOSE SERPL-MCNC: 102 MG/DL (ref 65–99)
GLUCOSE UR STRIP.AUTO-MCNC: NEGATIVE MG/DL
GLUCOSE UR STRIP.AUTO-MCNC: NEGATIVE MG/DL
HCT VFR BLD AUTO: 33.6 % (ref 37–47)
HGB BLD-MCNC: 11.3 G/DL (ref 12–16)
IMM GRANULOCYTES # BLD AUTO: 0.2 K/UL (ref 0–0.11)
IMM GRANULOCYTES NFR BLD AUTO: 2 % (ref 0–0.9)
KETONES UR STRIP.AUTO-MCNC: NEGATIVE MG/DL
KETONES UR STRIP.AUTO-MCNC: NEGATIVE MG/DL
LACTATE SERPL-SCNC: 0.9 MMOL/L (ref 0.5–2)
LACTATE SERPL-SCNC: 1.3 MMOL/L (ref 0.5–2)
LEUKOCYTE ESTERASE UR QL STRIP.AUTO: ABNORMAL
LEUKOCYTE ESTERASE UR QL STRIP.AUTO: ABNORMAL
LIPASE SERPL-CCNC: 15 U/L (ref 11–82)
LYMPHOCYTES # BLD AUTO: 0.98 K/UL (ref 1–4.8)
LYMPHOCYTES NFR BLD: 10 % (ref 22–41)
MCH RBC QN AUTO: 33.4 PG (ref 27–33)
MCHC RBC AUTO-ENTMCNC: 33.6 G/DL (ref 32.2–35.5)
MCV RBC AUTO: 99.4 FL (ref 81.4–97.8)
MICRO URNS: ABNORMAL
MICRO URNS: ABNORMAL
MONOCYTES # BLD AUTO: 0.66 K/UL (ref 0–0.85)
MONOCYTES NFR BLD AUTO: 6.7 % (ref 0–13.4)
NEUTROPHILS # BLD AUTO: 7.79 K/UL (ref 1.82–7.42)
NEUTROPHILS NFR BLD: 79.8 % (ref 44–72)
NITRITE UR QL STRIP.AUTO: NEGATIVE
NITRITE UR QL STRIP.AUTO: NEGATIVE
NRBC # BLD AUTO: 0 K/UL
NRBC BLD-RTO: 0 /100 WBC (ref 0–0.2)
PH UR STRIP.AUTO: 6.5 [PH] (ref 5–8)
PH UR STRIP.AUTO: 7.5 [PH] (ref 5–8)
PLATELET # BLD AUTO: 135 K/UL (ref 164–446)
PMV BLD AUTO: 11.4 FL (ref 9–12.9)
POTASSIUM SERPL-SCNC: 3.7 MMOL/L (ref 3.6–5.5)
PROT SERPL-MCNC: 6 G/DL (ref 6–8.2)
PROT UR QL STRIP: 100 MG/DL
PROT UR QL STRIP: NEGATIVE MG/DL
RBC # BLD AUTO: 3.38 M/UL (ref 4.2–5.4)
RBC # URNS HPF: ABNORMAL /HPF
RBC # URNS HPF: ABNORMAL /HPF
RBC UR QL AUTO: ABNORMAL
RBC UR QL AUTO: NEGATIVE
RENAL EPI CELLS #/AREA URNS HPF: ABNORMAL /HPF
SODIUM SERPL-SCNC: 142 MMOL/L (ref 135–145)
SP GR UR STRIP.AUTO: 1.01
SP GR UR STRIP.AUTO: 1.01
UROBILINOGEN UR STRIP.AUTO-MCNC: 0.2 MG/DL
UROBILINOGEN UR STRIP.AUTO-MCNC: 0.2 MG/DL
WBC # BLD AUTO: 9.8 K/UL (ref 4.8–10.8)
WBC #/AREA URNS HPF: ABNORMAL /HPF
WBC #/AREA URNS HPF: ABNORMAL /HPF

## 2023-06-02 PROCEDURE — 700102 HCHG RX REV CODE 250 W/ 637 OVERRIDE(OP): Performed by: GENERAL PRACTICE

## 2023-06-02 PROCEDURE — A9270 NON-COVERED ITEM OR SERVICE: HCPCS | Performed by: GENERAL PRACTICE

## 2023-06-02 PROCEDURE — 83690 ASSAY OF LIPASE: CPT

## 2023-06-02 PROCEDURE — 99222 1ST HOSP IP/OBS MODERATE 55: CPT | Performed by: GENERAL PRACTICE

## 2023-06-02 PROCEDURE — 36415 COLL VENOUS BLD VENIPUNCTURE: CPT

## 2023-06-02 PROCEDURE — 80053 COMPREHEN METABOLIC PANEL: CPT

## 2023-06-02 PROCEDURE — 81001 URINALYSIS AUTO W/SCOPE: CPT

## 2023-06-02 PROCEDURE — 770004 HCHG ROOM/CARE - ONCOLOGY PRIVATE *

## 2023-06-02 PROCEDURE — 87040 BLOOD CULTURE FOR BACTERIA: CPT | Mod: 91

## 2023-06-02 PROCEDURE — 700111 HCHG RX REV CODE 636 W/ 250 OVERRIDE (IP): Performed by: GENERAL PRACTICE

## 2023-06-02 PROCEDURE — 99285 EMERGENCY DEPT VISIT HI MDM: CPT

## 2023-06-02 PROCEDURE — 87077 CULTURE AEROBIC IDENTIFY: CPT

## 2023-06-02 PROCEDURE — 87086 URINE CULTURE/COLONY COUNT: CPT

## 2023-06-02 PROCEDURE — 700111 HCHG RX REV CODE 636 W/ 250 OVERRIDE (IP): Performed by: EMERGENCY MEDICINE

## 2023-06-02 PROCEDURE — 87186 SC STD MICRODIL/AGAR DIL: CPT

## 2023-06-02 PROCEDURE — 85025 COMPLETE CBC W/AUTO DIFF WBC: CPT

## 2023-06-02 PROCEDURE — 96365 THER/PROPH/DIAG IV INF INIT: CPT

## 2023-06-02 PROCEDURE — 83605 ASSAY OF LACTIC ACID: CPT | Mod: 91

## 2023-06-02 PROCEDURE — 700105 HCHG RX REV CODE 258: Performed by: EMERGENCY MEDICINE

## 2023-06-02 RX ORDER — POLYETHYLENE GLYCOL 3350 17 G/17G
1 POWDER, FOR SOLUTION ORAL
Status: DISCONTINUED | OUTPATIENT
Start: 2023-06-02 | End: 2023-06-07 | Stop reason: HOSPADM

## 2023-06-02 RX ORDER — LORATADINE 10 MG/1
10 TABLET ORAL DAILY
COMMUNITY
End: 2024-01-31

## 2023-06-02 RX ORDER — OXYCODONE HYDROCHLORIDE 5 MG/1
5 TABLET ORAL EVERY 6 HOURS PRN
Status: DISCONTINUED | OUTPATIENT
Start: 2023-06-02 | End: 2023-06-07 | Stop reason: HOSPADM

## 2023-06-02 RX ORDER — TRAMADOL HYDROCHLORIDE 50 MG/1
50 TABLET ORAL EVERY 6 HOURS PRN
Status: DISCONTINUED | OUTPATIENT
Start: 2023-06-02 | End: 2023-06-07 | Stop reason: HOSPADM

## 2023-06-02 RX ORDER — PHENAZOPYRIDINE HYDROCHLORIDE 200 MG/1
200 TABLET, FILM COATED ORAL 3 TIMES DAILY PRN
COMMUNITY
End: 2023-07-14

## 2023-06-02 RX ORDER — PROMETHAZINE HYDROCHLORIDE 25 MG/1
12.5-25 TABLET ORAL EVERY 4 HOURS PRN
Status: DISCONTINUED | OUTPATIENT
Start: 2023-06-02 | End: 2023-06-07 | Stop reason: HOSPADM

## 2023-06-02 RX ORDER — BISACODYL 10 MG
10 SUPPOSITORY, RECTAL RECTAL
Status: DISCONTINUED | OUTPATIENT
Start: 2023-06-02 | End: 2023-06-07 | Stop reason: HOSPADM

## 2023-06-02 RX ORDER — SODIUM CHLORIDE 9 MG/ML
500 INJECTION, SOLUTION INTRAVENOUS ONCE
Status: COMPLETED | OUTPATIENT
Start: 2023-06-02 | End: 2023-06-02

## 2023-06-02 RX ORDER — ENOXAPARIN SODIUM 100 MG/ML
1 INJECTION SUBCUTANEOUS EVERY 12 HOURS
Status: DISCONTINUED | OUTPATIENT
Start: 2023-06-02 | End: 2023-06-06

## 2023-06-02 RX ORDER — ACETAMINOPHEN 325 MG/1
650 TABLET ORAL EVERY 6 HOURS PRN
Status: DISCONTINUED | OUTPATIENT
Start: 2023-06-02 | End: 2023-06-06

## 2023-06-02 RX ORDER — ONDANSETRON 4 MG/1
4 TABLET, ORALLY DISINTEGRATING ORAL EVERY 4 HOURS PRN
Status: DISCONTINUED | OUTPATIENT
Start: 2023-06-02 | End: 2023-06-07 | Stop reason: HOSPADM

## 2023-06-02 RX ORDER — HYDRALAZINE HYDROCHLORIDE 20 MG/ML
10 INJECTION INTRAMUSCULAR; INTRAVENOUS EVERY 4 HOURS PRN
Status: DISCONTINUED | OUTPATIENT
Start: 2023-06-02 | End: 2023-06-07 | Stop reason: HOSPADM

## 2023-06-02 RX ORDER — NITROFURANTOIN 25; 75 MG/1; MG/1
100 CAPSULE ORAL 2 TIMES DAILY
Status: ON HOLD | COMMUNITY
Start: 2023-05-24 | End: 2023-06-07

## 2023-06-02 RX ORDER — AMOXICILLIN 250 MG
2 CAPSULE ORAL 2 TIMES DAILY PRN
Status: DISCONTINUED | OUTPATIENT
Start: 2023-06-02 | End: 2023-06-07 | Stop reason: HOSPADM

## 2023-06-02 RX ORDER — ONDANSETRON 2 MG/ML
4 INJECTION INTRAMUSCULAR; INTRAVENOUS EVERY 4 HOURS PRN
Status: DISCONTINUED | OUTPATIENT
Start: 2023-06-02 | End: 2023-06-07 | Stop reason: HOSPADM

## 2023-06-02 RX ORDER — SODIUM CHLORIDE 9 MG/ML
INJECTION, SOLUTION INTRAVENOUS CONTINUOUS
Status: ACTIVE | OUTPATIENT
Start: 2023-06-02 | End: 2023-06-03

## 2023-06-02 RX ORDER — ATORVASTATIN CALCIUM 20 MG/1
20 TABLET, FILM COATED ORAL
Status: DISCONTINUED | OUTPATIENT
Start: 2023-06-02 | End: 2023-06-07 | Stop reason: HOSPADM

## 2023-06-02 RX ORDER — FERROUS SULFATE 325(65) MG
325 TABLET ORAL
Status: DISCONTINUED | OUTPATIENT
Start: 2023-06-03 | End: 2023-06-02

## 2023-06-02 RX ORDER — PROCHLORPERAZINE EDISYLATE 5 MG/ML
5-10 INJECTION INTRAMUSCULAR; INTRAVENOUS EVERY 4 HOURS PRN
Status: DISCONTINUED | OUTPATIENT
Start: 2023-06-02 | End: 2023-06-07 | Stop reason: HOSPADM

## 2023-06-02 RX ORDER — PROMETHAZINE HYDROCHLORIDE 25 MG/1
12.5-25 SUPPOSITORY RECTAL EVERY 4 HOURS PRN
Status: DISCONTINUED | OUTPATIENT
Start: 2023-06-02 | End: 2023-06-07 | Stop reason: HOSPADM

## 2023-06-02 RX ORDER — PREDNISONE 20 MG/1
100 TABLET ORAL DAILY
COMMUNITY
Start: 2023-04-27 | End: 2024-01-31

## 2023-06-02 RX ADMIN — ENOXAPARIN SODIUM 60 MG: 100 INJECTION SUBCUTANEOUS at 20:13

## 2023-06-02 RX ADMIN — SODIUM CHLORIDE 500 ML: 9 INJECTION, SOLUTION INTRAVENOUS at 17:00

## 2023-06-02 RX ADMIN — OXYCODONE 5 MG: 5 TABLET ORAL at 20:13

## 2023-06-02 RX ADMIN — ATORVASTATIN CALCIUM 20 MG: 20 TABLET, FILM COATED ORAL at 20:13

## 2023-06-02 RX ADMIN — AMPICILLIN SODIUM 2000 MG: 2 INJECTION, POWDER, FOR SOLUTION INTRAVENOUS at 18:30

## 2023-06-02 RX ADMIN — ONDANSETRON 4 MG: 2 INJECTION INTRAMUSCULAR; INTRAVENOUS at 20:13

## 2023-06-02 ASSESSMENT — LIFESTYLE VARIABLES
HOW MANY TIMES IN THE PAST YEAR HAVE YOU HAD 5 OR MORE DRINKS IN A DAY: 0
HAVE PEOPLE ANNOYED YOU BY CRITICIZING YOUR DRINKING: NO
TOTAL SCORE: 0
ALCOHOL_USE: NO
TOTAL SCORE: 0
TOTAL SCORE: 0
AVERAGE NUMBER OF DAYS PER WEEK YOU HAVE A DRINK CONTAINING ALCOHOL: 0
EVER FELT BAD OR GUILTY ABOUT YOUR DRINKING: NO
EVER HAD A DRINK FIRST THING IN THE MORNING TO STEADY YOUR NERVES TO GET RID OF A HANGOVER: NO
CONSUMPTION TOTAL: NEGATIVE
HAVE YOU EVER FELT YOU SHOULD CUT DOWN ON YOUR DRINKING: NO
ON A TYPICAL DAY WHEN YOU DRINK ALCOHOL HOW MANY DRINKS DO YOU HAVE: 0

## 2023-06-02 ASSESSMENT — COGNITIVE AND FUNCTIONAL STATUS - GENERAL
MOVING FROM LYING ON BACK TO SITTING ON SIDE OF FLAT BED: A LITTLE
SUGGESTED CMS G CODE MODIFIER MOBILITY: CJ
CLIMB 3 TO 5 STEPS WITH RAILING: A LITTLE
WALKING IN HOSPITAL ROOM: A LITTLE
SUGGESTED CMS G CODE MODIFIER DAILY ACTIVITY: CH
DAILY ACTIVITIY SCORE: 24
MOBILITY SCORE: 21

## 2023-06-02 ASSESSMENT — PATIENT HEALTH QUESTIONNAIRE - PHQ9
SUM OF ALL RESPONSES TO PHQ9 QUESTIONS 1 AND 2: 0
2. FEELING DOWN, DEPRESSED, IRRITABLE, OR HOPELESS: NOT AT ALL
1. LITTLE INTEREST OR PLEASURE IN DOING THINGS: NOT AT ALL

## 2023-06-02 ASSESSMENT — ENCOUNTER SYMPTOMS
FLANK PAIN: 1
ABDOMINAL PAIN: 1
DIARRHEA: 1

## 2023-06-02 ASSESSMENT — FIBROSIS 4 INDEX: FIB4 SCORE: 0.83

## 2023-06-02 NOTE — ED PROVIDER NOTES
ED Provider Note    Scribed for Maximus Boogie M.D. by Franco Hill. 6/2/2023  4:30 PM    Primary care provider: Jose Cruz Smith M.D.  Means of arrival: Walk in    CHIEF COMPLAINT  Chief Complaint   Patient presents with    Abdominal Pain     Lower abdominal pain that started 2 days ago. Also reports to urinary pain and frequency. Pt has lymphoma and last received chemotherapy 2 weeks ago.     Flank Pain     Bilateral flank pain. Denies fevers at home.        LIMITATION TO HISTORY   None    HPI    Savana Manzanares is a 63 y.o. female who presents to the Emergency Department for evaluation of abdominal pain and flank pain onset two days ago. Patient is on chemo for lymphoma in the abdomen. She has had four rounds of chemo. Her oncologist is Dr. Macias. Daughter states patient has had a UTI since February. She was on nitrofurantoin and cefuroxime without improvement. Patient states the dysuria is now the worse it has ever been. She has bilateral flank pain and lower abdominal pain. She also reports nausea. She has chronic migraines. Denies fever, vomiting, or cough. She has history of c section. She reports history of blood clot in January for which she takes a blood thinner. She has history of nephrostomy in January because of blood clots. 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    OUTSIDE HISTORIAN(S):  Patient's daughter provided additional history. States patient has had a UTI which started in February. She was on nitrofurantoin and cefuroxime. Patient history of blood clot on the right side. She is on blood thinner medication. Patient has history of nephrostomy.    EXTERNAL RECORDS REVIEWED  The patient had culture that showed enterococcus faecalis on May 24, resistant to tetracycline otherwise sensitive. She had a positive culture in February 26 showing the same thing.  Discharge summary on March 1 for fever and flank pain. She is on Eliquis. This admission was for flank pain and  sepsis.    REVIEW OF SYSTEMS  Pertinent positives include: abdominal pain, flank pain, nausea.  Pertinent negatives include: fever, vomiting, cough.      PAST MEDICAL HISTORY  Past Medical History:   Diagnosis Date    Blood clotting disorder (HCC) 02/2023    DVT    Cancer (HCC)     Lymphoma    Disorder of thyroid     Thyroid nodule    Fatty liver     Heart burn     High cholesterol     Hypokalemia     Normocytic anemia     Renal disorder        FAMILY HISTORY  Family History   Family history unknown: Yes       SOCIAL HISTORY  Social History     Tobacco Use    Smoking status: Never    Smokeless tobacco: Never   Vaping Use    Vaping Use: Never used   Substance Use Topics    Alcohol use: Not Currently    Drug use: Never     Social History     Substance and Sexual Activity   Drug Use Never       SURGICAL HISTORY  Past Surgical History:   Procedure Laterality Date    [place] DX BONE MARROW ASPIRATIONS Left 02/16/2023    Procedure: BONE MARROW ASPIRATION -DR. LEÓN;  Surgeon: Chris León M.D.;  Location: SURGERY SAME DAY [facility];  Service: Orthopedics    [place] DX BONE MARROW BIOPSIES Left 02/16/2023    Procedure: BIOPSY, BONE MARROW, USING NEEDLE OR TROCAR;  Surgeon: Chris León M.D.;  Location: SURGERY SAME DAY [facility];  Service: Orthopedics       CURRENT MEDICATIONS    Current Facility-Administered Medications:     senna-docusate (PERICOLACE or SENOKOT S) 8.6-50 MG per tablet 2 Tablet, 2 Tablet, Oral, BID PRN **AND** polyethylene glycol/lytes (MIRALAX) PACKET 1 Packet, 1 Packet, Oral, QDAY PRN **AND** magnesium hydroxide (MILK OF MAGNESIA) suspension 30 mL, 30 mL, Oral, QDAY PRN **AND** bisacodyl (DULCOLAX) suppository 10 mg, 10 mg, Rectal, QDAY PRN, Katherin Tai, D.O.    acetaminophen (Tylenol) tablet 650 mg, 650 mg, Oral, Q6HRS PRN, Katherin Abida, D.O.    hydrALAZINE (APRESOLINE) injection 10 mg, 10 mg, Intravenous, Q4HRS PRN, Katherinsamy Tai, D.O.    ondansetron (ZOFRAN) syringe/vial injection 4 mg,  "4 mg, Intravenous, Q4HRS PRN, Katherin Tai, D.O., 4 mg at 06/02/23 2013    ondansetron (ZOFRAN ODT) dispertab 4 mg, 4 mg, Oral, Q4HRS PRN, Katherincristopher Tai, D.O.    promethazine (PHENERGAN) tablet 12.5-25 mg, 12.5-25 mg, Oral, Q4HRS PRN, Katherin Tai, D.O.    promethazine (PHENERGAN) suppository 12.5-25 mg, 12.5-25 mg, Rectal, Q4HRS PRN, Katherin Abida, D.O.    prochlorperazine (COMPAZINE) injection 5-10 mg, 5-10 mg, Intravenous, Q4HRS PRN, Katherinsamy Tai, D.O.    NS infusion, , Intravenous, Continuous, Katherinsamy Tai, D.O.    [START ON 6/3/2023] ampicillin (Omnipen) 2,000 mg in  mL IVPB, 2,000 mg, Intravenous, Q6HRS, Katherin Tai, D.O.    atorvastatin (LIPITOR) tablet 20 mg, 20 mg, Oral, QHS, Katherin Tai, D.O., 20 mg at 06/02/23 2013    enoxaparin (Lovenox) inj 60 mg, 1 mg/kg, Subcutaneous, Q12HRS, Katherincristopher Tai, D.O., 60 mg at 06/02/23 2013    traMADol (Ultram) 50 MG tablet 50 mg, 50 mg, Oral, Q6HRS PRN, Katherincristopher Tai, D.O.    oxyCODONE immediate-release (ROXICODONE) tablet 5 mg, 5 mg, Oral, Q6HRS PRN, Katherincristopher Tai, D.O., 5 mg at 06/02/23 2013    ALLERGIES  No Known Allergies    PHYSICAL EXAM  VITAL SIGNS: BP (!) 153/97   Pulse (!) 116   Temp 37.4 °C (99.3 °F) (Temporal)   Resp 20   Ht 1.575 m (5' 2\")   Wt 57.2 kg (126 lb)   SpO2 97%   BMI 23.05 kg/m²   Reviewed and High normal temperature, tachycardic, elevated blood pressure  Constitutional: Well developed, Somewhat cachectic.  HENT: Normocephalic, atraumatic, bilateral external ears normal, No intraoral erythema, edema, exudate  Eyes: PERRLA, conjunctiva pink, no scleral icterus.   Cardiovascular: Regular rate and rhythm. No murmurs, rubs or gallops.  No dependent edema or calf tenderness  Chest: Right chest port-a-cath  Respiratory: Lungs clear to auscultation bilaterally. No wheezes, rales, or rhonchi.  Abdominal:  Abdomen soft, some midline lower abdominal tenderness, non distended. No rebound, or guarding.    Skin: No erythema, no " rash. No wounds or bruising.  Genitourinary: No bilateral flank tenderness despite bilateral flank pain.  Musculoskeletal: no deformities. No dependent edema.  Neurologic: Alert & oriented x 3, cranial nerves 2-12 intact by passive exam.  No focal deficit noted.  Psychiatric: Affect normal, Judgment normal, Mood normal.     LABS Ordered and Reviewed by Me:  Results for orders placed or performed during the hospital encounter of 06/02/23   CBC with Differential   Result Value Ref Range    WBC 9.8 4.8 - 10.8 K/uL    RBC 3.38 (L) 4.20 - 5.40 M/uL    Hemoglobin 11.3 (L) 12.0 - 16.0 g/dL    Hematocrit 33.6 (L) 37.0 - 47.0 %    MCV 99.4 (H) 81.4 - 97.8 fL    MCH 33.4 (H) 27.0 - 33.0 pg    MCHC 33.6 32.2 - 35.5 g/dL    RDW 47.1 35.9 - 50.0 fL    Platelet Count 135 (L) 164 - 446 K/uL    MPV 11.4 9.0 - 12.9 fL    Neutrophils-Polys 79.80 (H) 44.00 - 72.00 %    Lymphocytes 10.00 (L) 22.00 - 41.00 %    Monocytes 6.70 0.00 - 13.40 %    Eosinophils 0.70 0.00 - 6.90 %    Basophils 0.80 0.00 - 1.80 %    Immature Granulocytes 2.00 (H) 0.00 - 0.90 %    Nucleated RBC 0.00 0.00 - 0.20 /100 WBC    Neutrophils (Absolute) 7.79 (H) 1.82 - 7.42 K/uL    Lymphs (Absolute) 0.98 (L) 1.00 - 4.80 K/uL    Monos (Absolute) 0.66 0.00 - 0.85 K/uL    Eos (Absolute) 0.07 0.00 - 0.51 K/uL    Baso (Absolute) 0.08 0.00 - 0.12 K/uL    Immature Granulocytes (abs) 0.20 (H) 0.00 - 0.11 K/uL    NRBC (Absolute) 0.00 K/uL   Complete Metabolic Panel   Result Value Ref Range    Sodium 142 135 - 145 mmol/L    Potassium 3.7 3.6 - 5.5 mmol/L    Chloride 108 96 - 112 mmol/L    Co2 21 20 - 33 mmol/L    Anion Gap 13.0 7.0 - 16.0    Glucose 102 (H) 65 - 99 mg/dL    Bun 11 8 - 22 mg/dL    Creatinine 0.46 (L) 0.50 - 1.40 mg/dL    Calcium 8.6 8.5 - 10.5 mg/dL    AST(SGOT) 15 12 - 45 U/L    ALT(SGPT) 18 2 - 50 U/L    Alkaline Phosphatase 73 30 - 99 U/L    Total Bilirubin 0.2 0.1 - 1.5 mg/dL    Albumin 4.1 3.2 - 4.9 g/dL    Total Protein 6.0 6.0 - 8.2 g/dL    Globulin 1.9 1.9  - 3.5 g/dL    A-G Ratio 2.2 g/dL   Lipase   Result Value Ref Range    Lipase 15 11 - 82 U/L   Urinalysis    Specimen: Urine   Result Value Ref Range    Color Yellow     Character Turbid (A)     Specific Gravity 1.009 <1.035    Ph 6.5 5.0 - 8.0    Glucose Negative Negative mg/dL    Ketones Negative Negative mg/dL    Protein 100 (A) Negative mg/dL    Bilirubin Negative Negative    Urobilinogen, Urine 0.2 Negative    Nitrite Negative Negative    Leukocyte Esterase Large (A) Negative    Occult Blood Large (A) Negative    Micro Urine Req Microscopic    Lactic Acid   Result Value Ref Range    Lactic Acid 0.9 0.5 - 2.0 mmol/L   Urinalysis    Specimen: Urine   Result Value Ref Range    Color Yellow     Character Clear     Specific Gravity 1.006 <1.035    Ph 7.5 5.0 - 8.0    Glucose Negative Negative mg/dL    Ketones Negative Negative mg/dL    Protein Negative Negative mg/dL    Bilirubin Negative Negative    Urobilinogen, Urine 0.2 Negative    Nitrite Negative Negative    Leukocyte Esterase Large (A) Negative    Occult Blood Negative Negative    Micro Urine Req Microscopic    CORRECTED CALCIUM   Result Value Ref Range    Correct Calcium 8.5 8.5 - 10.5 mg/dL   ESTIMATED GFR   Result Value Ref Range    GFR (CKD-EPI) 108 >60 mL/min/1.73 m 2   URINE MICROSCOPIC (W/UA)   Result Value Ref Range    WBC Packed (A) /hpf    RBC 10-20 (A) /hpf    Bacteria Few (A) None /hpf    Epithelial Cells Negative /hpf    Epithelial Cells Renal Rare /hpf   URINE MICROSCOPIC (W/UA)   Result Value Ref Range    WBC 10-20 (A) /hpf    RBC 0-2 /hpf    Bacteria Few (A) None /hpf    Epithelial Cells Negative /hpf           ED COURSE:      4:30 PM - Patient seen and examined at bedside.     5:46 PM Paged Dr. Macias (oncology) and urology.    6:11 PM - I discussed the patient's case and the above findings with Dr. Clancy (urology).    6:11 PM Patient reevaluated at bedside. Updated patient on plan of care.     6:37 PM I discussed the patient's case and above  findings with Dr. Tai (hospitalist). Plan is to admit patient on IV antibiotics until the culture results return and then will consult ID. Then plan on nephrostomy tube change.    INTERVENTIONS BY ME:  oxyCODONE immediate-release (ROXICODONE) tablet 5 mg (5 mg Oral Given 6/2/23 2013)   NS infusion 500 mL (0 mL Intravenous Continue to Floor 6/2/23 1940)   ampicillin (Omnipen) 2,000 mg in  mL IVPB (0 mg Intravenous Stopped 6/2/23 2010)       I have discussed management of the patient with the following physicians and sources:    Pharmacy  Oncology  Dr. Clancy (Urology)  Dr. Tai (hospitalist)    MEDICAL DECISION MAKING:  PROBLEMS EVALUATED THIS VISIT:    This patient presents with flank and abdominal pain with dysuria and certainly has a recurrent or persistent pyelonephritis and or cystitis with Enterococcus faecalis.  She was treated with appropriate antibiotics as an inpatient the first time, that being ampicillin but since then she has been discharged on Macrobid which was not helpful for pyelonephritis and later when urine culture results were not available on cefuroxime which was also inadequate.  She also has a nephrostomy tube that is probably contaminated with Enterococcus since she grew Enterococcus after its replacement in April.  She will need admission for IV antibiotics until cultures are back, for infectious disease consultation, and for nephrostomy tube replacement.  Generally she is not neutropenic and does not appear to have sepsis.  I was initially concerned about both of these possibilities.    RISK:  High given need for escalation of care to include admission       PLAN:    As above    CONDITION: Fair.     FINAL IMPRESSION  1. Pyelonephritis    2. Enterococcus faecalis infection    3. Nephrostomy status ([facility])    4. Lymphoma, unspecified body region, unspecified lymphoma type ([facility])       ED Observation Care     Franco CRUZ (Scribe), am scribing for, and in the presence of, Maximus MCKEON  VICKEY Boogie.    Electronically signed by: Franco Hill (Scribe), 6/2/2023    I, Maximus Boogie M.D. personally performed the services described in this documentation, as scribed by Franco Hill in my presence, and it is both accurate and complete.    The note accurately reflects work and decisions made by me.  Maximus Boogie M.D.  6/2/2023  11:50 PM

## 2023-06-02 NOTE — ED TRIAGE NOTES
"Savana Connor Leighton  63 y.o.    Chief Complaint   Patient presents with    Abdominal Pain     Lower abdominal pain that started 2 days ago. Also reports to urinary pain and frequency. Pt has lymphoma and last received chemotherapy 2 weeks ago.     Flank Pain     Bilateral flank pain. Denies fevers at home.        BP (!) 153/97   Pulse (!) 116   Temp 37.4 °C (99.3 °F) (Temporal)   Resp 20   Ht 1.575 m (5' 2\")   Wt 57.2 kg (126 lb)   SpO2 97%   BMI 23.05 kg/m²     Protocol placed, pt placed in family waiting area due to recent chemotherapy use. Pt educated to alert staff with any changes or concerns.   "

## 2023-06-03 LAB
ANION GAP SERPL CALC-SCNC: 9 MMOL/L (ref 7–16)
BUN SERPL-MCNC: 9 MG/DL (ref 8–22)
CALCIUM SERPL-MCNC: 8.4 MG/DL (ref 8.5–10.5)
CHLORIDE SERPL-SCNC: 110 MMOL/L (ref 96–112)
CO2 SERPL-SCNC: 20 MMOL/L (ref 20–33)
CREAT SERPL-MCNC: 0.41 MG/DL (ref 0.5–1.4)
ERYTHROCYTE [DISTWIDTH] IN BLOOD BY AUTOMATED COUNT: 47.5 FL (ref 35.9–50)
GFR SERPLBLD CREATININE-BSD FMLA CKD-EPI: 111 ML/MIN/1.73 M 2
GLUCOSE SERPL-MCNC: 103 MG/DL (ref 65–99)
HCT VFR BLD AUTO: 32.1 % (ref 37–47)
HGB BLD-MCNC: 10.7 G/DL (ref 12–16)
LACTATE SERPL-SCNC: 1 MMOL/L (ref 0.5–2)
MCH RBC QN AUTO: 33.1 PG (ref 27–33)
MCHC RBC AUTO-ENTMCNC: 33.3 G/DL (ref 32.2–35.5)
MCV RBC AUTO: 99.4 FL (ref 81.4–97.8)
PLATELET # BLD AUTO: 111 K/UL (ref 164–446)
PMV BLD AUTO: 11.3 FL (ref 9–12.9)
POTASSIUM SERPL-SCNC: 3.4 MMOL/L (ref 3.6–5.5)
RBC # BLD AUTO: 3.23 M/UL (ref 4.2–5.4)
SODIUM SERPL-SCNC: 139 MMOL/L (ref 135–145)
WBC # BLD AUTO: 11.1 K/UL (ref 4.8–10.8)

## 2023-06-03 PROCEDURE — 700105 HCHG RX REV CODE 258

## 2023-06-03 PROCEDURE — 83605 ASSAY OF LACTIC ACID: CPT

## 2023-06-03 PROCEDURE — 700102 HCHG RX REV CODE 250 W/ 637 OVERRIDE(OP): Performed by: GENERAL PRACTICE

## 2023-06-03 PROCEDURE — 85027 COMPLETE CBC AUTOMATED: CPT

## 2023-06-03 PROCEDURE — 99232 SBSQ HOSP IP/OBS MODERATE 35: CPT | Performed by: INTERNAL MEDICINE

## 2023-06-03 PROCEDURE — 700105 HCHG RX REV CODE 258: Performed by: GENERAL PRACTICE

## 2023-06-03 PROCEDURE — 80048 BASIC METABOLIC PNL TOTAL CA: CPT

## 2023-06-03 PROCEDURE — 700111 HCHG RX REV CODE 636 W/ 250 OVERRIDE (IP): Performed by: GENERAL PRACTICE

## 2023-06-03 PROCEDURE — A9270 NON-COVERED ITEM OR SERVICE: HCPCS | Performed by: GENERAL PRACTICE

## 2023-06-03 PROCEDURE — 770004 HCHG ROOM/CARE - ONCOLOGY PRIVATE *

## 2023-06-03 RX ORDER — SODIUM CHLORIDE 9 MG/ML
500 INJECTION, SOLUTION INTRAVENOUS ONCE
Status: COMPLETED | OUTPATIENT
Start: 2023-06-03 | End: 2023-06-03

## 2023-06-03 RX ADMIN — AMPICILLIN SODIUM 2000 MG: 2 INJECTION, POWDER, FOR SOLUTION INTRAVENOUS at 12:20

## 2023-06-03 RX ADMIN — ENOXAPARIN SODIUM 60 MG: 100 INJECTION SUBCUTANEOUS at 06:01

## 2023-06-03 RX ADMIN — AMPICILLIN SODIUM 2000 MG: 2 INJECTION, POWDER, FOR SOLUTION INTRAVENOUS at 06:02

## 2023-06-03 RX ADMIN — AMPICILLIN SODIUM 2000 MG: 2 INJECTION, POWDER, FOR SOLUTION INTRAVENOUS at 17:42

## 2023-06-03 RX ADMIN — SODIUM CHLORIDE 500 ML: 9 INJECTION, SOLUTION INTRAVENOUS at 05:10

## 2023-06-03 RX ADMIN — ATORVASTATIN CALCIUM 20 MG: 20 TABLET, FILM COATED ORAL at 20:27

## 2023-06-03 RX ADMIN — AMPICILLIN SODIUM 2000 MG: 2 INJECTION, POWDER, FOR SOLUTION INTRAVENOUS at 00:37

## 2023-06-03 RX ADMIN — ENOXAPARIN SODIUM 60 MG: 100 INJECTION SUBCUTANEOUS at 17:42

## 2023-06-03 ASSESSMENT — PAIN DESCRIPTION - PAIN TYPE: TYPE: ACUTE PAIN

## 2023-06-03 ASSESSMENT — ENCOUNTER SYMPTOMS
GASTROINTESTINAL NEGATIVE: 1
EYES NEGATIVE: 1
CARDIOVASCULAR NEGATIVE: 1
RESPIRATORY NEGATIVE: 1
NEUROLOGICAL NEGATIVE: 1
MUSCULOSKELETAL NEGATIVE: 1
PSYCHIATRIC NEGATIVE: 1

## 2023-06-03 ASSESSMENT — FIBROSIS 4 INDEX: FIB4 SCORE: 1.97

## 2023-06-03 NOTE — ASSESSMENT & PLAN NOTE
6/6/2023  On Eliquis at home.  Placed on Lovenox on admission due to possible procedure.  Transition back to apixaban.

## 2023-06-03 NOTE — ASSESSMENT & PLAN NOTE
6/6/2023  Patient was admitted for Enterococcus faecalis pyelonephritis in February 2023, discharged with oral antibiotics. Urine cultures from 5/24/2023 also showed Enterococcus, she just completed a 7-day course of Macrobid.  Urine culture from 6/2/2023 grew Enterococcus faecalis, resistant to Macrobid.  She has been on ampicillin since admission, will complete course.  Renal ultrasound was done on 6/4/2023 due to complaint of right flank pain, she has no evidence of hydronephrosis.   Switch from ampicillin to amoxicillin and monitor overnight for continued progress

## 2023-06-03 NOTE — PROGRESS NOTES
Hospital Medicine Daily Progress Note    Date of Service  6/3/2023    Chief Complaint  Savana Manzanares is a 63 y.o. female admitted 6/2/2023 with dysuria, and flank pain for 48 hours.  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  She has a left nephrostomy tube which was placed due to hydronephrosis, last exchanged in April 2023. She has a history of left lower extremity DVT (on Eliquis), and dyslipidemia.    She had Enterococcus faecalis UTI on 2/26/2023 and 5/24/2023.     Hospital Course  On admission, patient was afebrile, tachycardic.  UA was consistent with UTI. She was empirically started on ampicillin due to recent Enterococcus UTI.  Of note, she recently completed a 7-day course of Macrobid.     Patient complained of diarrhea. Stool for C. difficile was ordered.    Interval Problem Update  No further episodes of diarrhea. Afebrile and hemodynamically stable. Urine and blood cultures are pending     I have discussed this patient's plan of care and discharge plan at IDT rounds today with Case Management, Nursing, Nursing leadership, and other members of the IDT team.    Consultants/Specialty  N/A    Code Status  Full Code    Disposition  The patient is not medically cleared for discharge to home or a post-acute facility.  Anticipate discharge to: home with close outpatient follow-up    I have placed the appropriate orders for post-discharge needs.    Review of Systems  Review of Systems   Constitutional:  Positive for malaise/fatigue.   HENT: Negative.     Eyes: Negative.    Respiratory: Negative.     Cardiovascular: Negative.    Gastrointestinal: Negative.    Genitourinary: Negative.    Musculoskeletal: Negative.    Skin: Negative.    Neurological: Negative.    Endo/Heme/Allergies: Negative.    Psychiatric/Behavioral: Negative.          Physical Exam  Temp:  [36.4 °C (97.5 °F)-37.4 °C (99.3 °F)] 36.4 °C (97.5 °F)  Pulse:  []  69  Resp:  [16-20] 16  BP: ()/(49-97) 99/51  SpO2:  [96 %-100 %] 100 %    Physical Exam  Constitutional:       Appearance: She is ill-appearing.   HENT:      Head: Normocephalic.      Nose: Nose normal.      Mouth/Throat:      Mouth: Mucous membranes are moist.   Eyes:      Pupils: Pupils are equal, round, and reactive to light.   Cardiovascular:      Rate and Rhythm: Normal rate.   Pulmonary:      Effort: Pulmonary effort is normal.   Abdominal:      Palpations: Abdomen is soft.   Musculoskeletal:      Cervical back: Normal range of motion.      Right lower leg: No edema.      Left lower leg: No edema.   Skin:     General: Skin is warm.   Neurological:      General: No focal deficit present.      Mental Status: She is alert.   Psychiatric:         Mood and Affect: Mood normal.         Fluids    Intake/Output Summary (Last 24 hours) at 6/3/2023 1100  Last data filed at 6/3/2023 0400  Gross per 24 hour   Intake --   Output 260 ml   Net -260 ml       Laboratory  Recent Labs     06/02/23  1623 06/03/23  0018   WBC 9.8 11.1*   RBC 3.38* 3.23*   HEMOGLOBIN 11.3* 10.7*   HEMATOCRIT 33.6* 32.1*   MCV 99.4* 99.4*   MCH 33.4* 33.1*   MCHC 33.6 33.3   RDW 47.1 47.5   PLATELETCT 135* 111*   MPV 11.4 11.3     Recent Labs     05/31/23  1110 06/02/23  1623 06/03/23  0018   SODIUM 139 142 139   POTASSIUM 3.6 3.7 3.4*   CHLORIDE 104 108 110   CO2 25 21 20   GLUCOSE 98 102* 103*   BUN 10 11 9   CREATININE 0.41* 0.46* 0.41*   CALCIUM 9.0 8.6 8.4*                   Imaging  No orders to display        Assessment/Plan  * Pyelonephritis  Assessment & Plan  Patient was admitted for Enterococcus faecalis pyelonephritis in February 2023, discharged with oral antibiotics.  Urine culture from 5/24/2023 also showed Enterococcus faecalis. Urine cultures from 5/24/2023 showed Enterococcus, she just completed a 7-day course of Macrobid.  UA from admission is consistent with UTI.  Cultures pending.  On ampicillin.  Monitor for  now    Diarrhea of presumed infectious origin  Assessment & Plan  Stool for C. Diff has been ordered    Lymphoma of retroperitoneum (HCC)  Assessment & Plan  Patient follows Dr. Macias. Last received chemotherapy on 5/23/2023, received Neulasta on 5/24/2023, next chemotherapy is planned for June 13     Hydronephrosis- (present on admission)  Assessment & Plan  Patient had left sided hydronephrosis secondary to retroperitoneal lymphoma.  Nephrostomy tube was exchanged in April 2023.    Hyperlipidemia- (present on admission)  Assessment & Plan  Resume patient's statin therapy    DVT (deep venous thrombosis) (HCC)- (present on admission)  Assessment & Plan  On Eliquis at home.  Placed on Lovenox on admission due to possible procedure.          VTE prophylaxis: enoxaparin ppx

## 2023-06-03 NOTE — DIETARY
"Nutrition services: Day 1 of admit.  Savana Manzanares is a 63 y.o. female with admitting DX of pyelonephritis.     Consult received for unintentional weight loss of 2-13 lbs in 3 months due to decreased appetite (MST 2). RD visited pt at bedside. Pt with abdominal pain and decreased appetite. Pt notes chemotherapy has impacted her taste of foods and also greatly decreased her appetite. Pt drinks plant protein shakes at home 1 to 2 per day (unsure of brand name but similar product to Boost). RD suggested Boost supplements BID to help bolster nutrition while in acute care.      Assessment:  Height: 157.5 cm (5' 2\")  Weight: 57.2 kg (126 lb) via standing   Body mass index is 23.05 kg/m²., BMI classification: normal   Diet/Intake: Regular with supplements, no intake recorded     Evaluation:   Pt with reported weight loss.  Pt reports usual body weight to be 135 lbs and that she has been losing weight over the last ~5 months. Per chart pt is down 10 lbs (7.4%) in ~4 months. This is notable though not clinically significant.   126 lbs 6/2/2023  136 lbs 2/10/2023  Current clinical picture and MD progress notes reviewed. Pt admitted with abdominal pain x2 days with PMHx of lymphoma and chemotherapy (last dose 2 weeks ago)   Labs (6/3) K+ 3.4 (L), Glu 103 (H), Ca 8.4 (L)  Meds: zofran, oxycodone,   Skin: no staged wounds, pressure injuries or edema noted   +BM 6/2    Malnutrition Risk: Pt at risk with reported decreased appetite and 10 lb (7.4%) wt loss in ~4 months however unable to meet criteria per ASPEN guidelines at this time.     Recommendations/Plan:  Regular diet as tolerated   Addition of Boost supplements with meals    Encourage intake of all meals and supplements >50%  Document intake of all meals and supplements as % taken in ADL's to provide interdisciplinary communication across all shifts.   Monitor weight.  Nutrition rep will continue to see patient for ongoing meal and snack preferences. "     RD following

## 2023-06-03 NOTE — CARE PLAN
The patient is Watcher - Medium risk of patient condition declining or worsening    Shift Goals  Clinical Goals: Patient will be oriented to unit and know plan of care  Patient Goals: Patient will have adequate pain control  Family Goals: Patient will be comfortable    Progress made toward(s) clinical / shift goals:    Problem: Knowledge Deficit - Standard  Goal: Patient and family/care givers will demonstrate understanding of plan of care, disease process/condition, diagnostic tests and medications  Outcome: Progressing  Note: Patient and daughter updated on plan of care. Patient medicated per MAR for pain. Patient had a bowel movement that was firm and stool sample was contaminated with urine. Patient was educated to give a clean stool sample. New hat placed in toilet.     Problem: Fall Risk  Goal: Patient will remain free from falls  Outcome: Progressing  Note: Fall precautions in place and patient refusing wristband and bed alarm at this time. Patient was educated to call when using the bathroom. Patient was educated on options to use to get to the bathroom safely including a bedside commode.       Patient is not progressing towards the following goals:

## 2023-06-03 NOTE — PROGRESS NOTES
4 Eyes Skin Assessment Completed by Dulce CESPEDES RN and Coby DE LEON RN.    Head WDL  Ears WDL  Nose WDL  Mouth WDL  Neck WDL  Breast/Chest WDL  Shoulder Blades WDL  Spine L neph tube  (R) Arm/Elbow/Hand WDL  (L) Arm/Elbow/Hand WDL  Abdomen WDL  Groin WDL  Scrotum/Coccyx/Buttocks WDL  (R) Leg WDL  (L) Leg Swelling  (R) Heel/Foot/Toe WDL  (L) Heel/Foot/Toe WDL          Devices In Places Central Line      Interventions In Place Pressure Redistribution Mattress    Possible Skin Injury No    Pictures Uploaded Into Epic N/A  Wound Consult Placed N/A  RN Wound Prevention Protocol Ordered No

## 2023-06-03 NOTE — ASSESSMENT & PLAN NOTE
6/6/2023  Patient had left sided hydronephrosis secondary to retroperitoneal lymphoma.  Nephrostomy tube was exchanged in April 2023.  Renal ultrasound was repeated on 6/4/2023 due to right-sided flank pain.  There was no evidence of hydronephrosis.  Briefly discussed with urology today, patient is to follow-up with outpatient urology after discharge.

## 2023-06-03 NOTE — ED NOTES
Med Rec complete per patient  No known allergies  Preferred Pharmacy: [facility] on [address] [facility]

## 2023-06-03 NOTE — H&P
Hospital Medicine History & Physical Note    Date of Service  6/2/2023    Primary Care Physician  Jose Cruz Smith M.D.    Consultants  oncology and urology    Specialist Names: Dr. Macias and Dr. Clancy (consulted by ERP)    Code Status  Full Code    Chief Complaint  Chief Complaint   Patient presents with    Abdominal Pain     Lower abdominal pain that started 2 days ago. Also reports to urinary pain and frequency. Pt has lymphoma and last received chemotherapy 2 weeks ago.     Flank Pain     Bilateral flank pain. Denies fevers at home.        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    Patient reports for the past few days has been experiencing abdominal pain relieved by bowel movements however her bowel movements are more watery and mucousy.  She admits to dysuria and urinary frequency.  Denied any fever or chills at home.    On admission, patient tachycardic, UA positive for UTI.  Past urine cultures grew Enterococcus sensitive to ampicillin.  We will continue with ampicillin at this time.  Urine culture and blood culture pending.  Will discuss further with urology in regards to nephrostomy tube replacement during this admission.    Patient's daughter at bedside, updated on plan of care, all questions answered.    Patient had hospitalization in April for sepsis secondary to Enterococcus pyelonephritis.  Patient had her nephrostomy tube exchanged in early April 2023.    I discussed the plan of care with patient and bedside RN.    Review of Systems  Review of Systems   Constitutional:  Positive for malaise/fatigue.   Gastrointestinal:  Positive for abdominal pain and diarrhea.   Genitourinary:   Positive for flank pain.   All other systems reviewed and are negative.      Past Medical History   has a past medical history of Blood clotting disorder (HCC) (02/2023), Cancer (HCC), Disorder of thyroid, Fatty liver, Heart burn, High cholesterol, Hypokalemia, Normocytic anemia, and Renal disorder.    Surgical History   has a past surgical history that includes pr dx bone marrow aspirations (Left, 2/16/2023) and pr dx bone marrow biopsies (Left, 2/16/2023).     Family History  Family history is unknown by patient.   Family history reviewed with patient. There is no family history that is pertinent to the chief complaint.     Social History   reports that she has never smoked. She has never used smokeless tobacco. She reports that she does not currently use alcohol. She reports that she does not use drugs.    Allergies  No Known Allergies    Medications  Prior to Admission Medications   Prescriptions Last Dose Informant Patient Reported? Taking?   B Complex Vitamins (B COMPLEX PO)  Patient Yes No   Sig: Take 1 Tablet by mouth every day.   CALCIUM PO  Patient Yes No   Sig: Take 1 Tablet by mouth every day.   Docusate Sodium (COLACE PO)  Patient Yes No   Sig: Take  by mouth as needed.   GLUCOSAMINE-CHONDROITIN PO  Patient Yes No   Sig: Take 1 Tablet by mouth every day.   Omega-3 Fatty Acids (FISH OIL) 1000 MG Cap capsule  Patient Yes No   Sig: Take 1,000 mg by mouth every day.   allopurinol (ZYLOPRIM) 300 MG Tab  Patient Yes No   Sig: Take 300 mg by mouth every day.   apixaban (ELIQUIS) 5mg Tab  Patient Yes No   Sig: Take 5 mg by mouth 2 times a day.   atorvastatin (LIPITOR) 20 MG Tab  Patient Yes No   Sig: Take 20 mg by mouth at bedtime.   ferrous sulfate 325 (65 Fe) MG tablet  Patient No No   Sig: Take 1 Tablet by mouth every morning with breakfast.   ondansetron (ZOFRAN) 8 MG Tab  Patient Yes No   Sig: Take 8 mg by mouth 2 times a day as needed.   prochlorperazine (COMPAZINE) 10 MG Tab  Patient Yes No   Sig: Take  10 mg by mouth every 8 hours as needed.   vitamin D3 (CHOLECALCIFEROL) 5000 Unit (125 mcg) Tab  Patient Yes No   Sig: Take 5,000 Units by mouth every day.      Facility-Administered Medications: None       Physical Exam  Temp:  [37.4 °C (99.3 °F)] 37.4 °C (99.3 °F)  Pulse:  [] 73  Resp:  [18-20] 18  BP: (114-153)/(62-97) 114/62  SpO2:  [97 %-98 %] 98 %  Blood Pressure: 114/62   Temperature: 37.4 °C (99.3 °F)   Pulse: 73   Respiration: 18   Pulse Oximetry: 98 %       Physical Exam  Vitals and nursing note reviewed.   Constitutional:       General: She is not in acute distress.     Appearance: She is ill-appearing.   HENT:      Head: Normocephalic and atraumatic.      Mouth/Throat:      Mouth: Mucous membranes are moist.      Pharynx: No oropharyngeal exudate.   Eyes:      Extraocular Movements: Extraocular movements intact.      Pupils: Pupils are equal, round, and reactive to light.   Cardiovascular:      Rate and Rhythm: Normal rate and regular rhythm.      Pulses: Normal pulses.      Heart sounds: No murmur heard.     No friction rub. No gallop.   Pulmonary:      Effort: Pulmonary effort is normal. No respiratory distress.      Breath sounds: No wheezing, rhonchi or rales.   Abdominal:      General: Bowel sounds are normal. There is no distension.      Palpations: Abdomen is soft. There is no mass.      Tenderness: There is no abdominal tenderness. There is right CVA tenderness and left CVA tenderness.      Comments: Left-sided nephrostomy tube in place, suture in place, no evidence of erythema or cellulitis surrounding the insertion site   Musculoskeletal:         General: No swelling or tenderness. Normal range of motion.      Cervical back: Normal range of motion. No rigidity. No muscular tenderness.      Right lower leg: No edema.      Left lower leg: No edema.      Comments: LUE PICC Line -no evidence of erythema or cellulitis surrounding insertion site   Skin:     General: Skin is warm and dry.       Capillary Refill: Capillary refill takes less than 2 seconds.      Findings: No erythema or rash.   Neurological:      General: No focal deficit present.      Mental Status: She is alert and oriented to person, place, and time.      Motor: No weakness.      Gait: Gait normal.         Laboratory:  Recent Labs     06/02/23  1623   WBC 9.8   RBC 3.38*   HEMOGLOBIN 11.3*   HEMATOCRIT 33.6*   MCV 99.4*   MCH 33.4*   MCHC 33.6   RDW 47.1   PLATELETCT 135*   MPV 11.4     Recent Labs     05/31/23  1110 06/02/23  1623   SODIUM 139 142   POTASSIUM 3.6 3.7   CHLORIDE 104 108   CO2 25 21   GLUCOSE 98 102*   BUN 10 11   CREATININE 0.41* 0.46*   CALCIUM 9.0 8.6     Recent Labs     05/31/23  1110 06/02/23  1623   ALTSGPT 12 18   ASTSGOT 12 15   ALKPHOSPHAT 75 73   TBILIRUBIN 0.3 0.2   LIPASE  --  15   GLUCOSE 98 102*         No results for input(s): NTPROBNP in the last 72 hours.      No results for input(s): TROPONINT in the last 72 hours.    Imaging:  No orders to display       no X-Ray or EKG requiring interpretation    Assessment/Plan:  Justification for Admission Status  I anticipate this patient will require at least two midnights for appropriate medical management, necessitating inpatient admission because will require IV antibiotics for refractory pyelonephritis and possible exchange meant of nephrostomy tube.    Patient will need a Med/Surg bed on MEDICAL service .  The need is secondary to will require IV antibiotics for refractory pyelonephritis and possible exchange meant of nephrostomy tube..    * Pyelonephritis  Assessment & Plan  Patient was admitted February 2023 for sepsis secondary to Enterococcus pyelonephritis  Discharged with oral antibiotics  Patient was seen by urology outpatient with recurrent symptoms  Failed outpatient antibiotic therapy  Patient had nephrostomy tube exchange April 2023  Urine culture from 5/24 noted Enterococcus  Patient returns here, flank pain, dysuria, urinary frequency  Patient  currently on ampicillin  Pending urine culture and blood culture  We will need to discuss with urology in regards to exchanging nephrostomy tube  Given recurrence, possible ID consult for antibiotic regimen on discharge    Hydronephrosis- (present on admission)  Assessment & Plan  Left-sided  Secondary to retroperitoneal lymphoma  Nephrostomy tube last exchanged in early April  We will need to discuss with urology, in regards to exchanging it during this admission    Diarrhea of presumed infectious origin  Assessment & Plan  Patient reports for the past week she has been having abdominal pain relieved by bowel movements  Her bowel movements are now watery and mucousy   Patient was on previous courses of antibiotics  C. difficile and stool cultures ordered    Lymphoma of retroperitoneum (HCC)  Assessment & Plan  Patient follows with Dr. Macias  Currently on chemotherapy    Hyperlipidemia- (present on admission)  Assessment & Plan  Resume patient's statin therapy    DVT (deep venous thrombosis) (HCC)- (present on admission)  Assessment & Plan  LLE  On Eliquis   While inpatient maintain on therapeutic Lovenox -due to possible nephrostomy tube exchange        VTE prophylaxis: SCDs/TEDs and therapeutic anticoagulation with Lovenox

## 2023-06-03 NOTE — HOSPITAL COURSE
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    On admission, patient tachycardic, UA positive for UTI.    Patient had hospitalization in April for sepsis secondary to Enterococcus pyelonephritis.  Patient had her nephrostomy tube exchanged in early April 2023.    Urology - change?, johnson - keep IV amp -, confirm with ID about regimen.

## 2023-06-03 NOTE — CARE PLAN
The patient is Watcher - Medium risk of patient condition declining or worsening    Shift Goals  Clinical Goals: Rest, IV abx, pain management  Patient Goals: Rest, pain control  Family Goals: Patient will be comfortable    Progress made toward(s) clinical / shift goals:    Problem: Knowledge Deficit - Standard  Goal: Patient and family/care givers will demonstrate understanding of plan of care, disease process/condition, diagnostic tests and medications  Outcome: Progressing  Note: Pt updated on plan of care,  used as needed. Pt states understanding for IV abx. All questions answered at this time.      Problem: Pain - Standard  Goal: Alleviation of pain or a reduction in pain to the patient’s comfort goal  Outcome: Progressing  Note: Pain assessed using 0-10 verbal pain scale, pt denies need for pain medications at this time.        Patient is not progressing towards the following goals: N/A

## 2023-06-03 NOTE — ASSESSMENT & PLAN NOTE
6/6/2023  Patient follows Dr. Macias. Last received chemotherapy on 5/23/2023, received Neulasta on 5/24/2023, next chemotherapy is planned for June 13

## 2023-06-04 ENCOUNTER — APPOINTMENT (OUTPATIENT)
Dept: RADIOLOGY | Facility: MEDICAL CENTER | Age: 63
DRG: 699 | End: 2023-06-04
Attending: INTERNAL MEDICINE
Payer: COMMERCIAL

## 2023-06-04 LAB
ANION GAP SERPL CALC-SCNC: 10 MMOL/L (ref 7–16)
BACTERIA UR CULT: ABNORMAL
BACTERIA UR CULT: ABNORMAL
BASOPHILS # BLD AUTO: 0.8 % (ref 0–1.8)
BASOPHILS # BLD: 0.05 K/UL (ref 0–0.12)
BUN SERPL-MCNC: 5 MG/DL (ref 8–22)
CALCIUM SERPL-MCNC: 8.2 MG/DL (ref 8.5–10.5)
CHLORIDE SERPL-SCNC: 111 MMOL/L (ref 96–112)
CO2 SERPL-SCNC: 23 MMOL/L (ref 20–33)
CREAT SERPL-MCNC: 0.38 MG/DL (ref 0.5–1.4)
EOSINOPHIL # BLD AUTO: 0.1 K/UL (ref 0–0.51)
EOSINOPHIL NFR BLD: 1.6 % (ref 0–6.9)
ERYTHROCYTE [DISTWIDTH] IN BLOOD BY AUTOMATED COUNT: 47.5 FL (ref 35.9–50)
GFR SERPLBLD CREATININE-BSD FMLA CKD-EPI: 113 ML/MIN/1.73 M 2
GLUCOSE SERPL-MCNC: 96 MG/DL (ref 65–99)
HCT VFR BLD AUTO: 30.9 % (ref 37–47)
HGB BLD-MCNC: 10.3 G/DL (ref 12–16)
IMM GRANULOCYTES # BLD AUTO: 0.12 K/UL (ref 0–0.11)
IMM GRANULOCYTES NFR BLD AUTO: 1.9 % (ref 0–0.9)
LYMPHOCYTES # BLD AUTO: 1.02 K/UL (ref 1–4.8)
LYMPHOCYTES NFR BLD: 16.6 % (ref 22–41)
MCH RBC QN AUTO: 33 PG (ref 27–33)
MCHC RBC AUTO-ENTMCNC: 33.3 G/DL (ref 32.2–35.5)
MCV RBC AUTO: 99 FL (ref 81.4–97.8)
MONOCYTES # BLD AUTO: 0.45 K/UL (ref 0–0.85)
MONOCYTES NFR BLD AUTO: 7.3 % (ref 0–13.4)
NEUTROPHILS # BLD AUTO: 4.42 K/UL (ref 1.82–7.42)
NEUTROPHILS NFR BLD: 71.8 % (ref 44–72)
NRBC # BLD AUTO: 0 K/UL
NRBC BLD-RTO: 0 /100 WBC (ref 0–0.2)
PLATELET # BLD AUTO: 126 K/UL (ref 164–446)
PMV BLD AUTO: 11 FL (ref 9–12.9)
POTASSIUM SERPL-SCNC: 3.5 MMOL/L (ref 3.6–5.5)
RBC # BLD AUTO: 3.12 M/UL (ref 4.2–5.4)
SIGNIFICANT IND 70042: ABNORMAL
SITE SITE: ABNORMAL
SODIUM SERPL-SCNC: 144 MMOL/L (ref 135–145)
SOURCE SOURCE: ABNORMAL
WBC # BLD AUTO: 6.2 K/UL (ref 4.8–10.8)

## 2023-06-04 PROCEDURE — 99232 SBSQ HOSP IP/OBS MODERATE 35: CPT | Performed by: INTERNAL MEDICINE

## 2023-06-04 PROCEDURE — A9270 NON-COVERED ITEM OR SERVICE: HCPCS | Performed by: GENERAL PRACTICE

## 2023-06-04 PROCEDURE — 770004 HCHG ROOM/CARE - ONCOLOGY PRIVATE *

## 2023-06-04 PROCEDURE — 76775 US EXAM ABDO BACK WALL LIM: CPT

## 2023-06-04 PROCEDURE — 700111 HCHG RX REV CODE 636 W/ 250 OVERRIDE (IP): Performed by: GENERAL PRACTICE

## 2023-06-04 PROCEDURE — 700102 HCHG RX REV CODE 250 W/ 637 OVERRIDE(OP): Performed by: GENERAL PRACTICE

## 2023-06-04 PROCEDURE — 700105 HCHG RX REV CODE 258: Performed by: GENERAL PRACTICE

## 2023-06-04 PROCEDURE — 85025 COMPLETE CBC W/AUTO DIFF WBC: CPT

## 2023-06-04 PROCEDURE — 80048 BASIC METABOLIC PNL TOTAL CA: CPT

## 2023-06-04 RX ADMIN — ATORVASTATIN CALCIUM 20 MG: 20 TABLET, FILM COATED ORAL at 21:02

## 2023-06-04 RX ADMIN — AMPICILLIN SODIUM 2000 MG: 2 INJECTION, POWDER, FOR SOLUTION INTRAVENOUS at 11:45

## 2023-06-04 RX ADMIN — AMPICILLIN SODIUM 2000 MG: 2 INJECTION, POWDER, FOR SOLUTION INTRAVENOUS at 17:39

## 2023-06-04 RX ADMIN — AMPICILLIN SODIUM 2000 MG: 2 INJECTION, POWDER, FOR SOLUTION INTRAVENOUS at 05:06

## 2023-06-04 RX ADMIN — ENOXAPARIN SODIUM 60 MG: 100 INJECTION SUBCUTANEOUS at 05:05

## 2023-06-04 RX ADMIN — AMPICILLIN SODIUM 2000 MG: 2 INJECTION, POWDER, FOR SOLUTION INTRAVENOUS at 00:29

## 2023-06-04 RX ADMIN — ENOXAPARIN SODIUM 60 MG: 100 INJECTION SUBCUTANEOUS at 17:38

## 2023-06-04 ASSESSMENT — ENCOUNTER SYMPTOMS
NEUROLOGICAL NEGATIVE: 1
GASTROINTESTINAL NEGATIVE: 1
PSYCHIATRIC NEGATIVE: 1
RESPIRATORY NEGATIVE: 1
MUSCULOSKELETAL NEGATIVE: 1
CARDIOVASCULAR NEGATIVE: 1
EYES NEGATIVE: 1

## 2023-06-04 ASSESSMENT — PAIN DESCRIPTION - PAIN TYPE
TYPE: ACUTE PAIN
TYPE: ACUTE PAIN

## 2023-06-04 NOTE — CARE PLAN
The patient is Watcher - Medium risk of patient condition declining or worsening    Shift Goals  Clinical Goals: Pain management, rest  Patient Goals: Rest  Family Goals: Patient will be comfortable    Progress made toward(s) clinical / shift goals:    Problem: Knowledge Deficit - Standard  Goal: Patient and family/care givers will demonstrate understanding of plan of care, disease process/condition, diagnostic tests and medications  Outcome: Progressing  Note: Pt updated on plan of care, pt states understanding for plan of IV abx and pending ultrasound. All questions answered at this time.      Problem: Fall Risk  Goal: Patient will remain free from falls  Outcome: Progressing  Note: Pt up self, pt uses call light appropriately, bed locked and in lowest position, nonslip socks on.      Problem: Pain - Standard  Goal: Alleviation of pain or a reduction in pain to the patient’s comfort goal  Outcome: Progressing  Note: Pain assessed using 0-10 verbal pain scale, pt states pain is tolerable and medication is not needed.        Patient is not progressing towards the following goals: N/A

## 2023-06-04 NOTE — CARE PLAN
The patient is Stable - Low risk of patient condition declining or worsening    Shift Goals  Clinical Goals: pain control, rest  Patient Goals: rest  Family Goals: Patient will be comfortable    Progress made toward(s) clinical / shift goals:    Problem: Knowledge Deficit - Standard  Goal: Patient and family/care givers will demonstrate understanding of plan of care, disease process/condition, diagnostic tests and medications  Outcome: Progressing  Note: Pt educated on plan of care, pt verbalizes understanding and agrees to comply.      Problem: Pain - Standard  Goal: Alleviation of pain or a reduction in pain to the patient’s comfort goal  Outcome: Progressing  Note: Pain assessed using 0-10 pain scale, pt states pain is tolerable at this time and will request pain medication when needed. Will medicate per MAR.        Patient is not progressing towards the following goals:

## 2023-06-04 NOTE — PROGRESS NOTES
[facility] Medicine Daily Progress Note    Date of Service  6/4/2023    Chief Complaint  Savana Manzanares is a 63 y.o. female admitted 6/2/2023 with dysuria, and flank pain for 48 hours.  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  She has a left nephrostomy tube which was placed due to hydronephrosis, last exchanged in April 2023. She has a history of left lower extremity DVT (on Eliquis), and dyslipidemia.    She had Enterococcus faecalis UTI on 2/26/2023 and 5/24/2023.     Hospital Course  On admission, patient was afebrile, tachycardic.  UA was consistent with UTI. She was empirically started on ampicillin due to recent Enterococcus UTI. She recently completed a 7-day course of Macrobid.     Patient complained of diarrhea. Stool for C. difficile was ordered.    Interval Problem Update  No further episodes of diarrhea. Afebrile and hemodynamically stable.  Continues to complain of right flank pain, renal ultrasound ordered. Urine and blood cultures are pending.  On ampicillin    I have discussed this patient's plan of care and discharge plan at IDT rounds today with Case Management, Nursing, Nursing leadership, and other members of the IDT team.    Consultants/Specialty  N/A    Code Status  Full Code    Disposition  The patient is not medically cleared for discharge to home or a post-acute facility.  Anticipate discharge to: home with close outpatient follow-up    I have placed the appropriate orders for post-discharge needs.    Review of Systems  Review of Systems   Constitutional:  Positive for malaise/fatigue.   HENT: Negative.     Eyes: Negative.    Respiratory: Negative.     Cardiovascular: Negative.    Gastrointestinal: Negative.    Genitourinary: Negative.    Musculoskeletal: Negative.    Skin: Negative.    Neurological: Negative.    Endo/Heme/Allergies: Negative.    Psychiatric/Behavioral: Negative.          Physical Exam  Temp:   [36.2 °C (97.2 °F)-37.2 °C (99 °F)] 36.2 °C (97.2 °F)  Pulse:  [68-79] 71  Resp:  [16-18] 17  BP: ()/(52-58) 109/58  SpO2:  [96 %-99 %] 96 %    Physical Exam  Constitutional:       Appearance: She is ill-appearing.   HENT:      Head: Normocephalic.      Nose: Nose normal.      Mouth/Throat:      Mouth: Mucous membranes are moist.   Eyes:      Pupils: Pupils are equal, round, and reactive to light.   Cardiovascular:      Rate and Rhythm: Normal rate.   Pulmonary:      Effort: Pulmonary effort is normal.   Abdominal:      Palpations: Abdomen is soft.   Musculoskeletal:      Cervical back: Normal range of motion.      Right lower leg: No edema.      Left lower leg: No edema.   Skin:     General: Skin is warm.   Neurological:      General: No focal deficit present.      Mental Status: She is alert.   Psychiatric:         Mood and Affect: Mood normal.         Fluids  No intake or output data in the 24 hours ending 06/04/23 1022      Laboratory  Recent Labs     06/02/23 1623 06/03/23 0018 06/04/23  0023   WBC 9.8 11.1* 6.2   RBC 3.38* 3.23* 3.12*   HEMOGLOBIN 11.3* 10.7* 10.3*   HEMATOCRIT 33.6* 32.1* 30.9*   MCV 99.4* 99.4* 99.0*   MCH 33.4* 33.1* 33.0   MCHC 33.6 33.3 33.3   RDW 47.1 47.5 47.5   PLATELETCT 135* 111* 126*   MPV 11.4 11.3 11.0     Recent Labs     06/02/23  1623 06/03/23  0018 06/04/23  0023   SODIUM 142 139 144   POTASSIUM 3.7 3.4* 3.5*   CHLORIDE 108 110 111   CO2 21 20 23   GLUCOSE 102* 103* 96   BUN 11 9 5*   CREATININE 0.46* 0.41* 0.38*   CALCIUM 8.6 8.4* 8.2*                   Imaging  No orders to display        Assessment/Plan  * Pyelonephritis  Assessment & Plan  Patient was admitted for Enterococcus faecalis pyelonephritis in February 2023, discharged with oral antibiotics.  Urine culture from 5/24/2023 also showed Enterococcus faecalis. Urine cultures from 5/24/2023 showed Enterococcus, she just completed a 7-day course of Macrobid.  UA from admission is consistent with UTI.  Cultures  pending.  On ampicillin.  Complains of right-sided flank pain.  Renal ultrasound ordered.  Monitor for now    Diarrhea of presumed infectious origin  Assessment & Plan  Stool for C. Diff has been ordered    Lymphoma of retroperitoneum (HCC)  Assessment & Plan  Patient follows Dr. Macias. Last received chemotherapy on 5/23/2023, received Neulasta on 5/24/2023, next chemotherapy is planned for June 13     Hydronephrosis- (present on admission)  Assessment & Plan  Patient had left sided hydronephrosis secondary to retroperitoneal lymphoma.  Nephrostomy tube was exchanged in April 2023.    Hyperlipidemia- (present on admission)  Assessment & Plan  Resume patient's statin therapy    DVT (deep venous thrombosis) (HCC)- (present on admission)  Assessment & Plan  On Eliquis at home.  Placed on Lovenox on admission due to possible procedure.          VTE prophylaxis: enoxaparin ppx

## 2023-06-05 LAB
ANION GAP SERPL CALC-SCNC: 10 MMOL/L (ref 7–16)
BASOPHILS # BLD AUTO: 0.9 % (ref 0–1.8)
BASOPHILS # BLD: 0.05 K/UL (ref 0–0.12)
BUN SERPL-MCNC: 8 MG/DL (ref 8–22)
CALCIUM SERPL-MCNC: 8.4 MG/DL (ref 8.5–10.5)
CHLORIDE SERPL-SCNC: 112 MMOL/L (ref 96–112)
CO2 SERPL-SCNC: 22 MMOL/L (ref 20–33)
CREAT SERPL-MCNC: 0.4 MG/DL (ref 0.5–1.4)
EOSINOPHIL # BLD AUTO: 0.11 K/UL (ref 0–0.51)
EOSINOPHIL NFR BLD: 2 % (ref 0–6.9)
ERYTHROCYTE [DISTWIDTH] IN BLOOD BY AUTOMATED COUNT: 47.5 FL (ref 35.9–50)
GFR SERPLBLD CREATININE-BSD FMLA CKD-EPI: 112 ML/MIN/1.73 M 2
GLUCOSE SERPL-MCNC: 93 MG/DL (ref 65–99)
HCT VFR BLD AUTO: 31.6 % (ref 37–47)
HGB BLD-MCNC: 10.5 G/DL (ref 12–16)
IMM GRANULOCYTES # BLD AUTO: 0.1 K/UL (ref 0–0.11)
IMM GRANULOCYTES NFR BLD AUTO: 1.8 % (ref 0–0.9)
LYMPHOCYTES # BLD AUTO: 1.09 K/UL (ref 1–4.8)
LYMPHOCYTES NFR BLD: 19.7 % (ref 22–41)
MCH RBC QN AUTO: 32.9 PG (ref 27–33)
MCHC RBC AUTO-ENTMCNC: 33.2 G/DL (ref 32.2–35.5)
MCV RBC AUTO: 99.1 FL (ref 81.4–97.8)
MONOCYTES # BLD AUTO: 0.51 K/UL (ref 0–0.85)
MONOCYTES NFR BLD AUTO: 9.2 % (ref 0–13.4)
NEUTROPHILS # BLD AUTO: 3.67 K/UL (ref 1.82–7.42)
NEUTROPHILS NFR BLD: 66.4 % (ref 44–72)
NRBC # BLD AUTO: 0 K/UL
NRBC BLD-RTO: 0 /100 WBC (ref 0–0.2)
PLATELET # BLD AUTO: 147 K/UL (ref 164–446)
PMV BLD AUTO: 10.7 FL (ref 9–12.9)
POTASSIUM SERPL-SCNC: 3.4 MMOL/L (ref 3.6–5.5)
RBC # BLD AUTO: 3.19 M/UL (ref 4.2–5.4)
SODIUM SERPL-SCNC: 144 MMOL/L (ref 135–145)
WBC # BLD AUTO: 5.5 K/UL (ref 4.8–10.8)

## 2023-06-05 PROCEDURE — A9270 NON-COVERED ITEM OR SERVICE: HCPCS | Performed by: GENERAL PRACTICE

## 2023-06-05 PROCEDURE — 85025 COMPLETE CBC W/AUTO DIFF WBC: CPT

## 2023-06-05 PROCEDURE — 700102 HCHG RX REV CODE 250 W/ 637 OVERRIDE(OP): Performed by: GENERAL PRACTICE

## 2023-06-05 PROCEDURE — 700105 HCHG RX REV CODE 258: Performed by: GENERAL PRACTICE

## 2023-06-05 PROCEDURE — 80048 BASIC METABOLIC PNL TOTAL CA: CPT

## 2023-06-05 PROCEDURE — 700111 HCHG RX REV CODE 636 W/ 250 OVERRIDE (IP): Performed by: GENERAL PRACTICE

## 2023-06-05 PROCEDURE — 770004 HCHG ROOM/CARE - ONCOLOGY PRIVATE *

## 2023-06-05 PROCEDURE — 99232 SBSQ HOSP IP/OBS MODERATE 35: CPT | Performed by: INTERNAL MEDICINE

## 2023-06-05 RX ADMIN — ENOXAPARIN SODIUM 60 MG: 100 INJECTION SUBCUTANEOUS at 17:03

## 2023-06-05 RX ADMIN — AMPICILLIN SODIUM 2000 MG: 2 INJECTION, POWDER, FOR SOLUTION INTRAVENOUS at 00:18

## 2023-06-05 RX ADMIN — AMPICILLIN SODIUM 2000 MG: 2 INJECTION, POWDER, FOR SOLUTION INTRAVENOUS at 23:47

## 2023-06-05 RX ADMIN — AMPICILLIN SODIUM 2000 MG: 2 INJECTION, POWDER, FOR SOLUTION INTRAVENOUS at 17:34

## 2023-06-05 RX ADMIN — ACETAMINOPHEN 650 MG: 325 TABLET, FILM COATED ORAL at 20:35

## 2023-06-05 RX ADMIN — AMPICILLIN SODIUM 2000 MG: 2 INJECTION, POWDER, FOR SOLUTION INTRAVENOUS at 05:43

## 2023-06-05 RX ADMIN — ENOXAPARIN SODIUM 60 MG: 100 INJECTION SUBCUTANEOUS at 05:40

## 2023-06-05 RX ADMIN — ATORVASTATIN CALCIUM 20 MG: 20 TABLET, FILM COATED ORAL at 20:35

## 2023-06-05 RX ADMIN — OXYCODONE 5 MG: 5 TABLET ORAL at 17:02

## 2023-06-05 RX ADMIN — AMPICILLIN SODIUM 2000 MG: 2 INJECTION, POWDER, FOR SOLUTION INTRAVENOUS at 11:45

## 2023-06-05 ASSESSMENT — PAIN DESCRIPTION - PAIN TYPE
TYPE: ACUTE PAIN

## 2023-06-05 ASSESSMENT — ENCOUNTER SYMPTOMS
MUSCULOSKELETAL NEGATIVE: 1
RESPIRATORY NEGATIVE: 1
GASTROINTESTINAL NEGATIVE: 1
NEUROLOGICAL NEGATIVE: 1
PSYCHIATRIC NEGATIVE: 1
CARDIOVASCULAR NEGATIVE: 1
EYES NEGATIVE: 1

## 2023-06-05 NOTE — CARE PLAN
The patient is Watcher - Medium risk of patient condition declining or worsening    Shift Goals  Clinical Goals: IV abx, stool sample, rest  Patient Goals: Rest  Family Goals: Patient will be comfortable    Progress made toward(s) clinical / shift goals:    Problem: Knowledge Deficit - Standard  Goal: Patient and family/care givers will demonstrate understanding of plan of care, disease process/condition, diagnostic tests and medications  Outcome: Progressing  Note: Pt updated on plan of care, pt states understanding for continued IV abx. All questions answered at this time.      Problem: Pain - Standard  Goal: Alleviation of pain or a reduction in pain to the patient’s comfort goal  Outcome: Progressing  Note: Pain assessed using 0-10 verbal pain scale, pt denies need for PRN pain medications.        Patient is not progressing towards the following goals: N/A

## 2023-06-05 NOTE — DOCUMENTATION QUERY
"                                                                         [facility]                                                                       Query Response Note      PATIENT:               SUSAN CANO  ACCT #:                  [id number]  MRN:                     [id number]  :                      1960  ADMIT DATE:       2023 3:41 PM  DISCH DATE:          RESPONDING  PROVIDER #:        [id number]           QUERY TEXT:    Please clarify in documentation the relationship, if any, between nephrostomy  tube and pyelonephritis/UTI.    Note: If you agree with a diagnosis listed above, please remember to include it in your concurrent daily documentation and onto the Discharge Summary.      The patient's Clinical Indicators include:  63 year old female admitted with Pyelonephritis     [place] \"Pyelonephritis\"  \"She has a left nephrostomy  tube which was placed due to hydronephrosis, last exchanged in 2023.\"  \"UA was consistent with UTI\"  \"Placed on Lovenox on admission due to possible procedure.\"     + UC    Risk factors: Lymphoma of retroperitoneum   Treatment: labs, abx, US    If you have any questions, please contact:  Zenia Logan RN CDI [facility]  [email or web address]  Zenia Logan [address]  Options provided:   -- Condition pyelonephritis/UTI is due to or associated with the nephrostomy tube   -- Condition pyelonephritis/UTI is not due to or associated with the nephrostomy tube   -- CAUTI   -- Other explanation, please specify   -- Unable to determine      Query created by: Zenia Logan on 2023 1:38 PM    RESPONSE TEXT:    Condition pyelonephritis/UTI is due to or associated with the nephrostomy tube          Electronically signed by:  DION PADILLA MD 2023 1:43 PM              "

## 2023-06-05 NOTE — PROGRESS NOTES
[facility] Medicine Daily Progress Note    Date of Service  6/5/2023    Chief Complaint  Savana Manzanares is a 63 y.o. female admitted 6/2/2023 with dysuria, and flank pain for 48 hours.  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  She has a left nephrostomy tube which was placed due to hydronephrosis, last exchanged in April 2023. She has a history of left lower extremity DVT (on Eliquis), and dyslipidemia.    She had Enterococcus faecalis UTI on 2/26/2023 and 5/24/2023.     Hospital Course  On admission, patient was afebrile, tachycardic.  UA was consistent with UTI. She was empirically started on ampicillin due to recent Enterococcus UTI. She recently completed a 7-day course of Macrobid.     Patient complained of diarrhea. Stool for C. difficile was ordered. She had no further episodes of diarrhea.     Interval Problem Update  Urine culture from 6/2/2023 grew Enterococcus faecalis (resistant to Macrobid).  Renal ultrasound was done due to persistent right flank pain, no hydronephrosis was reported.  Potassium is low, being replaced.  Blood cultures have been negative.  On ampicillin.     Patient was concerned about her nephrostomy tube and stated that urology wanted to change her nephrostomy inpatient, I confirmed this with the on-call Urologist today, they will see her as an outpatient after discharge.    I have discussed this patient's plan of care and discharge plan at IDT rounds today with Case Management, Nursing, Nursing leadership, and other members of the IDT team.    Consultants/Specialty  N/A    Code Status  Full Code    Disposition  The patient is not medically cleared for discharge to home or a post-acute facility.  Anticipate discharge to: home with close outpatient follow-up    I have placed the appropriate orders for post-discharge needs.    Review of Systems  Review of Systems   Constitutional:  Positive for  malaise/fatigue.   HENT: Negative.     Eyes: Negative.    Respiratory: Negative.     Cardiovascular: Negative.    Gastrointestinal: Negative.    Genitourinary: Negative.    Musculoskeletal: Negative.    Skin: Negative.    Neurological: Negative.    Endo/Heme/Allergies: Negative.    Psychiatric/Behavioral: Negative.          Physical Exam  Temp:  [36.3 °C (97.4 °F)-37.2 °C (98.9 °F)] 36.3 °C (97.4 °F)  Pulse:  [63-72] 65  Resp:  [16-18] 18  BP: ()/(42-74) 100/42  SpO2:  [95 %-99 %] 95 %    Physical Exam  Constitutional:       Appearance: She is ill-appearing.   HENT:      Head: Normocephalic.      Nose: Nose normal.      Mouth/Throat:      Mouth: Mucous membranes are moist.   Eyes:      Pupils: Pupils are equal, round, and reactive to light.   Cardiovascular:      Rate and Rhythm: Normal rate.   Pulmonary:      Effort: Pulmonary effort is normal.   Abdominal:      Palpations: Abdomen is soft.   Musculoskeletal:      Cervical back: Normal range of motion.      Right lower leg: No edema.      Left lower leg: No edema.   Skin:     General: Skin is warm.   Neurological:      General: No focal deficit present.      Mental Status: She is alert.   Psychiatric:         Mood and Affect: Mood normal.         Fluids  No intake or output data in the 24 hours ending 06/05/23 1401      Laboratory  Recent Labs     06/03/23  0018 06/04/23  0023 06/05/23  0024   WBC 11.1* 6.2 5.5   RBC 3.23* 3.12* 3.19*   HEMOGLOBIN 10.7* 10.3* 10.5*   HEMATOCRIT 32.1* 30.9* 31.6*   MCV 99.4* 99.0* 99.1*   MCH 33.1* 33.0 32.9   MCHC 33.3 33.3 33.2   RDW 47.5 47.5 47.5   PLATELETCT 111* 126* 147*   MPV 11.3 11.0 10.7     Recent Labs     06/03/23  0018 06/04/23  0023 06/05/23  0042   SODIUM 139 144 144   POTASSIUM 3.4* 3.5* 3.4*   CHLORIDE 110 111 112   CO2 20 23 22   GLUCOSE 103* 96 93   BUN 9 5* 8   CREATININE 0.41* 0.38* 0.40*   CALCIUM 8.4* 8.2* 8.4*                   Imaging  US-RENAL   Final Result      1.  There is no hydronephrosis.   2.   There is a left nephrostomy tube evident.   3.  Simple left renal cyst does not need further imaging follow-up per ACR guidelines.           Assessment/Plan  * Pyelonephritis  Assessment & Plan  Patient was admitted for Enterococcus faecalis pyelonephritis in February 2023, discharged with oral antibiotics. Urine cultures from 5/24/2023 also showed Enterococcus, she just completed a 7-day course of Macrobid.  Urine culture from 6/2/2023 grew Enterococcus faecalis, resistant to Macrobid.  She has been on ampicillin since admission, will complete course.  Renal ultrasound was done on 6/4/2023 due to complaint of right flank pain, she has no evidence of hydronephrosis.     Lymphoma of retroperitoneum (HCC)  Assessment & Plan  Patient follows Dr. Macias. Last received chemotherapy on 5/23/2023, received Neulasta on 5/24/2023, next chemotherapy is planned for June 13     Hydronephrosis- (present on admission)  Assessment & Plan  Patient had left sided hydronephrosis secondary to retroperitoneal lymphoma.  Nephrostomy tube was exchanged in April 2023.  Renal ultrasound was repeated on 6/4/2023 due to right-sided flank pain.  There was no evidence of hydronephrosis.  Briefly discussed with urology today, patient is to follow-up with outpatient urology after discharge.    Diarrhea of presumed infectious origin  Assessment & Plan  No episodes of diarrhea since admission.    Hyperlipidemia- (present on admission)  Assessment & Plan  Resume patient's statin therapy    DVT (deep venous thrombosis) ([facility])- (present on admission)  Assessment & Plan  On Eliquis at home.  Placed on Lovenox on admission due to possible procedure.          VTE prophylaxis: enoxaparin ppx

## 2023-06-05 NOTE — CARE PLAN
The patient is Watcher - Medium risk of patient condition declining or worsening    Shift Goals  Clinical Goals: C Diff rule out, Complete abx treatment  Patient Goals: Rest  Family Goals: Patient will be comfortable    Progress made toward(s) clinical / shift goals:        Problem: Knowledge Deficit - Standard  Goal: Patient and family/care givers will demonstrate understanding of plan of care, disease process/condition, diagnostic tests and medications  Description: Target End Date:  1-3 days or as soon as patient condition allows    Document in Patient Education    1.  Patient and family/caregiver oriented to unit, equipment, visitation policy and means for communicating concern  2.  Complete/review Learning Assessment  3.  Assess knowledge level of disease process/condition, treatment plan, diagnostic tests and medications  4.  Explain disease process/condition, treatment plan, diagnostic tests and medications  Outcome: Progressing  Note: Patient understands the continued need for abx treatment.

## 2023-06-06 ENCOUNTER — APPOINTMENT (OUTPATIENT)
Dept: RADIOLOGY | Facility: MEDICAL CENTER | Age: 63
DRG: 699 | End: 2023-06-06
Attending: INTERNAL MEDICINE
Payer: COMMERCIAL

## 2023-06-06 LAB
ANION GAP SERPL CALC-SCNC: 9 MMOL/L (ref 7–16)
BASOPHILS # BLD AUTO: 1.5 % (ref 0–1.8)
BASOPHILS # BLD: 0.06 K/UL (ref 0–0.12)
BUN SERPL-MCNC: 8 MG/DL (ref 8–22)
CALCIUM SERPL-MCNC: 8.3 MG/DL (ref 8.5–10.5)
CHLORIDE SERPL-SCNC: 109 MMOL/L (ref 96–112)
CO2 SERPL-SCNC: 25 MMOL/L (ref 20–33)
CREAT SERPL-MCNC: 0.36 MG/DL (ref 0.5–1.4)
EOSINOPHIL # BLD AUTO: 0.12 K/UL (ref 0–0.51)
EOSINOPHIL NFR BLD: 2.9 % (ref 0–6.9)
ERYTHROCYTE [DISTWIDTH] IN BLOOD BY AUTOMATED COUNT: 47.1 FL (ref 35.9–50)
GFR SERPLBLD CREATININE-BSD FMLA CKD-EPI: 115 ML/MIN/1.73 M 2
GLUCOSE SERPL-MCNC: 93 MG/DL (ref 65–99)
HCT VFR BLD AUTO: 30.7 % (ref 37–47)
HGB BLD-MCNC: 10.5 G/DL (ref 12–16)
IMM GRANULOCYTES # BLD AUTO: 0.06 K/UL (ref 0–0.11)
IMM GRANULOCYTES NFR BLD AUTO: 1.5 % (ref 0–0.9)
LYMPHOCYTES # BLD AUTO: 0.97 K/UL (ref 1–4.8)
LYMPHOCYTES NFR BLD: 23.5 % (ref 22–41)
MCH RBC QN AUTO: 33.3 PG (ref 27–33)
MCHC RBC AUTO-ENTMCNC: 34.2 G/DL (ref 32.2–35.5)
MCV RBC AUTO: 97.5 FL (ref 81.4–97.8)
MONOCYTES # BLD AUTO: 0.39 K/UL (ref 0–0.85)
MONOCYTES NFR BLD AUTO: 9.5 % (ref 0–13.4)
NEUTROPHILS # BLD AUTO: 2.52 K/UL (ref 1.82–7.42)
NEUTROPHILS NFR BLD: 61.1 % (ref 44–72)
NRBC # BLD AUTO: 0 K/UL
NRBC BLD-RTO: 0 /100 WBC (ref 0–0.2)
PLATELET # BLD AUTO: 183 K/UL (ref 164–446)
PMV BLD AUTO: 10.5 FL (ref 9–12.9)
POTASSIUM SERPL-SCNC: 3.5 MMOL/L (ref 3.6–5.5)
RBC # BLD AUTO: 3.15 M/UL (ref 4.2–5.4)
SODIUM SERPL-SCNC: 143 MMOL/L (ref 135–145)
WBC # BLD AUTO: 4.1 K/UL (ref 4.8–10.8)

## 2023-06-06 PROCEDURE — 85025 COMPLETE CBC W/AUTO DIFF WBC: CPT

## 2023-06-06 PROCEDURE — 770004 HCHG ROOM/CARE - ONCOLOGY PRIVATE *

## 2023-06-06 PROCEDURE — 700102 HCHG RX REV CODE 250 W/ 637 OVERRIDE(OP): Performed by: INTERNAL MEDICINE

## 2023-06-06 PROCEDURE — 700105 HCHG RX REV CODE 258: Performed by: GENERAL PRACTICE

## 2023-06-06 PROCEDURE — 80048 BASIC METABOLIC PNL TOTAL CA: CPT

## 2023-06-06 PROCEDURE — 99232 SBSQ HOSP IP/OBS MODERATE 35: CPT | Performed by: INTERNAL MEDICINE

## 2023-06-06 PROCEDURE — 74018 RADEX ABDOMEN 1 VIEW: CPT

## 2023-06-06 PROCEDURE — A9270 NON-COVERED ITEM OR SERVICE: HCPCS | Performed by: GENERAL PRACTICE

## 2023-06-06 PROCEDURE — 700111 HCHG RX REV CODE 636 W/ 250 OVERRIDE (IP): Performed by: GENERAL PRACTICE

## 2023-06-06 PROCEDURE — A9270 NON-COVERED ITEM OR SERVICE: HCPCS | Performed by: INTERNAL MEDICINE

## 2023-06-06 PROCEDURE — 700102 HCHG RX REV CODE 250 W/ 637 OVERRIDE(OP): Performed by: GENERAL PRACTICE

## 2023-06-06 RX ORDER — POTASSIUM CHLORIDE 20 MEQ/1
40 TABLET, EXTENDED RELEASE ORAL ONCE
Status: COMPLETED | OUTPATIENT
Start: 2023-06-06 | End: 2023-06-06

## 2023-06-06 RX ORDER — BACLOFEN 10 MG/1
10 TABLET ORAL 3 TIMES DAILY
Status: DISCONTINUED | OUTPATIENT
Start: 2023-06-06 | End: 2023-06-07 | Stop reason: HOSPADM

## 2023-06-06 RX ORDER — ACETAMINOPHEN 325 MG/1
650 TABLET ORAL EVERY 4 HOURS PRN
Status: DISCONTINUED | OUTPATIENT
Start: 2023-06-06 | End: 2023-06-07 | Stop reason: HOSPADM

## 2023-06-06 RX ORDER — AMOXICILLIN 500 MG/1
500 CAPSULE ORAL EVERY 8 HOURS
Status: DISCONTINUED | OUTPATIENT
Start: 2023-06-06 | End: 2023-06-07 | Stop reason: HOSPADM

## 2023-06-06 RX ADMIN — ATORVASTATIN CALCIUM 20 MG: 20 TABLET, FILM COATED ORAL at 20:28

## 2023-06-06 RX ADMIN — MAGNESIUM HYDROXIDE 30 ML: 400 SUSPENSION ORAL at 13:37

## 2023-06-06 RX ADMIN — AMOXICILLIN 500 MG: 500 CAPSULE ORAL at 21:47

## 2023-06-06 RX ADMIN — ACETAMINOPHEN 650 MG: 325 TABLET, FILM COATED ORAL at 08:45

## 2023-06-06 RX ADMIN — ENOXAPARIN SODIUM 60 MG: 100 INJECTION SUBCUTANEOUS at 05:33

## 2023-06-06 RX ADMIN — AMPICILLIN SODIUM 2000 MG: 2 INJECTION, POWDER, FOR SOLUTION INTRAVENOUS at 12:54

## 2023-06-06 RX ADMIN — BACLOFEN 10 MG: 10 TABLET ORAL at 17:18

## 2023-06-06 RX ADMIN — POLYETHYLENE GLYCOL 3350 1 PACKET: 17 POWDER, FOR SOLUTION ORAL at 03:28

## 2023-06-06 RX ADMIN — BACLOFEN 10 MG: 10 TABLET ORAL at 12:53

## 2023-06-06 RX ADMIN — POTASSIUM CHLORIDE 40 MEQ: 1500 TABLET, EXTENDED RELEASE ORAL at 06:36

## 2023-06-06 RX ADMIN — AMPICILLIN SODIUM 2000 MG: 2 INJECTION, POWDER, FOR SOLUTION INTRAVENOUS at 05:30

## 2023-06-06 RX ADMIN — SENNOSIDES AND DOCUSATE SODIUM 2 TABLET: 50; 8.6 TABLET ORAL at 17:21

## 2023-06-06 RX ADMIN — LIDOCAINE HYDROCHLORIDE 30 ML: 20 SOLUTION OROPHARYNGEAL at 22:27

## 2023-06-06 RX ADMIN — AMOXICILLIN 500 MG: 500 CAPSULE ORAL at 17:17

## 2023-06-06 RX ADMIN — APIXABAN 5 MG: 5 TABLET, FILM COATED ORAL at 17:18

## 2023-06-06 RX ADMIN — LIDOCAINE HYDROCHLORIDE 30 ML: 20 SOLUTION OROPHARYNGEAL at 17:18

## 2023-06-06 RX ADMIN — ACETAMINOPHEN 650 MG: 325 TABLET, FILM COATED ORAL at 03:21

## 2023-06-06 ASSESSMENT — PAIN DESCRIPTION - PAIN TYPE
TYPE: ACUTE PAIN
TYPE: ACUTE PAIN

## 2023-06-06 NOTE — CARE PLAN
Problem: Fall Risk  Goal: Patient will remain free from falls  Outcome: Progressing     Problem: Pain - Standard  Goal: Alleviation of pain or a reduction in pain to the patient’s comfort goal  Outcome: Progressing     The patient is Watcher - Medium risk of patient condition declining or worsening    Shift Goals  Clinical Goals: Complete IV ABX  Patient Goals: Rest  Family Goals: Patient will be comfortable    Progress made toward(s) clinical / shift goals:  Fall precautions are in place    Patient is not progressing towards the following goals:

## 2023-06-06 NOTE — PROGRESS NOTES
MD Padilla notified of worsening internal pain in left lower back near nephrostomy tube. Pt states pain worsens when her left leg is straightened. Per MD Padilla continue to monitor pain, nephrostomy site, and left leg at this time.

## 2023-06-06 NOTE — CARE PLAN
The patient is Watcher - Medium risk of patient condition declining or worsening    Shift Goals  Clinical Goals: Complete IV ABX  Patient Goals: Rest  Family Goals: Patient will be comfortable    Progress made toward(s) clinical / shift goals:        Problem: Knowledge Deficit - Standard  Goal: Patient and family/care givers will demonstrate understanding of plan of care, disease process/condition, diagnostic tests and medications  Description: Target End Date:  1-3 days or as soon as patient condition allows    Document in Patient Education    1.  Patient and family/caregiver oriented to unit, equipment, visitation policy and means for communicating concern  2.  Complete/review Learning Assessment  3.  Assess knowledge level of disease process/condition, treatment plan, diagnostic tests and medications  4.  Explain disease process/condition, treatment plan, diagnostic tests and medications  Outcome: Progressing  Note: Patient understands the need for continued treatment with abx and monitoring of labs.

## 2023-06-06 NOTE — PROGRESS NOTES
[facility] Medicine Daily Progress Note    Date of Service  6/6/2023    Chief Complaint  Savana Manzanares is a 63 y.o. female admitted 6/2/2023 with dysuria, and flank pain for 48 hours.  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 Macias.  She has a left nephrostomy tube which was placed due to hydronephrosis, last exchanged in April 2023. She has a history of left lower extremity DVT (on Eliquis), and dyslipidemia.    She had Enterococcus faecalis UTI on 2/26/2023 and 5/24/2023.     Hospital Course  On admission, patient was afebrile, tachycardic.  UA was consistent with UTI. She was empirically started on ampicillin due to recent Enterococcus UTI. She recently completed a 7-day course of Macrobid.     Patient complained of diarrhea. Stool for C. difficile was ordered. She had no further episodes of diarrhea.     Urine culture from 6/2/2023 grew Enterococcus faecalis (resistant to Macrobid).  Renal ultrasound was done due to persistent right flank pain, no hydronephrosis was reported.  Potassium is low, being replaced.  Blood cultures have been negative.  On ampicillin.     Patient was concerned about her nephrostomy tube and stated that urology wanted to change her nephrostomy inpatient, I confirmed this with the on-call Urologist today, they will see her as an outpatient after discharge.    Interval Problem Update  Patient was seen and examined at bedside.  I have personally reviewed and interpreted vitals, labs, and imaging.    6/6.  Afebrile.  Stable vitals.  On room air.  Replete K.  Denies fevers, chills, chest pains, shortness of breath.  Patient continues to report left-sided flank pain.  Ultrasound showed no hydronephrosis.  Ordered KUB which was personally reviewed and interpreted, it does show some impacted stool in the right.  Discussed bowel regimen.  Started on muscle relaxants  baclofen as well as GI cocktail.  Switch to oral antibiotics with amoxicillin.  Switch back to apixaban from Lovenox.    I have discussed this patient's plan of care and discharge plan at IDT rounds today with Case Management, Nursing, Nursing leadership, and other members of the IDT team.    Consultants/Specialty  N/A    Code Status  Full Code    Disposition  The patient is not medically cleared for discharge to home or a post-acute facility.  Anticipate discharge to: home with close outpatient follow-up    I have placed the appropriate orders for post-discharge needs.    Review of Systems  Review of Systems   Constitutional:  Positive for malaise/fatigue.   HENT: Negative.     Eyes: Negative.    Respiratory: Negative.     Cardiovascular: Negative.    Gastrointestinal: Negative.    Genitourinary: Negative.    Musculoskeletal:  Positive for myalgias.   Skin: Negative.    Neurological: Negative.    Endo/Heme/Allergies: Negative.    Psychiatric/Behavioral: Negative.          Physical Exam  Temp:  [36.3 °C (97.4 °F)-37.1 °C (98.8 °F)] 37.1 °C (98.8 °F)  Pulse:  [56-71] 56  Resp:  [16-20] 17  BP: ()/(42-74) 107/61  SpO2:  [95 %-99 %] 96 %    Physical Exam  Constitutional:       Appearance: She is ill-appearing.   HENT:      Head: Normocephalic.      Nose: Nose normal.      Mouth/Throat:      Mouth: Mucous membranes are moist.   Eyes:      Pupils: Pupils are equal, round, and reactive to light.   Cardiovascular:      Rate and Rhythm: Normal rate.   Pulmonary:      Effort: Pulmonary effort is normal.   Abdominal:      Palpations: Abdomen is soft.   Musculoskeletal:      Cervical back: Normal range of motion.      Right lower leg: No edema.      Left lower leg: No edema.   Skin:     General: Skin is warm.   Neurological:      General: No focal deficit present.      Mental Status: She is alert.   Psychiatric:         Mood and Affect: Mood normal.         Fluids  No intake or output data in the 24 hours ending 06/06/23  0519      Laboratory  Recent Labs     06/04/23  0023 06/05/23  0024 06/06/23  0045   WBC 6.2 5.5 4.1*   RBC 3.12* 3.19* 3.15*   HEMOGLOBIN 10.3* 10.5* 10.5*   HEMATOCRIT 30.9* 31.6* 30.7*   MCV 99.0* 99.1* 97.5   MCH 33.0 32.9 33.3*   MCHC 33.3 33.2 34.2   RDW 47.5 47.5 47.1   PLATELETCT 126* 147* 183   MPV 11.0 10.7 10.5       Recent Labs     06/04/23  0023 06/05/23  0042 06/06/23  0045   SODIUM 144 144 143   POTASSIUM 3.5* 3.4* 3.5*   CHLORIDE 111 112 109   CO2 23 22 25   GLUCOSE 96 93 93   BUN 5* 8 8   CREATININE 0.38* 0.40* 0.36*   CALCIUM 8.2* 8.4* 8.3*                     Imaging  US-RENAL   Final Result      1.  There is no hydronephrosis.   2.  There is a left nephrostomy tube evident.   3.  Simple left renal cyst does not need further imaging follow-up per ACR guidelines.             Assessment/Plan  * Pyelonephritis  Assessment & Plan  6/6/2023  Patient was admitted for Enterococcus faecalis pyelonephritis in February 2023, discharged with oral antibiotics. Urine cultures from 5/24/2023 also showed Enterococcus, she just completed a 7-day course of Macrobid.  Urine culture from 6/2/2023 grew Enterococcus faecalis, resistant to Macrobid.  She has been on ampicillin since admission, will complete course.  Renal ultrasound was done on 6/4/2023 due to complaint of right flank pain, she has no evidence of hydronephrosis.   Switch from ampicillin to amoxicillin and monitor overnight for continued progress    Diarrhea of presumed infectious origin  Assessment & Plan  6/6/2023  No episodes of diarrhea since admission.    Lymphoma of retroperitoneum (HCC)  Assessment & Plan  6/6/2023  Patient follows Dr. Macias. Last received chemotherapy on 5/23/2023, received Neulasta on 5/24/2023, next chemotherapy is planned for June 13     Hyperlipidemia- (present on admission)  Assessment & Plan  6/6/2023  Resume patient's statin therapy    Hydronephrosis- (present on admission)  Assessment & Plan  6/6/2023  Patient had left  sided hydronephrosis secondary to retroperitoneal lymphoma.  Nephrostomy tube was exchanged in April 2023.  Renal ultrasound was repeated on 6/4/2023 due to right-sided flank pain.  There was no evidence of hydronephrosis.  Briefly discussed with urology today, patient is to follow-up with outpatient urology after discharge.    DVT (deep venous thrombosis) (HCC)- (present on admission)  Assessment & Plan  6/6/2023  On Eliquis at home.  Placed on Lovenox on admission due to possible procedure.  Transition back to apixaban.      VTE prophylaxis: enoxaparin ppx

## 2023-06-07 ENCOUNTER — PHARMACY VISIT (OUTPATIENT)
Dept: PHARMACY | Facility: MEDICAL CENTER | Age: 63
End: 2023-06-07
Payer: MEDICARE

## 2023-06-07 VITALS
HEIGHT: 62 IN | BODY MASS INDEX: 24.75 KG/M2 | OXYGEN SATURATION: 96 % | DIASTOLIC BLOOD PRESSURE: 51 MMHG | SYSTOLIC BLOOD PRESSURE: 94 MMHG | HEART RATE: 75 BPM | RESPIRATION RATE: 16 BRPM | WEIGHT: 134.48 LBS | TEMPERATURE: 97.9 F

## 2023-06-07 PROBLEM — K59.01 SLOW TRANSIT CONSTIPATION: Status: ACTIVE | Noted: 2023-06-07

## 2023-06-07 PROBLEM — N13.30 HYDRONEPHROSIS: Status: RESOLVED | Noted: 2023-02-09 | Resolved: 2023-06-07

## 2023-06-07 PROBLEM — R19.7 DIARRHEA OF PRESUMED INFECTIOUS ORIGIN: Status: RESOLVED | Noted: 2023-06-02 | Resolved: 2023-06-07

## 2023-06-07 LAB
ANION GAP SERPL CALC-SCNC: 11 MMOL/L (ref 7–16)
BACTERIA BLD CULT: NORMAL
BACTERIA BLD CULT: NORMAL
BASOPHILS # BLD AUTO: 1 % (ref 0–1.8)
BASOPHILS # BLD: 0.04 K/UL (ref 0–0.12)
BUN SERPL-MCNC: 10 MG/DL (ref 8–22)
CALCIUM SERPL-MCNC: 8.6 MG/DL (ref 8.5–10.5)
CHLORIDE SERPL-SCNC: 107 MMOL/L (ref 96–112)
CO2 SERPL-SCNC: 25 MMOL/L (ref 20–33)
CREAT SERPL-MCNC: 0.38 MG/DL (ref 0.5–1.4)
EOSINOPHIL # BLD AUTO: 0.09 K/UL (ref 0–0.51)
EOSINOPHIL NFR BLD: 2.3 % (ref 0–6.9)
ERYTHROCYTE [DISTWIDTH] IN BLOOD BY AUTOMATED COUNT: 47.3 FL (ref 35.9–50)
GFR SERPLBLD CREATININE-BSD FMLA CKD-EPI: 113 ML/MIN/1.73 M 2
GLUCOSE SERPL-MCNC: 97 MG/DL (ref 65–99)
HCT VFR BLD AUTO: 32.1 % (ref 37–47)
HGB BLD-MCNC: 10.8 G/DL (ref 12–16)
IMM GRANULOCYTES # BLD AUTO: 0.04 K/UL (ref 0–0.11)
IMM GRANULOCYTES NFR BLD AUTO: 1 % (ref 0–0.9)
LYMPHOCYTES # BLD AUTO: 0.98 K/UL (ref 1–4.8)
LYMPHOCYTES NFR BLD: 25.2 % (ref 22–41)
MAGNESIUM SERPL-MCNC: 2.4 MG/DL (ref 1.5–2.5)
MCH RBC QN AUTO: 33.1 PG (ref 27–33)
MCHC RBC AUTO-ENTMCNC: 33.6 G/DL (ref 32.2–35.5)
MCV RBC AUTO: 98.5 FL (ref 81.4–97.8)
MONOCYTES # BLD AUTO: 0.43 K/UL (ref 0–0.85)
MONOCYTES NFR BLD AUTO: 11.1 % (ref 0–13.4)
NEUTROPHILS # BLD AUTO: 2.31 K/UL (ref 1.82–7.42)
NEUTROPHILS NFR BLD: 59.4 % (ref 44–72)
NRBC # BLD AUTO: 0 K/UL
NRBC BLD-RTO: 0 /100 WBC (ref 0–0.2)
PHOSPHATE SERPL-MCNC: 4.3 MG/DL (ref 2.5–4.5)
PLATELET # BLD AUTO: 215 K/UL (ref 164–446)
PMV BLD AUTO: 10 FL (ref 9–12.9)
POTASSIUM SERPL-SCNC: 4.1 MMOL/L (ref 3.6–5.5)
PROCALCITONIN SERPL-MCNC: 0.06 NG/ML
RBC # BLD AUTO: 3.26 M/UL (ref 4.2–5.4)
SIGNIFICANT IND 70042: NORMAL
SIGNIFICANT IND 70042: NORMAL
SITE SITE: NORMAL
SITE SITE: NORMAL
SODIUM SERPL-SCNC: 143 MMOL/L (ref 135–145)
SOURCE SOURCE: NORMAL
SOURCE SOURCE: NORMAL
WBC # BLD AUTO: 3.9 K/UL (ref 4.8–10.8)

## 2023-06-07 PROCEDURE — 85025 COMPLETE CBC W/AUTO DIFF WBC: CPT

## 2023-06-07 PROCEDURE — 700102 HCHG RX REV CODE 250 W/ 637 OVERRIDE(OP): Performed by: INTERNAL MEDICINE

## 2023-06-07 PROCEDURE — 80048 BASIC METABOLIC PNL TOTAL CA: CPT

## 2023-06-07 PROCEDURE — 84145 PROCALCITONIN (PCT): CPT

## 2023-06-07 PROCEDURE — 99239 HOSP IP/OBS DSCHRG MGMT >30: CPT | Performed by: INTERNAL MEDICINE

## 2023-06-07 PROCEDURE — RXMED WILLOW AMBULATORY MEDICATION CHARGE: Performed by: INTERNAL MEDICINE

## 2023-06-07 PROCEDURE — A9270 NON-COVERED ITEM OR SERVICE: HCPCS | Performed by: INTERNAL MEDICINE

## 2023-06-07 PROCEDURE — 84100 ASSAY OF PHOSPHORUS: CPT

## 2023-06-07 PROCEDURE — 83735 ASSAY OF MAGNESIUM: CPT

## 2023-06-07 RX ORDER — LACTULOSE 20 G/30ML
30 SOLUTION ORAL 3 TIMES DAILY
Status: DISCONTINUED | OUTPATIENT
Start: 2023-06-07 | End: 2023-06-07 | Stop reason: HOSPADM

## 2023-06-07 RX ORDER — AMOXICILLIN 500 MG/1
500 CAPSULE ORAL EVERY 8 HOURS
Qty: 9 CAPSULE | Refills: 0 | Status: ACTIVE | OUTPATIENT
Start: 2023-06-07 | End: 2023-06-10

## 2023-06-07 RX ORDER — AMOXICILLIN 250 MG
2 CAPSULE ORAL 2 TIMES DAILY PRN
Qty: 30 TABLET | Refills: 0 | Status: SHIPPED | OUTPATIENT
Start: 2023-06-07 | End: 2023-07-14

## 2023-06-07 RX ORDER — POLYETHYLENE GLYCOL 3350 17 G/17G
17 POWDER, FOR SOLUTION ORAL
Qty: 10 EACH | Refills: 0 | Status: SHIPPED | OUTPATIENT
Start: 2023-06-07 | End: 2023-07-14

## 2023-06-07 RX ADMIN — AMOXICILLIN 500 MG: 500 CAPSULE ORAL at 05:05

## 2023-06-07 RX ADMIN — BACLOFEN 10 MG: 10 TABLET ORAL at 05:05

## 2023-06-07 RX ADMIN — LIDOCAINE HYDROCHLORIDE 30 ML: 20 SOLUTION OROPHARYNGEAL at 05:05

## 2023-06-07 RX ADMIN — BACLOFEN 10 MG: 10 TABLET ORAL at 12:07

## 2023-06-07 RX ADMIN — APIXABAN 5 MG: 5 TABLET, FILM COATED ORAL at 05:05

## 2023-06-07 ASSESSMENT — ENCOUNTER SYMPTOMS
GASTROINTESTINAL NEGATIVE: 1
MYALGIAS: 1
EYES NEGATIVE: 1
NEUROLOGICAL NEGATIVE: 1
PSYCHIATRIC NEGATIVE: 1
CARDIOVASCULAR NEGATIVE: 1
RESPIRATORY NEGATIVE: 1

## 2023-06-07 NOTE — DIETARY
Nutrition Update:    Day 5 of admit.  Savana Manzanares is a 63 y.o. female being followed to optimize nutrition.    Current Diet: Regular with Boost supplements    Problem: Nutritional:  Goal: Achieve adequate nutritional intake  Description: Patient will consume 50% of meals  Outcome: Met; she has been eating % of recent meals.    Nutrition Representative will continue to see her for ongoing meal and snack preferences.  RD following per department policy.

## 2023-06-07 NOTE — DISCHARGE INSTRUCTIONS
Discharge Instructions per Dr. Santiago James D.O.    DIET: Diet Order Diet: Regular  Supplements    ACTIVITY: As tolerated    A proper diet that is low in grease, fat, and salt, along with 30 minutes of exercise per day will lead to weight loss, and better controlled blood sugar and blood pressure.    DIAGNOSIS: Pyelonephritis    Follow up with your Primary Care Provider Jose Cruz Smith M.D. as scheduled or sooner if your symptoms persist or worsen.  Return to Emergency Room for sever chest pain, shortness of breath, signs of a stroke, or any other emergencies.      For constipation take MiraLAX every day as needed  If still constipated after MiraLAX take milk of magnesia every day as needed  If still constipated after MiraLAX and milk of magnesia, take senna as needed twice a day.

## 2023-06-07 NOTE — CARE PLAN
The patient is Watcher - Medium risk of patient condition declining or worsening    Shift Goals  Clinical Goals: Complete IV ABX  Patient Goals: Rest, return home  Family Goals: Patient will be comfortable    Progress made toward(s) clinical / shift goals:        Problem: Knowledge Deficit - Standard  Goal: Patient and family/care givers will demonstrate understanding of plan of care, disease process/condition, diagnostic tests and medications  Description: Target End Date:  1-3 days or as soon as patient condition allows    Document in Patient Education    1.  Patient and family/caregiver oriented to unit, equipment, visitation policy and means for communicating concern  2.  Complete/review Learning Assessment  3.  Assess knowledge level of disease process/condition, treatment plan, diagnostic tests and medications  4.  Explain disease process/condition, treatment plan, diagnostic tests and medications  Outcome: Progressing  Note: Patient understands the importance of monitoring of labs and abx treatment.

## 2023-06-07 NOTE — DISCHARGE SUMMARY
Discharge Summary    CHIEF COMPLAINT ON ADMISSION  Chief Complaint   Patient presents with    Abdominal Pain     Lower abdominal pain that started 2 days ago. Also reports to urinary pain and frequency. Pt has lymphoma and last received chemotherapy 2 weeks ago.     Flank Pain     Bilateral flank pain. Denies fevers at home.        Reason for Admission  Painful Urination; Cancer     Admission Date  6/2/2023    CODE STATUS  Full Code    HPI & HOSPITAL COURSE  Savana Manzanares is a 63 y.o. female admitted 6/2/2023 with dysuria, and flank pain for 48 hours.  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 Macias.  She has a left nephrostomy tube which was placed due to hydronephrosis, last exchanged in April 2023. She has a history of left lower extremity DVT (on Eliquis), and dyslipidemia.     She had Enterococcus faecalis UTI on 2/26/2023 and 5/24/2023.      On admission, patient was afebrile, tachycardic.  UA was consistent with UTI. She was empirically started on ampicillin due to recent Enterococcus UTI. She recently completed a 7-day course of Macrobid.      Patient complained of diarrhea. Stool for C. difficile was ordered. She had no further episodes of diarrhea.      Urine culture from 6/2/2023 grew Enterococcus faecalis (resistant to Macrobid).  Patient was started on ampicillin IV, which was transitioned to oral amoxicillin at the time of discharge.  Blood cultures have been negative.    Patient was concerned about her nephrostomy tube and stated that urology wanted to change her nephrostomy inpatient, I confirmed this with the on-call Urologist today, they will see her as an outpatient after discharge.    Renal ultrasound was done due to persistent right flank pain, no hydronephrosis was reported.  KUB showed impacted stool in right, nephrostomy tubes in place.  Counseled patient about pain  regimen and bowel regimen.  Pain was controlled at time of discharge.    Medically stable to discharge home.  Complete course of oxacillin as outpatient.  Follow-up with primary care, urology as outpatient.      Therefore, she is discharged in fair and stable condition to home with close outpatient follow-up.    The patient met 2-midnight criteria for an inpatient stay at the time of discharge.    Discharge Date  6/7/2023    FOLLOW UP ITEMS POST DISCHARGE  None    DISCHARGE DIAGNOSES  Principal Problem:    Pyelonephritis (POA: Yes)  Active Problems:    DVT (deep venous thrombosis) (HCC) (POA: Yes)    Hyperlipidemia (POA: Yes)    Lymphoma of retroperitoneum (HCC) (POA: Yes)    Slow transit constipation (POA: Unknown)  Resolved Problems:    Hydronephrosis (POA: Yes)    Diarrhea of presumed infectious origin (POA: Yes)      FOLLOW UP  Jose Cruz Smith M.D.  [address] [facility]  [place] [address]  [phone]    Follow up        MEDICATIONS ON DISCHARGE     Medication List        START taking these medications        Instructions   amoxicillin 500 MG Caps  Commonly known as: AMOXIL   Take 1 Capsule by mouth every 8 hours for 3 days.  Dose: 500 mg     magnesium hydroxide 400 MG/5ML Susp  Commonly known as: MILK OF MAGNESIA   Take 30 mL by mouth 1 time a day as needed (Constipation.  Take if still constipated after Miralax).  Dose: 30 mL     polyethylene glycol/lytes 17 g Pack  Commonly known as: MIRALAX   Take 1 Packet by mouth 1 time a day as needed (For constipation).  Dose: 17 g     senna-docusate 8.6-50 MG Tabs  Commonly known as: PERICOLACE or SENOKOT S   Take 2 Tablets by mouth 2 times a day as needed for Constipation (Constipation.  Take if still constipated after Miralax and Magnesium).  Dose: 2 Tablet            CONTINUE taking these medications        Instructions   apixaban 5mg Tabs  Commonly known as: ELIQUIS   Take 5 mg by mouth 2 times a day.  Dose: 5 mg     atorvastatin 20 MG Tabs  Commonly known as:  LIPITOR   Take 20 mg by mouth at bedtime.  Dose: 20 mg     B COMPLEX PO   Take 1 Tablet by mouth every day.  Dose: 1 Tablet     CALCIUM PO   Take 1 Tablet by mouth every day.  Dose: 1 Tablet     COLACE PO   Take 1 Tablet by mouth 1 time a day as needed.  Dose: 1 Tablet     fish oil 1000 MG Caps capsule   Take 1,000 mg by mouth every day.  Dose: 1,000 mg     GLUCOSAMINE-CHONDROITIN PO   Take 1 Tablet by mouth every day.  Dose: 1 Tablet     loratadine 10 MG Tabs  Commonly known as: CLARITIN   Take 10 mg by mouth 1 time a day as needed.  Dose: 10 mg     ondansetron 8 MG Tabs  Commonly known as: ZOFRAN   Take 8 mg by mouth 2 times a day as needed.  Dose: 8 mg     phenazopyridine 200 MG Tabs  Commonly known as: PYRIDIUM   Take 200 mg by mouth 3 times a day as needed. 2 day course started 5/24/23  Dose: 200 mg     predniSONE 20 MG Tabs  Commonly known as: DELTASONE   Take 100 mg by mouth every day. On days 1-5 with each cycle of chemo  Dose: 100 mg     prochlorperazine 10 MG Tabs  Commonly known as: COMPAZINE   Take 10 mg by mouth every 8 hours as needed.  Dose: 10 mg     vitamin D3 5000 Unit (125 mcg) Tabs  Commonly known as: cholecalciferol   Take 5,000 Units by mouth every day.  Dose: 5,000 Units            STOP taking these medications      nitrofurantoin 100 MG Caps  Commonly known as: MACROBID              Allergies  No Known Allergies    DIET  Orders Placed This Encounter   Procedures    Diet Order Diet: Regular     Standing Status:   Standing     Number of Occurrences:   1     Order Specific Question:   Diet:     Answer:   Regular [1]       ACTIVITY  As tolerated.  Weight bearing as tolerated    CONSULTATIONS  None    PROCEDURES  None    LABORATORY  Lab Results   Component Value Date    SODIUM 143 06/07/2023    POTASSIUM 4.1 06/07/2023    CHLORIDE 107 06/07/2023    CO2 25 06/07/2023    GLUCOSE 97 06/07/2023    BUN 10 06/07/2023    CREATININE 0.38 (L) 06/07/2023        Lab Results   Component Value Date    WBC 3.9  (L) 06/07/2023    HEMOGLOBIN 10.8 (L) 06/07/2023    HEMATOCRIT 32.1 (L) 06/07/2023    PLATELETCT 215 06/07/2023        I discussed medications and side effects with the patient.  I discussed prognosis and importance of medical compliance with the patient.  I counseled the patient about diet, exercise, weight loss, smoking cessation, and life style modifications.  All questions and concerns have been addressed.  Total time of the discharge process was 36 minutes.

## 2023-06-14 ENCOUNTER — HOSPITAL ENCOUNTER (OUTPATIENT)
Facility: MEDICAL CENTER | Age: 63
End: 2023-06-14
Attending: PHYSICIAN ASSISTANT
Payer: COMMERCIAL

## 2023-06-14 PROCEDURE — 87086 URINE CULTURE/COLONY COUNT: CPT

## 2023-06-17 LAB
BACTERIA UR CULT: NORMAL
SIGNIFICANT IND 70042: NORMAL
SITE SITE: NORMAL
SOURCE SOURCE: NORMAL

## 2023-06-26 ENCOUNTER — HOSPITAL ENCOUNTER (OUTPATIENT)
Facility: MEDICAL CENTER | Age: 63
End: 2023-06-26
Attending: PHYSICIAN ASSISTANT
Payer: COMMERCIAL

## 2023-06-26 PROCEDURE — 87086 URINE CULTURE/COLONY COUNT: CPT

## 2023-06-29 LAB
BACTERIA UR CULT: NORMAL
SIGNIFICANT IND 70042: NORMAL
SITE SITE: NORMAL
SOURCE SOURCE: NORMAL

## 2023-07-05 ENCOUNTER — HOSPITAL ENCOUNTER (OUTPATIENT)
Facility: MEDICAL CENTER | Age: 63
End: 2023-07-05
Attending: STUDENT IN AN ORGANIZED HEALTH CARE EDUCATION/TRAINING PROGRAM
Payer: COMMERCIAL

## 2023-07-05 PROCEDURE — 87086 URINE CULTURE/COLONY COUNT: CPT

## 2023-07-07 ENCOUNTER — PRE-ADMISSION TESTING (OUTPATIENT)
Dept: ADMISSIONS | Facility: MEDICAL CENTER | Age: 63
End: 2023-07-07
Attending: STUDENT IN AN ORGANIZED HEALTH CARE EDUCATION/TRAINING PROGRAM
Payer: COMMERCIAL

## 2023-07-08 LAB
BACTERIA UR CULT: NORMAL
SIGNIFICANT IND 70042: NORMAL
SITE SITE: NORMAL
SOURCE SOURCE: NORMAL

## 2023-07-14 ENCOUNTER — PRE-ADMISSION TESTING (OUTPATIENT)
Dept: ADMISSIONS | Facility: MEDICAL CENTER | Age: 63
End: 2023-07-14
Attending: STUDENT IN AN ORGANIZED HEALTH CARE EDUCATION/TRAINING PROGRAM
Payer: COMMERCIAL

## 2023-07-14 RX ORDER — NITROFURANTOIN MACROCRYSTALS 50 MG/1
CAPSULE ORAL
COMMUNITY
Start: 2023-06-14 | End: 2024-01-31

## 2023-07-14 RX ORDER — TOLTERODINE TARTRATE 2 MG/1
2 TABLET, EXTENDED RELEASE ORAL 2 TIMES DAILY
COMMUNITY
Start: 2023-07-08 | End: 2024-01-31

## 2023-07-18 ENCOUNTER — HOSPITAL ENCOUNTER (OUTPATIENT)
Facility: MEDICAL CENTER | Age: 63
End: 2023-07-18
Attending: STUDENT IN AN ORGANIZED HEALTH CARE EDUCATION/TRAINING PROGRAM | Admitting: STUDENT IN AN ORGANIZED HEALTH CARE EDUCATION/TRAINING PROGRAM
Payer: COMMERCIAL

## 2023-07-18 ENCOUNTER — APPOINTMENT (OUTPATIENT)
Dept: RADIOLOGY | Facility: MEDICAL CENTER | Age: 63
End: 2023-07-18
Attending: STUDENT IN AN ORGANIZED HEALTH CARE EDUCATION/TRAINING PROGRAM
Payer: COMMERCIAL

## 2023-07-18 VITALS
OXYGEN SATURATION: 96 % | BODY MASS INDEX: 22.82 KG/M2 | SYSTOLIC BLOOD PRESSURE: 107 MMHG | HEART RATE: 61 BPM | WEIGHT: 124 LBS | RESPIRATION RATE: 15 BRPM | HEIGHT: 62 IN | DIASTOLIC BLOOD PRESSURE: 60 MMHG

## 2023-07-18 DIAGNOSIS — N13.30 HYDRONEPHROSIS, UNSPECIFIED HYDRONEPHROSIS TYPE: ICD-10-CM

## 2023-07-18 PROCEDURE — C1729 CATH, DRAINAGE: HCPCS

## 2023-07-18 PROCEDURE — 700117 HCHG RX CONTRAST REV CODE 255: Performed by: STUDENT IN AN ORGANIZED HEALTH CARE EDUCATION/TRAINING PROGRAM

## 2023-07-18 PROCEDURE — 700111 HCHG RX REV CODE 636 W/ 250 OVERRIDE (IP): Mod: JZ | Performed by: STUDENT IN AN ORGANIZED HEALTH CARE EDUCATION/TRAINING PROGRAM

## 2023-07-18 PROCEDURE — 700105 HCHG RX REV CODE 258: Performed by: STUDENT IN AN ORGANIZED HEALTH CARE EDUCATION/TRAINING PROGRAM

## 2023-07-18 PROCEDURE — 700111 HCHG RX REV CODE 636 W/ 250 OVERRIDE (IP): Mod: JZ

## 2023-07-18 RX ORDER — ONDANSETRON 2 MG/ML
4 INJECTION INTRAMUSCULAR; INTRAVENOUS PRN
Status: DISCONTINUED | OUTPATIENT
Start: 2023-07-18 | End: 2023-07-18 | Stop reason: HOSPADM

## 2023-07-18 RX ORDER — MIDAZOLAM HYDROCHLORIDE 1 MG/ML
INJECTION INTRAMUSCULAR; INTRAVENOUS
Status: DISCONTINUED
Start: 2023-07-18 | End: 2023-07-18 | Stop reason: HOSPADM

## 2023-07-18 RX ORDER — ONDANSETRON 2 MG/ML
INJECTION INTRAMUSCULAR; INTRAVENOUS
Status: COMPLETED
Start: 2023-07-18 | End: 2023-07-18

## 2023-07-18 RX ORDER — SODIUM CHLORIDE 9 MG/ML
500 INJECTION, SOLUTION INTRAVENOUS
Status: DISCONTINUED | OUTPATIENT
Start: 2023-07-18 | End: 2023-07-18 | Stop reason: HOSPADM

## 2023-07-18 RX ORDER — MIDAZOLAM HYDROCHLORIDE 1 MG/ML
INJECTION INTRAMUSCULAR; INTRAVENOUS
Status: COMPLETED
Start: 2023-07-18 | End: 2023-07-18

## 2023-07-18 RX ORDER — MIDAZOLAM HYDROCHLORIDE 1 MG/ML
.5-2 INJECTION INTRAMUSCULAR; INTRAVENOUS PRN
Status: DISCONTINUED | OUTPATIENT
Start: 2023-07-18 | End: 2023-07-18 | Stop reason: HOSPADM

## 2023-07-18 RX ADMIN — FENTANYL CITRATE 50 MCG: 50 INJECTION, SOLUTION INTRAMUSCULAR; INTRAVENOUS at 08:59

## 2023-07-18 RX ADMIN — IOHEXOL 10 ML: 300 INJECTION, SOLUTION INTRAVENOUS at 11:29

## 2023-07-18 RX ADMIN — MIDAZOLAM HYDROCHLORIDE 2 MG: 1 INJECTION INTRAMUSCULAR; INTRAVENOUS at 09:00

## 2023-07-18 RX ADMIN — ONDANSETRON 4 MG: 2 INJECTION INTRAMUSCULAR; INTRAVENOUS at 08:41

## 2023-07-18 RX ADMIN — FENTANYL CITRATE 25 MCG: 50 INJECTION, SOLUTION INTRAMUSCULAR; INTRAVENOUS at 09:00

## 2023-07-18 RX ADMIN — MIDAZOLAM 2 MG: 1 INJECTION INTRAMUSCULAR; INTRAVENOUS at 09:00

## 2023-07-18 ASSESSMENT — FIBROSIS 4 INDEX: FIB4 SCORE: 1.02

## 2023-07-18 NOTE — H&P
History and Physical    Date: 7/18/2023    PCP: Jose Cruz Smith M.D.      CC: Neph tube    HPI: This is a 63 y.o. female who is presenting with neph tube.    Past Medical History:   Diagnosis Date    Blood clotting disorder (HCC) 02/2023    left leg DVT, medicated    Bowel habit changes 07/14/2023    constipation, medicated    Cancer (HCC) 07/14/2023    Lymphoma, undergoing chemo at this time    Dental disorder 07/14/2023    crowns upper front teeth 2    Fatty liver     Fatty liver 07/14/2023    Heart burn     High cholesterol 07/14/2023    medicated    Hypokalemia     Normocytic anemia     Pain 07/14/2023    left foot pain    Renal disorder 07/14/2023    kidney cancer, nephrostomy tube in place       Past Surgical History:   Procedure Laterality Date    GYN SURGERY  07/14/2023    c sections times 2    MI DX BONE MARROW ASPIRATIONS Left 02/16/2023    Procedure: BONE MARROW ASPIRATION -DR. LEÓN;  Surgeon: Chris León M.D.;  Location: SURGERY SAME DAY [facility];  Service: Orthopedics    MI DX BONE MARROW BIOPSIES Left 02/16/2023    Procedure: BIOPSY, BONE MARROW, USING NEEDLE OR TROCAR;  Surgeon: Chris León M.D.;  Location: SURGERY SAME DAY [facility];  Service: Orthopedics    OTHER         No current facility-administered medications for this encounter.        Social History     Socioeconomic History    Marital status:      Spouse name: Not on file    Number of children: Not on file    Years of education: Not on file    Highest education level: Not on file   Occupational History    Not on file   Tobacco Use    Smoking status: Never    Smokeless tobacco: Never   Vaping Use    Vaping Use: Never used   Substance and Sexual Activity    Alcohol use: Not Currently    Drug use: Never    Sexual activity: Not on file   Other Topics Concern    Not on file   Social History Narrative    Not on file     Social Determinants of Health     Financial Resource Strain: Medium Risk (2/23/2023)    Overall  Financial Resource Strain (CARDIA)     Difficulty of Paying Living Expenses: Somewhat hard   Food Insecurity: No Food Insecurity (2/23/2023)    Hunger Vital Sign     Worried About Running Out of Food in the Last Year: Never true     Ran Out of Food in the Last Year: Never true   Transportation Needs: No Transportation Needs (2/23/2023)    PRAPARE - Transportation     Lack of Transportation (Medical): No     Lack of Transportation (Non-Medical): No   Physical Activity: Not on file   Stress: Not on file   Social Connections: Unknown (2/23/2023)    Social Connection and Isolation Panel [NHANES]     Frequency of Communication with Friends and Family: More than three times a week     Frequency of Social Gatherings with Friends and Family: More than three times a week     Attends Sikhism Services: Not on file     Active Member of Clubs or Organizations: Not on file     Attends Club or Organization Meetings: Not on file     Marital Status: Not on file   Intimate Partner Violence: Not on file   Housing Stability: Low Risk  (2/23/2023)    Housing Stability Vital Sign     Unable to Pay for Housing in the Last Year: No     Number of Places Lived in the Last Year: 1     Unstable Housing in the Last Year: No       Family History   Family history unknown: Yes       Allergies:  Patient has no known allergies.    Review of Systems:  Negative except for none    Physical Exam  Pulmonary:      Effort: Pulmonary effort is normal.   Skin:     General: Skin is warm and dry.   Neurological:      Mental Status: She is alert.   Psychiatric:         Mood and Affect: Mood normal.         Behavior: Behavior normal.         Thought Content: Thought content normal.         Judgment: Judgment normal.         Vital Signs  Blood Pressure: 111/57       Pulse: 68       Pulse Oximetry: 98 %        Labs:                    Radiology:  IR-EXCHANGE PERQ NEPH CATH (ALL RADIOLOGY) LEFT    (Results Pending)             Assessment and Plan:This is a 63  y.o. with neph tube for exchange

## 2023-07-18 NOTE — PROGRESS NOTES
Pt presents to [facility]. Patient was consented by MD at bedside, confirmed by this RN and consent at bedside. Pt transferred to IR table in prone position. Patient underwent a left nephrostomy tube exchange by Dr. Richard. Procedure site was marked by MD and verified using imaging guidance. Pt placed on monitor, prepped and draped in a sterile fashion. Vitals were taken every 5 minutes and remained stable during procedure (see doc flow sheet for results). CO2 waveform capnography was monitored and remained WNL throughout procedure.         [employer] Flexima [id number], lot:  [id number], exp:  3/29/26

## 2023-07-18 NOTE — OR SURGEON
Immediate Post- Operative Note        Findings: Left neph tube      Procedure(s): Exchange      Estimated Blood Loss: Less than 5 ml        Complications: None            7/18/2023     9:18 AM     Ladarius Richard M.D.

## 2023-07-27 ENCOUNTER — HOSPITAL ENCOUNTER (OUTPATIENT)
Facility: MEDICAL CENTER | Age: 63
End: 2023-07-27
Attending: STUDENT IN AN ORGANIZED HEALTH CARE EDUCATION/TRAINING PROGRAM
Payer: COMMERCIAL

## 2023-07-27 PROCEDURE — 87086 URINE CULTURE/COLONY COUNT: CPT

## 2023-07-29 LAB
BACTERIA UR CULT: NORMAL
SIGNIFICANT IND 70042: NORMAL
SITE SITE: NORMAL
SOURCE SOURCE: NORMAL

## 2023-07-31 ENCOUNTER — APPOINTMENT (OUTPATIENT)
Dept: RADIOLOGY | Facility: MEDICAL CENTER | Age: 63
End: 2023-07-31
Attending: INTERNAL MEDICINE
Payer: COMMERCIAL

## 2023-08-04 ENCOUNTER — APPOINTMENT (OUTPATIENT)
Dept: RADIOLOGY | Facility: MEDICAL CENTER | Age: 63
End: 2023-08-04
Attending: UROLOGY
Payer: COMMERCIAL

## 2023-08-11 ENCOUNTER — HOSPITAL ENCOUNTER (OUTPATIENT)
Dept: RADIOLOGY | Facility: MEDICAL CENTER | Age: 63
End: 2023-08-11
Attending: UROLOGY
Payer: COMMERCIAL

## 2023-08-11 DIAGNOSIS — N13.30 HYDRONEPHROSIS, UNSPECIFIED HYDRONEPHROSIS TYPE: ICD-10-CM

## 2023-08-11 PROCEDURE — 700117 HCHG RX CONTRAST REV CODE 255: Performed by: STUDENT IN AN ORGANIZED HEALTH CARE EDUCATION/TRAINING PROGRAM

## 2023-08-11 PROCEDURE — 50389 REMOVE RENAL TUBE W/FLUORO: CPT

## 2023-08-11 RX ADMIN — IOHEXOL 10 ML: 300 INJECTION, SOLUTION INTRAVENOUS at 14:00

## 2023-08-11 NOTE — PROGRESS NOTES
[facility] Nursing Note    Left Nephrostogram with Possible Removal by MD Richard assisted by RT Galarza,Left Flank access site.    Access site covered with gauze/tegaderm, C/D/I.    All questions and concerns answered prior to being dc'd; pt provided with appropriate education for procedure, pt discharge to home.

## 2023-08-16 ENCOUNTER — HOSPITAL ENCOUNTER (OUTPATIENT)
Facility: MEDICAL CENTER | Age: 63
End: 2023-08-16
Attending: UROLOGY
Payer: COMMERCIAL

## 2023-08-16 PROCEDURE — 87086 URINE CULTURE/COLONY COUNT: CPT

## 2023-08-16 PROCEDURE — 87077 CULTURE AEROBIC IDENTIFY: CPT

## 2023-08-18 ENCOUNTER — HOSPITAL ENCOUNTER (OUTPATIENT)
Dept: RADIOLOGY | Facility: MEDICAL CENTER | Age: 63
End: 2023-08-18
Attending: STUDENT IN AN ORGANIZED HEALTH CARE EDUCATION/TRAINING PROGRAM
Payer: COMMERCIAL

## 2023-08-18 DIAGNOSIS — M25.552 ARTHRALGIA OF HIP, LEFT: ICD-10-CM

## 2023-08-18 PROCEDURE — 73562 X-RAY EXAM OF KNEE 3: CPT | Mod: LT

## 2023-08-18 PROCEDURE — 73502 X-RAY EXAM HIP UNI 2-3 VIEWS: CPT | Mod: LT

## 2023-08-19 LAB
BACTERIA UR CULT: NORMAL
SIGNIFICANT IND 70042: NORMAL
SITE SITE: NORMAL
SOURCE SOURCE: NORMAL

## 2023-09-01 ENCOUNTER — HOSPITAL ENCOUNTER (OUTPATIENT)
Dept: RADIOLOGY | Facility: MEDICAL CENTER | Age: 63
End: 2023-09-01
Attending: STUDENT IN AN ORGANIZED HEALTH CARE EDUCATION/TRAINING PROGRAM
Payer: COMMERCIAL

## 2023-09-01 DIAGNOSIS — M79.641 RIGHT HAND PAIN: ICD-10-CM

## 2023-09-01 PROCEDURE — 73130 X-RAY EXAM OF HAND: CPT | Mod: RT

## 2023-09-08 ENCOUNTER — HOSPITAL ENCOUNTER (OUTPATIENT)
Dept: RADIOLOGY | Facility: MEDICAL CENTER | Age: 63
End: 2023-09-08
Attending: INTERNAL MEDICINE
Payer: COMMERCIAL

## 2023-09-08 DIAGNOSIS — C83.39 LYMPHOID GRANULOMATOSIS OF THE LUNG (HCC): ICD-10-CM

## 2023-09-08 PROCEDURE — 93970 EXTREMITY STUDY: CPT

## 2023-09-22 ENCOUNTER — HOSPITAL ENCOUNTER (OUTPATIENT)
Dept: LAB | Facility: MEDICAL CENTER | Age: 63
End: 2023-09-22
Attending: NURSE PRACTITIONER
Payer: COMMERCIAL

## 2023-09-22 LAB
ALBUMIN SERPL BCP-MCNC: 4.3 G/DL (ref 3.2–4.9)
ALBUMIN/GLOB SERPL: 2 G/DL
ALP SERPL-CCNC: 50 U/L (ref 30–99)
ALT SERPL-CCNC: 18 U/L (ref 2–50)
ANION GAP SERPL CALC-SCNC: 9 MMOL/L (ref 7–16)
AST SERPL-CCNC: 20 U/L (ref 12–45)
BASOPHILS # BLD AUTO: 0.9 % (ref 0–1.8)
BASOPHILS # BLD: 0.03 K/UL (ref 0–0.12)
BILIRUB SERPL-MCNC: 0.7 MG/DL (ref 0.1–1.5)
BUN SERPL-MCNC: 13 MG/DL (ref 8–22)
CALCIUM ALBUM COR SERPL-MCNC: 9.1 MG/DL (ref 8.5–10.5)
CALCIUM SERPL-MCNC: 9.3 MG/DL (ref 8.5–10.5)
CHLORIDE SERPL-SCNC: 107 MMOL/L (ref 96–112)
CO2 SERPL-SCNC: 24 MMOL/L (ref 20–33)
CREAT SERPL-MCNC: 0.44 MG/DL (ref 0.5–1.4)
EOSINOPHIL # BLD AUTO: 0.05 K/UL (ref 0–0.51)
EOSINOPHIL NFR BLD: 1.5 % (ref 0–6.9)
ERYTHROCYTE [DISTWIDTH] IN BLOOD BY AUTOMATED COUNT: 43.8 FL (ref 35.9–50)
GFR SERPLBLD CREATININE-BSD FMLA CKD-EPI: 109 ML/MIN/1.73 M 2
GLOBULIN SER CALC-MCNC: 2.1 G/DL (ref 1.9–3.5)
GLUCOSE SERPL-MCNC: 100 MG/DL (ref 65–99)
HCT VFR BLD AUTO: 38.8 % (ref 37–47)
HGB BLD-MCNC: 13.1 G/DL (ref 12–16)
IMM GRANULOCYTES # BLD AUTO: 0.01 K/UL (ref 0–0.11)
IMM GRANULOCYTES NFR BLD AUTO: 0.3 % (ref 0–0.9)
LDH SERPL L TO P-CCNC: 162 U/L (ref 107–266)
LYMPHOCYTES # BLD AUTO: 0.97 K/UL (ref 1–4.8)
LYMPHOCYTES NFR BLD: 28.8 % (ref 22–41)
MCH RBC QN AUTO: 33.2 PG (ref 27–33)
MCHC RBC AUTO-ENTMCNC: 33.8 G/DL (ref 32.2–35.5)
MCV RBC AUTO: 98.5 FL (ref 81.4–97.8)
MONOCYTES # BLD AUTO: 0.29 K/UL (ref 0–0.85)
MONOCYTES NFR BLD AUTO: 8.6 % (ref 0–13.4)
NEUTROPHILS # BLD AUTO: 2.02 K/UL (ref 1.82–7.42)
NEUTROPHILS NFR BLD: 59.9 % (ref 44–72)
NRBC # BLD AUTO: 0 K/UL
NRBC BLD-RTO: 0 /100 WBC (ref 0–0.2)
PLATELET # BLD AUTO: 248 K/UL (ref 164–446)
PMV BLD AUTO: 10.3 FL (ref 9–12.9)
POTASSIUM SERPL-SCNC: 4.2 MMOL/L (ref 3.6–5.5)
PROT SERPL-MCNC: 6.4 G/DL (ref 6–8.2)
RBC # BLD AUTO: 3.94 M/UL (ref 4.2–5.4)
SODIUM SERPL-SCNC: 140 MMOL/L (ref 135–145)
WBC # BLD AUTO: 3.4 K/UL (ref 4.8–10.8)

## 2023-09-22 PROCEDURE — 80053 COMPREHEN METABOLIC PANEL: CPT

## 2023-09-22 PROCEDURE — 85025 COMPLETE CBC W/AUTO DIFF WBC: CPT

## 2023-09-22 PROCEDURE — 36415 COLL VENOUS BLD VENIPUNCTURE: CPT

## 2023-09-22 PROCEDURE — 83615 LACTATE (LD) (LDH) ENZYME: CPT

## 2023-12-04 ENCOUNTER — HOSPITAL ENCOUNTER (OUTPATIENT)
Dept: LAB | Facility: MEDICAL CENTER | Age: 63
End: 2023-12-04
Attending: UROLOGY
Payer: COMMERCIAL

## 2023-12-04 LAB
ANION GAP SERPL CALC-SCNC: 11 MMOL/L (ref 7–16)
BUN SERPL-MCNC: 20 MG/DL (ref 8–22)
CALCIUM SERPL-MCNC: 9.4 MG/DL (ref 8.5–10.5)
CHLORIDE SERPL-SCNC: 107 MMOL/L (ref 96–112)
CO2 SERPL-SCNC: 24 MMOL/L (ref 20–33)
CREAT SERPL-MCNC: 0.51 MG/DL (ref 0.5–1.4)
GFR SERPLBLD CREATININE-BSD FMLA CKD-EPI: 105 ML/MIN/1.73 M 2
GLUCOSE SERPL-MCNC: 93 MG/DL (ref 65–99)
POTASSIUM SERPL-SCNC: 4.5 MMOL/L (ref 3.6–5.5)
SODIUM SERPL-SCNC: 142 MMOL/L (ref 135–145)

## 2023-12-04 PROCEDURE — 36415 COLL VENOUS BLD VENIPUNCTURE: CPT

## 2023-12-04 PROCEDURE — 80048 BASIC METABOLIC PNL TOTAL CA: CPT

## 2023-12-06 ENCOUNTER — HOSPITAL ENCOUNTER (OUTPATIENT)
Facility: MEDICAL CENTER | Age: 63
End: 2023-12-06
Attending: UROLOGY
Payer: COMMERCIAL

## 2023-12-06 PROCEDURE — 87086 URINE CULTURE/COLONY COUNT: CPT

## 2023-12-09 LAB
BACTERIA UR CULT: NORMAL
SIGNIFICANT IND 70042: NORMAL
SITE SITE: NORMAL
SOURCE SOURCE: NORMAL

## 2023-12-28 ENCOUNTER — HOSPITAL ENCOUNTER (OUTPATIENT)
Dept: RADIOLOGY | Facility: MEDICAL CENTER | Age: 63
End: 2023-12-28
Attending: STUDENT IN AN ORGANIZED HEALTH CARE EDUCATION/TRAINING PROGRAM
Payer: COMMERCIAL

## 2023-12-28 DIAGNOSIS — M25.512 CHRONIC LEFT SHOULDER PAIN: ICD-10-CM

## 2023-12-28 DIAGNOSIS — G89.29 CHRONIC LEFT SHOULDER PAIN: ICD-10-CM

## 2023-12-28 DIAGNOSIS — G89.29 CHRONIC RIGHT SHOULDER PAIN: ICD-10-CM

## 2023-12-28 DIAGNOSIS — M25.511 CHRONIC RIGHT SHOULDER PAIN: ICD-10-CM

## 2023-12-28 PROCEDURE — 73030 X-RAY EXAM OF SHOULDER: CPT | Mod: LT

## 2024-01-04 ENCOUNTER — HOSPITAL ENCOUNTER (OUTPATIENT)
Dept: LAB | Facility: MEDICAL CENTER | Age: 64
End: 2024-01-04
Attending: PEDIATRICS
Payer: COMMERCIAL

## 2024-01-04 LAB
BASOPHILS # BLD AUTO: 0.8 % (ref 0–1.8)
BASOPHILS # BLD: 0.03 K/UL (ref 0–0.12)
EOSINOPHIL # BLD AUTO: 0.07 K/UL (ref 0–0.51)
EOSINOPHIL NFR BLD: 2 % (ref 0–6.9)
ERYTHROCYTE [DISTWIDTH] IN BLOOD BY AUTOMATED COUNT: 45.1 FL (ref 35.9–50)
HCT VFR BLD AUTO: 37.8 % (ref 37–47)
HGB BLD-MCNC: 12.8 G/DL (ref 12–16)
IMM GRANULOCYTES # BLD AUTO: 0.01 K/UL (ref 0–0.11)
IMM GRANULOCYTES NFR BLD AUTO: 0.3 % (ref 0–0.9)
LYMPHOCYTES # BLD AUTO: 1.11 K/UL (ref 1–4.8)
LYMPHOCYTES NFR BLD: 31.2 % (ref 22–41)
MCH RBC QN AUTO: 33.4 PG (ref 27–33)
MCHC RBC AUTO-ENTMCNC: 33.9 G/DL (ref 32.2–35.5)
MCV RBC AUTO: 98.7 FL (ref 81.4–97.8)
MONOCYTES # BLD AUTO: 0.33 K/UL (ref 0–0.85)
MONOCYTES NFR BLD AUTO: 9.3 % (ref 0–13.4)
NEUTROPHILS # BLD AUTO: 2.01 K/UL (ref 1.82–7.42)
NEUTROPHILS NFR BLD: 56.4 % (ref 44–72)
NRBC # BLD AUTO: 0 K/UL
NRBC BLD-RTO: 0 /100 WBC (ref 0–0.2)
PLATELET # BLD AUTO: 274 K/UL (ref 164–446)
PMV BLD AUTO: 10.3 FL (ref 9–12.9)
RBC # BLD AUTO: 3.83 M/UL (ref 4.2–5.4)
WBC # BLD AUTO: 3.6 K/UL (ref 4.8–10.8)

## 2024-01-04 PROCEDURE — 36415 COLL VENOUS BLD VENIPUNCTURE: CPT

## 2024-01-04 PROCEDURE — 80053 COMPREHEN METABOLIC PANEL: CPT

## 2024-01-04 PROCEDURE — 83615 LACTATE (LD) (LDH) ENZYME: CPT

## 2024-01-04 PROCEDURE — 85025 COMPLETE CBC W/AUTO DIFF WBC: CPT

## 2024-01-05 LAB
ALBUMIN SERPL BCP-MCNC: 4.2 G/DL (ref 3.2–4.9)
ALBUMIN/GLOB SERPL: 1.9 G/DL
ALP SERPL-CCNC: 60 U/L (ref 30–99)
ALT SERPL-CCNC: 18 U/L (ref 2–50)
ANION GAP SERPL CALC-SCNC: 9 MMOL/L (ref 7–16)
AST SERPL-CCNC: 15 U/L (ref 12–45)
BILIRUB SERPL-MCNC: 0.7 MG/DL (ref 0.1–1.5)
BUN SERPL-MCNC: 18 MG/DL (ref 8–22)
CALCIUM ALBUM COR SERPL-MCNC: 8.9 MG/DL (ref 8.5–10.5)
CALCIUM SERPL-MCNC: 9.1 MG/DL (ref 8.5–10.5)
CHLORIDE SERPL-SCNC: 105 MMOL/L (ref 96–112)
CO2 SERPL-SCNC: 27 MMOL/L (ref 20–33)
CREAT SERPL-MCNC: 0.92 MG/DL (ref 0.5–1.4)
GFR SERPLBLD CREATININE-BSD FMLA CKD-EPI: 70 ML/MIN/1.73 M 2
GLOBULIN SER CALC-MCNC: 2.2 G/DL (ref 1.9–3.5)
GLUCOSE SERPL-MCNC: 88 MG/DL (ref 65–99)
LDH SERPL L TO P-CCNC: 148 U/L (ref 107–266)
POTASSIUM SERPL-SCNC: 4.7 MMOL/L (ref 3.6–5.5)
PROT SERPL-MCNC: 6.4 G/DL (ref 6–8.2)
SODIUM SERPL-SCNC: 141 MMOL/L (ref 135–145)

## 2024-01-29 SDOH — ECONOMIC STABILITY: TRANSPORTATION INSECURITY
IN THE PAST 12 MONTHS, HAS LACK OF RELIABLE TRANSPORTATION KEPT YOU FROM MEDICAL APPOINTMENTS, MEETINGS, WORK OR FROM GETTING THINGS NEEDED FOR DAILY LIVING?: NO

## 2024-01-29 SDOH — ECONOMIC STABILITY: HOUSING INSECURITY

## 2024-01-29 SDOH — ECONOMIC STABILITY: INCOME INSECURITY: HOW HARD IS IT FOR YOU TO PAY FOR THE VERY BASICS LIKE FOOD, HOUSING, MEDICAL CARE, AND HEATING?: SOMEWHAT HARD

## 2024-01-29 SDOH — ECONOMIC STABILITY: FOOD INSECURITY: WITHIN THE PAST 12 MONTHS, YOU WORRIED THAT YOUR FOOD WOULD RUN OUT BEFORE YOU GOT MONEY TO BUY MORE.: SOMETIMES TRUE

## 2024-01-29 SDOH — ECONOMIC STABILITY: HOUSING INSECURITY
IN THE LAST 12 MONTHS, WAS THERE A TIME WHEN YOU DID NOT HAVE A STEADY PLACE TO SLEEP OR SLEPT IN A SHELTER (INCLUDING NOW)?: NO

## 2024-01-29 SDOH — ECONOMIC STABILITY: INCOME INSECURITY: IN THE LAST 12 MONTHS, WAS THERE A TIME WHEN YOU WERE NOT ABLE TO PAY THE MORTGAGE OR RENT ON TIME?: NO

## 2024-01-29 SDOH — ECONOMIC STABILITY: TRANSPORTATION INSECURITY
IN THE PAST 12 MONTHS, HAS LACK OF TRANSPORTATION KEPT YOU FROM MEETINGS, WORK, OR FROM GETTING THINGS NEEDED FOR DAILY LIVING?: NO

## 2024-01-29 SDOH — ECONOMIC STABILITY: FOOD INSECURITY: WITHIN THE PAST 12 MONTHS, THE FOOD YOU BOUGHT JUST DIDN'T LAST AND YOU DIDN'T HAVE MONEY TO GET MORE.: NEVER TRUE

## 2024-01-29 SDOH — HEALTH STABILITY: PHYSICAL HEALTH: ON AVERAGE, HOW MANY MINUTES DO YOU ENGAGE IN EXERCISE AT THIS LEVEL?: 30 MIN

## 2024-01-29 SDOH — HEALTH STABILITY: MENTAL HEALTH
STRESS IS WHEN SOMEONE FEELS TENSE, NERVOUS, ANXIOUS, OR CAN'T SLEEP AT NIGHT BECAUSE THEIR MIND IS TROUBLED. HOW STRESSED ARE YOU?: TO SOME EXTENT

## 2024-01-29 SDOH — HEALTH STABILITY: PHYSICAL HEALTH: ON AVERAGE, HOW MANY DAYS PER WEEK DO YOU ENGAGE IN MODERATE TO STRENUOUS EXERCISE (LIKE A BRISK WALK)?: 7 DAYS

## 2024-01-29 SDOH — ECONOMIC STABILITY: TRANSPORTATION INSECURITY
IN THE PAST 12 MONTHS, HAS THE LACK OF TRANSPORTATION KEPT YOU FROM MEDICAL APPOINTMENTS OR FROM GETTING MEDICATIONS?: NO

## 2024-01-29 ASSESSMENT — SOCIAL DETERMINANTS OF HEALTH (SDOH)
HOW OFTEN DO YOU HAVE SIX OR MORE DRINKS ON ONE OCCASION: NEVER
IN A TYPICAL WEEK, HOW MANY TIMES DO YOU TALK ON THE PHONE WITH FAMILY, FRIENDS, OR NEIGHBORS?: MORE THAN THREE TIMES A WEEK
HOW OFTEN DO YOU ATTENT MEETINGS OF THE CLUB OR ORGANIZATION YOU BELONG TO?: NEVER
HOW OFTEN DO YOU GET TOGETHER WITH FRIENDS OR RELATIVES?: NEVER
HOW OFTEN DO YOU GET TOGETHER WITH FRIENDS OR RELATIVES?: NEVER
IN A TYPICAL WEEK, HOW MANY TIMES DO YOU TALK ON THE PHONE WITH FAMILY, FRIENDS, OR NEIGHBORS?: MORE THAN THREE TIMES A WEEK
DO YOU BELONG TO ANY CLUBS OR ORGANIZATIONS SUCH AS CHURCH GROUPS UNIONS, FRATERNAL OR ATHLETIC GROUPS, OR SCHOOL GROUPS?: NO
DO YOU BELONG TO ANY CLUBS OR ORGANIZATIONS SUCH AS CHURCH GROUPS UNIONS, FRATERNAL OR ATHLETIC GROUPS, OR SCHOOL GROUPS?: NO
HOW MANY DRINKS CONTAINING ALCOHOL DO YOU HAVE ON A TYPICAL DAY WHEN YOU ARE DRINKING: PATIENT DOES NOT DRINK
HOW OFTEN DO YOU ATTEND CHURCH OR RELIGIOUS SERVICES?: NEVER
HOW OFTEN DO YOU HAVE A DRINK CONTAINING ALCOHOL: NEVER
WITHIN THE PAST 12 MONTHS, YOU WORRIED THAT YOUR FOOD WOULD RUN OUT BEFORE YOU GOT THE MONEY TO BUY MORE: SOMETIMES TRUE
HOW OFTEN DO YOU ATTENT MEETINGS OF THE CLUB OR ORGANIZATION YOU BELONG TO?: NEVER
HOW HARD IS IT FOR YOU TO PAY FOR THE VERY BASICS LIKE FOOD, HOUSING, MEDICAL CARE, AND HEATING?: SOMEWHAT HARD
HOW OFTEN DO YOU ATTEND CHURCH OR RELIGIOUS SERVICES?: NEVER

## 2024-01-29 ASSESSMENT — LIFESTYLE VARIABLES
HOW MANY STANDARD DRINKS CONTAINING ALCOHOL DO YOU HAVE ON A TYPICAL DAY: PATIENT DOES NOT DRINK
HOW OFTEN DO YOU HAVE A DRINK CONTAINING ALCOHOL: NEVER
AUDIT-C TOTAL SCORE: 0
SKIP TO QUESTIONS 9-10: 1
HOW OFTEN DO YOU HAVE SIX OR MORE DRINKS ON ONE OCCASION: NEVER

## 2024-01-30 PROBLEM — Z93.6 OTHER ARTIFICIAL OPENINGS OF URINARY TRACT STATUS (HCC): Status: ACTIVE | Noted: 2024-01-30

## 2024-01-31 ENCOUNTER — OFFICE VISIT (OUTPATIENT)
Dept: MEDICAL GROUP | Facility: IMAGING CENTER | Age: 64
End: 2024-01-31
Payer: COMMERCIAL

## 2024-01-31 VITALS
HEIGHT: 62 IN | WEIGHT: 125 LBS | OXYGEN SATURATION: 99 % | TEMPERATURE: 97.4 F | DIASTOLIC BLOOD PRESSURE: 80 MMHG | RESPIRATION RATE: 16 BRPM | HEART RATE: 76 BPM | SYSTOLIC BLOOD PRESSURE: 126 MMHG | BODY MASS INDEX: 23 KG/M2

## 2024-01-31 DIAGNOSIS — Z12.11 SCREENING FOR COLON CANCER: ICD-10-CM

## 2024-01-31 DIAGNOSIS — Z12.31 ENCOUNTER FOR SCREENING MAMMOGRAM FOR BREAST CANCER: ICD-10-CM

## 2024-01-31 DIAGNOSIS — M85.811 OSTEOPENIA OF BOTH SHOULDERS: ICD-10-CM

## 2024-01-31 DIAGNOSIS — M25.511 ACUTE PAIN OF BOTH SHOULDERS: ICD-10-CM

## 2024-01-31 DIAGNOSIS — E78.5 HYPERLIPIDEMIA, UNSPECIFIED HYPERLIPIDEMIA TYPE: ICD-10-CM

## 2024-01-31 DIAGNOSIS — M62.89 MASS OF ILIOPSOAS MUSCLE GROUP: ICD-10-CM

## 2024-01-31 DIAGNOSIS — Z76.89 ENCOUNTER TO ESTABLISH CARE: ICD-10-CM

## 2024-01-31 DIAGNOSIS — I83.813 VARICOSE VEINS OF BOTH LOWER EXTREMITIES WITH PAIN: ICD-10-CM

## 2024-01-31 DIAGNOSIS — M85.812 OSTEOPENIA OF BOTH SHOULDERS: ICD-10-CM

## 2024-01-31 DIAGNOSIS — Z86.718 HISTORY OF DVT (DEEP VEIN THROMBOSIS): ICD-10-CM

## 2024-01-31 DIAGNOSIS — M25.512 ACUTE PAIN OF BOTH SHOULDERS: ICD-10-CM

## 2024-01-31 DIAGNOSIS — M72.0 DUPUYTREN CONTRACTURE: ICD-10-CM

## 2024-01-31 DIAGNOSIS — G56.01 CARPAL TUNNEL SYNDROME OF RIGHT WRIST: ICD-10-CM

## 2024-01-31 DIAGNOSIS — C85.99 LYMPHOMA OF RETROPERITONEUM (HCC): ICD-10-CM

## 2024-01-31 PROBLEM — B95.2 ENTEROCOCCUS UTI: Status: RESOLVED | Noted: 2023-02-27 | Resolved: 2024-01-31

## 2024-01-31 PROBLEM — I83.93 VARICOSE VEINS OF BOTH LOWER EXTREMITIES: Status: ACTIVE | Noted: 2023-10-19

## 2024-01-31 PROBLEM — N12 PYELONEPHRITIS: Status: RESOLVED | Noted: 2023-06-02 | Resolved: 2024-01-31

## 2024-01-31 PROBLEM — Z93.6 OTHER ARTIFICIAL OPENINGS OF URINARY TRACT STATUS (HCC): Status: RESOLVED | Noted: 2024-01-30 | Resolved: 2024-01-31

## 2024-01-31 PROBLEM — I83.90 VARICOSE VEINS OF LOWER EXTREMITY: Status: ACTIVE | Noted: 2023-10-19

## 2024-01-31 PROBLEM — A41.9 SEPSIS (HCC): Status: RESOLVED | Noted: 2023-02-26 | Resolved: 2024-01-31

## 2024-01-31 PROBLEM — E87.6 HYPOKALEMIA: Status: RESOLVED | Noted: 2023-02-26 | Resolved: 2024-01-31

## 2024-01-31 PROBLEM — N39.0 ENTEROCOCCUS UTI: Status: RESOLVED | Noted: 2023-02-27 | Resolved: 2024-01-31

## 2024-01-31 PROCEDURE — 3079F DIAST BP 80-89 MM HG: CPT | Performed by: STUDENT IN AN ORGANIZED HEALTH CARE EDUCATION/TRAINING PROGRAM

## 2024-01-31 PROCEDURE — 99205 OFFICE O/P NEW HI 60 MIN: CPT | Performed by: STUDENT IN AN ORGANIZED HEALTH CARE EDUCATION/TRAINING PROGRAM

## 2024-01-31 PROCEDURE — 3074F SYST BP LT 130 MM HG: CPT | Performed by: STUDENT IN AN ORGANIZED HEALTH CARE EDUCATION/TRAINING PROGRAM

## 2024-01-31 ASSESSMENT — FIBROSIS 4 INDEX: FIB4 SCORE: 0.8

## 2024-01-31 ASSESSMENT — PATIENT HEALTH QUESTIONNAIRE - PHQ9: CLINICAL INTERPRETATION OF PHQ2 SCORE: 0

## 2024-01-31 NOTE — ASSESSMENT & PLAN NOTE
Chronic, stable. S/p injection on 11/30/23. Managed by raquel Mai at [facility]. Has f/u 2/8/24. Takes Tylenol as needed.

## 2024-01-31 NOTE — ASSESSMENT & PLAN NOTE
s/p RFA 1/2024, established with Dr. Gilda Richard at [facility].

## 2024-01-31 NOTE — PATIENT INSTRUCTIONS
Thank you for choosing [facility]. It was a pleasure meeting you today.     Take care!  Asya [facility]

## 2024-01-31 NOTE — ASSESSMENT & PLAN NOTE
Chronic, stable. S/p injection on 11/30/23. Managed by raquel Mai at [facility]. Has f/u 2/8/24. Takes Tylenol as needed.

## 2024-01-31 NOTE — ASSESSMENT & PLAN NOTE
Chronic, stable diagnosed 2/2023. S/P chemo 7/2023. Established with Dr. Tamia Macias at [facility]. Has appt for PET scan tomorrow.  Will obtain records.  Patient will follow-up with oncology as planned.

## 2024-01-31 NOTE — PROGRESS NOTES
Subjective:       CC:   Chief Complaint   Patient presents with    Establish Care       HPI:   Savana is a 63 y.o. female is new to me and is here today to establish care. Previous PCP was at [facility].  Pt would like to discuss the following today:    Establish care: due to insurance change, she needs to establish care with [facility]    Lymphoma: chronic, diagnosed 2/2023. S/P chemo 7/2023. Established with Dr. Tamia Macias at [facility]. Has appt for PET scan tomorrow. One of her sisters has cancer but she doesn't know what type, otherwise denies family history of cancer.       Carpal tunnel/dupuytren's contracture: s/p injection on 11/30/23. Managed by jose- Preston Mai at [facility]. Has f/u 2/8/24. Takes Tylenol as needed.      Shoulder pain bilateral: acute, started suddenly in December, has not worsened, L worse than R. Had xray in December. She's currently doing PT, has had 2 sessions.  Denies edema, erythema, and paresthesia.  Denies injuries and recent falls. Xray 12/2023 remarkable for marked osteopenia, otherwise normal.     Hydronephrosis: secondary to compression of the distal LEFT ureter between iliac fossa mass and uterus. Established with [facility] and gynecologist Dr. Rayna Cabral.      History of DVT: 1/2023. Anticoagulated with Eliquis 5mg BID.     Varicose veins: s/p RFA, established with Dr. Gilda Richard at [facility].     Health Maintenance/Immunizations: Due for colonoscopy, pap, and mammogram. Per pt she has appt for pap smear in April.     ROS:   Denies CP, SOB, fever, and chills.     Patient Active Problem List    Diagnosis Date Noted    Acute pain of both shoulders 01/31/2024    Osteopenia of both shoulders 01/31/2024    Carpal tunnel syndrome of right wrist 01/31/2024    Dupuytren contracture 01/31/2024    Varicose veins of both lower extremities 10/19/2023    Slow transit constipation 06/07/2023    Lymphoma of retroperitoneum (HCC) 06/02/2023    Normocytic  anemia 02/27/2023    Mass of iliopsoas muscle group, L hydronephrosis, h/o DVT/anticoagulation 02/09/2023    History of DVT (deep vein thrombosis) 02/09/2023    Thyroid nodule 02/09/2023    Hyperlipidemia 02/09/2023       Past Medical History:   Diagnosis Date    Blood clotting disorder (HCC) 02/2023    left leg DVT, medicated    Bowel habit changes 07/14/2023    constipation, medicated    Cancer (HCC) 07/14/2023    Lymphoma, undergoing chemo at this time    Dental disorder 07/14/2023    crowns upper front teeth 2    Fatty liver     Fatty liver 07/14/2023    Heart burn     High cholesterol 07/14/2023    medicated    Hypokalemia     Normocytic anemia     Pain 07/14/2023    left foot pain    Renal disorder 07/14/2023    kidney cancer, nephrostomy tube in place       Current Outpatient Medications   Medication Sig Dispense Refill    apixaban (ELIQUIS) 5mg Tab Take 5 mg by mouth 2 times a day.      atorvastatin (LIPITOR) 20 MG Tab Take 20 mg by mouth at bedtime.      CALCIUM PO Take 1 Tablet by mouth every day.      B Complex Vitamins (B COMPLEX PO) Take 1 Tablet by mouth every day.      vitamin D3 (CHOLECALCIFEROL) 5000 Unit (125 mcg) Tab Take 5,000 Units by mouth every day.      Omega-3 Fatty Acids (FISH OIL) 1000 MG Cap capsule Take 1,000 mg by mouth every day.      GLUCOSAMINE-CHONDROITIN PO Take 1 Tablet by mouth every day.       No current facility-administered medications for this visit.       Allergies as of 01/31/2024    (No Known Allergies)       Social History     Socioeconomic History    Marital status:      Spouse name: Not on file    Number of children: Not on file    Years of education: Not on file    Highest education level: 3rd grade   Occupational History    Not on file   Tobacco Use    Smoking status: Never    Smokeless tobacco: Never   Vaping Use    Vaping Use: Never used   Substance and Sexual Activity    Alcohol use: Not Currently    Drug use: Never    Sexual activity: Not on file   Other  Topics Concern    Not on file   Social History Narrative    Not on file     Social Determinants of Health     Financial Resource Strain: Medium Risk (1/29/2024)    Overall Financial Resource Strain (CARDIA)     Difficulty of Paying Living Expenses: Somewhat hard   Food Insecurity: Food Insecurity Present (1/29/2024)    Hunger Vital Sign     Worried About Running Out of Food in the Last Year: Sometimes true     Ran Out of Food in the Last Year: Never true   Transportation Needs: No Transportation Needs (1/29/2024)    PRAPARE - Transportation     Lack of Transportation (Medical): No     Lack of Transportation (Non-Medical): No   Physical Activity: Sufficiently Active (1/29/2024)    Exercise Vital Sign     Days of Exercise per Week: 7 days     Minutes of Exercise per Session: 30 min   Stress: Stress Concern Present (1/29/2024)    [employer] - Occupational Stress Questionnaire     Feeling of Stress : To some extent   Social Connections: Socially Isolated (1/29/2024)    Social Connection and Isolation Panel [NHANES]     Frequency of Communication with Friends and Family: More than three times a week     Frequency of Social Gatherings with Friends and Family: Never     Attends Presybeterian Services: Never     Active Member of Clubs or Organizations: No     Attends Club or Organization Meetings: Never     Marital Status:    Intimate Partner Violence: Not on file   Housing Stability: Low Risk  (1/29/2024)    Housing Stability Vital Sign     Unable to Pay for Housing in the Last Year: No     Number of Places Lived in the Last Year: 1     Unstable Housing in the Last Year: No       Family History   Problem Relation Age of Onset    No Known Problems Mother     Diabetes Father     Alcohol abuse Father     Cancer Sister         unknown       Past Surgical History:   Procedure Laterality Date    GYN SURGERY  07/14/2023    c sections times 2    [place] DX BONE MARROW ASPIRATIONS Left 02/16/2023     "Procedure: BONE MARROW ASPIRATION -DR. LEÓN;  Surgeon: Chris León M.D.;  Location: SURGERY SAME DAY [facility];  Service: Orthopedics    [place] DX BONE MARROW BIOPSIES Left 02/16/2023    Procedure: BIOPSY, BONE MARROW, USING NEEDLE OR TROCAR;  Surgeon: Chris León M.D.;  Location: SURGERY SAME DAY [facility];  Service: Orthopedics    OTHER      PRIMARY C SECTION             Objective:     /80 (BP Location: Right arm, Patient Position: Sitting, BP Cuff Size: Adult)   Pulse 76   Temp 36.3 °C (97.4 °F) (Temporal)   Resp 16   Ht 1.575 m (5' 2\")   Wt 56.7 kg (125 lb)   SpO2 99%   BMI 22.86 kg/m²     Physical Exam  Constitutional:       General: She is not in acute distress.     Appearance: Normal appearance.   HENT:      Head: Normocephalic and atraumatic.   Eyes:      General: No scleral icterus.  Neck:      Thyroid: No thyroid mass or thyromegaly.   Cardiovascular:      Rate and Rhythm: Normal rate and regular rhythm.      Heart sounds: Normal heart sounds. No murmur heard.  Pulmonary:      Effort: Pulmonary effort is normal.      Breath sounds: Normal breath sounds. No wheezing.   Abdominal:      General: Bowel sounds are normal. There is no distension.      Palpations: Abdomen is soft. There is no mass.      Tenderness: There is no abdominal tenderness.   Musculoskeletal:         General: No swelling.      Right shoulder: No swelling, deformity or tenderness. Normal range of motion.      Left shoulder: No swelling, deformity or tenderness. Decreased range of motion.      Cervical back: Neck supple.   Skin:     General: Skin is warm and dry.   Neurological:      General: No focal deficit present.      Mental Status: She is alert and oriented to person, place, and time.      Gait: Gait normal.   Psychiatric:         Mood and Affect: Mood normal.         Behavior: Behavior normal.         Thought Content: Thought content normal.         Judgment: Judgment normal.            Assessment and " Plan:       1. Encounter to establish care  Pt is establishing care today. Chart reviewed. Discussed health maintenance and orders placed as recommended and agreed upon by pt. JO completed by pt to obtain previous records.  We are out of vaccines at the clinic today, therefore recommended patient to get vaccines at her pharmacy.    2. Acute pain of both shoulders  Acute. Pain started suddenly one month ago, has not worsened, L worse than R. She's currently doing PT, has had 2 sessions.  Denies edema, erythema, and paresthesia.  Denies recent falls and injuries. Xray 12/2023 remarkable for marked osteopenia, otherwise normal.  On exam today she has limited mobility of her left shoulder due to pain otherwise normal examination.  Patient will continue with PT.  Referred to the [facility] clinic per patient's request.  Advised patient to return to clinic if pain worsens, may need MRI for further evaluation.  - Referral to Orthopedics      3. Osteopenia of both shoulders  Acute. New finding on xray 12/2023.  DEXA ordered.  Will continue with vitamin D3 5000 units daily.  Recommended over the counter calcium supplements.  Will follow-up after completing DEXA  - DS-BONE DENSITY STUDY (DEXA); Future    4. Lymphoma of retroperitoneum (HCC)  Chronic, stable diagnosed 2/2023. S/P chemo 7/2023. Established with Dr. Tamia Macias at [facility]. Has appt for PET scan tomorrow.  Will obtain records.  Patient will follow-up with oncology as planned.    5. Screening for colon cancer  - Referral to GI for Colonoscopy    6. Encounter for screening mammogram for breast cancer  - MA-SCREENING MAMMO BILAT W/TOMOSYNTHESIS W/CAD; Future    7. Hyperlipidemia, unspecified hyperlipidemia type  Chronic.  Will continue with atorvastatin 20 mg daily.  Will obtain updated lipid panel along with screening labs as listed below. CMP unremarkable 1/2024.  - Lipid Profile; Future  - TSH WITH REFLEX TO FT4; Future  - CBC WITH DIFFERENTIAL;  Future  - VITAMIN D,25 HYDROXY (DEFICIENCY); Future  - HEMOGLOBIN A1C; Future    8. Mass of iliopsoas muscle group, L hydronephrosis, h/o DVT/anticoagulation  Chronic, f/u with urology and gyn as planned.     9. History of DVT (deep vein thrombosis)  DVT of lower extremity on 1/2023. Anticoagulated with Eliquis 5mg BID.  Strict ED precautions given for DVT/PE.    Varicose veins of both lower extremities   Chronic, s/p RFA 1/2024, established with Dr. Gilda Richard at [facility].  With follow-up with vascular health as planned.    11. Carpal tunnel syndrome of right wrist  12. Dupuytren contracture  Chronic, stable. S/p injection on 11/30/23. Managed by ortho- Preston Mai at [facility]. Has f/u 2/8/24. Takes Tylenol as needed.  Will follow-up with Ortho as planned.            Diagnosis and treatment plan explained to pt. . Pt agreed with treatment plan and verbalized understanding.        Return in about 3 months (around 4/30/2024) for  Lab Results.     A total of  65 minutes was spent today reviewing chart, assessing and examining the patient, ordering tests, and documenting. None of which was spent performing separately billing procedures or ancillary services.     Please note that this dictation was created using voice recognition software. I have made every reasonable attempt to correct obvious errors, but I expect that there are errors of grammar and possibly content that I did not discover before finalizing the note.    Asya Flores PA-C  [facility]

## 2024-01-31 NOTE — ASSESSMENT & PLAN NOTE
Acute. Pain started suddenly one month ago, has not worsened, L worse than R. She's currently doing PT, has had 2 sessions.  Denies edema, erythema, and paresthesia.  Denies recent falls and injuries. Xray 12/2023 remarkable for marked osteopenia, otherwise normal.  On exam today she has limited mobility of her left shoulder due to pain otherwise normal examination.  Patient will continue with PT.  Referred to the [facility] clinic per patient's request.  Advised patient to return to clinic if pain worsens, may need MRI for further evaluation.

## 2024-02-01 PROBLEM — H43.813 VITREOUS DETACHMENT OF BOTH EYES: Status: ACTIVE | Noted: 2024-02-01

## 2024-02-02 ENCOUNTER — TELEPHONE (OUTPATIENT)
Dept: MEDICAL GROUP | Facility: IMAGING CENTER | Age: 64
End: 2024-02-02
Payer: COMMERCIAL

## 2024-02-02 DIAGNOSIS — M25.511 ACUTE PAIN OF BOTH SHOULDERS: ICD-10-CM

## 2024-02-02 DIAGNOSIS — M25.512 ACUTE PAIN OF BOTH SHOULDERS: ICD-10-CM

## 2024-02-02 PROBLEM — C83.30 DIFFUSE LARGE B CELL LYMPHOMA (HCC): Status: ACTIVE | Noted: 2024-02-02

## 2024-02-02 NOTE — TELEPHONE ENCOUNTER
Caller Name: Savana Rendon   Call Back Number: [phone] (home)      How would the patient prefer to be contacted with a response: Phone call do NOT leave a detailed message    Pt called stating she would like to proceed with the MRI for her shoulders. She states she has been doing PT and her therapist mentioned the pain that she is experiencing is not due to her osteopenia. Are you able to order MRI for this? Please advise.

## 2024-02-05 ENCOUNTER — OFFICE VISIT (OUTPATIENT)
Dept: MEDICAL GROUP | Facility: IMAGING CENTER | Age: 64
End: 2024-02-05
Payer: COMMERCIAL

## 2024-02-05 VITALS
DIASTOLIC BLOOD PRESSURE: 78 MMHG | WEIGHT: 123.8 LBS | OXYGEN SATURATION: 97 % | SYSTOLIC BLOOD PRESSURE: 122 MMHG | HEIGHT: 62 IN | HEART RATE: 88 BPM | RESPIRATION RATE: 16 BRPM | TEMPERATURE: 98.8 F | BODY MASS INDEX: 22.78 KG/M2

## 2024-02-05 DIAGNOSIS — M25.511 ACUTE PAIN OF BOTH SHOULDERS: ICD-10-CM

## 2024-02-05 DIAGNOSIS — M25.512 ACUTE PAIN OF BOTH SHOULDERS: ICD-10-CM

## 2024-02-05 PROCEDURE — 1125F AMNT PAIN NOTED PAIN PRSNT: CPT | Performed by: STUDENT IN AN ORGANIZED HEALTH CARE EDUCATION/TRAINING PROGRAM

## 2024-02-05 PROCEDURE — 3078F DIAST BP <80 MM HG: CPT | Performed by: STUDENT IN AN ORGANIZED HEALTH CARE EDUCATION/TRAINING PROGRAM

## 2024-02-05 PROCEDURE — 3074F SYST BP LT 130 MM HG: CPT | Performed by: STUDENT IN AN ORGANIZED HEALTH CARE EDUCATION/TRAINING PROGRAM

## 2024-02-05 PROCEDURE — 99214 OFFICE O/P EST MOD 30 MIN: CPT | Performed by: STUDENT IN AN ORGANIZED HEALTH CARE EDUCATION/TRAINING PROGRAM

## 2024-02-05 RX ORDER — TRAMADOL HYDROCHLORIDE 50 MG/1
50 TABLET ORAL 3 TIMES DAILY PRN
Qty: 21 TABLET | Refills: 0 | Status: SHIPPED | OUTPATIENT
Start: 2024-02-05 | End: 2024-02-12

## 2024-02-05 ASSESSMENT — FIBROSIS 4 INDEX: FIB4 SCORE: 0.8

## 2024-02-05 ASSESSMENT — PAIN SCALES - GENERAL: PAINLEVEL: 6=MODERATE PAIN

## 2024-02-05 NOTE — PROGRESS NOTES
"Subjective:     CC:   Chief Complaint   Patient presents with    Shoulder Pain     Both shoulder pain, with back pain and right heel pain. Since Dec        HPI:   Savana Rendon, sulaiman, 63 y.o., female,  presents today to discuss:     Shoulder pain bilateral f/u: started suddenly in December.  L worse than R. Has been worsening in the last few days, couldn't do PT last week due to the pain. States her pain is severe, 7/10, has been radiating to her upper arm and neck, described as \" pulsating,\" worse with movement and associated with weakness. Has been interfering with her sleep.  Has been taking OTC Tylenol 1000mg with no benefit. She cannot take NSAIDs. Had numbness in L hand this morning, otherwise she has never experienced paresthesia before. Denies edema, erythema,  and ecchymosis.  Denies recent  injuries and recent falls. Xray 12/2023 remarkable for marked osteopenia, otherwise normal.        ROS:  See HPI    Medications, allergies, past medical history, family history, surgical history, and social history documented in chart and reviewed by me.       Objective:   Exam:  /78 (BP Location: Left arm, Patient Position: Sitting, BP Cuff Size: Adult)   Pulse 88   Temp 37.1 °C (98.8 °F) (Temporal)   Resp 16   Ht 1.575 m (5' 2\")   Wt 56.2 kg (123 lb 12.8 oz)   SpO2 97%   BMI 22.64 kg/m²      Physical Exam  Constitutional:       General: She is not in acute distress.     Appearance: Normal appearance.   HENT:      Head: Normocephalic and atraumatic.   Eyes:      General: No scleral icterus.  Neck:      Thyroid: No thyroid mass or thyromegaly.   Cardiovascular:      Rate and Rhythm: Normal rate and regular rhythm.      Heart sounds: Normal heart sounds. No murmur heard.  Pulmonary:      Effort: Pulmonary effort is normal.      Breath sounds: Normal breath sounds. No wheezing.   Abdominal:      General: Bowel sounds are normal. There is no distension.      Palpations: Abdomen is soft. " There is no mass.      Tenderness: There is no abdominal tenderness.   Musculoskeletal:         General: No swelling.      Right shoulder: Tenderness present. No swelling or deformity. Decreased range of motion.      Left shoulder: Tenderness present. No swelling or deformity. Decreased range of motion.      Cervical back: Neck supple.   Skin:     General: Skin is warm and dry.   Neurological:      General: No focal deficit present.      Mental Status: She is alert and oriented to person, place, and time.      Gait: Gait normal.   Psychiatric:         Mood and Affect: Mood normal.         Behavior: Behavior normal.         Thought Content: Thought content normal.         Judgment: Judgment normal.              Assessment & Plan:     1. Acute pain of both shoulders  - Controlled Substance Treatment Agreement  - traMADol (ULTRAM) 50 MG Tab; Take 1 Tablet by mouth 3 times a day as needed for Moderate Pain or Severe Pain (as needed for pain) for up to 7 days.  Dispense: 21 Tablet; Refill: 0  - MR-SHOULDER-W/O RIGHT; Future  - MR-SHOULDER-W/O LEFT; Future   Acute. Pt started suddenly one month ago.  L worse than R. Has been worsening in the last few days, couldn't do PT last week due to the pain.  Has been taking OTC Tylenol 1000mg with no benefit. She cannot take NSAIDs due to Eliquis. Had numbness in L hand this morning, otherwise she has never experienced paresthesia before. Denies edema, erythema,  and ecchymosis.  Denies recent  injuries and recent falls. Xray 12/2023 remarkable for marked osteopenia, otherwise normal. Limited examination performed today due to pain. Pt has tenderness with palpation over posterior and anterior bilateral shoulders. She has limited mobility and decreased strength due to pain.   Stat MRI of her shoulders ordered for further evaluation. She was also referred to the [facility] on last visit per her request. She will call to schedule appt.  Sent prescription for Tramadol 50mg TID PRN x  "7 days for pain control. Risks versus benefits were reviewed. Opioid Risk is 0. Obtained and reviewed patient utilization report from [facility] pharmacy database on 2/5/2024 2:28 PM  prior to writing prescription for controlled substance II, III or IV per [place] bill . Based on assessment of the report, the prescription is medically necessary. Pt understands that Tramadol is a controlled substance which is potentially habit-forming. Its use is regulated by the [facility]. We also discussed the new \"black box\" warning regarding the lethal combination of opioids and benzodiazepines.  Narcotics have may adverse effects and the risks of addiction, accidental overdose and death were emphasized.    I have advised patient to take medication as prescribed, keep medication in a safe place, DO NOT drive while taking this medication, DO NOT operate heavy machinery, DO NOT sign legal documents, DO NOT combine other sedating medications, DO NOT take illegal drugs including marijuana,  DO NOT drink alcohol, and DO NOT sell/share this medication. Patient verbalized understanding and agreed with instructions.   In prescribing controlled substances to this patient, I certify that I have obtained and reviewed the medical history of pt. I have also made a good tony effort to obtain applicable records from other providers who have treated the patient and records did not demonstrate any increased risk of substance abuse that would prevent me from prescribing controlled substances.    Given the above, I believe the benefits of controlled substance therapy outweigh the risks. The reasons for prescribing controlled substances include in my professional opinion, controlled substances are the only reasonable choice for this patient because oral over-the-counter medications have not not been adequate for pain control. Accordingly, I have discussed the risks and benefits, treatment plan, and alternative therapies with the patient.    Pt " completed controlled substance agreement and agreed to abide by the controlled substance agreement.             Diagnosis and treatment plan explained to pt. Counseled pt on new medication(s) and potential side effects. Pt agreed with treatment plan and verbalized understanding.       Return for after completing MRI.     Please note that this dictation was created using voice recognition software. I have made every reasonable attempt to correct obvious errors, but I expect that there are errors of grammar and possibly content that I did not discover before finalizing the note.    Asya Flores PA-C  [facility]

## 2024-02-07 ENCOUNTER — HOSPITAL ENCOUNTER (OUTPATIENT)
Dept: RADIOLOGY | Facility: MEDICAL CENTER | Age: 64
End: 2024-02-07
Attending: STUDENT IN AN ORGANIZED HEALTH CARE EDUCATION/TRAINING PROGRAM
Payer: COMMERCIAL

## 2024-02-07 ENCOUNTER — TELEPHONE (OUTPATIENT)
Dept: MEDICAL GROUP | Facility: IMAGING CENTER | Age: 64
End: 2024-02-07

## 2024-02-07 DIAGNOSIS — M25.511 ACUTE PAIN OF BOTH SHOULDERS: ICD-10-CM

## 2024-02-07 DIAGNOSIS — M25.512 ACUTE PAIN OF BOTH SHOULDERS: ICD-10-CM

## 2024-02-07 PROBLEM — M67.819 TENDINOSIS OF SHOULDER: Status: ACTIVE | Noted: 2024-02-07

## 2024-02-07 PROCEDURE — 73221 MRI JOINT UPR EXTREM W/O DYE: CPT | Mod: RT

## 2024-02-07 PROCEDURE — 73221 MRI JOINT UPR EXTREM W/O DYE: CPT | Mod: LT

## 2024-02-07 NOTE — TELEPHONE ENCOUNTER
Called and spoke with pt regarding her MRI results. R shoulder MRI remarkable for: moderate lateral downsloping of the acromion and mild tendinosis of the supraspinatus tendon. L shoulder MRI remarkable for mild tendinosis of the supraspinatus tendon, moderate lateral downsloping of the acromion, and small inferiorly directed subacromial enthesophyte. Per pt she has appt with Je tomorrow for further consultation.  Advised patient to continue physical therapy and tramadol as needed for pain.  Patient had no further questions.

## 2024-02-14 ENCOUNTER — APPOINTMENT (OUTPATIENT)
Dept: RADIOLOGY | Facility: MEDICAL CENTER | Age: 64
End: 2024-02-14
Attending: INTERNAL MEDICINE
Payer: COMMERCIAL

## 2024-02-27 ENCOUNTER — HOSPITAL ENCOUNTER (OUTPATIENT)
Dept: RADIOLOGY | Facility: MEDICAL CENTER | Age: 64
End: 2024-02-27
Attending: STUDENT IN AN ORGANIZED HEALTH CARE EDUCATION/TRAINING PROGRAM
Payer: COMMERCIAL

## 2024-02-27 DIAGNOSIS — M85.811 OSTEOPENIA OF BOTH SHOULDERS: ICD-10-CM

## 2024-02-27 DIAGNOSIS — Z12.31 ENCOUNTER FOR SCREENING MAMMOGRAM FOR BREAST CANCER: ICD-10-CM

## 2024-02-27 DIAGNOSIS — M85.812 OSTEOPENIA OF BOTH SHOULDERS: ICD-10-CM

## 2024-02-27 PROCEDURE — 77067 SCR MAMMO BI INCL CAD: CPT

## 2024-02-27 PROCEDURE — 77080 DXA BONE DENSITY AXIAL: CPT

## 2024-03-13 ENCOUNTER — APPOINTMENT (OUTPATIENT)
Dept: RADIOLOGY | Facility: MEDICAL CENTER | Age: 64
End: 2024-03-13
Attending: INTERNAL MEDICINE
Payer: COMMERCIAL

## 2024-03-13 DIAGNOSIS — C83.39 LYMPHOID GRANULOMATOSIS OF THE LUNG (HCC): ICD-10-CM

## 2024-03-13 PROCEDURE — A9579 GAD-BASE MR CONTRAST NOS,1ML: HCPCS | Performed by: INTERNAL MEDICINE

## 2024-03-13 PROCEDURE — 700117 HCHG RX CONTRAST REV CODE 255: Performed by: INTERNAL MEDICINE

## 2024-03-13 PROCEDURE — 70553 MRI BRAIN STEM W/O & W/DYE: CPT

## 2024-03-13 RX ADMIN — GADOTERIDOL 12 ML: 279.3 INJECTION, SOLUTION INTRAVENOUS at 15:00

## 2024-03-15 ENCOUNTER — HOSPITAL ENCOUNTER (OUTPATIENT)
Dept: LAB | Facility: MEDICAL CENTER | Age: 64
End: 2024-03-15
Attending: STUDENT IN AN ORGANIZED HEALTH CARE EDUCATION/TRAINING PROGRAM
Payer: COMMERCIAL

## 2024-03-15 DIAGNOSIS — E78.5 HYPERLIPIDEMIA, UNSPECIFIED HYPERLIPIDEMIA TYPE: ICD-10-CM

## 2024-03-15 LAB
BASOPHILS # BLD AUTO: 0.3 % (ref 0–1.8)
BASOPHILS # BLD: 0.02 K/UL (ref 0–0.12)
EOSINOPHIL # BLD AUTO: 0.08 K/UL (ref 0–0.51)
EOSINOPHIL NFR BLD: 1.2 % (ref 0–6.9)
ERYTHROCYTE [DISTWIDTH] IN BLOOD BY AUTOMATED COUNT: 47.7 FL (ref 35.9–50)
EST. AVERAGE GLUCOSE BLD GHB EST-MCNC: 100 MG/DL
HBA1C MFR BLD: 5.1 % (ref 4–5.6)
HCT VFR BLD AUTO: 43 % (ref 37–47)
HGB BLD-MCNC: 14.3 G/DL (ref 12–16)
IMM GRANULOCYTES # BLD AUTO: 0.02 K/UL (ref 0–0.11)
IMM GRANULOCYTES NFR BLD AUTO: 0.3 % (ref 0–0.9)
LYMPHOCYTES # BLD AUTO: 1.32 K/UL (ref 1–4.8)
LYMPHOCYTES NFR BLD: 20.5 % (ref 22–41)
MCH RBC QN AUTO: 32.8 PG (ref 27–33)
MCHC RBC AUTO-ENTMCNC: 33.3 G/DL (ref 32.2–35.5)
MCV RBC AUTO: 98.6 FL (ref 81.4–97.8)
MONOCYTES # BLD AUTO: 0.48 K/UL (ref 0–0.85)
MONOCYTES NFR BLD AUTO: 7.5 % (ref 0–13.4)
NEUTROPHILS # BLD AUTO: 4.51 K/UL (ref 1.82–7.42)
NEUTROPHILS NFR BLD: 70.2 % (ref 44–72)
NRBC # BLD AUTO: 0 K/UL
NRBC BLD-RTO: 0 /100 WBC (ref 0–0.2)
PLATELET # BLD AUTO: 258 K/UL (ref 164–446)
PMV BLD AUTO: 10.1 FL (ref 9–12.9)
RBC # BLD AUTO: 4.36 M/UL (ref 4.2–5.4)
WBC # BLD AUTO: 6.4 K/UL (ref 4.8–10.8)

## 2024-03-15 PROCEDURE — 84443 ASSAY THYROID STIM HORMONE: CPT

## 2024-03-15 PROCEDURE — 83036 HEMOGLOBIN GLYCOSYLATED A1C: CPT

## 2024-03-15 PROCEDURE — 85025 COMPLETE CBC W/AUTO DIFF WBC: CPT

## 2024-03-15 PROCEDURE — 82306 VITAMIN D 25 HYDROXY: CPT

## 2024-03-15 PROCEDURE — 36415 COLL VENOUS BLD VENIPUNCTURE: CPT

## 2024-03-15 PROCEDURE — 80061 LIPID PANEL: CPT

## 2024-03-16 LAB
25(OH)D3 SERPL-MCNC: 59 NG/ML (ref 30–100)
CHOLEST SERPL-MCNC: 224 MG/DL (ref 100–199)
HDLC SERPL-MCNC: 72 MG/DL
LDLC SERPL CALC-MCNC: 125 MG/DL
TRIGL SERPL-MCNC: 136 MG/DL (ref 0–149)
TSH SERPL DL<=0.005 MIU/L-ACNC: 1.78 UIU/ML (ref 0.38–5.33)

## 2024-03-18 DIAGNOSIS — D75.89 MACROCYTOSIS: ICD-10-CM

## 2024-03-27 ENCOUNTER — OFFICE VISIT (OUTPATIENT)
Dept: MEDICAL GROUP | Facility: IMAGING CENTER | Age: 64
End: 2024-03-27
Payer: COMMERCIAL

## 2024-03-27 VITALS
HEART RATE: 68 BPM | DIASTOLIC BLOOD PRESSURE: 68 MMHG | TEMPERATURE: 97.2 F | RESPIRATION RATE: 16 BRPM | OXYGEN SATURATION: 99 % | SYSTOLIC BLOOD PRESSURE: 116 MMHG | BODY MASS INDEX: 22.63 KG/M2 | HEIGHT: 62 IN | WEIGHT: 123 LBS

## 2024-03-27 DIAGNOSIS — D75.89 MACROCYTOSIS: ICD-10-CM

## 2024-03-27 DIAGNOSIS — N95.1 HOT FLASHES DUE TO MENOPAUSE: ICD-10-CM

## 2024-03-27 DIAGNOSIS — E78.5 DYSLIPIDEMIA: ICD-10-CM

## 2024-03-27 DIAGNOSIS — M81.0 OSTEOPOROSIS WITHOUT CURRENT PATHOLOGICAL FRACTURE, UNSPECIFIED OSTEOPOROSIS TYPE: ICD-10-CM

## 2024-03-27 DIAGNOSIS — M67.819 TENDINOSIS OF SHOULDER: ICD-10-CM

## 2024-03-27 DIAGNOSIS — G47.00 INSOMNIA, UNSPECIFIED TYPE: ICD-10-CM

## 2024-03-27 DIAGNOSIS — R13.10 DYSPHAGIA, UNSPECIFIED TYPE: ICD-10-CM

## 2024-03-27 DIAGNOSIS — C83.30 DIFFUSE LARGE B-CELL LYMPHOMA, UNSPECIFIED BODY REGION (HCC): ICD-10-CM

## 2024-03-27 RX ORDER — RAMELTEON 8 MG/1
8 TABLET ORAL NIGHTLY
Qty: 30 TABLET | Refills: 3 | Status: SHIPPED | OUTPATIENT
Start: 2024-03-27

## 2024-03-27 ASSESSMENT — FIBROSIS 4 INDEX: FIB4 SCORE: 0.85

## 2024-03-27 NOTE — PROGRESS NOTES
"Subjective:     CC:   Chief Complaint   Patient presents with    Follow-Up       HPI:   Savana Rendonsulaiman, 63 y.o., female,  presents today to discuss:       Left shoulder pain: chronic, states she received a steroid injection on L shoulder a few weeks ago. Has not experienced relief. She will start PT this week. She has f/u with ortho on May 16.     Dyslipidemia: states she eats mostly chicken for protein. She doesn't eat a lot of fish or beef. She eats vegetables. She walks 3-4 times per week about 30 minutes.     Mid back pain: chronic, started a few months ago. Her R heel feels numb. States her vascular doctor referred her to a back specialist.     States she is not sleeping well. She is experiencing hot flashes at night. She has appt with gyn on 4/10. She entered menopause at age 56. She has tried Melatonin for sleep but doesn't work.    Dysphagia: chronic.  States sometimes when she has liquids she has trouble swallowing and develops a cough.  She is able to tolerate solids.  Denies difficulty swallowing saliva.  Denies history of trauma in her throat.    Osteoporosis: New diagnosis per DEXA scan.  Patient denies taking oral bisphosphonates in the past.              ROS:  See HPI    Medications, allergies, past medical history, family history, surgical history, and social history documented in chart and reviewed by me.       Objective:   Exam:  /68 (BP Location: Left arm, Patient Position: Sitting, BP Cuff Size: Adult)   Pulse 68   Temp 36.2 °C (97.2 °F) (Temporal)   Resp 16   Ht 1.575 m (5' 2\")   Wt 55.8 kg (123 lb)   SpO2 99%   BMI 22.50 kg/m²      General: In no acute distress. Normal appearance.   Head:   Atraumatic, normocephalic.   Neck: Supple without lymphadenopathy. Thyroid normal. No nodules or masses palpated.  Pulmonary: Normal effort, clear to auscultation bilaterally, no wheezes, rhonchi, or rales  Cardiovascular:   Regular rate and rhythm. No murmurs, rubs, " or gallops.  Musculoskeletal:  Normal ROM. No edema.    Skin: No visible rashes or lesions.  Neurological: Alert and oriented to person, place, and time. Gait normal.   Psychiatric: Normal mood and affect. Calm and friendly behavior. Speech clear. Normal judgement and insight.         Assessment & Plan:     1. Dysphagia, unspecified type  Chronic, unknown etiology. Barium swallowed ordered for further eval.   - NM-ESOPHAGEAL REFLUX; Future    2. Macrocytosis  Chronic.  Unknown etiology.  Will rule out B12 folic acid deficiency.  - VIT B12,  FOLIC ACID    3. Insomnia, unspecified type  Acute.  Per patient she has been having trouble falling asleep due to hot flashes.  Will trial ramelteon to help her with sleep.  - ramelteon (ROZEREM) 8 MG tablet; Take 1 Tablet by mouth every evening.  Dispense: 30 Tablet; Refill: 3    4. Hot flashes due to menopause  Acute and uncontrolled.  Per patient she has an upcoming appointment with GYN to discuss her symptoms.    5. Osteoporosis without current pathological fracture, unspecified osteoporosis type  New diagnosis.  DEXA reviewed with patient.  Patient reports having some dysphagia, therefore oral bisphosphonate would not be ideal at this time.  Barium swallow ordered for further evaluation.  Patient is not interested in injections such as Prolia.  Will continue with vitamin D, calcium, and exercise. Will monitor.     6. Dyslipidemia  Chronic. Lipid panel reviewed with pt today. The 10-year ASCVD risk score ([place] DK, et al., 2019) is: 3.1%. No statin indicated at this time. Counseled pt on a low cholesterol/carb diet and recommended physical activity for at least 30 min most days of the week. Will continue with yearly lipid panel checks or sooner if indicated.     Latest Reference Range & Units 03/15/24 11:21   Cholesterol,Tot 100 - 199 mg/dL 224 (H)   Triglycerides 0 - 149 mg/dL 136   HDL >=40 mg/dL 72   LDL <100 mg/dL 125 (H)   (H): Data is abnormally high    7. Tendinosis  of shoulder  Chronic and uncontrolled.  Patient is established at the [facility] clinic.  Advised patient to schedule follow-up to discuss other treatment options.    8. Diffuse large B-cell lymphoma, unspecified body region (HCC)  HCC Gap Form    Assessment and plan: Chronic, stable, as based on today's assessment and impact on other conditions evaluated today. Continue with current treatment plan: Follow-up with specialist as directed, but at least annually.  Last edited 03/27/24 16:34 PDT by Asya Flores P.A.-C.            Diagnosis and treatment plan explained to pt. Counseled pt on new medication(s) and potential side effects. Pt agreed with treatment plan and verbalized understanding.     Return in about 2 months (around 5/27/2024) for test results.     Please note that this dictation was created using voice recognition software. I have made every reasonable attempt to correct obvious errors, but I expect that there are errors of grammar and possibly content that I did not discover before finalizing the note.    Asya Flores PA-C  [facility]

## 2024-04-03 ENCOUNTER — HOSPITAL ENCOUNTER (OUTPATIENT)
Dept: CARDIOLOGY | Facility: MEDICAL CENTER | Age: 64
End: 2024-04-03
Attending: INTERNAL MEDICINE
Payer: COMMERCIAL

## 2024-04-03 DIAGNOSIS — T45.1X5S NEUTROPENIA ASSOCIATED WITH MUCOSITIS DUE TO ANTINEOPLASTIC THERAPY (HCC): ICD-10-CM

## 2024-04-03 DIAGNOSIS — R30.9 MICTURITION PAINFUL: ICD-10-CM

## 2024-04-03 DIAGNOSIS — R53.0 NEOPLASTIC MALIGNANT RELATED FATIGUE: ICD-10-CM

## 2024-04-03 DIAGNOSIS — Z51.12 ENCOUNTER FOR ANTINEOPLASTIC IMMUNOTHERAPY: ICD-10-CM

## 2024-04-03 DIAGNOSIS — Z51.11 ENCOUNTER FOR ANTINEOPLASTIC CHEMOTHERAPY: ICD-10-CM

## 2024-04-03 DIAGNOSIS — C83.39 LYMPHOID GRANULOMATOSIS OF THE LUNG (HCC): ICD-10-CM

## 2024-04-03 DIAGNOSIS — D70.1 NEUTROPENIA ASSOCIATED WITH MUCOSITIS DUE TO ANTINEOPLASTIC THERAPY (HCC): ICD-10-CM

## 2024-04-03 DIAGNOSIS — H53.19 PHOTOPSIA: ICD-10-CM

## 2024-04-03 DIAGNOSIS — K12.31 NEUTROPENIA ASSOCIATED WITH MUCOSITIS DUE TO ANTINEOPLASTIC THERAPY (HCC): ICD-10-CM

## 2024-04-03 LAB
LV EJECT FRACT  99904: 59
LV EJECT FRACT MOD 2C 99903: 55.13
LV EJECT FRACT MOD 4C 99902: 61.24
LV EJECT FRACT MOD BP 99901: 58.52

## 2024-04-03 PROCEDURE — 93306 TTE W/DOPPLER COMPLETE: CPT | Mod: 26 | Performed by: INTERNAL MEDICINE

## 2024-04-03 PROCEDURE — 93306 TTE W/DOPPLER COMPLETE: CPT

## 2024-04-10 ENCOUNTER — HOSPITAL ENCOUNTER (OUTPATIENT)
Facility: MEDICAL CENTER | Age: 64
End: 2024-04-10
Attending: OBSTETRICS & GYNECOLOGY
Payer: COMMERCIAL

## 2024-04-10 PROCEDURE — 87624 HPV HI-RISK TYP POOLED RSLT: CPT

## 2024-04-10 PROCEDURE — 88175 CYTOPATH C/V AUTO FLUID REDO: CPT

## 2024-04-12 ENCOUNTER — HOSPITAL ENCOUNTER (OUTPATIENT)
Dept: RADIOLOGY | Facility: MEDICAL CENTER | Age: 64
End: 2024-04-12
Attending: INTERNAL MEDICINE
Payer: COMMERCIAL

## 2024-04-12 DIAGNOSIS — R53.0 NEOPLASTIC MALIGNANT RELATED FATIGUE: ICD-10-CM

## 2024-04-12 DIAGNOSIS — T45.1X5S NEUTROPENIA ASSOCIATED WITH MUCOSITIS DUE TO ANTINEOPLASTIC THERAPY (HCC): ICD-10-CM

## 2024-04-12 DIAGNOSIS — K12.31 NEUTROPENIA ASSOCIATED WITH MUCOSITIS DUE TO ANTINEOPLASTIC THERAPY (HCC): ICD-10-CM

## 2024-04-12 DIAGNOSIS — C83.39 LYMPHOID GRANULOMATOSIS OF THE LUNG (HCC): ICD-10-CM

## 2024-04-12 DIAGNOSIS — Z51.11 ENCOUNTER FOR ANTINEOPLASTIC CHEMOTHERAPY: ICD-10-CM

## 2024-04-12 DIAGNOSIS — R30.9 MICTURITION PAINFUL: ICD-10-CM

## 2024-04-12 DIAGNOSIS — D70.1 NEUTROPENIA ASSOCIATED WITH MUCOSITIS DUE TO ANTINEOPLASTIC THERAPY (HCC): ICD-10-CM

## 2024-04-12 DIAGNOSIS — Z51.12 ENCOUNTER FOR ANTINEOPLASTIC IMMUNOTHERAPY: ICD-10-CM

## 2024-04-12 DIAGNOSIS — H53.19 PHOTOPSIA: ICD-10-CM

## 2024-04-12 PROCEDURE — 93970 EXTREMITY STUDY: CPT

## 2024-04-13 LAB
CYTOLOGIST CVX/VAG CYTO: NORMAL
CYTOLOGY CVX/VAG DOC CYTO: NORMAL
CYTOLOGY CVX/VAG DOC THIN PREP: NORMAL
HPV I/H RISK 4 DNA CVX QL PROBE+SIG AMP: NEGATIVE
NOTE NL11727A: NORMAL
OTHER STN SPEC: NORMAL
STAT OF ADQ CVX/VAG CYTO-IMP: NORMAL

## 2024-04-17 ENCOUNTER — HOSPITAL ENCOUNTER (OUTPATIENT)
Dept: LAB | Facility: MEDICAL CENTER | Age: 64
End: 2024-04-17
Attending: STUDENT IN AN ORGANIZED HEALTH CARE EDUCATION/TRAINING PROGRAM
Payer: COMMERCIAL

## 2024-04-17 PROCEDURE — 82746 ASSAY OF FOLIC ACID SERUM: CPT

## 2024-04-17 PROCEDURE — 82607 VITAMIN B-12: CPT

## 2024-04-17 PROCEDURE — 36415 COLL VENOUS BLD VENIPUNCTURE: CPT

## 2024-04-18 ENCOUNTER — TELEPHONE (OUTPATIENT)
Dept: MEDICAL GROUP | Facility: IMAGING CENTER | Age: 64
End: 2024-04-18
Payer: COMMERCIAL

## 2024-04-18 LAB
FOLATE SERPL-MCNC: 37.2 NG/ML
VIT B12 SERPL-MCNC: 701 PG/ML (ref 211–911)

## 2024-04-24 ENCOUNTER — HOSPITAL ENCOUNTER (OUTPATIENT)
Dept: RADIOLOGY | Facility: MEDICAL CENTER | Age: 64
End: 2024-04-24
Attending: INTERNAL MEDICINE
Payer: COMMERCIAL

## 2024-04-24 DIAGNOSIS — C83.39 LYMPHOID GRANULOMATOSIS OF THE LUNG (HCC): ICD-10-CM

## 2024-04-24 PROCEDURE — 93970 EXTREMITY STUDY: CPT

## 2024-04-29 ENCOUNTER — HOSPITAL ENCOUNTER (OUTPATIENT)
Dept: RADIOLOGY | Facility: MEDICAL CENTER | Age: 64
End: 2024-04-29
Attending: STUDENT IN AN ORGANIZED HEALTH CARE EDUCATION/TRAINING PROGRAM
Payer: COMMERCIAL

## 2024-04-29 DIAGNOSIS — R13.10 DYSPHAGIA, UNSPECIFIED TYPE: ICD-10-CM

## 2024-04-29 PROCEDURE — 74220 X-RAY XM ESOPHAGUS 1CNTRST: CPT

## 2024-05-02 ENCOUNTER — APPOINTMENT (OUTPATIENT)
Dept: MEDICAL GROUP | Facility: IMAGING CENTER | Age: 64
End: 2024-05-02
Payer: COMMERCIAL

## 2024-05-09 ENCOUNTER — OFFICE VISIT (OUTPATIENT)
Dept: MEDICAL GROUP | Facility: IMAGING CENTER | Age: 64
End: 2024-05-09
Payer: COMMERCIAL

## 2024-05-09 ENCOUNTER — TELEPHONE (OUTPATIENT)
Dept: MEDICAL GROUP | Facility: IMAGING CENTER | Age: 64
End: 2024-05-09

## 2024-05-09 VITALS
HEART RATE: 68 BPM | WEIGHT: 123 LBS | HEIGHT: 62 IN | TEMPERATURE: 97 F | BODY MASS INDEX: 22.63 KG/M2 | OXYGEN SATURATION: 99 % | SYSTOLIC BLOOD PRESSURE: 122 MMHG | DIASTOLIC BLOOD PRESSURE: 70 MMHG

## 2024-05-09 DIAGNOSIS — D75.89 MACROCYTOSIS: ICD-10-CM

## 2024-05-09 DIAGNOSIS — M54.50 CHRONIC BILATERAL LOW BACK PAIN WITHOUT SCIATICA: ICD-10-CM

## 2024-05-09 DIAGNOSIS — R13.10 DYSPHAGIA, UNSPECIFIED TYPE: ICD-10-CM

## 2024-05-09 DIAGNOSIS — G47.09 OTHER INSOMNIA: ICD-10-CM

## 2024-05-09 DIAGNOSIS — I82.5Y3 CHRONIC DEEP VEIN THROMBOSIS (DVT) OF PROXIMAL VEIN OF BOTH LOWER EXTREMITIES (HCC): ICD-10-CM

## 2024-05-09 DIAGNOSIS — R23.2 HOT FLASHES: ICD-10-CM

## 2024-05-09 DIAGNOSIS — G89.29 CHRONIC BILATERAL LOW BACK PAIN WITHOUT SCIATICA: ICD-10-CM

## 2024-05-09 PROBLEM — I82.503 CHRONIC DEEP VEIN THROMBOSIS (DVT) OF BOTH LOWER EXTREMITIES (HCC): Status: ACTIVE | Noted: 2024-03-27

## 2024-05-09 PROCEDURE — 3074F SYST BP LT 130 MM HG: CPT | Performed by: STUDENT IN AN ORGANIZED HEALTH CARE EDUCATION/TRAINING PROGRAM

## 2024-05-09 PROCEDURE — 3078F DIAST BP <80 MM HG: CPT | Performed by: STUDENT IN AN ORGANIZED HEALTH CARE EDUCATION/TRAINING PROGRAM

## 2024-05-09 PROCEDURE — 99214 OFFICE O/P EST MOD 30 MIN: CPT | Performed by: STUDENT IN AN ORGANIZED HEALTH CARE EDUCATION/TRAINING PROGRAM

## 2024-05-09 ASSESSMENT — FIBROSIS 4 INDEX: FIB4 SCORE: 0.86

## 2024-05-09 NOTE — PROGRESS NOTES
"Subjective:     CC:   Chief Complaint   Patient presents with    Lab Results       HPI:   sulaiman Salinas, 64 y.o., female,  presents today to discuss:     Hot flashes: chronic,reports she saw her gynecologist on April 10 and Sertraline 50mg was given but she did not notice benefit. She became nervous. She only took it for 3 weeks.     Insomnia: chronic. Reports sometimes she cannot sleep but this is secondary to the hotflashes.     Dysphagia: chronic, completed barium swallow. Continues to experience dysphagia when she's consuming food with liquid. She had acid reflux in the past but none recently.     DVT: reports she completed a doppler u/s  on 4/24, she saw her oncologist Dr. Gilberto Ashton on 4/26 and was told she still has DVT in both legs and to continue with Eliquis 5mg BID. She's established at [facility].     Back pain: reports she was referred to [facility] and they ordered xrays. She doesn't know where she is going for xrays. She has trouble scheduling appointments. She has appt on June 26 with the [facility]. She would like me to order the xrays.         ROS:  See HPI    Medications, allergies, past medical history, family history, surgical history, and social history documented in chart and reviewed by me.       Objective:   Exam:  /70 (BP Location: Left arm, Patient Position: Sitting, BP Cuff Size: Adult)   Pulse 68   Temp 36.1 °C (97 °F) (Temporal)   Ht 1.575 m (5' 2\")   Wt 55.8 kg (123 lb)   SpO2 99%   BMI 22.50 kg/m²      General: In no acute distress. Normal appearance.   Head:   Atraumatic, normocephalic.   Neck: Supple   Pulmonary: Normal effort, clear to auscultation bilaterally, no wheezes, rhonchi, or rales  Cardiovascular:   Regular rate and rhythm. No murmurs, rubs, or gallops.  Musculoskeletal:  Normal ROM. No edema.    Back: limited ROM due to pain. No edema or erythema.   Skin: No visible rashes or lesions.  Neurological: Alert and oriented to person, place, " and time. Gait normal.   Psychiatric: Normal mood and affect. Calm and friendly behavior. Speech clear. Normal judgement and insight.         Assessment & Plan:         1. Dysphagia, unspecified type  Chronic and ongoing.  Barium swallow reviewed with patient and normal.  I recommended referral to GI for EGD but patient declined.  Recommend OTC Prilosec for a few weeks to see if that helps.  If symptoms worsen patient then would like to proceed with GI referral.    IMPRESSION:        1. No abnormality detected.    2. Chronic bilateral low back pain without sciatica  Chronic.  Unknown etiology.  Patient reports she established at the [facility] which they ordered x-rays but patient has difficulty scheduling the x-rays.  She has an upcoming appointment at the [facility] next month and she would like me to order the x-rays for her.  Orders placed.  - DX-THORACIC SPINE-2 VIEWS; Future  - DX-LUMBAR SPINE-2 OR 3 VIEWS; Future  - DX-SACRUM AND COCCYX 2+; Future    3. Chronic deep vein thrombosis (DVT) of proximal vein of both lower extremities (HCC)  Chronic and ongoing.  Managed by vascular health.  Patient is established at [facility].  Will continue with Eliquis 5 mg twice daily.  Strict ED precautions given.    4. Macrocytosis  Chronic and ongoing.  B12 and folic acid reviewed with patient and normal.  Will continue to monitor.   Latest Reference Range & Units 04/17/24 13:25   Vitamin B12 -True Cobalamin 211 - 911 pg/mL 701      Latest Reference Range & Units 04/17/24 13:25   Folate -Folic Acid >4.0 ng/mL 37.2       5. Hot flashes  Chronic and ongoing.  Patient tried  sertraline for a few weeks and did not notice benefit.  Advised patient that it can take up to 6 weeks to notice a response after initiating an SSRI.  Patient wants to trial SSRI again.  If SSRI is ineffective patient will schedule appointment with her gynecologist.    6. Other insomnia  Chronic and ongoing.  Secondary to hot flashes.  Patient will  focus on managing her hot flashes and will return to clinic if insomnia persists.     Pt agreed with treatment plan and verbalized understanding.     Return in about 3 months (around 8/9/2024) for Annual Wellness Visit.     Please note that this dictation was created using voice recognition software. I have made every reasonable attempt to correct obvious errors, but I expect that there are errors of grammar and possibly content that I did not discover before finalizing the note.    Asya Flores PA-C  [facility]

## 2024-06-18 ENCOUNTER — OFFICE VISIT (OUTPATIENT)
Dept: MEDICAL GROUP | Facility: IMAGING CENTER | Age: 64
End: 2024-06-18
Payer: COMMERCIAL

## 2024-06-18 VITALS
HEIGHT: 62 IN | OXYGEN SATURATION: 100 % | RESPIRATION RATE: 16 BRPM | WEIGHT: 130 LBS | TEMPERATURE: 98.1 F | DIASTOLIC BLOOD PRESSURE: 66 MMHG | BODY MASS INDEX: 23.92 KG/M2 | HEART RATE: 69 BPM | SYSTOLIC BLOOD PRESSURE: 112 MMHG

## 2024-06-18 DIAGNOSIS — G89.29 CHRONIC BILATERAL LOW BACK PAIN WITHOUT SCIATICA: ICD-10-CM

## 2024-06-18 DIAGNOSIS — I82.5Y3 CHRONIC DEEP VEIN THROMBOSIS (DVT) OF PROXIMAL VEIN OF BOTH LOWER EXTREMITIES (HCC): ICD-10-CM

## 2024-06-18 DIAGNOSIS — M25.552 LEFT HIP PAIN: ICD-10-CM

## 2024-06-18 DIAGNOSIS — M54.50 CHRONIC BILATERAL LOW BACK PAIN WITHOUT SCIATICA: ICD-10-CM

## 2024-06-18 DIAGNOSIS — M79.672 LEFT FOOT PAIN: ICD-10-CM

## 2024-06-18 DIAGNOSIS — I83.813 VARICOSE VEINS OF BOTH LOWER EXTREMITIES WITH PAIN: ICD-10-CM

## 2024-06-18 PROCEDURE — 3078F DIAST BP <80 MM HG: CPT | Performed by: STUDENT IN AN ORGANIZED HEALTH CARE EDUCATION/TRAINING PROGRAM

## 2024-06-18 PROCEDURE — 3074F SYST BP LT 130 MM HG: CPT | Performed by: STUDENT IN AN ORGANIZED HEALTH CARE EDUCATION/TRAINING PROGRAM

## 2024-06-18 PROCEDURE — 99214 OFFICE O/P EST MOD 30 MIN: CPT | Performed by: STUDENT IN AN ORGANIZED HEALTH CARE EDUCATION/TRAINING PROGRAM

## 2024-06-18 ASSESSMENT — FIBROSIS 4 INDEX: FIB4 SCORE: 0.86

## 2024-06-18 NOTE — ASSESSMENT & PLAN NOTE
Managed by Socorro MOFFETT at the [facility] . Completed MRI on 5/2024. MRI remarkable for L4-L5 and L5-S1 concentric disc bulges 2mm with mild foraminal stenosis. T12-L1 Disffuse disc bulge 2mm with mild left foraminal stenosis. Facet arthropathy, spondylosis, interfacet fluid/edema. Heterogenous cytic lesion left renal parenchyma measuring up to 4cm in size. Per pt she contacted her cancer spealist Dr. Macias and PET scan was ordered. She also consulted with her urologist and has upcoming appt.  She started PT.

## 2024-06-18 NOTE — PROGRESS NOTES
"Subjective:     CC:   Chief Complaint   Patient presents with    Leg Swelling     Left, pain in ankle and the hip, ankle is slightly swollen, feeling a burning sensation in the right ankle       HPI:   Savana Domingochristellewilliesulaiman, 64 y.o., female,  presents today to discuss:     L lower extremity edema/pain: chronic onset was 1.5 years ago. Edema has been subsiding. Pain however, has been worsening. She has pain in her L hip and in her L foot. Walking and standing exacerbate the pain. Edema worsens at the end of the day. She has extensive history of DVT and varicose veins. She is established at [facility] and has underwent RFA multiples times. She was advised to continue with Apixaban 5mg BID. Reports she cannot go to [facility] due to insurance change.     Chronic back pain: she established with Socorro MOFFETT at the [facility] . Completed MRI on 5/2024. MRI remarkable for L4-L5 and L5-S1 concentric disc bulges 2mm with mild foraminal stenosis. T12-L1 Disffuse disc bulge 2mm with mild left foraminal stenosis. Facet arthropathy, spondylosis, interfacet fluid/edema. Heterogenous cytic lesion left renal parenchyma measuring up to 4cm in size. Per pt she contacted her cancer spealist Dr. Macias and PET scan was ordered. She also consulted with her urologist and has upcoming appt.  She started PT.     Her L shoulder pain has improved significantly. She is still doing PT.     ROS:  See HPI    Medications, allergies, past medical history, family history, surgical history, and social history documented in chart and reviewed by me.       Objective:   Exam:  /66 (BP Location: Left arm, Patient Position: Sitting, BP Cuff Size: Adult)   Pulse 69   Temp 36.7 °C (98.1 °F) (Temporal)   Resp 16   Ht 1.575 m (5' 2\")   Wt 59 kg (130 lb)   SpO2 100%   BMI 23.78 kg/m²      Physical Exam  Vitals reviewed.   Constitutional:       General: She is not in acute distress.     Appearance: Normal " appearance. She is not toxic-appearing.   HENT:      Head: Normocephalic and atraumatic.   Eyes:      General: No scleral icterus.  Cardiovascular:      Rate and Rhythm: Normal rate and regular rhythm.      Heart sounds: No murmur heard.  Pulmonary:      Effort: Pulmonary effort is normal. No respiratory distress.      Breath sounds: Normal breath sounds. No wheezing or rales.   Musculoskeletal:         General: Normal range of motion.      Right lower leg: No swelling, deformity or tenderness.      Left lower leg: No swelling, deformity or tenderness.      Right ankle: No swelling or ecchymosis. No tenderness. Normal range of motion.      Left ankle: No swelling or ecchymosis. No tenderness. Normal range of motion.      Right foot: Normal range of motion. No swelling, deformity, foot drop or tenderness.      Left foot: Normal range of motion. No swelling, deformity, foot drop or tenderness.      Comments: Varicose veins present in both lower extremities    Neurological:      Mental Status: She is alert and oriented to person, place, and time.      Gait: Gait normal.   Psychiatric:         Mood and Affect: Mood normal.         Behavior: Behavior normal.         Thought Content: Thought content normal.         Judgment: Judgment normal.               Assessment & Plan:       1. Left hip pain  2. Left foot pain  Chronic and worsening.  Left hip x-ray and left foot x-ray ordered for evaluation.  Patient will continue with PT.  Will follow-up with the [facility] clinic as planned.  - DX-HIP-BILATERAL-WITH PELVIS-2 VIEWS; Future  - DX-FOOT-COMPLETE 3+ LEFT; Future    3. Chronic deep vein thrombosis (DVT) of proximal vein of both lower extremities (HCC)  Chronic and stable.  Patient needs a new referral to vascular health.  Referral provided.  Will continue with apixaban 5 mg twice daily.  Strict ED precautions given for new DVT/PE.  - Referral to Vascular Medicine    4. Varicose veins of both lower extremities with  pain  Chronic and uncontrolled.  Patient continues to experience pain and swelling.  Patient can no longer be seen at [place] [facility].  New referral to vascular health provided today.  Per patient she was told she cannot wear compression socks.  Advised patient to avoid prolonged periods of sitting or standing.  - Referral to Vascular Medicine    5. Chronic bilateral low back pain without sciatica  Chronic and uncontrolled.  Managed by the [facility] clinic.  Patient will continue with PT and will follow-up with the [facility] clinic.     Pt agreed with treatment plan and verbalized understanding.     Return for as scheduled 8/14/24 or sooner if needed.       A total of 32  minutes was spent today reviewing chart, assessing and examining the patient, ordering tests, and documenting. None of which was spent performing separately billing procedures or ancillary services.     Please note that this dictation was created using voice recognition software. I have made every reasonable attempt to correct obvious errors, but I expect that there are errors of grammar and possibly content that I did not discover before finalizing the note.    Asya Flores PA-C  [facility]

## 2024-06-19 ENCOUNTER — HOSPITAL ENCOUNTER (OUTPATIENT)
Dept: RADIOLOGY | Facility: MEDICAL CENTER | Age: 64
End: 2024-06-19
Attending: STUDENT IN AN ORGANIZED HEALTH CARE EDUCATION/TRAINING PROGRAM
Payer: COMMERCIAL

## 2024-06-19 DIAGNOSIS — M25.552 LEFT HIP PAIN: ICD-10-CM

## 2024-06-19 DIAGNOSIS — M79.672 LEFT FOOT PAIN: ICD-10-CM

## 2024-06-19 DIAGNOSIS — I82.5Y3 CHRONIC DEEP VEIN THROMBOSIS (DVT) OF PROXIMAL VEIN OF BOTH LOWER EXTREMITIES (HCC): ICD-10-CM

## 2024-06-19 PROBLEM — M19.072 PRIMARY OSTEOARTHRITIS OF LEFT FOOT: Status: ACTIVE | Noted: 2024-06-19

## 2024-06-19 PROBLEM — M16.0 ARTHRITIS OF BOTH HIPS: Status: ACTIVE | Noted: 2024-06-19

## 2024-06-19 PROCEDURE — 73630 X-RAY EXAM OF FOOT: CPT | Mod: LT

## 2024-06-19 PROCEDURE — 73521 X-RAY EXAM HIPS BI 2 VIEWS: CPT

## 2024-06-20 ENCOUNTER — HOSPITAL ENCOUNTER (OUTPATIENT)
Dept: RADIOLOGY | Facility: MEDICAL CENTER | Age: 64
End: 2024-06-20
Attending: INTERNAL MEDICINE
Payer: COMMERCIAL

## 2024-06-20 DIAGNOSIS — C83.39 DIFFUSE LARGE B-CELL LYMPHOMA, EXTRANODAL AND SOLID ORGAN SITES (HCC): ICD-10-CM

## 2024-06-20 DIAGNOSIS — D70.1 NEUTROPENIA DUE TO AND NOT CONCURRENT WITH CHEMOTHERAPY (HCC): ICD-10-CM

## 2024-06-20 DIAGNOSIS — T45.1X5S NEUTROPENIA DUE TO AND NOT CONCURRENT WITH CHEMOTHERAPY (HCC): ICD-10-CM

## 2024-06-20 DIAGNOSIS — Z51.11 ENCOUNTER FOR ANTINEOPLASTIC CHEMOTHERAPY: ICD-10-CM

## 2024-06-20 DIAGNOSIS — Z51.12 ENCOUNTER FOR ANTINEOPLASTIC IMMUNOTHERAPY: ICD-10-CM

## 2024-06-20 DIAGNOSIS — R53.0 NEOPLASTIC (MALIGNANT) RELATED FATIGUE: ICD-10-CM

## 2024-06-20 DIAGNOSIS — H53.19 OTHER SUBJECTIVE VISUAL DISTURBANCES: ICD-10-CM

## 2024-06-20 DIAGNOSIS — R30.9 PAINFUL MICTURITION, UNSPECIFIED: ICD-10-CM

## 2024-06-20 PROCEDURE — 93970 EXTREMITY STUDY: CPT

## 2024-06-21 ENCOUNTER — TELEPHONE (OUTPATIENT)
Dept: MEDICAL GROUP | Facility: IMAGING CENTER | Age: 64
End: 2024-06-21
Payer: COMMERCIAL

## 2024-06-21 DIAGNOSIS — N28.9 RENAL LESION: ICD-10-CM

## 2024-06-21 NOTE — TELEPHONE ENCOUNTER
1. Caller Name: Savana Rendon                         Call Back Number: [phone] (home)        How would the patient prefer to be contacted with a response: Phone call do NOT leave a detailed message    2. SPECIFIC Action To Be Taken: Referral pending, please sign. Pt called stating she had appt scheduled with [facility] [place] but no referral was placed so they had to cancel. She states she recently had xray of the spine and they noted heterogeneous cystic lesion left renal parenchyma requiring further eval.     3. Diagnosis/Clinical Reason for Request: Renal lesion     4. Specialty & Provider Name/Lab/Imaging Location: [facility] [place]     5. Is appointment scheduled for requested order/referral: no    Patient was informed they will receive a return phone call from the office ONLY if there are any questions before processing their request. Advised to call back if they haven't received a call from the referral department in 5 days.

## 2024-07-22 ENCOUNTER — TELEPHONE (OUTPATIENT)
Dept: MEDICAL GROUP | Facility: IMAGING CENTER | Age: 64
End: 2024-07-22
Payer: COMMERCIAL

## 2024-07-22 DIAGNOSIS — H43.813 VITREOUS DETACHMENT OF BOTH EYES: ICD-10-CM

## 2024-07-26 ENCOUNTER — HOSPITAL ENCOUNTER (OUTPATIENT)
Dept: LAB | Facility: MEDICAL CENTER | Age: 64
End: 2024-07-26
Attending: UROLOGY
Payer: COMMERCIAL

## 2024-07-26 ENCOUNTER — TELEPHONE (OUTPATIENT)
Dept: VASCULAR LAB | Facility: MEDICAL CENTER | Age: 64
End: 2024-07-26
Payer: COMMERCIAL

## 2024-07-26 LAB
BUN SERPL-MCNC: 12 MG/DL (ref 8–22)
CREAT SERPL-MCNC: 0.75 MG/DL (ref 0.5–1.4)
GFR SERPLBLD CREATININE-BSD FMLA CKD-EPI: 89 ML/MIN/1.73 M 2

## 2024-07-26 PROCEDURE — 36415 COLL VENOUS BLD VENIPUNCTURE: CPT

## 2024-07-26 PROCEDURE — 82565 ASSAY OF CREATININE: CPT

## 2024-07-26 PROCEDURE — 84520 ASSAY OF UREA NITROGEN: CPT

## 2024-07-30 ENCOUNTER — TELEPHONE (OUTPATIENT)
Dept: VASCULAR LAB | Facility: MEDICAL CENTER | Age: 64
End: 2024-07-30

## 2024-07-30 ENCOUNTER — OFFICE VISIT (OUTPATIENT)
Dept: VASCULAR LAB | Facility: MEDICAL CENTER | Age: 64
End: 2024-07-30
Attending: FAMILY MEDICINE
Payer: COMMERCIAL

## 2024-07-30 VITALS
WEIGHT: 131.4 LBS | HEIGHT: 62 IN | HEART RATE: 67 BPM | BODY MASS INDEX: 24.18 KG/M2 | SYSTOLIC BLOOD PRESSURE: 129 MMHG | DIASTOLIC BLOOD PRESSURE: 77 MMHG

## 2024-07-30 DIAGNOSIS — C85.99 LYMPHOMA OF RETROPERITONEUM (HCC): ICD-10-CM

## 2024-07-30 DIAGNOSIS — M62.89 MASS OF ILIOPSOAS MUSCLE GROUP: ICD-10-CM

## 2024-07-30 DIAGNOSIS — D68.59 HYPERCOAGULABLE STATE (HCC): ICD-10-CM

## 2024-07-30 DIAGNOSIS — I87.2 CHRONIC VENOUS INSUFFICIENCY: ICD-10-CM

## 2024-07-30 DIAGNOSIS — I82.813: ICD-10-CM

## 2024-07-30 DIAGNOSIS — Z86.718 HISTORY OF DVT (DEEP VEIN THROMBOSIS): ICD-10-CM

## 2024-07-30 DIAGNOSIS — C83.30 DIFFUSE LARGE B-CELL LYMPHOMA, UNSPECIFIED BODY REGION (HCC): ICD-10-CM

## 2024-07-30 DIAGNOSIS — D25.9 UTERINE LEIOMYOMA, UNSPECIFIED LOCATION: ICD-10-CM

## 2024-07-30 PROBLEM — I82.512 CHRONIC DEEP VEIN THROMBOSIS (DVT) OF FEMORAL VEIN OF LEFT LOWER EXTREMITY (HCC): Status: RESOLVED | Noted: 2024-07-30 | Resolved: 2024-07-30

## 2024-07-30 PROBLEM — M54.16 LUMBAR RADICULOPATHY: Status: ACTIVE | Noted: 2024-04-24

## 2024-07-30 PROBLEM — I82.503 CHRONIC DEEP VEIN THROMBOSIS (DVT) OF BOTH LOWER EXTREMITIES (HCC): Status: RESOLVED | Noted: 2024-03-27 | Resolved: 2024-07-30

## 2024-07-30 PROBLEM — M62.81 MUSCLE WEAKNESS: Status: ACTIVE | Noted: 2024-04-23

## 2024-07-30 PROBLEM — M25.512 PAIN IN JOINT OF LEFT SHOULDER: Status: ACTIVE | Noted: 2024-04-23

## 2024-07-30 PROBLEM — M25.612 STIFFNESS OF LEFT SHOULDER JOINT: Status: ACTIVE | Noted: 2024-04-23

## 2024-07-30 PROBLEM — I82.512 CHRONIC DEEP VEIN THROMBOSIS (DVT) OF FEMORAL VEIN OF LEFT LOWER EXTREMITY (HCC): Status: ACTIVE | Noted: 2024-07-30

## 2024-07-30 PROCEDURE — 99212 OFFICE O/P EST SF 10 MIN: CPT

## 2024-07-30 RX ORDER — TRAMADOL HYDROCHLORIDE 50 MG/1
TABLET ORAL
COMMUNITY

## 2024-07-30 RX ORDER — DIOSMIN 100 %
1000 POWDER (GRAM) MISCELLANEOUS
COMMUNITY
Start: 2024-07-30

## 2024-07-30 RX ORDER — PAROXETINE HCL 10 MG
1 TABLET ORAL
COMMUNITY

## 2024-07-30 ASSESSMENT — ENCOUNTER SYMPTOMS
PALPITATIONS: 0
SPUTUM PRODUCTION: 0
PND: 0
COUGH: 0
CHILLS: 0
ORTHOPNEA: 0
CLAUDICATION: 0
HEMOPTYSIS: 0
WHEEZING: 0
FEVER: 0
SHORTNESS OF BREATH: 0

## 2024-07-30 ASSESSMENT — FIBROSIS 4 INDEX: FIB4 SCORE: 0.86

## 2024-08-07 ENCOUNTER — OFFICE VISIT (OUTPATIENT)
Dept: MEDICAL GROUP | Facility: IMAGING CENTER | Age: 64
End: 2024-08-07
Payer: COMMERCIAL

## 2024-08-07 VITALS
OXYGEN SATURATION: 97 % | DIASTOLIC BLOOD PRESSURE: 60 MMHG | HEART RATE: 66 BPM | HEIGHT: 62 IN | BODY MASS INDEX: 24.11 KG/M2 | TEMPERATURE: 98.3 F | SYSTOLIC BLOOD PRESSURE: 120 MMHG | WEIGHT: 131 LBS | RESPIRATION RATE: 14 BRPM

## 2024-08-07 DIAGNOSIS — H81.11 BENIGN PAROXYSMAL POSITIONAL VERTIGO OF RIGHT EAR: ICD-10-CM

## 2024-08-07 PROCEDURE — 3074F SYST BP LT 130 MM HG: CPT

## 2024-08-07 PROCEDURE — 99214 OFFICE O/P EST MOD 30 MIN: CPT

## 2024-08-07 PROCEDURE — 3078F DIAST BP <80 MM HG: CPT

## 2024-08-07 RX ORDER — MECLIZINE HCL 12.5 MG 12.5 MG/1
12.5 TABLET ORAL 3 TIMES DAILY PRN
Qty: 30 TABLET | Refills: 0 | Status: SHIPPED | OUTPATIENT
Start: 2024-08-07

## 2024-08-07 ASSESSMENT — FIBROSIS 4 INDEX: FIB4 SCORE: 0.86

## 2024-08-07 NOTE — PROGRESS NOTES
"Subjective:     CC:   Chief Complaint   Patient presents with    Dizziness     Nausea x 1 day     Otalgia     Pt report right hear , pitches x  this morning   pt report having these symptoms on the left ear before but not the right ear       HPI:   Savana presents today to discuss:    Interpretive services utilized during encounter: ara     Patient reports dizziness, right hear discomfort, and nausea that started yesterday. She describes \"spinning\" feeling provoked by position changes.   She admits having history of left ear pain with \"popping\" sounds and muffled hearing a few years ago.   Denies fevers, URI symptoms, tinnitus, hearing loss, facial swelling, palpitations, syncope, chest pain, SOB, amnesia, somnolence, mood or cognitive changes, sleep disturbances, severe headache, dizziness, loss of balance, weakness, numbness to any extremities.      Past Medical History:   Diagnosis Date    Arthritis of both hips 06/19/2024    Blood clotting disorder (HCC) 02/2023    left leg DVT, medicated    Bowel habit changes 07/14/2023    constipation, medicated    Cancer (HCC) 07/14/2023    Lymphoma, undergoing chemo at this time    Dental disorder 07/14/2023    crowns upper front teeth 2    Fatty liver     Fatty liver 07/14/2023    Heart burn     High cholesterol 07/14/2023    medicated    Hypokalemia     Normocytic anemia     Pain 07/14/2023    left foot pain    Primary osteoarthritis of left foot 06/19/2024    Renal disorder 07/14/2023    kidney cancer, nephrostomy tube in place     Family History   Problem Relation Age of Onset    No Known Problems Mother     Diabetes Father     Alcohol abuse Father     Cancer Sister         unknown    Clotting Disorder Neg Hx     DVT Neg Hx      Past Surgical History:   Procedure Laterality Date    GYN SURGERY  07/14/2023    c sections times 2    NE DX BONE MARROW ASPIRATIONS Left 02/16/2023    Procedure: BONE MARROW ASPIRATION -DR. LEÓN;  Surgeon: Chris León M.D.;  " Manoj is a 26 year old who is being evaluated via a billable video visit.      How would you like to obtain your AVS? MyChart  If the video visit is dropped, the invitation should be resent by: Text to cell phone: [phone]  Will anyone else be joining your video visit? No      Assessment & Plan     Abdominal pain, generalized  -Discomfort mainly in periumbilical region, noticing before bowel movement.  -History of nausea and abdominal bloating.On Zofran and Gas-X as needed.  -Reported that his symptoms got worse mainly after finishing the course of azithromycin since 11/15/2022  -History of oral candidiasis recently diagnosed at urgent care and taking course of clotrimazole.  -Denied fever, diarrhea.  No known history of IBS.  -Ordered CBC, CMP, lipase.  -Ty felt that his abdominal cramps are bothersome and he asked for help with medications.  Prescribed dicyclomine 10 mg as needed to give a try. Notified Ty that its not a cure.  We still have to figure out the etiology of his abdominal pain  -If labs are stable and if patient still has bothersome symptoms after a week, plan for CT scan and gastroenterology referral for upper GI scope due to history of dysphagia and oral candidiasis initially when he presented to urgent care on 11/9/2022.    - CBC with platelets and differential; Future  - Comprehensive metabolic panel (BMP + Alb, Alk Phos, ALT, AST, Total. Bili, TP); Future  - Lipase; Future    Nausea  -On Zofran as needed    Abdominal bloating  -On Gas-X as needed    Oral candida  -On clotrimazole prescribed from urgent care 7 days- 5 times daily.                   No follow-ups on file.    Yas Young MD  [facility]    Subjective   Ty is a 26 year old presenting for the following health issues:  Nausea and Bloated    History of Present Illness       Reason for visit:  Gut pain, nausea, bloating, low appetite.  Symptom onset:  3-7 days ago  Symptoms include:  See  Location: SURGERY SAME DAY [facility];  Service: Orthopedics    IL DX BONE MARROW BIOPSIES Left 02/16/2023    Procedure: BIOPSY, BONE MARROW, USING NEEDLE OR TROCAR;  Surgeon: Chris León M.D.;  Location: SURGERY SAME DAY [facility];  Service: Orthopedics    OTHER      PRIMARY C SECTION       Social History     Tobacco Use    Smoking status: Never    Smokeless tobacco: Never   Vaping Use    Vaping status: Never Used   Substance Use Topics    Alcohol use: Not Currently    Drug use: Never     Social History     Social History Narrative    Not on file     Current Outpatient Medications Ordered in Epic   Medication Sig Dispense Refill    meclizine (ANTIVERT) 12.5 MG Tab Take 1 Tablet by mouth 3 times a day as needed for Dizziness or Nausea/Vomiting. 30 Tablet 0    PAXIL 10 MG Tab Take 1 Tablet by mouth every day.      apixaban (ELIQUIS) 5mg Tab Take 1 Tablet by mouth 2 times a day. 180 Tablet 3    CALCIUM PO Take 1 Tablet by mouth every day.      B Complex Vitamins (B COMPLEX PO) Take 1 Tablet by mouth every day.      vitamin D3 (CHOLECALCIFEROL) 5000 Unit (125 mcg) Tab Take 5,000 Units by mouth every day.      Omega-3 Fatty Acids (FISH OIL) 1000 MG Cap capsule Take 1,000 mg by mouth every day.      GLUCOSAMINE-CHONDROITIN PO Take 1 Tablet by mouth every day.      traMADol (ULTRAM) 50 MG Tab PLEASE SEE ATTACHED FOR DETAILED DIRECTIONS (Patient not taking: Reported on 8/7/2024)      sertraline (ZOLOFT) 50 MG Tab Take 1 Tablet by mouth every day. (Patient not taking: Reported on 8/7/2024)      Diosmin Powder Take 1,000 mg by mouth 2 times a day. To improve vein function (Patient not taking: Reported on 8/7/2024)      ramelteon (ROZEREM) 8 MG tablet Take 1 Tablet by mouth every evening. (Patient not taking: Reported on 8/7/2024) 30 Tablet 3    atorvastatin (LIPITOR) 20 MG Tab Take 20 mg by mouth at bedtime.       No current Epic-ordered facility-administered medications on file.     Patient has no known  previous  Symptom intensity:  Severe  Symptom progression:  Staying the same  Had these symptoms before:  No  What makes it worse:  Both eating and not eating.    Manoj Castellanos eats 0-1 servings of fruits and vegetables daily.Manoj Castellanos consumes 0 sweetened beverage(s) daily.Manoj Castellanos exercises with enough effort to increase Manoj Castellanos's heart rate 9 or less minutes per day.  Manoj Castellanos exercises with enough effort to increase Manoj Castellanos's heart rate 3 or less days per week.   Manoj Castellanos is taking medications regularly.    Today's PHQ-9         PHQ-9 Total Score: 6    PHQ-9 Q9 Thoughts of better off dead/self-harm past 2 weeks :   Not at all    How difficult have these problems made it for you to do your work, take care of things at home, or get along with other people: Very difficult           Review of Systems   Constitutional, HEENT, cardiovascular, pulmonary, gi and gu systems are negative, except as otherwise noted.      Objective           Vitals:  No vitals were obtained today due to virtual visit.    Physical Exam   GENERAL: Healthy, alert and no distress  EYES: Eyes grossly normal to inspection.  No discharge or erythema, or obvious scleral/conjunctival abnormalities.  RESP: No audible wheeze, cough, or visible cyanosis.  No visible retractions or increased work of breathing.    SKIN: Visible skin clear. No significant rash, abnormal pigmentation or lesions.  NEURO: Cranial nerves grossly intact.  Mentation and speech appropriate for age.  PSYCH: Mentation appears normal, affect normal/bright, judgement and insight intact, normal speech and appearance well-groomed.                Video-Visit Details    Video Start Time: 1.45 pm    Type of service:  Video Visit    Video End Time:2:00 PM    Originating Location (pt. Location): Home        Distant Location (provider location):  On-site    Platform used for Video Visit: Surfingbird     "allergies.    ROS: see hpi  Gen: no fevers/chills  Pulm: no sob, no cough  CV: no chest pain, no palpitations, no edema  GI: yes nausea, no vomiting, no diarrhea  Skin: no rash    Objective:   Exam:  /60 (BP Location: Left arm, Patient Position: Sitting, BP Cuff Size: Adult)   Pulse 66   Temp 36.8 °C (98.3 °F) (Temporal)   Resp 14   Ht 1.575 m (5' 2\")   Wt 59.4 kg (131 lb)   SpO2 97%   BMI 23.96 kg/m²    Body mass index is 23.96 kg/m².    Gen: Alert and oriented, No apparent distress.  HEENT: Head atraumatic, normocephalic. Pupils equal and round. Bilateral ear canal benign, bilateral TM intact and benign.   Neck: Neck is supple without lymphadenopathy.   Lungs: Normal effort, CTA bilaterally, no wheezes, rhonchi, or rales  CV: Regular rate and rhythm. No murmurs, rubs, or gallops.  ABD: +BS. Non-tender, non-distended. No rebound, rigidity, or guarding.  Ext: No clubbing, cyanosis, edema.  Neuro: Mental Status: Speech fluent without errors. Follows all commands. No dysarthria or apraxia.  Cranial Nerves: Pupils equal round and reactive to light. Extraocular motion intact. Visual fields intact. No nystagmus. CN V1-V3 intact to light touch. No facial asymmetry. Hearing clinically intact bilaterally. Tongue protrusion midline. No uvular deviation. Normal shoulder shrug and head turn.  Motor:  RUE: 5/5 with hand , 5/5 with flexion at the elbow 5/5 with extension at the elbow  LUE: 5/5 with hand , 5/5 with flexion at the elbow 5/5 with extension at the elbow  RLE: 5/5 with leg raise, 5/5 with plantar flexion, 5/5 with dorsal flexion  LLE: 5/5 with leg raise, 5/5 with plantar flexion, 5/5 with dorsal flexion  Sensation to light touch intact throughout  Reflexes 2+ Patellar tendons  No ataxia noted  Rapidly alternating movements without     Positive Jesus Flood pike Maneuver    Assessment & Plan:     64 y.o. female with the following -     1. Benign paroxysmal positional vertigo of right ear  New issue. " Pt presentation consistent with BPPV.  No s/s of infectious etiology. Neuro WNL. VSS.  Will treat with antivert PRN. Medication administration and side effects discussed.   Recommend to increase fluid intake, move slow with position changes, and fall precaution discussed.   Recommend Epley Maneuver, will defer to PT>   Recommend to f/u with PCP  ED symptoms discussed    - Referral to Physical Therapy  - meclizine (ANTIVERT) 12.5 MG Tab; Take 1 Tablet by mouth 3 times a day as needed for Dizziness or Nausea/Vomiting.  Dispense: 30 Tablet; Refill: 0    Medical Decision Making/Course:  In the course of preparing for this visit with review of the pertinent past medical history, recent and past clinic visits, current medications, and performing chart, immunization, medical history and medication reconciliation, and in the further course of obtaining the current history pertinent to the clinic visit today, performing an exam and evaluation, ordering and independently evaluating labs, tests, and/or procedures, prescribing any recommended new medications as noted above, providing any pertinent counseling and education and recommending further coordination of care. This was discussed with patient in a shared-decision making conversation, and they understand and agreed with plan of care.     Return if symptoms worsen or fail to improve.    JESSICA Freeman.   [facility]    Please note that this dictation was created using voice recognition software. I have made every reasonable attempt to correct obvious errors, but I expect that there are errors of grammar and possibly content that I did not discover before finalizing the note.

## 2024-08-14 ENCOUNTER — APPOINTMENT (OUTPATIENT)
Dept: MEDICAL GROUP | Facility: IMAGING CENTER | Age: 64
End: 2024-08-14
Payer: COMMERCIAL

## 2024-08-14 VITALS
DIASTOLIC BLOOD PRESSURE: 68 MMHG | TEMPERATURE: 98.5 F | HEIGHT: 62 IN | OXYGEN SATURATION: 99 % | HEART RATE: 67 BPM | WEIGHT: 131 LBS | SYSTOLIC BLOOD PRESSURE: 118 MMHG | RESPIRATION RATE: 16 BRPM | BODY MASS INDEX: 24.11 KG/M2

## 2024-08-14 DIAGNOSIS — I87.2 CHRONIC VENOUS INSUFFICIENCY: ICD-10-CM

## 2024-08-14 DIAGNOSIS — G89.29 CHRONIC PAIN OF BOTH SHOULDERS: ICD-10-CM

## 2024-08-14 DIAGNOSIS — G89.29 CHRONIC BILATERAL LOW BACK PAIN WITHOUT SCIATICA: ICD-10-CM

## 2024-08-14 DIAGNOSIS — M54.50 CHRONIC BILATERAL LOW BACK PAIN WITHOUT SCIATICA: ICD-10-CM

## 2024-08-14 DIAGNOSIS — M25.511 CHRONIC PAIN OF BOTH SHOULDERS: ICD-10-CM

## 2024-08-14 DIAGNOSIS — M25.50 GENERALIZED JOINT PAIN: ICD-10-CM

## 2024-08-14 DIAGNOSIS — Z23 ENCOUNTER FOR ADMINISTRATION OF VACCINE: ICD-10-CM

## 2024-08-14 DIAGNOSIS — R53.82 CHRONIC FATIGUE: ICD-10-CM

## 2024-08-14 DIAGNOSIS — M25.512 CHRONIC PAIN OF BOTH SHOULDERS: ICD-10-CM

## 2024-08-14 DIAGNOSIS — H93.13 TINNITUS, BILATERAL: ICD-10-CM

## 2024-08-14 PROBLEM — K59.01 SLOW TRANSIT CONSTIPATION: Status: RESOLVED | Noted: 2023-06-07 | Resolved: 2024-08-14

## 2024-08-14 PROCEDURE — 99215 OFFICE O/P EST HI 40 MIN: CPT | Mod: 25 | Performed by: STUDENT IN AN ORGANIZED HEALTH CARE EDUCATION/TRAINING PROGRAM

## 2024-08-14 PROCEDURE — 3078F DIAST BP <80 MM HG: CPT | Performed by: STUDENT IN AN ORGANIZED HEALTH CARE EDUCATION/TRAINING PROGRAM

## 2024-08-14 PROCEDURE — 90715 TDAP VACCINE 7 YRS/> IM: CPT | Performed by: STUDENT IN AN ORGANIZED HEALTH CARE EDUCATION/TRAINING PROGRAM

## 2024-08-14 PROCEDURE — 90471 IMMUNIZATION ADMIN: CPT | Performed by: STUDENT IN AN ORGANIZED HEALTH CARE EDUCATION/TRAINING PROGRAM

## 2024-08-14 PROCEDURE — 3074F SYST BP LT 130 MM HG: CPT | Performed by: STUDENT IN AN ORGANIZED HEALTH CARE EDUCATION/TRAINING PROGRAM

## 2024-08-14 RX ORDER — PAROXETINE 20 MG/1
20 TABLET, FILM COATED ORAL DAILY
COMMUNITY
Start: 2024-08-07

## 2024-08-14 ASSESSMENT — FIBROSIS 4 INDEX: FIB4 SCORE: 0.86

## 2024-08-14 NOTE — PROGRESS NOTES
"Subjective:     CC:   Chief Complaint   Patient presents with    Fatigue    Vertigo       HPI:   sulaiman Salinas, 64 y.o., female,  presents today to discuss:     Patient was initially scheduled for annual wellness visit but since patient had multiple concerns her appointment was changed to a regular office visit per her consent.    Vertigo f/u: reports she continues to experience episodes, but have been diminishing. She has mild pain in her R ear.  She has chronic tinnitus in both ears.  Vestibular therapy was denied because she is going to PT for her body pain.     Reports she feels very tired. Her shoulders, lower back, hands, knees, and feet are painful.  She is not sleeping well due to hot flashes. Her gynecologist recently increased paroxetine to 20 mg daily.    Reports she consulted with her orthopedics at the [facility]  and she has upcoming appt for MRI of her back, hips, and right hand. She sees Dr. Pérez for her shoulder and hands at the [facility] clinic.  She  sees Socorro Horowitz for her back at the [facility].     Chronic suferficial venous thrombosis: she established with [facility]- Dr. Asher on 7/30/24. She has upcoming appt for CT of abdomen and pelvis and CT of her legs on 7/30/24.     She received a call from her cancer care specialist stating that she needs a new PET scan.     Vitreous detachment of both eyes: she's established at  retinal. She might need surgery.     Hematuria: Has upcoming appt with [facility] on 9/6/24 for evaluation.     ROS:  See HPI    Medications, allergies, past medical history, family history, surgical history, and social history documented in chart and reviewed by me.       Objective:   Exam:  /68 (BP Location: Left arm, Patient Position: Sitting, BP Cuff Size: Adult)   Pulse 67   Temp 36.9 °C (98.5 °F) (Temporal)   Resp 16   Ht 1.575 m (5' 2\")   Wt 59.4 kg (131 lb)   SpO2 99%   BMI 23.96 kg/m²      General: In no acute " distress. Normal appearance.   Head:   Atraumatic, normocephalic.   Neck: Supple without lymphadenopathy.   Pulmonary: Normal effort, clear to auscultation bilaterally, no wheezes, rhonchi, or rales  Cardiovascular:   Regular rate and rhythm. No murmurs, rubs, or gallops.  Musculoskeletal:  Normal ROM. No edema.    Skin: No visible rashes or lesions.  Neurological: Alert and oriented to person, place, and time. Gait normal.   Psychiatric: Normal mood and affect. Calm and friendly behavior. Speech clear. Normal judgement and insight.         Assessment & Plan:     1. Chronic fatigue  Chronic and ongoing.  Suspect fibromyalgia.  Labs as listed below ordered for evaluation.  Will follow after completing labs.  - IRON/TOTAL IRON BIND; Future  - FERRITIN; Future  - EBV ACUTE INFECTION AB PANEL; Future  - CMV ABS IGG & IGM, SERUM; Future  - PARVOVIRUS B19 HUMAN IGG/IGM SERUM; Future  - Sed Rate; Future  - CRP QUANTITIVE (NON-CARDIAC); Future  - HLA-B27; Future  - STONE REFLEXIVE PROFILE; Future  - C3+C4+COMPT  - RHEUMATOID ARTHRITIS FACTOR; Future  - CCP ANTIBODIES, IGG/IGA; Future    2. Generalized joint pain  Chronic.  Unknown etiology.  Labs ordered for evaluation.  Patient has upcoming appointment for MRI of her shoulders, back, and right hand.  Will await results.  - IRON/TOTAL IRON BIND; Future  - FERRITIN; Future  - EBV ACUTE INFECTION AB PANEL; Future  - CMV ABS IGG & IGM, SERUM; Future  - PARVOVIRUS B19 HUMAN IGG/IGM SERUM; Future  - Sed Rate; Future  - CRP QUANTITIVE (NON-CARDIAC); Future  - HLA-B27; Future  - STONE REFLEXIVE PROFILE; Future  - C3+C4+COMPT  - RHEUMATOID ARTHRITIS FACTOR; Future  - CCP ANTIBODIES, IGG/IGA; Future    3. Encounter for administration of vaccine  - Tdap =>6yo IM    4. Tinnitus, bilateral  Chronic and ongoing.  Discussed treatment options.  Patient declined referral to ENT.    5. Chronic bilateral low back pain without sciatica  Chronic and ongoing.  Uncontrolled.  Managed by Ortho at the  [facility].  Patient will complete MRI and will follow-up with [facility] clinic.    6. Chronic venous insufficiency  Chronic and ongoing.  Patient recently established with Dr. Asher at [facility].  She will complete CT of legs and abdomen and pelvis.  Will follow-up with vascular health as planned.      7. Chronic pain of both shoulders  Chronic and ongoing.  She has upcoming appointment for MRI.  Will follow-up with Ortho as planned.       Pt agreed with treatment plan and verbalized understanding.     Return in about 4 weeks (around 9/11/2024) for Lab Results.     A total of  41 minutes was spent today reviewing chart, assessing and examining the patient, ordering tests, and documenting. None of which was spent performing separately billing procedures or ancillary services.     Please note that this dictation was created using voice recognition software. I have made every reasonable attempt to correct obvious errors, but I expect that there are errors of grammar and possibly content that I did not discover before finalizing the note.    Asya Flores PA-C  [facility]

## 2024-08-16 ENCOUNTER — TELEPHONE (OUTPATIENT)
Dept: MEDICAL GROUP | Facility: IMAGING CENTER | Age: 64
End: 2024-08-16
Payer: COMMERCIAL

## 2024-08-16 NOTE — TELEPHONE ENCOUNTER
Caller Name: Savana Rendon   Call Back Number: [phone] (home)      How would the patient prefer to be contacted with a response: Phone call do NOT leave a detailed message    Pt called requesting to [facility] over most recent clinical [facility]lab [facility] Faxed over clinical notes/lab results.

## 2024-08-19 ENCOUNTER — HOSPITAL ENCOUNTER (OUTPATIENT)
Dept: LAB | Facility: MEDICAL CENTER | Age: 64
End: 2024-08-19
Attending: STUDENT IN AN ORGANIZED HEALTH CARE EDUCATION/TRAINING PROGRAM
Payer: COMMERCIAL

## 2024-08-19 ENCOUNTER — HOSPITAL ENCOUNTER (OUTPATIENT)
Dept: LAB | Facility: MEDICAL CENTER | Age: 64
End: 2024-08-19
Attending: FAMILY MEDICINE
Payer: COMMERCIAL

## 2024-08-19 DIAGNOSIS — I87.2 CHRONIC VENOUS INSUFFICIENCY: ICD-10-CM

## 2024-08-19 DIAGNOSIS — R53.82 CHRONIC FATIGUE: ICD-10-CM

## 2024-08-19 DIAGNOSIS — M25.50 GENERALIZED JOINT PAIN: ICD-10-CM

## 2024-08-19 DIAGNOSIS — C85.99 LYMPHOMA OF RETROPERITONEUM (HCC): ICD-10-CM

## 2024-08-19 DIAGNOSIS — D68.59 HYPERCOAGULABLE STATE (HCC): ICD-10-CM

## 2024-08-19 DIAGNOSIS — M62.89 MASS OF ILIOPSOAS MUSCLE GROUP: ICD-10-CM

## 2024-08-19 DIAGNOSIS — C83.30 DIFFUSE LARGE B-CELL LYMPHOMA, UNSPECIFIED BODY REGION (HCC): ICD-10-CM

## 2024-08-19 DIAGNOSIS — Z86.718 HISTORY OF DVT (DEEP VEIN THROMBOSIS): ICD-10-CM

## 2024-08-19 DIAGNOSIS — I82.813: ICD-10-CM

## 2024-08-19 DIAGNOSIS — D25.9 UTERINE LEIOMYOMA, UNSPECIFIED LOCATION: ICD-10-CM

## 2024-08-19 LAB
ANION GAP SERPL CALC-SCNC: 12 MMOL/L (ref 7–16)
BUN SERPL-MCNC: 14 MG/DL (ref 8–22)
C3 SERPL-MCNC: 112 MG/DL (ref 87–200)
C4 SERPL-MCNC: 23.6 MG/DL (ref 19–52)
CALCIUM SERPL-MCNC: 8.9 MG/DL (ref 8.5–10.5)
CHLORIDE SERPL-SCNC: 101 MMOL/L (ref 96–112)
CO2 SERPL-SCNC: 25 MMOL/L (ref 20–33)
CREAT SERPL-MCNC: 0.54 MG/DL (ref 0.5–1.4)
CRP SERPL HS-MCNC: <0.3 MG/DL (ref 0–0.75)
D DIMER PPP IA.FEU-MCNC: <0.27 UG/ML (FEU) (ref 0–0.5)
ERYTHROCYTE [DISTWIDTH] IN BLOOD BY AUTOMATED COUNT: 43.8 FL (ref 35.9–50)
ERYTHROCYTE [SEDIMENTATION RATE] IN BLOOD BY WESTERGREN METHOD: 11 MM/HOUR (ref 0–25)
FERRITIN SERPL-MCNC: 117 NG/ML (ref 10–291)
GFR SERPLBLD CREATININE-BSD FMLA CKD-EPI: 103 ML/MIN/1.73 M 2
GLUCOSE SERPL-MCNC: 90 MG/DL (ref 65–99)
HCT VFR BLD AUTO: 39.8 % (ref 37–47)
HGB BLD-MCNC: 13.5 G/DL (ref 12–16)
IRON SATN MFR SERPL: 27 % (ref 15–55)
IRON SERPL-MCNC: 91 UG/DL (ref 40–170)
MCH RBC QN AUTO: 33.4 PG (ref 27–33)
MCHC RBC AUTO-ENTMCNC: 33.9 G/DL (ref 32.2–35.5)
MCV RBC AUTO: 98.5 FL (ref 81.4–97.8)
PLATELET # BLD AUTO: 257 K/UL (ref 164–446)
PMV BLD AUTO: 10.6 FL (ref 9–12.9)
POTASSIUM SERPL-SCNC: 4 MMOL/L (ref 3.6–5.5)
RBC # BLD AUTO: 4.04 M/UL (ref 4.2–5.4)
RHEUMATOID FACT SER IA-ACNC: <10 IU/ML (ref 0–14)
SODIUM SERPL-SCNC: 138 MMOL/L (ref 135–145)
TIBC SERPL-MCNC: 343 UG/DL (ref 250–450)
UIBC SERPL-MCNC: 252 UG/DL (ref 110–370)
WBC # BLD AUTO: 3.5 K/UL (ref 4.8–10.8)

## 2024-08-19 PROCEDURE — 86038 ANTINUCLEAR ANTIBODIES: CPT

## 2024-08-19 PROCEDURE — 85379 FIBRIN DEGRADATION QUANT: CPT

## 2024-08-19 PROCEDURE — 86645 CMV ANTIBODY IGM: CPT

## 2024-08-19 PROCEDURE — 86812 HLA TYPING A B OR C: CPT

## 2024-08-19 PROCEDURE — 86747 PARVOVIRUS ANTIBODY: CPT

## 2024-08-19 PROCEDURE — 36415 COLL VENOUS BLD VENIPUNCTURE: CPT

## 2024-08-19 PROCEDURE — 86162 COMPLEMENT TOTAL (CH50): CPT

## 2024-08-19 PROCEDURE — 86663 EPSTEIN-BARR ANTIBODY: CPT

## 2024-08-19 PROCEDURE — 86160 COMPLEMENT ANTIGEN: CPT

## 2024-08-19 PROCEDURE — 86146 BETA-2 GLYCOPROTEIN ANTIBODY: CPT

## 2024-08-19 PROCEDURE — 85652 RBC SED RATE AUTOMATED: CPT

## 2024-08-19 PROCEDURE — 86200 CCP ANTIBODY: CPT

## 2024-08-19 PROCEDURE — 83540 ASSAY OF IRON: CPT

## 2024-08-19 PROCEDURE — 86644 CMV ANTIBODY: CPT

## 2024-08-19 PROCEDURE — 83550 IRON BINDING TEST: CPT

## 2024-08-19 PROCEDURE — 85027 COMPLETE CBC AUTOMATED: CPT

## 2024-08-19 PROCEDURE — 80048 BASIC METABOLIC PNL TOTAL CA: CPT

## 2024-08-19 PROCEDURE — 86148 ANTI-PHOSPHOLIPID ANTIBODY: CPT | Mod: 91

## 2024-08-19 PROCEDURE — 86140 C-REACTIVE PROTEIN: CPT

## 2024-08-19 PROCEDURE — 81241 F5 GENE: CPT

## 2024-08-19 PROCEDURE — 86147 CARDIOLIPIN ANTIBODY EA IG: CPT | Mod: 91

## 2024-08-19 PROCEDURE — 81240 F2 GENE: CPT

## 2024-08-19 PROCEDURE — 82728 ASSAY OF FERRITIN: CPT

## 2024-08-19 PROCEDURE — 86665 EPSTEIN-BARR CAPSID VCA: CPT | Mod: 91

## 2024-08-19 PROCEDURE — 86664 EPSTEIN-BARR NUCLEAR ANTIGEN: CPT

## 2024-08-19 PROCEDURE — 86431 RHEUMATOID FACTOR QUANT: CPT

## 2024-08-20 LAB
B2 GLYCOPROT1 IGA SER-ACNC: 62 SAU
B2 GLYCOPROT1 IGG SERPL IA-ACNC: <10 SGU
B2 GLYCOPROT1 IGM SERPL IA-ACNC: <10 SMU
CCP IGA+IGG SERPL IA-ACNC: 2 UNITS (ref 0–19)
CMV IGG SERPL IA-ACNC: 9 U/ML
CMV IGM SERPL IA-ACNC: <8 AU/ML
EBV EA-D IGG SER-ACNC: <5 U/ML (ref 0–10.9)
EBV NA IGG SER IA-ACNC: 440 U/ML (ref 0–21.9)
EBV VCA IGG SER IA-ACNC: >750 U/ML (ref 0–21.9)
EBV VCA IGM SER IA-ACNC: <10 U/ML (ref 0–43.9)
HLA-B27 QL FC: NEGATIVE
NUCLEAR IGG SER QL IA: NORMAL

## 2024-08-21 ENCOUNTER — HOSPITAL ENCOUNTER (OUTPATIENT)
Dept: RADIOLOGY | Facility: MEDICAL CENTER | Age: 64
End: 2024-08-21
Attending: FAMILY MEDICINE
Payer: COMMERCIAL

## 2024-08-21 DIAGNOSIS — D25.9 UTERINE LEIOMYOMA, UNSPECIFIED LOCATION: ICD-10-CM

## 2024-08-21 DIAGNOSIS — I87.2 CHRONIC VENOUS INSUFFICIENCY: ICD-10-CM

## 2024-08-21 DIAGNOSIS — M62.89 MASS OF ILIOPSOAS MUSCLE GROUP: ICD-10-CM

## 2024-08-21 DIAGNOSIS — C85.99 LYMPHOMA OF RETROPERITONEUM (HCC): ICD-10-CM

## 2024-08-21 DIAGNOSIS — I82.813: ICD-10-CM

## 2024-08-21 DIAGNOSIS — C83.30 DIFFUSE LARGE B-CELL LYMPHOMA, UNSPECIFIED BODY REGION (HCC): ICD-10-CM

## 2024-08-21 LAB
CARDIOLIPIN IGA SER IA-ACNC: <10 APL
CARDIOLIPIN IGG SER IA-ACNC: <10 GPL
CARDIOLIPIN IGM SER IA-ACNC: <10 MPL
CH50 SERPL-ACNC: 76.6 U/ML (ref 38.7–89.9)

## 2024-08-21 PROCEDURE — 73701 CT LOWER EXTREMITY W/DYE: CPT | Mod: LT

## 2024-08-21 PROCEDURE — 700117 HCHG RX CONTRAST REV CODE 255: Performed by: FAMILY MEDICINE

## 2024-08-21 PROCEDURE — 74177 CT ABD & PELVIS W/CONTRAST: CPT

## 2024-08-21 RX ADMIN — IOHEXOL 100 ML: 350 INJECTION, SOLUTION INTRAVENOUS at 10:08

## 2024-08-22 LAB
B19V IGG SER IA-ACNC: 0.4 IV
B19V IGM SER IA-ACNC: 0.14 IV
F5 P.R506Q BLD/T QL: NEGATIVE
PS IGA SER IA-ACNC: 0 APS (ref 0–19)
PS IGG SER IA-ACNC: 0 GPS (ref 0–15)
PS IGM SER IA-ACNC: 0 MPS (ref 0–21)

## 2024-08-23 LAB — F2 C.20210G>A GENO BLD/T: NEGATIVE

## 2024-09-04 ENCOUNTER — APPOINTMENT (OUTPATIENT)
Dept: MEDICAL GROUP | Facility: IMAGING CENTER | Age: 64
End: 2024-09-04
Payer: COMMERCIAL

## 2024-09-04 VITALS
RESPIRATION RATE: 16 BRPM | BODY MASS INDEX: 24.48 KG/M2 | TEMPERATURE: 98.8 F | HEART RATE: 64 BPM | SYSTOLIC BLOOD PRESSURE: 116 MMHG | HEIGHT: 62 IN | DIASTOLIC BLOOD PRESSURE: 68 MMHG | OXYGEN SATURATION: 98 % | WEIGHT: 133 LBS

## 2024-09-04 DIAGNOSIS — R53.82 CHRONIC FATIGUE: ICD-10-CM

## 2024-09-04 DIAGNOSIS — M25.572 CHRONIC PAIN OF BOTH ANKLES: ICD-10-CM

## 2024-09-04 DIAGNOSIS — M25.50 GENERALIZED JOINT PAIN: ICD-10-CM

## 2024-09-04 DIAGNOSIS — D75.89 MACROCYTOSIS: ICD-10-CM

## 2024-09-04 DIAGNOSIS — M25.571 CHRONIC PAIN OF BOTH ANKLES: ICD-10-CM

## 2024-09-04 DIAGNOSIS — Z71.9 ENCOUNTER FOR CONSULTATION: ICD-10-CM

## 2024-09-04 DIAGNOSIS — M81.0 OSTEOPOROSIS WITHOUT CURRENT PATHOLOGICAL FRACTURE, UNSPECIFIED OSTEOPOROSIS TYPE: ICD-10-CM

## 2024-09-04 DIAGNOSIS — G89.29 CHRONIC PAIN OF BOTH ANKLES: ICD-10-CM

## 2024-09-04 PROCEDURE — 99214 OFFICE O/P EST MOD 30 MIN: CPT | Performed by: STUDENT IN AN ORGANIZED HEALTH CARE EDUCATION/TRAINING PROGRAM

## 2024-09-04 RX ORDER — DIPHENHYDRAMINE HYDROCHLORIDE 50 MG/ML
50 INJECTION INTRAMUSCULAR; INTRAVENOUS PRN
OUTPATIENT
Start: 2024-09-04

## 2024-09-04 RX ORDER — METHYLPREDNISOLONE SODIUM SUCCINATE 125 MG/2ML
125 INJECTION, POWDER, LYOPHILIZED, FOR SOLUTION INTRAMUSCULAR; INTRAVENOUS PRN
OUTPATIENT
Start: 2024-09-04

## 2024-09-04 RX ORDER — EPINEPHRINE 1 MG/ML(1)
0.5 AMPUL (ML) INJECTION PRN
OUTPATIENT
Start: 2024-09-04

## 2024-09-04 ASSESSMENT — FIBROSIS 4 INDEX: FIB4 SCORE: 0.87

## 2024-09-04 NOTE — PROGRESS NOTES
"Subjective:     CC:   Chief Complaint   Patient presents with    Lab Results       HPI:   sulaiman Salinas, 64 y.o., female,  presents today to discuss:     Test results: she completed labs. Reports she continues to experience pain in her lower back, hands, knees, and feet. She recently completed a R hip MRI at the [facility] and she continues to do PT. Pain has not worsened nor improved. She takes OTC Tylenol for pain as needed. She has a burning pain in both ankles. Onset was 2 years ago. R ankle feels numb which is a new symptom. She has appt for compression socks on 9/24/24. She has upcoming appt for R hand MRI and appt with Dr. Pérez next month. Fatigue has not worsened.     Patient also reports that she has changed her mind and now she would like to start Prolia for osteoporosis.    ROS:  See HPI    Medications, allergies, past medical history, family history, surgical history, and social history documented in chart and reviewed by me.       Objective:   Exam:  /68 (BP Location: Left arm, Patient Position: Sitting, BP Cuff Size: Adult)   Pulse 64   Temp 37.1 °C (98.8 °F) (Temporal)   Resp 16   Ht 1.575 m (5' 2\")   Wt 60.3 kg (133 lb)   SpO2 98%   BMI 24.33 kg/m²      General: In no acute distress. Normal appearance.   Head:   Atraumatic, normocephalic.   Neck: Supple without lymphadenopathy.   Pulmonary: Normal effort, clear to auscultation bilaterally, no wheezes, rhonchi, or rales  Cardiovascular:   Regular rate and rhythm. No murmurs, rubs, or gallops.  Musculoskeletal:  Normal ROM. No edema.  Normal examination of feet: no edema, erythema, ecchymosis, or tenderness. Normal ROM and strength.   Skin: No visible rashes or lesions.  Neurological: Alert and oriented to person, place, and time. Gait normal.   Psychiatric: Normal mood and affect. Calm and friendly behavior. Speech clear. Normal judgement and insight.         Assessment & Plan:           1. Chronic pain " of both ankles  Chronic, unknown etiology.  X-ray ordered for evaluation.  Patient will follow-up with Ortho as planned.  - DX-FOOT-COMPLETE 3+ RIGHT; Future    2. Chronic fatigue  3. Generalized joint pain  4. Macrocytosis  Chronic and ongoing.  Unknown etiology.  Suspect fibromyalgia.  Patient has had an extensive workup with negative results.  Labs reviewed with patient today.  Sed rate, iron, ferritin, total complement, complement C4, complement C3, folic acid, B12, Rheumatoid factor, CCP antibody, CRP, and STONE are negative.  Infectious disease labs are remarkable for a history of a prior CMV and EBV infection.  Discussed doing e-consult with hematology for further guidance and she's agreeable. Pt understands that she may be receive a bill for the e-consult.    4. Encounter for consultation  - E-consult to Hematology/Oncology    5. Osteoporosis without current pathological fracture, unspecified osteoporosis type  Chronic and ongoing.  Patient declined biphosphonate or Prolia at the time of her diagnosis.  Patient would like to trial Prolia now.  Discussed medication and possible side effects.  Patient understands and wishes to proceed.  Prolia ordered per protocol.  Patient will be contacted by our infusion center for her injections.        Diagnosis and treatment plan explained to pt. Counseled pt on new medication(s) and potential side effects. Pt agreed with treatment plan and verbalized understanding.       Return in about 3 months (around 12/4/2024) for test results or sooner if needed.     A total of 45 minutes was spent today reviewing chart, assessing and examining the patient, ordering tests, writing e-consult, and documenting. None of which was spent performing separately billing procedures or ancillary services.     Please note that this dictation was created using voice recognition software. I have made every reasonable attempt to correct obvious errors, but I expect that there are errors of grammar  and possibly content that I did not discover before finalizing the note.    Asya Flores PA-C  [facility]

## 2024-09-05 ENCOUNTER — HOSPITAL ENCOUNTER (OUTPATIENT)
Dept: RADIOLOGY | Facility: MEDICAL CENTER | Age: 64
End: 2024-09-05
Attending: INTERNAL MEDICINE
Payer: COMMERCIAL

## 2024-09-05 ENCOUNTER — TELEPHONE (OUTPATIENT)
Dept: VASCULAR LAB | Facility: MEDICAL CENTER | Age: 64
End: 2024-09-05

## 2024-09-05 DIAGNOSIS — K12.31 NEUTROPENIA ASSOCIATED WITH MUCOSITIS DUE TO ANTINEOPLASTIC THERAPY (HCC): ICD-10-CM

## 2024-09-05 DIAGNOSIS — Z51.12 ENCOUNTER FOR ANTINEOPLASTIC IMMUNOTHERAPY: ICD-10-CM

## 2024-09-05 DIAGNOSIS — R53.0 NEOPLASTIC MALIGNANT RELATED FATIGUE: ICD-10-CM

## 2024-09-05 DIAGNOSIS — R30.9 MICTURITION PAINFUL: ICD-10-CM

## 2024-09-05 DIAGNOSIS — Z51.11 ENCOUNTER FOR ANTINEOPLASTIC CHEMOTHERAPY: ICD-10-CM

## 2024-09-05 DIAGNOSIS — T45.1X5S NEUTROPENIA ASSOCIATED WITH MUCOSITIS DUE TO ANTINEOPLASTIC THERAPY (HCC): ICD-10-CM

## 2024-09-05 DIAGNOSIS — C83.39 DIFFUSE LARGE B-CELL LYMPHOMA OF EXTRANODAL SITE: ICD-10-CM

## 2024-09-05 DIAGNOSIS — D70.1 NEUTROPENIA ASSOCIATED WITH MUCOSITIS DUE TO ANTINEOPLASTIC THERAPY (HCC): ICD-10-CM

## 2024-09-05 DIAGNOSIS — H53.19 OTHER SUBJECTIVE VISUAL DISTURBANCES: ICD-10-CM

## 2024-09-05 PROCEDURE — 700117 HCHG RX CONTRAST REV CODE 255: Performed by: INTERNAL MEDICINE

## 2024-09-05 PROCEDURE — 71260 CT THORAX DX C+: CPT

## 2024-09-05 PROCEDURE — 74178 CT ABD&PLV WO CNTR FLWD CNTR: CPT

## 2024-09-05 RX ADMIN — IOHEXOL 100 ML: 350 INJECTION, SOLUTION INTRAVENOUS at 11:15

## 2024-09-06 ENCOUNTER — E-CONSULT (OUTPATIENT)
Dept: HEMATOLOGY ONCOLOGY | Facility: MEDICAL CENTER | Age: 64
End: 2024-09-06
Payer: COMMERCIAL

## 2024-09-06 DIAGNOSIS — Z71.9 ENCOUNTER FOR CONSULTATION: ICD-10-CM

## 2024-09-07 NOTE — PROGRESS NOTES
E-Consult Response     After careful review of the patient's information available in the medical record, the following are my findings and recommendations:    Reason for consult:      Pt is a 64 year old female with PMHx of lymphoma, status post chemo x 6 months a year ago.  Pt is experiencing chronic fatigue and generalized joint pain for years, onset was before her lymphoma diagnosis. She has had an extensive work up with labs and imaging. She's established with ortho at the [facility]. She is currenlty doing PT. Her CBC is remarkable for persistent macrocytosis and slight decrease in WBC and RBC. Based on CBC results, does pt needs additonal work up. Pt is no longer undergoing chemo or radiation. Pt is still followed by [facility]. Thank you.     Summary of data reviewed:   DLBCL patient who recently got chemo with [facility] with Dr. Macias   CBC reviewed.  Bone marrow can take about 9 months to recover from chemo.     Recommendations:   Since patient is being followed by [facility], please have patient reach out to them to discuss concerns on CBC but likely recovery from chemo and given how recent treatment was, they will be followed likely q3 months with [facility].  No need for [facility] referral.   E-Consult Time: 10 minutes were spent with >50% of the total time spent reviewing items outlined in the summary of data reviewed (Use code [id number])    Sarah Meehan M.D.

## 2024-09-10 ENCOUNTER — OFFICE VISIT (OUTPATIENT)
Dept: VASCULAR LAB | Facility: MEDICAL CENTER | Age: 64
End: 2024-09-10
Attending: FAMILY MEDICINE
Payer: COMMERCIAL

## 2024-09-10 VITALS
SYSTOLIC BLOOD PRESSURE: 98 MMHG | HEIGHT: 62 IN | WEIGHT: 132 LBS | DIASTOLIC BLOOD PRESSURE: 57 MMHG | HEART RATE: 67 BPM | BODY MASS INDEX: 24.29 KG/M2

## 2024-09-10 DIAGNOSIS — Z86.718 HISTORY OF DVT (DEEP VEIN THROMBOSIS): Chronic | ICD-10-CM

## 2024-09-10 DIAGNOSIS — C83.30 DIFFUSE LARGE B-CELL LYMPHOMA, UNSPECIFIED BODY REGION (HCC): ICD-10-CM

## 2024-09-10 DIAGNOSIS — D68.59 HYPERCOAGULABLE STATE (HCC): Chronic | ICD-10-CM

## 2024-09-10 DIAGNOSIS — I87.2 CHRONIC VENOUS INSUFFICIENCY: Chronic | ICD-10-CM

## 2024-09-10 DIAGNOSIS — I82.813: Chronic | ICD-10-CM

## 2024-09-10 DIAGNOSIS — I83.893 SYMPTOMATIC VARICOSE VEINS OF BOTH LOWER EXTREMITIES: Chronic | ICD-10-CM

## 2024-09-10 DIAGNOSIS — D25.9 UTERINE LEIOMYOMA, UNSPECIFIED LOCATION: ICD-10-CM

## 2024-09-10 PROCEDURE — 3074F SYST BP LT 130 MM HG: CPT | Performed by: FAMILY MEDICINE

## 2024-09-10 PROCEDURE — 3078F DIAST BP <80 MM HG: CPT | Performed by: FAMILY MEDICINE

## 2024-09-10 PROCEDURE — 99214 OFFICE O/P EST MOD 30 MIN: CPT | Performed by: FAMILY MEDICINE

## 2024-09-10 PROCEDURE — 99212 OFFICE O/P EST SF 10 MIN: CPT

## 2024-09-10 ASSESSMENT — ENCOUNTER SYMPTOMS
CHILLS: 0
COUGH: 0
WHEEZING: 0
CLAUDICATION: 0
SPUTUM PRODUCTION: 0
SHORTNESS OF BREATH: 0
HEMOPTYSIS: 0
PND: 0
ORTHOPNEA: 0
PALPITATIONS: 0
FEVER: 0

## 2024-09-10 ASSESSMENT — FIBROSIS 4 INDEX: FIB4 SCORE: 0.87

## 2024-09-10 NOTE — PROGRESS NOTES
FOLLOW-UP VASCULAR ANTICOAGULATION VISIT  09/10/24     Savana Rendon is a female who presents for  f/u  Initially referred by Asya Flores P.A.-C. for VTE disease and anticoag LOT evaluation,   est 7/2024     Subjective    Interval hx/concerns: last seen 7/2024, had interval labs, CTV a/p and LLE duplex.  Reports no new concerns.  Taking diosmin 1g BID currently.   Pending this Thursday for compression socks fitting.  Interested in ongoing procedural care - our office contacted [facility] and they do accept her insurance.      VTE disease / Anticoagulation:   Current agent: eliquis - adherence and tolerance good, no bleeding  Barriers to care/SDOH: yes - limited English proficiency  Date of initiation of AC: 1/2023   Date of Diagnosis and type: same - acute, occclusive clot in the proximal LEFT greater saphenous vein extending into common femoral vein.   Initial visit hx/sx:  acute onset of L leg swelling/pain.  Denies current SOB, CP, leg swelling, edema, leg pain, cyanosis of digits, redness of extremities.  VTE tx course: Eliquis 10mg BID x 7d, now eliquis 5mg BID   POSSIBLE PROVOKING FACTORS:  Personal VTE hx? No  Family VTE hx or clotting d/o? No  Recent surgery ? No  Recent trauma ? No  Smoker?  reports that she has never smoked. She has never used smokeless tobacco.    Extended travel? No  Recent periods of immobility? No  Other risk factors:  yes L iliopsoas area mass, uterine fibroid with evidence of venous compression  WOMEN: Hx of cancer or abnormal cancer screenings:  known diffuse B-cell lymphoma (sees oncology MD, Dr. Macias, Cancer care specialist) , s/p chemotx       Any estrogen, testosterone HRT, E2 birth control, tamoxifene, raloxifene: no       Hx of recurrent miscarriages: no  Hypercoagulability work-up completed?  No    Chronic venous insufficiency: stable, reports ongoing LLE swelling at ankle, reticular veins and spider veins with itching.  No hx of VLUs   INITIAL VISIT  HISTORY: prior established with [facility] for ongoing care but reports currently unable to f/u there due to insurance coverage   Symptoms: reports worsening L foot pain, end of day L leg swelling - progressive worsening and varicosities more pronounced.    Hx of known VTE:  hx of extensive LLE DVT  Prior imaging or work-up: had L hip, foot xrays  Prior interventions:     Compression stockings: not using at this time    Medications/phlebotonics: none    Procedural interventions:  sclerotherapy and RFA - [facility] 2024 - reports did not help    OTHER CVD: no    HTN: Stable, tolerating meds with good adherence, Home BP los/70s  HLD: Stable, current treatment: Moderate intensity statin - tolerating, good adherence   Dysglycemia: no     Current Outpatient Medications on File Prior to Visit   Medication Sig Dispense Refill    HESPERIDIN-DIOSMIN PO Take 1,000 mg by mouth 2 times a day. 1 tablet 2 times daily      PARoxetine (PAXIL) 20 MG Tab Take 20 mg by mouth every day.      meclizine (ANTIVERT) 12.5 MG Tab Take 1 Tablet by mouth 3 times a day as needed for Dizziness or Nausea/Vomiting. 30 Tablet 0    apixaban (ELIQUIS) 5mg Tab Take 1 Tablet by mouth 2 times a day. 180 Tablet 3    CALCIUM PO Take 1 Tablet by mouth every day.      B Complex Vitamins (B COMPLEX PO) Take 1 Tablet by mouth every day.      vitamin D3 (CHOLECALCIFEROL) 5000 Unit (125 mcg) Tab Take 5,000 Units by mouth every day.      Omega-3 Fatty Acids (FISH OIL) 1000 MG Cap capsule Take 1,000 mg by mouth every day.      GLUCOSAMINE-CHONDROITIN PO Take 1 Tablet by mouth every day.       No current facility-administered medications on file prior to visit.      No Known Allergies    Social History     Tobacco Use    Smoking status: Never    Smokeless tobacco: Never   Vaping Use    Vaping status: Never Used   Substance Use Topics    Alcohol use: Not Currently    Drug use: Never     DIET AND EXERCISE:  Weight Change:stable   Diet:  "common adult  Exercise: moderate regular exercise program     Review of Systems   Constitutional:  Negative for chills, fever and malaise/fatigue.   Respiratory:  Negative for cough, hemoptysis, sputum production, shortness of breath and wheezing.    Cardiovascular:  Negative for chest pain, palpitations, orthopnea, claudication, leg swelling and PND.       Objective    Vitals:    09/10/24 1326   BP: 98/57   BP Location: Left arm   Patient Position: Sitting   BP Cuff Size: Adult   Pulse: 67   Weight: 59.9 kg (132 lb)   Height: 1.575 m (5' 2\")      BP Readings from Last 5 Encounters:   09/10/24 98/57   09/04/24 116/68   08/14/24 118/68   08/07/24 120/60   07/30/24 129/77      Body mass index is 24.14 kg/m².  Wt Readings from Last 3 Encounters:   09/10/24 59.9 kg (132 lb)   09/04/24 60.3 kg (133 lb)   08/14/24 59.4 kg (131 lb)      Physical Exam  Vitals reviewed.   Constitutional:       General: She is not in acute distress.     Appearance: Normal appearance.   HENT:      Head: Normocephalic and atraumatic.   Eyes:      General: Lids are normal.      Extraocular Movements: Extraocular movements intact.      Conjunctiva/sclera: Conjunctivae normal.   Neck:      Thyroid: No thyroid mass.      Vascular: Normal carotid pulses. No carotid bruit.      Trachea: Trachea normal.   Cardiovascular:      Rate and Rhythm: Normal rate and regular rhythm.      Chest Wall: PMI is not displaced.      Pulses: Normal pulses.           Carotid pulses are 2+ on the right side and 2+ on the left side.       Radial pulses are 2+ on the right side and 2+ on the left side.        Dorsalis pedis pulses are 2+ on the right side and 2+ on the left side.        Posterior tibial pulses are 2+ on the right side and 2+ on the left side.      Heart sounds: Normal heart sounds.      Comments: Stemmer's sign - negative bilat   Spider telangectasia:       RLE:  None      LLE: scattered   Varicosities:           RLE: none      LLE: none   Corona " "phlebectatica:      RLE:  None        LLE:  mild   Cording:         RLE:  None     LLE: None         Pulmonary:      Effort: Pulmonary effort is normal.      Breath sounds: Normal breath sounds.   Musculoskeletal:      Cervical back: Full passive range of motion without pain and neck supple.      Right lower leg: No edema.      Left lower le+ Edema (minimal ankle, soft non-tender) present.   Skin:     General: Skin is warm and dry.      Capillary Refill: Capillary refill takes less than 2 seconds.      Coloration: Skin is not cyanotic.      Nails: There is no clubbing.      Comments: Stasis pigmentation:     RLE:  no     LLE: no   Stasis dermatitis:    RLE: no     LLE: no   Lipodermatosclerosis and/or atrophie lesa:     RLE: no    LLE: no   Healed and/or active venous leg ulcers:     RLE: no     LLE: no    Neurological:      General: No focal deficit present.      Mental Status: She is alert and oriented to person, place, and time. Mental status is at baseline.      Cranial Nerves: No cranial nerve deficit.      Coordination: Coordination is intact. Coordination normal.      Gait: Gait is intact. Gait normal.   Psychiatric:         Mood and Affect: Mood normal.         Behavior: Behavior normal.         Lab Results   Component Value Date    CHOLSTRLTOT 224 (H) 03/15/2024     (H) 03/15/2024    HDL 72 03/15/2024    TRIGLYCERIDE 136 03/15/2024      No results found for: \"LIPOPROTA\"   No results found for: \"APOB\"   Lab Results   Component Value Date/Time    CRPHIGHSEN 1.2 2013 08:40 AM       Lab Results   Component Value Date    PROTHROMBTM 17.5 (H) 2023    INR 1.46 (H) 2023       Lab Results   Component Value Date    HBA1C 5.1 03/15/2024      Lab Results   Component Value Date    SODIUM 138 2024    POTASSIUM 4.0 2024    CHLORIDE 101 2024    CO2 25 2024    GLUCOSE 90 2024    BUN 14 2024    CREATININE 0.54 2024        Lab Results   Component Value " Date    WBC 3.5 (L) 08/19/2024    RBC 4.04 (L) 08/19/2024    HEMOGLOBIN 13.5 08/19/2024    HEMATOCRIT 39.8 08/19/2024    MCV 98.5 (H) 08/19/2024    MCH 33.4 (H) 08/19/2024    MCHC 33.9 08/19/2024    MPV 10.6 08/19/2024       Latest Reference Range & Units 08/19/24 07:50   D-Dimer 0.00 - 0.50 ug/mL (FEU) <0.27   Factor Ii Dna Analysis Pt Gene  Negative   V Leiden Factor  Negative      Latest Reference Range & Units 08/19/24 07:50   Anti-Cardiolipin Ab Iga <=11 APL <10   Anti-Cardiolipin Ab Igm <=12 MPL <10   Anti-Cariolipin Ab Igg <=14 GPL <10   Beta-2 Glycoprotein I Ab Igg <=20 SGU <10   Beta-2 Glycoprotein I Iga <=20 MARGARET 62 (H)   Beta-2 Glycoprotein I Igm <=20 SMU <10   Phosphatidylserine IgA 0 - 19 APS 0   Phosphatidylserine IgG 0 - 15 GPS 0   Phosphatidylserine IgM 0 - 21 MPS 0      Latest Reference Range & Units 08/19/24 07:53   Rheumatoid Factor -Neph- 0 - 14 IU/mL <10   Cyclic Citrullinated Peptide Ab, IgG/A 0 - 19 Units 2   Stat C-Reactive Protein 0.00 - 0.75 mg/dL <0.30   Antinuclear Antibody None Detected  None Detected       VASCULAR IMAGING:    LLE venous 1/3/2023  Occlusive clot in the proximal LEFT greater saphenous vein extending into common femoral vein.     LLE VR duplex 1/31/23   Left lower extremity-   Known partial thrombus visualized at the saphenofemoral junction within the greater saphenous vein, subacute on chronic.   No other DVT seen.   No significant deep venous reflux.    A varicosity is visualized at the mid thigh level (reflux time 2450 msec), with reflux demonstrated in the greater saphenous vein from the mid thigh to the knee level. A varicosity is seen at the level of the knee with reflux demonstrated. No reflux demonstrated in the calf.   There is a large incompetent  vein communicating with a varicosity at the ankle level, reflux demonstrated (5600 msec).    CTPA 2/2023   1.  No pulmonary embolus appreciated.  2.  Right thyroid nodule, recommend follow-up thyroid  sonography due to nodule size.    MR pelvis 2/2023  1.  Multilobular LEFT iliac fossa mass extending into the inguinal region.  Additional separate mesenteric masses present in the pelvis and LEFT mid abdomen.  Primary diagnostic considerations are lymphoma and metastatic leiomyosarcoma, however exact   etiology is uncertain.  2.  Heterogeneous mass in the uterine fundus measuring 5.8 cm, fibroid versus leiomyosarcoma.  3.  Moderate LEFT hydroureteronephrosis with apparent compression of the distal LEFT ureter between iliac fossa mass and uterus.  4.  Multiple mildly prominent LEFT groin lymph nodes, nonspecific.  5.  Edema of LEFT thigh musculature suggests venous compression as well      PET/CT 5/2023      [facility] 9/2023   1.  Chronic nonocclusive DVT in the LEFT common femoral vein.  2.  No acute occlusive DVT in either lower extremity.    MR brain 3/2024   Unremarkable pre and postcontrast MR examination of the brain except for a few punctate nonspecific supratentorial T2 hyperintensities likely representing nonspecific foci of gliosis of no clinical significance.     [facility] 4/12/24  1.  No bilateral lower extremity deep venous thrombosis.   2.  Superficial venous thrombosis is noted throughout the greater saphenous veins bilaterally to the proximal thigh region    [facility] 4/24/24  1.  Bilateral greater saphenous vein thrombosis consistent with superficial venous thrombosis, unchanged from prior exam.  2.  No acute occlusive DVT in either lower extremity.    L hip xray 6/2024  1.  No hip or pelvic fracture.  2.  Mild degenerative change of both hips.  3.  Diffuse osteopenia.    L foot xray 6/2024   1.  Diffuse osteopenia.  2.  No fracture or dislocation of LEFT foot.  3.  Mild osteoarthritis.    [facility] 6/20/24  There is chronic mid thigh to calf thrombus within the right and left greater saphenous veins similar to previous.   IMPRESSION:   1.  No evidence of bilateral lower extremity deep venous  thrombosis.   2.  Bilateral greater saphenous vein chronic thrombus similar to previous.    8/2/24 CTV LLE   Vascular anatomy: The LEFT external iliac and common femoral vein are patent without CT evidence for deep venous thrombus.   The left superficial femoral vein is also patent.   The visualized veins of the calf are patent   . The saphenous system is not visualized on CT.   IMPRESSION:   1.  Negative left lower extremity CT   2.  Specifically, no evidence for deep venous thrombus or venous enlargement as could occur with proximal compression    8/21/24 CT a/p with    The splenic vein and portal vein enhance normally.   ADRENAL glands: Normal in appearance.   RIGHT kidney: Normal in appearance.   LEFT kidney: There is a simple cyst.   AORTA and VASCULATURE: Atherosclerosis without aneurysm   PELVIS:   Uterus is oriented to the left. Within the superior aspect of the uterus there is a 13 mm hyperdense mass consistent with a fibroid.   Since the prior CT scan left internal and external iliac adenopathy has resolved.  IMPRESSION:   1.  Resolution of left external/internal iliac adenopathy   2.  Uterus is retroflexed to the left in contains a 13 mm fundal fibroid   3.  No vascular compression identifie    9/2024 CT a/p urogram   Adrenals: Adrenal glands are unremarkable.   Vasculature: Renal veins are patent.  IMPRESSION:   1.  No evidence for lymphoma within the abdomen or pelvis   2.  Incidental left renal cyst    9/5/24 CT chest with   Cardiac: Heart normal in size without pericardial effusion. There is no coronary artery calcification.   Vascular: Unremarkable.  IMPRESSION:   Negative CT chest with contrast. No evidence for lymphoma            Medical Decision Making:  Today's Assessment / Status / Plan:     1. Chronic superficial venous thrombosis of lower extremity, bilateral  Referral to Vascular Surgery      2. Chronic venous insufficiency  Referral to Vascular Surgery      3. Symptomatic varicose veins of  both lower extremities  Referral to Vascular Surgery      4. Diffuse large B-cell lymphoma, unspecified body region (HCC)        5. Hypercoagulable state (HCC)        6. History of DVT (deep vein thrombosis)        7. Uterine leiomyoma, unspecified location          Established CVD:     1) VTE disease - stable, residual chronic bilat GSV SVTs and now progressively worsening left PTS - see discussion below  1/2023 - cancer-associated, acute, occlusive L GSV through SFJ to fem v DVT   2/2023 MR evidence of  L sided venous compressive pathology from Multilobular LEFT iliac fossa mass and uterine fibroid   6/2024 duplex shows chronic residual bilat GSV SVTs w/o DVT component   7/2024 CTV with no sign of abd/pelvic extrinsic compression on veins  7/2024 CTV LLE shows no DVTs  - Thrombophilia/hypercoag evaluation:  neg for FVL, PTGM and APS (please note that B2GP1 IgA is elevated >40, however IgA subtype is not considered a component for APS as only IgM and IgG would qualify for APS consideration)  - at initial visit gave [facility] pt info handouts on VTE, DOAC  PLAN  - antithrombotic plan as noted below   - counseled on signs and symptoms of acute VTE that require seeking prompt attention in the ED including shortness of breath, chest pain, pain with deep inhalation, acute leg swelling and/or pain in calf or leg   - elevate legs as much as possible, use compression stockings/socks if directed by your provider  - avoid hormonal therapies containing E2 or testosterone, raloxifene, tamoxifene, and other meds increasing risk for VTE   - avoid or limit sedentary periods  - continue complete avoidance of tobacco products  - if having any invasive procedure, advised to inform provider of history of blood clots and current anticoagulation status    2) CHRONIC VENOUS DISEASE / INSUFFICIENCY   LLE CEAP classification: C3S / Ep,s / As,d / Pr,o  RLE CEAP classification: C2A / Ep / An / Pn  - s/p RFA, scler[facility] 1/2024 with [facility]   - reviewed anatomy, pathophys and gave educ handouts regarding CVI, common s/s, possible complications, and tx options   - CTV a/p and LLE without any specific venous abnormalities noted  Plan:  - may require tertiary center evaluation with [place]-based or [facility] who have more experience with dealing with symptomatic, chronic VTE disease as there is no local area expertise for this condition   - ref back to [facility] [place] for further evaluation for procedural care - our office contact [facility] and confirmed they do accept patient's insurance ([facility]), so unclear why they have not seen her in f/u   - pending start of knee-high compression socks, 20-30mmHg - gave Rx for optima or carolyn compounding for sizing   - increase walking, avoid prolonged standing/sitting  - active calf pumping exercises if prolonged standing and elevate legs above heart level while sitting and sleeping   - emphasized need for reduction of central adiposity to reduce extrinsic compression of the low-pressure IVC  to improve venous return and reduce distal venous pressure in legs   - reviewed dietary changes and monitor weight and waist circumference  - side sleeping with body pillow may improve lymphatic and venous return by reducing  extrinsic compression on IVC during sleep  - reduce sodium (\"salt\") in diet to less than 2,000mg daily   - continue daily moisturizing lotion (such as [employer] Diabetic Foot Cream)  Medications:    - in general, diuretics will not help with CVI-assoc edema and should be avoided   - evidence-based venoactive tx options include diosmin, HCSE, Rx Vasculera   - continue OTC diosmin 1g BID for 3-6mo trial     ANTITHROMBOTIC THERAPY  Date of initiation: 1/2023   HAS-BLED bleeding risk calc ([email or web address]): 0 pts, 0.9%, low risk   Factors to consider for indefinite OAC:  active cancer dx with hematologic cancer carrying highest risk for recurrent VTE, chronic residua can slightly increase risk " "for new acute VTE   Last CBC, BMP: reviewed above   Expected duration: has completed 1.5yr on full dose DOAC. If active cancer dx, recommended for indefinite OAC  - currently with hematology but notes do not specific LOT   PLAN:  - continue eliquis 5mg BID indefinitely due to active cancer dx, annual benefit/risk review   - defer ongoing LOT decision-making to heme/onc doctors   - stressed strict adherence to tx   - avoid Aspirin and NSAIDs while anticoagulated   - limit or avoid sports or high-risk for head injury to reduce risk for intracranial bleeds  - advised to seek urgent eval for any GI bleeding, stroke-like sx, or minor bleeding >30min duration   - advised reversal agents are available for all agents     LIPID MANAGEMENT  Qualifies for Statin Therapy Based on 2018 ACC/AHA Guidelines: yes, Primary Prevention - 40-74yo, LDLc >70, <190 w/o DM  The 10-year ASCVD risk score (Aye GARCIA, et al., 2019) is: 2.5%, <5% \"low risk\"  Major ASCVD events: None  High-risk conditions: N/A  Risk-enhancers: N/A  Currently on Statin: Yes  Tx goals: LDL-C <100   At goal? yes  PLAN  - reinforced ongoing TLC measures as noted   - monitor labs   Meds:   - continue atorva 20mg daily   - annual lipids per PCP     BLOOD PRESSURE MANAGEMENT  Office BP Goal ACC/AHA (2017) goal <130/80  Home BP at goal:  yes  Office BP at goal:  yes  24h ABPM:  not indicated  RDN candidate? N/a  Contributing factors: n/a   Plan:   - monitor annual office-based BP and/or intermittent at home BP, report >130/80    - continue healthy lifestyle  Medications: none      GLYCEMIC STATUS:  Normal    LIFESTYLE INTERVENTIONS  TOBACCO:    reports that she has never smoked. She has never used smokeless tobacco.   - continued complete avoidance of all tobacco products   PHYSICAL ACTIVITY: >150min/week of mod-intensity activity or as much as tolerated  NUTRITION: Mediterranean and reduce Na to 1500mg/day   ETOH: limit to 1 or less standard drinks/day  WT MGMT: " maintain healthy weight     OTHER:     # diffuse B-cell lymphoma - s/p chemotx, stable  - chronic leukopenia   Plan:  - defer mgmt to heme/onc including CBC monitoring     STUDIES: none  LABS: none  Follow up in: RTC in  prn       Farnco Asher M.D.  [facility]   [facility]   [phone]

## 2024-09-11 ENCOUNTER — HOSPITAL ENCOUNTER (OUTPATIENT)
Dept: LAB | Facility: MEDICAL CENTER | Age: 64
End: 2024-09-11
Attending: INTERNAL MEDICINE
Payer: COMMERCIAL

## 2024-09-11 LAB
ALBUMIN SERPL BCP-MCNC: 4.1 G/DL (ref 3.2–4.9)
ALBUMIN/GLOB SERPL: 1.6 G/DL
ALP SERPL-CCNC: 71 U/L (ref 30–99)
ALT SERPL-CCNC: 41 U/L (ref 2–50)
ANION GAP SERPL CALC-SCNC: 9 MMOL/L (ref 7–16)
AST SERPL-CCNC: 35 U/L (ref 12–45)
BASOPHILS # BLD AUTO: 0.8 % (ref 0–1.8)
BASOPHILS # BLD: 0.03 K/UL (ref 0–0.12)
BILIRUB SERPL-MCNC: 0.5 MG/DL (ref 0.1–1.5)
BUN SERPL-MCNC: 14 MG/DL (ref 8–22)
CALCIUM ALBUM COR SERPL-MCNC: 9.1 MG/DL (ref 8.5–10.5)
CALCIUM SERPL-MCNC: 9.2 MG/DL (ref 8.5–10.5)
CHLORIDE SERPL-SCNC: 102 MMOL/L (ref 96–112)
CO2 SERPL-SCNC: 26 MMOL/L (ref 20–33)
CREAT SERPL-MCNC: 0.68 MG/DL (ref 0.5–1.4)
EOSINOPHIL # BLD AUTO: 0.11 K/UL (ref 0–0.51)
EOSINOPHIL NFR BLD: 2.9 % (ref 0–6.9)
ERYTHROCYTE [DISTWIDTH] IN BLOOD BY AUTOMATED COUNT: 45.7 FL (ref 35.9–50)
GFR SERPLBLD CREATININE-BSD FMLA CKD-EPI: 98 ML/MIN/1.73 M 2
GLOBULIN SER CALC-MCNC: 2.5 G/DL (ref 1.9–3.5)
GLUCOSE SERPL-MCNC: 78 MG/DL (ref 65–99)
HCT VFR BLD AUTO: 40.4 % (ref 37–47)
HGB BLD-MCNC: 13.1 G/DL (ref 12–16)
IMM GRANULOCYTES # BLD AUTO: 0.01 K/UL (ref 0–0.11)
IMM GRANULOCYTES NFR BLD AUTO: 0.3 % (ref 0–0.9)
LYMPHOCYTES # BLD AUTO: 1.38 K/UL (ref 1–4.8)
LYMPHOCYTES NFR BLD: 36.5 % (ref 22–41)
MCH RBC QN AUTO: 32.5 PG (ref 27–33)
MCHC RBC AUTO-ENTMCNC: 32.4 G/DL (ref 32.2–35.5)
MCV RBC AUTO: 100.2 FL (ref 81.4–97.8)
MONOCYTES # BLD AUTO: 0.34 K/UL (ref 0–0.85)
MONOCYTES NFR BLD AUTO: 9 % (ref 0–13.4)
NEUTROPHILS # BLD AUTO: 1.91 K/UL (ref 1.82–7.42)
NEUTROPHILS NFR BLD: 50.5 % (ref 44–72)
NRBC # BLD AUTO: 0 K/UL
NRBC BLD-RTO: 0 /100 WBC (ref 0–0.2)
PLATELET # BLD AUTO: 234 K/UL (ref 164–446)
PMV BLD AUTO: 10.4 FL (ref 9–12.9)
POTASSIUM SERPL-SCNC: 5.1 MMOL/L (ref 3.6–5.5)
PROT SERPL-MCNC: 6.6 G/DL (ref 6–8.2)
RBC # BLD AUTO: 4.03 M/UL (ref 4.2–5.4)
SODIUM SERPL-SCNC: 137 MMOL/L (ref 135–145)
WBC # BLD AUTO: 3.8 K/UL (ref 4.8–10.8)

## 2024-09-11 PROCEDURE — 36415 COLL VENOUS BLD VENIPUNCTURE: CPT

## 2024-09-11 PROCEDURE — 80053 COMPREHEN METABOLIC PANEL: CPT

## 2024-09-11 PROCEDURE — 85025 COMPLETE CBC W/AUTO DIFF WBC: CPT

## 2024-09-20 ENCOUNTER — DOCUMENTATION (OUTPATIENT)
Dept: HEALTH INFORMATION MANAGEMENT | Facility: OTHER | Age: 64
End: 2024-09-20
Payer: COMMERCIAL

## 2024-09-27 ENCOUNTER — APPOINTMENT (OUTPATIENT)
Dept: RADIOLOGY | Facility: MEDICAL CENTER | Age: 64
End: 2024-09-27
Attending: STUDENT IN AN ORGANIZED HEALTH CARE EDUCATION/TRAINING PROGRAM
Payer: COMMERCIAL

## 2024-10-01 ENCOUNTER — HOSPITAL ENCOUNTER (OUTPATIENT)
Dept: RADIOLOGY | Facility: MEDICAL CENTER | Age: 64
End: 2024-10-01
Attending: ORTHOPAEDIC SURGERY
Payer: COMMERCIAL

## 2024-10-01 DIAGNOSIS — R22.31 LOCALIZED SWELLING, MASS, OR LUMP OF UPPER EXTREMITY, RIGHT: ICD-10-CM

## 2024-10-01 PROCEDURE — 700117 HCHG RX CONTRAST REV CODE 255: Mod: JZ | Performed by: ORTHOPAEDIC SURGERY

## 2024-10-01 PROCEDURE — 73220 MRI UPPR EXTREMITY W/O&W/DYE: CPT | Mod: RT

## 2024-10-01 PROCEDURE — A9579 GAD-BASE MR CONTRAST NOS,1ML: HCPCS | Mod: JZ | Performed by: ORTHOPAEDIC SURGERY

## 2024-10-01 RX ADMIN — GADOTERIDOL 10 ML: 279.3 INJECTION, SOLUTION INTRAVENOUS at 14:43

## 2024-10-12 ENCOUNTER — HOSPITAL ENCOUNTER (OUTPATIENT)
Dept: RADIOLOGY | Facility: MEDICAL CENTER | Age: 64
End: 2024-10-12
Attending: STUDENT IN AN ORGANIZED HEALTH CARE EDUCATION/TRAINING PROGRAM
Payer: COMMERCIAL

## 2024-10-12 DIAGNOSIS — G89.29 CHRONIC PAIN OF BOTH ANKLES: ICD-10-CM

## 2024-10-12 DIAGNOSIS — M25.571 CHRONIC PAIN OF BOTH ANKLES: ICD-10-CM

## 2024-10-12 DIAGNOSIS — M25.572 CHRONIC PAIN OF BOTH ANKLES: ICD-10-CM

## 2024-10-12 PROCEDURE — 73630 X-RAY EXAM OF FOOT: CPT | Mod: RT

## 2024-11-08 ENCOUNTER — OFFICE VISIT (OUTPATIENT)
Dept: MEDICAL GROUP | Facility: IMAGING CENTER | Age: 64
End: 2024-11-08
Payer: COMMERCIAL

## 2024-11-08 ENCOUNTER — TELEPHONE (OUTPATIENT)
Dept: MEDICAL GROUP | Facility: IMAGING CENTER | Age: 64
End: 2024-11-08

## 2024-11-08 VITALS
HEIGHT: 62 IN | WEIGHT: 134 LBS | SYSTOLIC BLOOD PRESSURE: 112 MMHG | BODY MASS INDEX: 24.66 KG/M2 | OXYGEN SATURATION: 98 % | TEMPERATURE: 97.8 F | DIASTOLIC BLOOD PRESSURE: 68 MMHG | RESPIRATION RATE: 16 BRPM | HEART RATE: 77 BPM

## 2024-11-08 DIAGNOSIS — M25.572 CHRONIC PAIN OF BOTH ANKLES: ICD-10-CM

## 2024-11-08 DIAGNOSIS — G89.29 CHRONIC PAIN OF BOTH ANKLES: ICD-10-CM

## 2024-11-08 DIAGNOSIS — H93.13 TINNITUS OF BOTH EARS: ICD-10-CM

## 2024-11-08 DIAGNOSIS — H92.03 OTALGIA OF BOTH EARS: ICD-10-CM

## 2024-11-08 DIAGNOSIS — M25.571 CHRONIC PAIN OF BOTH ANKLES: ICD-10-CM

## 2024-11-08 DIAGNOSIS — Z23 ENCOUNTER FOR ADMINISTRATION OF VACCINE: ICD-10-CM

## 2024-11-08 DIAGNOSIS — G62.9 NEUROPATHY: ICD-10-CM

## 2024-11-08 PROCEDURE — 90656 IIV3 VACC NO PRSV 0.5 ML IM: CPT | Performed by: STUDENT IN AN ORGANIZED HEALTH CARE EDUCATION/TRAINING PROGRAM

## 2024-11-08 PROCEDURE — 3078F DIAST BP <80 MM HG: CPT | Performed by: STUDENT IN AN ORGANIZED HEALTH CARE EDUCATION/TRAINING PROGRAM

## 2024-11-08 PROCEDURE — 90471 IMMUNIZATION ADMIN: CPT | Performed by: STUDENT IN AN ORGANIZED HEALTH CARE EDUCATION/TRAINING PROGRAM

## 2024-11-08 PROCEDURE — 3074F SYST BP LT 130 MM HG: CPT | Performed by: STUDENT IN AN ORGANIZED HEALTH CARE EDUCATION/TRAINING PROGRAM

## 2024-11-08 PROCEDURE — 99214 OFFICE O/P EST MOD 30 MIN: CPT | Mod: 25 | Performed by: STUDENT IN AN ORGANIZED HEALTH CARE EDUCATION/TRAINING PROGRAM

## 2024-11-08 ASSESSMENT — FIBROSIS 4 INDEX: FIB4 SCORE: 1.47

## 2024-11-08 NOTE — PROGRESS NOTES
"Subjective:     CC:   Chief Complaint   Patient presents with    Ear Pain     In both ears, and for last two weeks has felt sick       HPI:   Savana Rendon, sulaiman, 64 y.o., female,  presents today to discuss:     Ear pain/tinnitus: onset was 2 weeks ago. Symptoms have been intermittent. She feels stabbing pain on the inside of her ear. Denies edema, fever, and chills.     Foot pain f/u: reports she continues to experience bilateral foot pain, associated with paresthesia. Denies edema, erythema, and ecchymosis. She takes OTC Tylenol but does not help.     She had a recent vertigo episode associated with vomiting. Symptoms have resolved.     ROS:  See HPI    Medications, allergies, past medical history, family history, surgical history, and social history documented in chart and reviewed by me.       Objective:   Exam:  /68 (BP Location: Left arm, Patient Position: Sitting, BP Cuff Size: Adult)   Pulse 77   Temp 36.6 °C (97.8 °F) (Temporal)   Resp 16   Ht 1.575 m (5' 2\")   Wt 60.8 kg (134 lb)   SpO2 98%   BMI 24.51 kg/m²      Physical Exam  Constitutional:       General: She is not in acute distress.     Appearance: Normal appearance.   HENT:      Head: Normocephalic and atraumatic.      Right Ear: Tympanic membrane, ear canal and external ear normal. There is no impacted cerumen.      Left Ear: Tympanic membrane, ear canal and external ear normal. There is no impacted cerumen.      Nose: Nose normal. No congestion or rhinorrhea.      Mouth/Throat:      Mouth: Mucous membranes are moist.      Pharynx: Oropharynx is clear. No oropharyngeal exudate or posterior oropharyngeal erythema.   Eyes:      General: No scleral icterus.        Right eye: No discharge.         Left eye: No discharge.   Neck:      Thyroid: No thyroid mass or thyromegaly.      Vascular: No carotid bruit.   Cardiovascular:      Rate and Rhythm: Normal rate and regular rhythm.      Heart sounds: Normal heart sounds. No " murmur heard.  Pulmonary:      Effort: Pulmonary effort is normal.      Breath sounds: Normal breath sounds. No wheezing.   Abdominal:      General: Bowel sounds are normal. There is no distension.      Palpations: Abdomen is soft. There is no mass.      Tenderness: There is no abdominal tenderness.   Musculoskeletal:         General: No swelling. Normal range of motion.      Cervical back: Normal range of motion and neck supple.   Lymphadenopathy:      Cervical: No cervical adenopathy.   Skin:     General: Skin is warm and dry.   Neurological:      General: No focal deficit present.      Mental Status: She is alert and oriented to person, place, and time.      Gait: Gait normal.   Psychiatric:         Mood and Affect: Mood normal.         Behavior: Behavior normal.         Thought Content: Thought content normal.         Judgment: Judgment normal.              Assessment & Plan:     1. Chronic pain of both ankles  Chronic, uncontrolled.  Patient continues to experience pain.  X-ray of right foot reviewed with patient.  Xray for left foot was not performed. Patient has upcoming appointment with Ortho and will consult with them.  Since she is also experiencing paresthesia, recommend referral for EMG.  Order placed.  B12, folic acid, and A1c are normal.  Patient denies drinking alcohol.  - Referral to Neurodiagnostics (EEG,EP,EMG/NCS/DBS)    DX-FOOT-COMPLETE 3+ RIGHT   IMPRESSION:  Moderate osteopenia and mild degenerative changes first MTP joint.    2. Neuropathy  Acute, uncontrolled.  Referred for NCS.  - Referral to Neurodiagnostics (EEG,EP,EMG/NCS/DBS)    3. Otalgia of both ears  4. Tinnitus of both ears  Acute.  Ear examination is normal bilaterally.  Recommend over-the-counter oral antihistamine like Zyrtec, Allegra, Claritin and nasal spray like Flonase for a few weeks for possible eustachian tube disorder.  If pain does not resolve, recommend referral to ENT.    5. Encounter for administration of vaccine  -  INFLUENZA VACCINE TRI INJ (PF)         Diagnosis and treatment plan explained to pt. Pt agreed with treatment plan and verbalized understanding.       Return for as scheduled 12/4/24 or sooner if needed.     Please note that this dictation was created using voice recognition software. I have made every reasonable attempt to correct obvious errors, but I expect that there are errors of grammar and possibly content that I did not discover before finalizing the note.    Asya Flores PA-C  [facility]

## 2024-11-09 NOTE — TELEPHONE ENCOUNTER
Called [facility] referrals department on request from provider. Spoke to Ange.    Inquired about whether the Prolia needs a prior authorization. Ange informed me that under the patient's insurance, patient should be approved to either get the prolia at the doctor's office or by infusion.       [facility] Referrals Department phone number:   [phone]

## 2024-11-15 ENCOUNTER — TELEPHONE (OUTPATIENT)
Dept: MEDICAL GROUP | Facility: IMAGING CENTER | Age: 64
End: 2024-11-15
Payer: COMMERCIAL

## 2024-11-15 NOTE — TELEPHONE ENCOUNTER
Caller Name: Savana   Call Back Number: [phone]    How would the patient prefer to be contacted with a response: Phone call do NOT leave a detailed message    Patient called and said that her previous referral to [facility] is not covered under her insurance. She would like the referral to go to another neurologist in town and said she would like to use Dr. Dominique

## 2024-11-16 NOTE — TELEPHONE ENCOUNTER
Caller Name: Savana Rendon   Call Back Number: [phone] (home)      How would the patient prefer to be contacted with a response: Phone call do NOT leave a detailed message    Patient called about osteoporosis injection. She was not abale to get it since her insurance didn't approve or deny the medication. She was told by her insurance they they had sent her and Asya Flores a card to fill out so they can approve of the injection but there is no card in her chart.

## 2024-11-19 RX ORDER — EPINEPHRINE 1 MG/ML(1)
0.5 AMPUL (ML) INJECTION PRN
OUTPATIENT
Start: 2024-11-26

## 2024-11-19 RX ORDER — METHYLPREDNISOLONE SODIUM SUCCINATE 125 MG/2ML
125 INJECTION, POWDER, LYOPHILIZED, FOR SOLUTION INTRAMUSCULAR; INTRAVENOUS PRN
OUTPATIENT
Start: 2024-11-26

## 2024-11-19 RX ORDER — ALBUTEROL SULFATE 90 UG/1
2 INHALANT RESPIRATORY (INHALATION) PRN
OUTPATIENT
Start: 2024-11-26

## 2024-11-19 RX ORDER — DIPHENHYDRAMINE HYDROCHLORIDE 50 MG/ML
50 INJECTION INTRAMUSCULAR; INTRAVENOUS PRN
OUTPATIENT
Start: 2024-11-26

## 2024-11-20 DIAGNOSIS — M25.571 CHRONIC PAIN OF BOTH ANKLES: ICD-10-CM

## 2024-11-20 DIAGNOSIS — G89.29 CHRONIC PAIN OF BOTH ANKLES: ICD-10-CM

## 2024-11-20 DIAGNOSIS — M25.572 CHRONIC PAIN OF BOTH ANKLES: ICD-10-CM

## 2024-11-20 NOTE — PROGRESS NOTES
1. Caller Name: Savana Rendon                          Call Back Number: [phone] (home)         How would the patient prefer to be contacted with a response: Phone call do NOT leave a detailed message    2. SPECIFIC Action To Be Taken: Referral pending, please sign.    3. Diagnosis/Clinical Reason for Request: ankle pain    4. Specialty & Provider Name/Lab/Imaging Location: [facility]     5. Is appointment scheduled for requested order/referral: no    Patient was informed they will receive a return phone call from the office ONLY if there are any questions before processing their request. Advised to call back if they haven't received a call from the referral department in 5 days.

## 2024-11-21 DIAGNOSIS — M81.0 OSTEOPOROSIS WITHOUT CURRENT PATHOLOGICAL FRACTURE, UNSPECIFIED OSTEOPOROSIS TYPE: ICD-10-CM

## 2024-11-21 NOTE — PROGRESS NOTES
Insurance declined prolia. Pt experiences dysphagia, therefore I do not recommend biphosphonate.  Referred to endocrinology for management.

## 2024-11-21 NOTE — TELEPHONE ENCOUNTER
Patient was called and is aware that insurance does not cover Prolia so she was sent a referral for different treatment options

## 2024-12-04 ENCOUNTER — APPOINTMENT (OUTPATIENT)
Dept: MEDICAL GROUP | Facility: IMAGING CENTER | Age: 64
End: 2024-12-04
Payer: COMMERCIAL

## 2024-12-04 VITALS
RESPIRATION RATE: 16 BRPM | DIASTOLIC BLOOD PRESSURE: 72 MMHG | WEIGHT: 134.4 LBS | SYSTOLIC BLOOD PRESSURE: 116 MMHG | TEMPERATURE: 98.5 F | BODY MASS INDEX: 24.73 KG/M2 | OXYGEN SATURATION: 98 % | HEART RATE: 62 BPM | HEIGHT: 62 IN

## 2024-12-04 DIAGNOSIS — E04.1 THYROID NODULE: ICD-10-CM

## 2024-12-04 DIAGNOSIS — E78.5 DYSLIPIDEMIA: ICD-10-CM

## 2024-12-04 DIAGNOSIS — G89.29 CHRONIC BILATERAL LOW BACK PAIN WITHOUT SCIATICA: ICD-10-CM

## 2024-12-04 DIAGNOSIS — M81.0 OSTEOPOROSIS WITHOUT CURRENT PATHOLOGICAL FRACTURE, UNSPECIFIED OSTEOPOROSIS TYPE: ICD-10-CM

## 2024-12-04 DIAGNOSIS — H93.13 TINNITUS, BILATERAL: ICD-10-CM

## 2024-12-04 DIAGNOSIS — M54.50 CHRONIC BILATERAL LOW BACK PAIN WITHOUT SCIATICA: ICD-10-CM

## 2024-12-04 DIAGNOSIS — G89.29 CHRONIC PAIN OF BOTH ANKLES: ICD-10-CM

## 2024-12-04 DIAGNOSIS — G62.9 NEUROPATHY: ICD-10-CM

## 2024-12-04 DIAGNOSIS — M25.571 CHRONIC PAIN OF BOTH ANKLES: ICD-10-CM

## 2024-12-04 DIAGNOSIS — M25.572 CHRONIC PAIN OF BOTH ANKLES: ICD-10-CM

## 2024-12-04 PROCEDURE — 3074F SYST BP LT 130 MM HG: CPT | Performed by: STUDENT IN AN ORGANIZED HEALTH CARE EDUCATION/TRAINING PROGRAM

## 2024-12-04 PROCEDURE — 99214 OFFICE O/P EST MOD 30 MIN: CPT | Performed by: STUDENT IN AN ORGANIZED HEALTH CARE EDUCATION/TRAINING PROGRAM

## 2024-12-04 PROCEDURE — 3078F DIAST BP <80 MM HG: CPT | Performed by: STUDENT IN AN ORGANIZED HEALTH CARE EDUCATION/TRAINING PROGRAM

## 2024-12-04 ASSESSMENT — FIBROSIS 4 INDEX: FIB4 SCORE: 1.47

## 2024-12-04 NOTE — PROGRESS NOTES
"Subjective:     CC:   Chief Complaint   Patient presents with    Follow-Up       HPI:   sulaiman Salinas, 64 y.o., female,  presents today to discuss:     Neuropathy f/u: has not completed NCS of feet. She was informed to call at the beginning of the year because there were no appointments availabe at the end of year.     Tinnitus/ear pain f/u: Pain has resolved.  She continues to experience bilateral tinnitus.  This is getting worse.  Denies precipitating events.  Denies triggers. Denies exposure to loud environments.  She would like to see ENT for consultation.    She experienced more pain in her low back after doing yard work. She is going to do PT today. She has appt with [facility] MOFFETT in January.  She still has pain in her shoulders but remains stable.  She is still doing PT.  She has appointment with Dr. Pérez in few weeks.          ROS:  See HPI    Medications, allergies, past medical history, family history, surgical history, and social history documented in chart and reviewed by me.       Objective:   Exam:  /72 (BP Location: Left arm, Patient Position: Sitting, BP Cuff Size: Adult)   Pulse 62   Temp 36.9 °C (98.5 °F) (Temporal)   Resp 16   Ht 1.575 m (5' 2\")   Wt 61 kg (134 lb 6.4 oz)   SpO2 98%   BMI 24.58 kg/m²      Physical Exam  Constitutional:       General: She is not in acute distress.     Appearance: Normal appearance.   HENT:      Head: Normocephalic and atraumatic.      Right Ear: Tympanic membrane, ear canal and external ear normal. There is no impacted cerumen.      Left Ear: Tympanic membrane, ear canal and external ear normal. There is no impacted cerumen.      Nose: Nose normal. No congestion.      Mouth/Throat:      Mouth: Mucous membranes are moist.      Pharynx: Oropharynx is clear. No oropharyngeal exudate.   Eyes:      General: No scleral icterus.        Right eye: No discharge.         Left eye: No discharge.   Neck:      Thyroid: No " thyroid mass or thyromegaly.      Vascular: No carotid bruit.   Cardiovascular:      Rate and Rhythm: Normal rate and regular rhythm.      Heart sounds: Normal heart sounds. No murmur heard.  Pulmonary:      Effort: Pulmonary effort is normal.      Breath sounds: Normal breath sounds. No wheezing.   Abdominal:      General: There is no distension.   Musculoskeletal:         General: No swelling. Normal range of motion.      Cervical back: Normal range of motion and neck supple.   Lymphadenopathy:      Cervical: No cervical adenopathy.   Skin:     General: Skin is warm and dry.   Neurological:      General: No focal deficit present.      Mental Status: She is alert and oriented to person, place, and time.      Gait: Gait normal.   Psychiatric:         Mood and Affect: Mood normal.         Behavior: Behavior normal.         Thought Content: Thought content normal.         Judgment: Judgment normal.              Assessment & Plan:       1. Tinnitus, bilateral  Chronic, worsening. Referred to ENT for evaluation.   - Referral to ENT    2. Dyslipidemia  Chronic, stable.  Will update lipid panel before next visit in 3 months.  - HEMOGLOBIN A1C; Future  - Lipid Profile; Future    3. Thyroid nodule  Chronic.  Unknown etiology.  Thyroid ultrasound ordered for surveillance.  Most recent TSH was normal.  - Lipid Profile; Future  - US-THYROID; Future    4. Osteoporosis without current pathological fracture, unspecified osteoporosis type  Chronic, stable.  Since patient experiences dysphagia, oral biphosphonates are not recommended.  Prolia was ordered but insurance declined it.  Patient was referred to endocrinology for management.  [facility]  office information provided to patient.  She will contact them to schedule consultation.    5. Neuropathy  6. Chronic pain of both ankles  Chronic, stable.  Patient will complete NCS and will follow-up with podiatry as recommended.    7. Chronic bilateral low back pain without  sciatica  Chronic, with recent exacerbation.  Managed by [facility] at the [facility]. Patient will continue with PT and will follow-up with Ortho as planned.    Pt agreed with treatment plan and verbalized understanding.       Return in about 3 months (around 3/4/2025) for test results, Annual Wellness Visit.     Please note that this dictation was created using voice recognition software. I have made every reasonable attempt to correct obvious errors, but I expect that there are errors of grammar and possibly content that I did not discover before finalizing the note.    Asya Flores PA-C  [facility]

## 2024-12-05 ENCOUNTER — TELEPHONE (OUTPATIENT)
Dept: ENDOCRINOLOGY | Facility: MEDICAL CENTER | Age: 64
End: 2024-12-05
Payer: COMMERCIAL

## 2024-12-05 NOTE — TELEPHONE ENCOUNTER
Pt left vm trying to schedule an appt from referral. Called pt back and left a vm trying to schedule an appt

## 2024-12-11 ENCOUNTER — HOSPITAL ENCOUNTER (OUTPATIENT)
Dept: RADIOLOGY | Facility: MEDICAL CENTER | Age: 64
End: 2024-12-11
Attending: STUDENT IN AN ORGANIZED HEALTH CARE EDUCATION/TRAINING PROGRAM
Payer: COMMERCIAL

## 2024-12-11 DIAGNOSIS — E04.1 THYROID NODULE: ICD-10-CM

## 2024-12-11 PROCEDURE — 76536 US EXAM OF HEAD AND NECK: CPT

## 2024-12-13 ENCOUNTER — HOSPITAL ENCOUNTER (OUTPATIENT)
Dept: LAB | Facility: MEDICAL CENTER | Age: 64
End: 2024-12-13
Attending: STUDENT IN AN ORGANIZED HEALTH CARE EDUCATION/TRAINING PROGRAM
Payer: COMMERCIAL

## 2024-12-13 DIAGNOSIS — E78.5 DYSLIPIDEMIA: ICD-10-CM

## 2024-12-13 DIAGNOSIS — E04.1 THYROID NODULE: ICD-10-CM

## 2024-12-13 LAB
CHOLEST SERPL-MCNC: 222 MG/DL (ref 100–199)
EST. AVERAGE GLUCOSE BLD GHB EST-MCNC: 105 MG/DL
HBA1C MFR BLD: 5.3 % (ref 4–5.6)
HDLC SERPL-MCNC: 67 MG/DL
LDLC SERPL CALC-MCNC: 139 MG/DL
TRIGL SERPL-MCNC: 80 MG/DL (ref 0–149)

## 2024-12-13 PROCEDURE — 36415 COLL VENOUS BLD VENIPUNCTURE: CPT

## 2024-12-13 PROCEDURE — 83036 HEMOGLOBIN GLYCOSYLATED A1C: CPT

## 2024-12-13 PROCEDURE — 80061 LIPID PANEL: CPT

## 2024-12-31 ENCOUNTER — HOSPITAL ENCOUNTER (OUTPATIENT)
Dept: RADIOLOGY | Facility: MEDICAL CENTER | Age: 64
End: 2024-12-31
Attending: STUDENT IN AN ORGANIZED HEALTH CARE EDUCATION/TRAINING PROGRAM
Payer: COMMERCIAL

## 2024-12-31 DIAGNOSIS — E04.1 THYROID NODULE: ICD-10-CM

## 2024-12-31 LAB — CYTOLOGY REG CYTOL: NORMAL

## 2024-12-31 PROCEDURE — 10005 FNA BX W/US GDN 1ST LES: CPT

## 2024-12-31 PROCEDURE — 88173 CYTOPATH EVAL FNA REPORT: CPT

## 2024-12-31 NOTE — PROGRESS NOTES
US guided right mid thyroid nodule fine needle aspiration done by Dr. Warren; NON-SEDATION (no H&P required as this is a NON SEDATION procedure) right anterior aspect of neck access site, dressing CDI; 3 samples in 1 jar of cytolyt obtained, 1 sample 1 vial afirma obtained and sent to lab. Pt tolerated the procedure well. Pt hemodynamically stable pre/intra/post procedure; all questions and concerns answered prior to being d/c; patient provided with appropriate education for procedure; pt d/c home.

## 2025-01-08 DIAGNOSIS — I83.813 VARICOSE VEINS OF BOTH LOWER EXTREMITIES WITH PAIN: ICD-10-CM

## 2025-01-08 NOTE — PROGRESS NOTES
1. Caller Name: Savana Rendon                         Call Back Number: [phone] (home)         How would the patient prefer to be contacted with a response: Phone call do NOT leave a detailed message    2. SPECIFIC Action To Be Taken: Referral pending, please sign.    3. Diagnosis/Clinical Reason for Request: vein     4. Specialty & Provider Name/Lab/Imaging Location: [facility] - Vein and Neurology    5. Is appointment scheduled for requested order/referral: no    Patient was informed they will receive a return phone call from the office ONLY if there are any questions before processing their request. Advised to call back if they haven't received a call from the referral department in 5 days.

## 2025-01-30 ENCOUNTER — OFFICE VISIT (OUTPATIENT)
Dept: MEDICAL GROUP | Facility: IMAGING CENTER | Age: 65
End: 2025-01-30
Payer: COMMERCIAL

## 2025-01-30 VITALS
HEIGHT: 62 IN | RESPIRATION RATE: 12 BRPM | WEIGHT: 132.6 LBS | BODY MASS INDEX: 24.4 KG/M2 | OXYGEN SATURATION: 98 % | HEART RATE: 82 BPM | SYSTOLIC BLOOD PRESSURE: 108 MMHG | TEMPERATURE: 98.4 F | DIASTOLIC BLOOD PRESSURE: 60 MMHG

## 2025-01-30 DIAGNOSIS — E78.5 DYSLIPIDEMIA: ICD-10-CM

## 2025-01-30 DIAGNOSIS — E04.1 THYROID NODULE: ICD-10-CM

## 2025-01-30 DIAGNOSIS — M79.7 FIBROMYALGIA: ICD-10-CM

## 2025-01-30 DIAGNOSIS — D68.59 HYPERCOAGULABLE STATE (HCC): ICD-10-CM

## 2025-01-30 DIAGNOSIS — G62.9 NEUROPATHY: ICD-10-CM

## 2025-01-30 DIAGNOSIS — C83.30 DIFFUSE LARGE B-CELL LYMPHOMA, UNSPECIFIED BODY REGION (HCC): ICD-10-CM

## 2025-01-30 DIAGNOSIS — Z12.31 ENCOUNTER FOR SCREENING MAMMOGRAM FOR BREAST CANCER: ICD-10-CM

## 2025-01-30 RX ORDER — GABAPENTIN 100 MG/1
100 CAPSULE ORAL 3 TIMES DAILY
COMMUNITY

## 2025-01-30 RX ORDER — DULOXETIN HYDROCHLORIDE 20 MG/1
20 CAPSULE, DELAYED RELEASE ORAL DAILY
Qty: 60 CAPSULE | Refills: 1 | Status: SHIPPED | OUTPATIENT
Start: 2025-01-30

## 2025-01-30 ASSESSMENT — PATIENT HEALTH QUESTIONNAIRE - PHQ9: CLINICAL INTERPRETATION OF PHQ2 SCORE: 0

## 2025-01-30 ASSESSMENT — FIBROSIS 4 INDEX: FIB4 SCORE: 1.47

## 2025-01-30 NOTE — PROGRESS NOTES
New Patient Consult Note for Endocrinology  Referred by: Asya Flores P.A.-C.    Reason for consult:   Osteoporosis    HPI:  Savana Rendon is a 64 y.o. female who was referred to endocrinology service for osteoporosis management.     Osteoporosis:  - diagnosed based on DEXA in 2/2024  - history of low impact fractures: denies  - change in height: denies  - denies history of falls  - admits some balance problems due to pain in her back, knees, foot pains  - previous treatment: none  - daily Ca intake - 1g  - daily vit D intake - 5000 IU/day  - exercise - PT x 2/week, walks 30 min/day, doing exercises at home  RISK FACTORS:  - menopause at 54 yo  - R-CHOP chemotherapy for lymphoma x 6 mo, finished  in 7/2023  - denies history of Ca, phosphorus, vit D, thyroid/parathryoid function disorders, kidney stones, CKD, liver disease, chronic malabsorption/diarrhea, smoking, alcohol abuse  -  Fhx of fractures/osteoporosis - denies  Factors affecting osteoporosis treatment choice:  History of prior radiation - denies  GERD/dysphagia - yes, sometimes  Hx of teeth problems, upcoming dental work - denies  Hx CKD (baseline creatinine) - denies  Hx of CVA or MI  - denies    Past Medical History:  Patient Active Problem List    Diagnosis Date Noted    Neuropathy 12/04/2024    Chronic pain of both ankles 11/08/2024    Chronic superficial venous thrombosis of lower extremity, bilateral 09/10/2024    Chronic fatigue 08/14/2024    Tinnitus, bilateral 08/14/2024    Chronic venous insufficiency 07/30/2024    Acute superficial venous thrombosis of lower extremity, bilateral 07/30/2024    Uterine leiomyoma, unspecified location 07/30/2024    Hypercoagulable state (HCC) 07/30/2024    Primary osteoarthritis of left foot 06/19/2024    Arthritis of both hips 06/19/2024    Generalized joint pain 06/18/2024    Left foot pain 06/18/2024    Chronic bilateral low back pain without sciatica 05/09/2024    Lumbar radiculopathy  04/24/2024    Muscle weakness 04/23/2024    Pain in joint of left shoulder 04/23/2024    Stiffness of left shoulder joint 04/23/2024    Macrocytosis 03/27/2024    Osteoporosis without current pathological fracture 03/27/2024    Dysphagia 03/27/2024    Insomnia 03/27/2024    Tendinosis of shoulder 02/07/2024    Diffuse large B cell lymphoma (HCC) 02/02/2024    Vitreous detachment of both eyes 02/01/2024    Chronic pain of both shoulders 01/31/2024    Osteopenia of both shoulders 01/31/2024    Carpal tunnel syndrome of right wrist 01/31/2024    Dupuytren contracture 01/31/2024    Varicose veins of both lower extremities 10/19/2023    Lymphoma of retroperitoneum (HCC) 06/02/2023    Normocytic anemia 02/27/2023    Mass of iliopsoas muscle group, L hydronephrosis, h/o DVT/anticoagulation 02/09/2023    History of DVT (deep vein thrombosis) 02/09/2023    Thyroid nodule 02/09/2023    Dyslipidemia 02/09/2023     Past Surgical History:  Past Surgical History:   Procedure Laterality Date    GYN SURGERY  07/14/2023    c sections times 2    FL DX BONE MARROW ASPIRATIONS Left 02/16/2023    Procedure: BONE MARROW ASPIRATION -DR. LEÓN;  Surgeon: Chris León M.D.;  Location: SURGERY SAME DAY [facility];  Service: Orthopedics    FL DX BONE MARROW BIOPSIES Left 02/16/2023    Procedure: BIOPSY, BONE MARROW, USING NEEDLE OR TROCAR;  Surgeon: Chris León M.D.;  Location: SURGERY SAME DAY [facility];  Service: Orthopedics    OTHER      PRIMARY C SECTION       Allergies:  Patient has no known allergies.    Social History:  Social History     Socioeconomic History    Marital status:      Spouse name: Not on file    Number of children: Not on file    Years of education: Not on file    Highest education level: 3rd grade   Occupational History    Not on file   Tobacco Use    Smoking status: Never    Smokeless tobacco: Never   Vaping Use    Vaping status: Never Used   Substance and Sexual Activity    Alcohol use: Not  Currently    Drug use: Never    Sexual activity: Not on file   Other Topics Concern    Not on file   Social History Narrative    Not on file     Social Drivers of Health     Financial Resource Strain: Medium Risk (1/29/2024)    Overall Financial Resource Strain (CARDIA)     Difficulty of Paying Living Expenses: Somewhat hard   Food Insecurity: Food Insecurity Present (1/29/2024)    Hunger Vital Sign     Worried About Running Out of Food in the Last Year: Sometimes true     Ran Out of Food in the Last Year: Never true   Transportation Needs: No Transportation Needs (1/29/2024)    PRAPARE - Transportation     Lack of Transportation (Medical): No     Lack of Transportation (Non-Medical): No   Physical Activity: Sufficiently Active (1/29/2024)    Exercise Vital Sign     Days of Exercise per Week: 7 days     Minutes of Exercise per Session: 30 min   Stress: Stress Concern Present (1/29/2024)    [employer] - Occupational Stress Questionnaire     Feeling of Stress : To some extent   Social Connections: Socially Isolated (1/29/2024)    Social Connection and Isolation Panel [NHANES]     Frequency of Communication with Friends and Family: More than three times a week     Frequency of Social Gatherings with Friends and Family: Never     Attends Christianity Services: Never     Active Member of Clubs or Organizations: No     Attends Club or Organization Meetings: Never     Marital Status:    Intimate Partner Violence: Low Risk  (10/9/2021)    Received from [facility]    Intimate Partner Violence     Insults You: Not on file     Threatens You: Not on file     Screams at You: Not on file     Physically Hurt: Not on file     Intimate Partner Violence Score: Not on file   Housing Stability: Unknown (1/29/2024)    Housing Stability Vital Sign     Unable to Pay for Housing in the Last Year: No     Number of Places Lived in the Last Year: Not on file     Unstable Housing in the  "Last Year: No     Family History:  Family History   Problem Relation Age of Onset    No Known Problems Mother     Diabetes Father     Alcohol abuse Father     Cancer Sister         unknown    Clotting Disorder Neg Hx     DVT Neg Hx      Medications:  Current Outpatient Medications:     HESPERIDIN-DIOSMIN PO, Take 1,000 mg by mouth 2 times a day. 1 tablet 2 times daily, Disp: , Rfl:     PARoxetine (PAXIL) 20 MG Tab, Take 20 mg by mouth every day., Disp: , Rfl:     meclizine (ANTIVERT) 12.5 MG Tab, Take 1 Tablet by mouth 3 times a day as needed for Dizziness or Nausea/Vomiting., Disp: 30 Tablet, Rfl: 0    apixaban (ELIQUIS) 5mg Tab, Take 1 Tablet by mouth 2 times a day. (Patient taking differently: Take 2.5 mg by mouth 2 times a day.), Disp: 180 Tablet, Rfl: 3    CALCIUM PO, Take 1 Tablet by mouth every day., Disp: , Rfl:     B Complex Vitamins (B COMPLEX PO), Take 1 Tablet by mouth every day., Disp: , Rfl:     vitamin D3 (CHOLECALCIFEROL) 5000 Unit (125 mcg) Tab, Take 5,000 Units by mouth every day., Disp: , Rfl:     Omega-3 Fatty Acids (FISH OIL) 1000 MG Cap capsule, Take 1,000 mg by mouth every day., Disp: , Rfl:     GLUCOSAMINE-CHONDROITIN PO, Take 1 Tablet by mouth every day., Disp: , Rfl:     Physical Examination:   Vital signs:/63   Pulse (!) 102   Ht 1.575 m (5' 2\")   Wt 59.9 kg (132 lb)   SpO2 96%   BMI 24.14 kg/m²   General: No distress, cooperative, well dressed and well nourished.   Eyes: No scleral icterus or discharge  ENMT: Normal on external inspection of nose, lips, No nasal drainage   Neck: No abnormal masses on inspection  Resp: Normal effort  CVS: Regular rate and rhythm  Extremities: No edema bilateral extremities  Neuro: Alert and oriented  Skin: No rash, No Ulcers  Psych: Normal mood and affect    Labs:  - reviewed  Osteoporosis work up:   Reference Range  03/15/24  09/11/24    Hb 12.0 - 16.0 g/dL 14.3 13.1   Hct 37.0 - 47.0 % 43.0 40.4   Sodium 135 - 145 mmol/L  137   Creatinine 0.50 " - 1.40 mg/dL  0.68   GFR (CKD-EPI) >60 mL/min/1.73 m 2  98   Calcium 8.5 - 10.5 mg/dL  9.2   Correct Calcium 8.5 - 10.5 mg/dL  9.1   AST(SGOT) 12 - 45 U/L  35   ALT(SGPT) 2 - 50 U/L  41   ALP 30 - 99 U/L  71   Albumin 3.2 - 4.9 g/dL  4.1   HbA1C 4.0 - 5.6 % 5.1    Vit D 30 - 100 ng/mL 59    TSH 0.380 - 5.330 uIU/mL 1.780      Imaging:  - reviewed  DEXA scan:  Date Machine L1- L4  BMD/ T-score LFN  BMD/ T-score FRAX Rx    2/27/2024   [employer] Prodigy unit   0.660 g/cm2 / - 4.3  0.580 g/cm2 / - 3.4  18.6 % / 8.0%      Assessment and Plan:  # Age-related osteoporosis   - diagnosed based on DEXA scan in 2/2024  - as per latest DEXA in - lowest T-score = - 4.3 in lumbar spine  - identifiable risk factors: postmenopausal state, high dose steroids x 6 mo for lymphoma treatment  - will rule out secondary causes of osteoporosis: hypophosphatemia, hyperparathyroidism, hypercalciuria, Ca malabsorption  - discussed with the patient existing pharmacologic treatment options for osteoporosis; discussed pro and cons, routs, frequency, duration, side effects  and efficacy of the medications  - given Tscore < -3.1 patient would benefit from stating with bone forming agent > antiresorptive agent  - reported generalized muscle aches and bone pains are unlikely to be related to osteoporosis    Plan:  Obtain following labs:  - Sed Rate; Future  - Comp Metabolic Panel; Future  - PHOSPHORUS; Future  - VITAMIN D,25 HYDROXY (DEFICIENCY); Future  - PTH INTACT (PTH ONLY); Future  - URINE CREATININE 24 HR; Future  - Urine Calcium 24 HR or Random; Future  - SERUM PROTEIN ELECTROPHORESIS; Future  - CELIAC DISEASE AB PANEL; Future  - PROCOLLAGEN 1(O1NP); Future  - N-TELOPEPTIDE SERUM CROSS-LINKED (ARUP); Future    2. Start Evenity 210 mg SC monthly x 12 mo.   3. Continue Ca and vit D supplementation.   4. Fall precautions.    RTC: 1-2 mo    Total time (face-to-face and non-face-to face time):  65 min - discussion of diagnoses, treatment, prognosis,  medical charts, lab, imaging review, documentation.    Plan reviewed with the patient and agreed with plan.  All questions answered to patient's satisfaction.  Thank you kindly for allowing me to participate in the care plan for this patient.    Nicole Bourgeois MD    CC:   Asya Flores P.A.-C.

## 2025-01-30 NOTE — PROGRESS NOTES
Subjective:     CC:   Chief Complaint   Patient presents with    Lab Results     Labs & biopsy 12/31    Otalgia     Feeling pain in both ears for 1 month. Pain comes and goes.         HPI:     Verbal consent was acquired by the patient to use [employer] ambient listening note generation during this visit Yes      sulaiman Salinas, 64 y.o., female,  presents today to discuss:     History of Present Illness      Test results  The patient presents for a follow-up visit to review the results of previously ordered diagnostic studies and to address ongoing health concerns. She was last evaluated on December 4th, 2023, and was referred to an otolaryngologist for otalgia and tinnitus. The patient has not yet consulted the ENT specialist but will be provided with the necessary contact information to schedule an appointment. She has a forthcoming consultation with an endocrinologist on February 5th, 2024, to discuss the initiation of Prolia (denosumab) injections for osteoporosis, which were previously not approved by her insurance.    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 She is currently taking Gabapentin 100 mg once daily at night due to associated fatigue and drowsiness.     Hot flashes  She previously used Paroxetine for menopausal symptoms but discontinued it due to lack of efficacy. She has an upcoming appointment with her gynecologist in February 2024 to explore alternative treatments.    Fatigue/body pain  The patient inquires about fibromyalgia, citing chronic fatigue and widespread pain. She expresses interest in initiating Duloxetine for symptom management.            ROS:  See HPI    Medications, allergies, past medical history, family history, surgical history, and social history documented in chart and reviewed by me.       Objective:   Exam:  /60 (BP Location: Left arm, Patient  "Position: Sitting, BP Cuff Size: Adult)   Pulse 82   Temp 36.9 °C (98.4 °F) (Temporal)   Resp 12   Ht 1.575 m (5' 2\")   Wt 60.1 kg (132 lb 9.6 oz)   SpO2 98%   BMI 24.25 kg/m²      General: In no acute distress. Normal appearance.   Head:   Atraumatic, normocephalic.   Neck: Supple without lymphadenopathy.   Pulmonary: Normal effort, clear to auscultation bilaterally, no wheezes, rhonchi, or rales  Cardiovascular:   Regular rate and rhythm. No murmurs, rubs, or gallops.  Musculoskeletal:  Normal ROM. No edema.    Skin: No visible rashes or lesions.  Neurological: Alert and oriented to person, place, and time. Gait normal.   Psychiatric: Normal mood and affect. Calm and friendly behavior. Speech clear. Normal judgement and insight.         Assessment & Plan:       Assessment & Plan      1. Fibromyalgia  New diagnosis based on symptoms.  Patient has had an extensive workup with unremarkable results.  Discussed treatment options. Will trial Duloxetine 20mg daily.  Counseled patient on medication and possible side effects.  Recommend sleep, nutrition, and exercise.  Recommend follow-up in 5 weeks or sooner if needed.  - DULoxetine (CYMBALTA) 20 MG Cap DR Particles; Take 1 Capsule by mouth every day.  Dispense: 60 Capsule; Refill: 1      2. Dyslipidemia  Chronic, established condition.  Stable.  Lipid panel reviewed today. The 10-year ASCVD risk score (Aye DK, et al., 2019) is: 3.1%.  Recommend healthy diet, exercise, and yearly screenings.   Latest Reference Range & Units 12/13/24 11:43   Cholesterol,Tot 100 - 199 mg/dL 222 (H)   Triglycerides 0 - 149 mg/dL 80   HDL >=40 mg/dL 67   LDL <100 mg/dL 139 (H)   (H): Data is abnormally high    3. Encounter for screening mammogram for breast cancer  - MA-SCREENING MAMMO BILAT W/CAD; Future    4. Neuropathy  Chronic, established condition stable.  Suspect secondary to chemo.  Patient will continue with gabapentin 100 mg nightly.  Patient has upcoming appointment for " [facility] to rule out other etiologies.    5. Thyroid nodule  Chronic, established condition.  Stable.  Biopsy is negative for malignancy.  Will monitor.    6. Diffuse large B-cell lymphoma, unspecified body region (HCC)  7. Hypercoagulable state (HCC)  HCC Gap Form    Diagnosis to address: D68.59 - Hypercoagulable state (HCC)  Assessment and plan: Chronic, stable. Continue with current defined treatment plan: Eliquis 2.5mg BID. Follow-up at least annually.  Diagnosis: C83.30 - Diffuse large B-cell lymphoma, unspecified site (HCC)  Assessment and plan: Chronic, stable. Continue with current defined treatment plan: Surveillance screenings as recommended by oncology. Follow-up at least annually.  Last edited 01/30/25 17:07 PST by Asya Flores P.A.-C.                      Diagnosis and treatment plan explained to pt. Counseled pt on new medication(s) and potential side effects. Pt agreed with treatment plan and verbalized understanding.     Return in about 6 weeks (around 3/13/2025) for fibromyalgia.     Please note that this dictation was created using voice recognition software. I have made every reasonable attempt to correct obvious errors, but I expect that there are errors of grammar and possibly content that I did not discover before finalizing the note.    Asya Flores PA-C  [facility]

## 2025-02-05 ENCOUNTER — OFFICE VISIT (OUTPATIENT)
Dept: ENDOCRINOLOGY | Facility: MEDICAL CENTER | Age: 65
End: 2025-02-05
Attending: STUDENT IN AN ORGANIZED HEALTH CARE EDUCATION/TRAINING PROGRAM
Payer: COMMERCIAL

## 2025-02-05 VITALS
SYSTOLIC BLOOD PRESSURE: 133 MMHG | BODY MASS INDEX: 24.29 KG/M2 | OXYGEN SATURATION: 96 % | WEIGHT: 132 LBS | DIASTOLIC BLOOD PRESSURE: 63 MMHG | HEIGHT: 62 IN | HEART RATE: 102 BPM

## 2025-02-05 DIAGNOSIS — M81.0 OSTEOPOROSIS WITHOUT CURRENT PATHOLOGICAL FRACTURE, UNSPECIFIED OSTEOPOROSIS TYPE: ICD-10-CM

## 2025-02-05 PROCEDURE — 99211 OFF/OP EST MAY X REQ PHY/QHP: CPT | Performed by: STUDENT IN AN ORGANIZED HEALTH CARE EDUCATION/TRAINING PROGRAM

## 2025-02-05 PROCEDURE — 99205 OFFICE O/P NEW HI 60 MIN: CPT | Performed by: STUDENT IN AN ORGANIZED HEALTH CARE EDUCATION/TRAINING PROGRAM

## 2025-02-05 RX ORDER — APIXABAN 2.5 MG/1
2.5 TABLET, FILM COATED ORAL EVERY 12 HOURS
COMMUNITY

## 2025-02-05 ASSESSMENT — FIBROSIS 4 INDEX: FIB4 SCORE: 1.47

## 2025-02-14 RX ORDER — DIPHENHYDRAMINE HYDROCHLORIDE 50 MG/ML
50 INJECTION INTRAMUSCULAR; INTRAVENOUS PRN
OUTPATIENT
Start: 2025-02-14

## 2025-02-14 RX ORDER — EPINEPHRINE 1 MG/ML(1)
0.5 AMPUL (ML) INJECTION PRN
OUTPATIENT
Start: 2025-02-14

## 2025-02-14 RX ORDER — METHYLPREDNISOLONE SODIUM SUCCINATE 125 MG/2ML
125 INJECTION, POWDER, LYOPHILIZED, FOR SOLUTION INTRAMUSCULAR; INTRAVENOUS PRN
OUTPATIENT
Start: 2025-02-14

## 2025-02-28 ENCOUNTER — HOSPITAL ENCOUNTER (OUTPATIENT)
Dept: RADIOLOGY | Facility: MEDICAL CENTER | Age: 65
End: 2025-02-28
Attending: STUDENT IN AN ORGANIZED HEALTH CARE EDUCATION/TRAINING PROGRAM
Payer: COMMERCIAL

## 2025-02-28 DIAGNOSIS — Z12.31 ENCOUNTER FOR SCREENING MAMMOGRAM FOR BREAST CANCER: ICD-10-CM

## 2025-02-28 PROCEDURE — 77067 SCR MAMMO BI INCL CAD: CPT

## 2025-03-01 ENCOUNTER — HOSPITAL ENCOUNTER (OUTPATIENT)
Dept: LAB | Facility: MEDICAL CENTER | Age: 65
End: 2025-03-01
Attending: STUDENT IN AN ORGANIZED HEALTH CARE EDUCATION/TRAINING PROGRAM
Payer: COMMERCIAL

## 2025-03-01 DIAGNOSIS — M81.0 OSTEOPOROSIS WITHOUT CURRENT PATHOLOGICAL FRACTURE, UNSPECIFIED OSTEOPOROSIS TYPE: ICD-10-CM

## 2025-03-01 LAB
25(OH)D3 SERPL-MCNC: 70 NG/ML (ref 30–100)
ALBUMIN SERPL BCP-MCNC: 4.2 G/DL (ref 3.2–4.9)
ALBUMIN/GLOB SERPL: 1.6 G/DL
ALP SERPL-CCNC: 69 U/L (ref 30–99)
ALT SERPL-CCNC: 22 U/L (ref 2–50)
ANION GAP SERPL CALC-SCNC: 9 MMOL/L (ref 7–16)
AST SERPL-CCNC: 23 U/L (ref 12–45)
BILIRUB SERPL-MCNC: 0.7 MG/DL (ref 0.1–1.5)
BUN SERPL-MCNC: 14 MG/DL (ref 8–22)
CALCIUM ALBUM COR SERPL-MCNC: 9.1 MG/DL (ref 8.5–10.5)
CALCIUM SERPL-MCNC: 9.3 MG/DL (ref 8.5–10.5)
CHLORIDE SERPL-SCNC: 107 MMOL/L (ref 96–112)
CO2 SERPL-SCNC: 24 MMOL/L (ref 20–33)
CREAT 24H UR-MSRATE: 932 MG/24 HR (ref 800–1800)
CREAT SERPL-MCNC: 0.57 MG/DL (ref 0.5–1.4)
CREAT UR-MCNC: 34.5 MG/DL
ERYTHROCYTE [SEDIMENTATION RATE] IN BLOOD BY WESTERGREN METHOD: 10 MM/HOUR (ref 0–25)
GFR SERPLBLD CREATININE-BSD FMLA CKD-EPI: 102 ML/MIN/1.73 M 2
GLOBULIN SER CALC-MCNC: 2.6 G/DL (ref 1.9–3.5)
GLUCOSE SERPL-MCNC: 84 MG/DL (ref 65–99)
PHOSPHATE SERPL-MCNC: 3.2 MG/DL (ref 2.5–4.5)
POTASSIUM SERPL-SCNC: 4.2 MMOL/L (ref 3.6–5.5)
PROT SERPL-MCNC: 6.8 G/DL (ref 6–8.2)
PTH-INTACT SERPL-MCNC: 32.9 PG/ML (ref 14–72)
SODIUM SERPL-SCNC: 140 MMOL/L (ref 135–145)
SPECIMEN VOL UR: 2700 ML

## 2025-03-01 PROCEDURE — 36415 COLL VENOUS BLD VENIPUNCTURE: CPT

## 2025-03-01 PROCEDURE — 80053 COMPREHEN METABOLIC PANEL: CPT

## 2025-03-01 PROCEDURE — 86364 TISS TRNSGLTMNASE EA IG CLAS: CPT

## 2025-03-01 PROCEDURE — 84100 ASSAY OF PHOSPHORUS: CPT

## 2025-03-01 PROCEDURE — 83970 ASSAY OF PARATHORMONE: CPT

## 2025-03-01 PROCEDURE — 81050 URINALYSIS VOLUME MEASURE: CPT

## 2025-03-01 PROCEDURE — 82570 ASSAY OF URINE CREATININE: CPT

## 2025-03-01 PROCEDURE — 84165 PROTEIN E-PHORESIS SERUM: CPT

## 2025-03-01 PROCEDURE — 82340 ASSAY OF CALCIUM IN URINE: CPT

## 2025-03-01 PROCEDURE — 83519 RIA NONANTIBODY: CPT

## 2025-03-01 PROCEDURE — 85652 RBC SED RATE AUTOMATED: CPT

## 2025-03-01 PROCEDURE — 86258 DGP ANTIBODY EACH IG CLASS: CPT

## 2025-03-01 PROCEDURE — 82523 COLLAGEN CROSSLINKS: CPT

## 2025-03-01 PROCEDURE — 84155 ASSAY OF PROTEIN SERUM: CPT

## 2025-03-01 PROCEDURE — 82306 VITAMIN D 25 HYDROXY: CPT

## 2025-03-02 DIAGNOSIS — M81.0 OSTEOPOROSIS WITHOUT CURRENT PATHOLOGICAL FRACTURE, UNSPECIFIED OSTEOPOROSIS TYPE: ICD-10-CM

## 2025-03-03 LAB
CALCIUM 24H UR-MCNC: 9.9 MG/DL
CALCIUM 24H UR-MRATE: 267 MG/D (ref 100–250)
CALCIUM/CREAT 24H UR: 283 MG/G (ref 20–300)
COLLECT DURATION TIME SPEC: 24 HR
CREAT 24H UR-MCNC: 35 MG/DL
GLIADIN IGA SER IA-ACNC: <0.72 FLU (ref 0–4.99)
SPECIMEN VOL ?TM UR: 2700 ML
TTG IGA SER IA-ACNC: <1.02 FLU (ref 0–4.99)

## 2025-03-03 NOTE — PROGRESS NOTES
Follow up Endocrinology Visit  Initial consult/last visit on: 2/5/25  Referred by: Asya Flores P.A.-C.    Chief complaint:  Savana Rendon, 64 y.o., female, who is here for follow up for osteoporosis management. Conversation with help of .    Interval history:   - patient struggles with L shoulder pain, back pain, R heel pain, got 3 steroid injections in her shoulder without significant symptoms improvement  - unclear if Evenity will be covered by patient's medical insurance  - it seems that patient's PCP ordered Denosumab, but patient states that she never got any Denosumab treatment as it was not covered by her medical insurance  - reviewed most recent labs  Osteoporosis:  - diagnosed based on DEXA in 2/2024  - history of low impact fractures: denies  - change in height: denies  - denies history of falls  - admits some balance problems due to pain in her back, knees, foot pains  - previous treatment: none  - daily Ca intake - 1g  - daily vit D intake - 5000 IU/day  - exercise - PT x 2/week, walks 30 min/day, doing exercises at home  RISK FACTORS:  - menopause at 52 yo  - R-CHOP chemotherapy for lymphoma x 6 mo, finished  in 7/2023  - denies history of Ca, phosphorus, vit D, thyroid/parathryoid function disorders, kidney stones, CKD, liver disease, chronic malabsorption/diarrhea, smoking, alcohol abuse  -  Fhx of fractures/osteoporosis - denies  Factors affecting osteoporosis treatment choice:  History of prior radiation - denies  GERD/dysphagia - yes, sometimes  Hx of teeth problems, upcoming dental work - denies  Hx CKD (baseline creatinine) - denies  Hx of CVA or MI  - denies    Medications:  Current Outpatient Medications:     ELIQUIS 2.5 MG Tab, Take 2.5 mg by mouth every 12 hours., Disp: , Rfl:     gabapentin (NEURONTIN) 100 MG Cap, 100 mg in the morning, at noon, and at bedtime., Disp: , Rfl:     DULoxetine (CYMBALTA) 20 MG Cap DR Particles, Take 1 Capsule by mouth  "every day., Disp: 60 Capsule, Rfl: 1    HESPERIDIN-DIOSMIN PO, Take 1,000 mg by mouth 2 times a day. 1 tablet 2 times daily, Disp: , Rfl:     apixaban (ELIQUIS) 5mg Tab, Take 1 Tablet by mouth 2 times a day. (Patient taking differently: Take 2.5 mg by mouth 2 times a day.), Disp: 180 Tablet, Rfl: 3    CALCIUM PO, Take 1 Tablet by mouth every day., Disp: , Rfl:     B Complex Vitamins (B COMPLEX PO), Take 1 Tablet by mouth every day., Disp: , Rfl:     vitamin D3 (CHOLECALCIFEROL) 5000 Unit (125 mcg) Tab, Take 5,000 Units by mouth every day., Disp: , Rfl:     Omega-3 Fatty Acids (FISH OIL) 1000 MG Cap capsule, Take 1,000 mg by mouth every day., Disp: , Rfl:     Physical Examination:   Vital signs:/74   Pulse 78   Ht 1.575 m (5' 2\") Comment: pt stated  Wt 60.6 kg (133 lb 8 oz)   SpO2 97%   BMI 24.42 kg/m²   General: No distress, cooperative, well dressed and well nourished.   Eyes: No scleral icterus or discharge  ENMT: Normal on external inspection of nose, lips, No nasal drainage   Neck: No abnormal masses on inspection  Resp: Normal effort  CVS: Regular rate and rhythm  Extremities: No edema bilateral extremities  Neuro: Alert and oriented  Skin: No rash, No Ulcers  Psych: Normal mood and affect    Labs:  - reviewed  Osteoporosis work up:   Reference Range  03/15/24  09/11/24  3/1/25   Hb 12.0 - 16.0 g/dL 14.3 13.1    Hct 37.0 - 47.0 % 43.0 40.4    Sodium 135 - 145 mmol/L  137 140   Creatinine 0.50 - 1.40 mg/dL  0.68 0.57   GFR (CKD-EPI) >60 mL/min/1.73 m 2  98 102   Calcium 8.5 - 10.5 mg/dL  9.2 9.3   Correct Calcium 8.5 - 10.5 mg/dL  9.1 9.1   AST(SGOT) 12 - 45 U/L  35 23   ALT(SGPT) 2 - 50 U/L  41 22   ALP 30 - 99 U/L  71 69   Albumin 3.2 - 4.9 g/dL  4.1 4.2   Phosphorus 2.5 - 4.5 mg/dL   3.2   PTH 14.0 - 72.0 pg/mL   32.9   Vit D 30 - 100 ng/mL 59  70   HbA1C 4.0 - 5.6 % 5.1     TSH 0.380 - 5.330 uIU/mL 1.780     P1NP 16 - 96   104   NTX 6.2 - 19.0    19.6     24 hrs urine:    Reference Range  03/01/25 "    Total Volume mL 2700   Creatinine 800 - 1800 mg/24 Hr 932   Calcium  100 - 250 mg/d 267 (H)   Calcium Creat Ratio    20 - 300 mg/g 283     Celiac disease work up:   Reference Range 03/01/25    t-TG IgA 0.00 - 4.99 FLU <1.02   t-TG IgG 0.00 - 4.99 FLU <0.82     SPEP evaluation:   Reference Range  03/01/25    Albumin 3.75 - 5.01 g/dL 4.22   Gamma Globulin 0.62 - 1.51 g/dL 0.69   Alpha-1 Globulin 0.19 - 0.46 g/dL 0.22   Alpha-2 Globulin 0.48 - 1.05 g/dL 0.56   Beta Globulin 0.48 - 1.10 g/dL 0.70     ESR, rheumatologic conditions work up:   Reference Range 08/19/24  3/1/25   Rheumatoid Factor -Neph- 0 - 14 IU/mL <10    Cyclic Citrullinated Peptide Ab, IgG/A 0 - 19 Units 2    Stat C-Reactive Protein 0.00 - 0.75 mg/dL <0.30    ESR 0 - 25 mm/hr 11 10     Imaging:  - reviewed  DEXA scan:  Date Machine L1- L4  BMD/ T-score LFN  BMD/ T-score FRAX Rx    2/27/2024   GE Prodigy unit   0.660 g/cm2 / - 4.3  0.580 g/cm2 / - 3.4  18.6 % / 8.0%      Assessment and Plan:  # Age-related osteoporosis   # Hypercalciuria  - no secondary causes noted except mild hypercalciuria  - recommended to decrease dose of vit D as vit D level is at higher upper range -> hopefully, it might lead to lower Ca in the urine  - patient states that she rarely use salt and not eating processed food - has low Na intake  - confirmed with the patient that she never was on any anti-osteoporotic medication including Prolia ordered by her PCP (was not covered by her medical insurance)  - encouraged patient to talk to her oncologist to confirm that she is ok to proceed with Evenity despite her history of lymphoma  - patient will see dentist prior starting the therapy  Prior notes:  2/5/25  - diagnosed based on DEXA scan in 2/2024  - as per latest DEXA in - lowest T-score = - 4.3 in lumbar spine  - identifiable risk factors: postmenopausal state, high dose steroids x 6 mo for lymphoma treatment  - will rule out secondary causes of osteoporosis: hypophosphatemia,  hyperparathyroidism, hypercalciuria, Ca malabsorption  - discussed with the patient existing pharmacologic treatment options for osteoporosis; discussed pro and cons, routs, frequency, duration, side effects  and efficacy of the medications  - given Tscore < -3.1 patient would benefit from stating with bone forming agent > antiresorptive agent  - reported generalized muscle aches and bone pains are unlikely to be related to osteoporosis    Plan:  Decrease vit D supplementation 5000 -> 2000 IU daily  Continue 500 mg Ca supplementation.  Ordered  Evenity 210 mg SC monthly x 12 mo.   Call infusion center/insurance to see if medication will be covered.    Encouraged patient to talk to her oncologist to confirm that she is ok to proceed with Evenity despite her history of lymphoma   Proceed with dentist visit to now if any plans for significant dental work.   7.    Fall precautions.    RTC: 1 mo    Total time (face-to-face and non-face-to face time):  60 min - prolonged discussion with  help    Plan reviewed with the patient and agreed with plan.  All questions answered to patient's satisfaction.  Thank you kindly for allowing me to participate in the care plan for this patient.    Nicole Bourgeois MD    CC:   Asya Flores P.A.-C.

## 2025-03-04 ENCOUNTER — RESULTS FOLLOW-UP (OUTPATIENT)
Dept: MEDICAL GROUP | Facility: IMAGING CENTER | Age: 65
End: 2025-03-04

## 2025-03-04 LAB
GLIADIN IGG SER IA-ACNC: 0.58 FLU (ref 0–4.99)
TEST NAME 95000: NORMAL
TTG IGG SER IA-ACNC: <0.82 FLU (ref 0–4.99)

## 2025-03-05 ENCOUNTER — OFFICE VISIT (OUTPATIENT)
Dept: ENDOCRINOLOGY | Facility: MEDICAL CENTER | Age: 65
End: 2025-03-05
Attending: STUDENT IN AN ORGANIZED HEALTH CARE EDUCATION/TRAINING PROGRAM
Payer: COMMERCIAL

## 2025-03-05 VITALS
BODY MASS INDEX: 24.56 KG/M2 | WEIGHT: 133.5 LBS | HEART RATE: 78 BPM | OXYGEN SATURATION: 97 % | HEIGHT: 62 IN | DIASTOLIC BLOOD PRESSURE: 74 MMHG | SYSTOLIC BLOOD PRESSURE: 110 MMHG

## 2025-03-05 DIAGNOSIS — M81.0 OSTEOPOROSIS WITHOUT CURRENT PATHOLOGICAL FRACTURE, UNSPECIFIED OSTEOPOROSIS TYPE: ICD-10-CM

## 2025-03-05 LAB
ALBUMIN SERPL ELPH-MCNC: 4.22 G/DL (ref 3.75–5.01)
ALPHA1 GLOB SERPL ELPH-MCNC: 0.22 G/DL (ref 0.19–0.46)
ALPHA2 GLOB SERPL ELPH-MCNC: 0.56 G/DL (ref 0.48–1.05)
B-GLOBULIN SERPL ELPH-MCNC: 0.7 G/DL (ref 0.48–1.1)
COLLAGEN NTX SER-SCNC: 19.6 NM BCE
EER PROT ELECT SER Q1092: NORMAL
GAMMA GLOB SERPL ELPH-MCNC: 0.69 G/DL (ref 0.62–1.51)
INTERPRETATION SERPL IFE-IMP: NORMAL
MONOCLONAL PROTEIN NL11656: NORMAL G/DL
PROT SERPL-MCNC: 6.4 G/DL (ref 6.3–8.2)

## 2025-03-05 PROCEDURE — 99211 OFF/OP EST MAY X REQ PHY/QHP: CPT | Performed by: STUDENT IN AN ORGANIZED HEALTH CARE EDUCATION/TRAINING PROGRAM

## 2025-03-05 PROCEDURE — 3074F SYST BP LT 130 MM HG: CPT | Performed by: STUDENT IN AN ORGANIZED HEALTH CARE EDUCATION/TRAINING PROGRAM

## 2025-03-05 PROCEDURE — 3078F DIAST BP <80 MM HG: CPT | Performed by: STUDENT IN AN ORGANIZED HEALTH CARE EDUCATION/TRAINING PROGRAM

## 2025-03-05 PROCEDURE — 99215 OFFICE O/P EST HI 40 MIN: CPT | Performed by: STUDENT IN AN ORGANIZED HEALTH CARE EDUCATION/TRAINING PROGRAM

## 2025-03-05 PROCEDURE — 99417 PROLNG OP E/M EACH 15 MIN: CPT | Performed by: STUDENT IN AN ORGANIZED HEALTH CARE EDUCATION/TRAINING PROGRAM

## 2025-03-05 ASSESSMENT — FIBROSIS 4 INDEX: FIB4 SCORE: 1.32

## 2025-03-07 ENCOUNTER — HOSPITAL ENCOUNTER (OUTPATIENT)
Dept: LAB | Facility: MEDICAL CENTER | Age: 65
End: 2025-03-07
Attending: INTERNAL MEDICINE
Payer: COMMERCIAL

## 2025-03-07 LAB
ALBUMIN SERPL BCP-MCNC: 4.4 G/DL (ref 3.2–4.9)
ALBUMIN/GLOB SERPL: 1.7 G/DL
ALP SERPL-CCNC: 69 U/L (ref 30–99)
ALT SERPL-CCNC: 33 U/L (ref 2–50)
ANION GAP SERPL CALC-SCNC: 9 MMOL/L (ref 7–16)
AST SERPL-CCNC: 27 U/L (ref 12–45)
BASOPHILS # BLD AUTO: 0.8 % (ref 0–1.8)
BASOPHILS # BLD: 0.03 K/UL (ref 0–0.12)
BILIRUB SERPL-MCNC: 0.8 MG/DL (ref 0.1–1.5)
BUN SERPL-MCNC: 16 MG/DL (ref 8–22)
CALCIUM ALBUM COR SERPL-MCNC: 9.1 MG/DL (ref 8.5–10.5)
CALCIUM SERPL-MCNC: 9.4 MG/DL (ref 8.5–10.5)
CHLORIDE SERPL-SCNC: 104 MMOL/L (ref 96–112)
CO2 SERPL-SCNC: 25 MMOL/L (ref 20–33)
CREAT SERPL-MCNC: 0.65 MG/DL (ref 0.5–1.4)
EOSINOPHIL # BLD AUTO: 0.03 K/UL (ref 0–0.51)
EOSINOPHIL NFR BLD: 0.8 % (ref 0–6.9)
ERYTHROCYTE [DISTWIDTH] IN BLOOD BY AUTOMATED COUNT: 44.9 FL (ref 35.9–50)
GFR SERPLBLD CREATININE-BSD FMLA CKD-EPI: 98 ML/MIN/1.73 M 2
GLOBULIN SER CALC-MCNC: 2.6 G/DL (ref 1.9–3.5)
GLUCOSE SERPL-MCNC: 89 MG/DL (ref 65–99)
HCT VFR BLD AUTO: 40.3 % (ref 37–47)
HGB BLD-MCNC: 13.4 G/DL (ref 12–16)
IMM GRANULOCYTES # BLD AUTO: 0.01 K/UL (ref 0–0.11)
IMM GRANULOCYTES NFR BLD AUTO: 0.3 % (ref 0–0.9)
LDH SERPL L TO P-CCNC: 153 U/L (ref 107–266)
LYMPHOCYTES # BLD AUTO: 1.39 K/UL (ref 1–4.8)
LYMPHOCYTES NFR BLD: 35.3 % (ref 22–41)
MCH RBC QN AUTO: 32.6 PG (ref 27–33)
MCHC RBC AUTO-ENTMCNC: 33.3 G/DL (ref 32.2–35.5)
MCV RBC AUTO: 98.1 FL (ref 81.4–97.8)
MONOCYTES # BLD AUTO: 0.36 K/UL (ref 0–0.85)
MONOCYTES NFR BLD AUTO: 9.1 % (ref 0–13.4)
NEUTROPHILS # BLD AUTO: 2.12 K/UL (ref 1.82–7.42)
NEUTROPHILS NFR BLD: 53.7 % (ref 44–72)
NRBC # BLD AUTO: 0 K/UL
NRBC BLD-RTO: 0 /100 WBC (ref 0–0.2)
PLATELET # BLD AUTO: 229 K/UL (ref 164–446)
PMV BLD AUTO: 10.8 FL (ref 9–12.9)
POTASSIUM SERPL-SCNC: 4.2 MMOL/L (ref 3.6–5.5)
PROT SERPL-MCNC: 7 G/DL (ref 6–8.2)
RBC # BLD AUTO: 4.11 M/UL (ref 4.2–5.4)
SODIUM SERPL-SCNC: 138 MMOL/L (ref 135–145)
WBC # BLD AUTO: 3.9 K/UL (ref 4.8–10.8)

## 2025-03-07 PROCEDURE — 80053 COMPREHEN METABOLIC PANEL: CPT

## 2025-03-07 PROCEDURE — 36415 COLL VENOUS BLD VENIPUNCTURE: CPT

## 2025-03-07 PROCEDURE — 83615 LACTATE (LD) (LDH) ENZYME: CPT

## 2025-03-07 PROCEDURE — 85025 COMPLETE CBC W/AUTO DIFF WBC: CPT

## 2025-03-10 ENCOUNTER — TELEPHONE (OUTPATIENT)
Dept: ENDOCRINOLOGY | Facility: MEDICAL CENTER | Age: 65
End: 2025-03-10
Payer: COMMERCIAL

## 2025-03-18 ENCOUNTER — OFFICE VISIT (OUTPATIENT)
Dept: MEDICAL GROUP | Facility: IMAGING CENTER | Age: 65
End: 2025-03-18
Payer: COMMERCIAL

## 2025-03-18 VITALS
SYSTOLIC BLOOD PRESSURE: 112 MMHG | BODY MASS INDEX: 24.11 KG/M2 | TEMPERATURE: 98.4 F | WEIGHT: 131 LBS | DIASTOLIC BLOOD PRESSURE: 70 MMHG | HEART RATE: 80 BPM | OXYGEN SATURATION: 98 % | RESPIRATION RATE: 14 BRPM | HEIGHT: 62 IN

## 2025-03-18 DIAGNOSIS — G62.9 NEUROPATHY: ICD-10-CM

## 2025-03-18 DIAGNOSIS — M81.0 OSTEOPOROSIS WITHOUT CURRENT PATHOLOGICAL FRACTURE, UNSPECIFIED OSTEOPOROSIS TYPE: ICD-10-CM

## 2025-03-18 DIAGNOSIS — M79.7 FIBROMYALGIA: ICD-10-CM

## 2025-03-18 PROCEDURE — 99213 OFFICE O/P EST LOW 20 MIN: CPT | Performed by: STUDENT IN AN ORGANIZED HEALTH CARE EDUCATION/TRAINING PROGRAM

## 2025-03-18 PROCEDURE — 3074F SYST BP LT 130 MM HG: CPT | Performed by: STUDENT IN AN ORGANIZED HEALTH CARE EDUCATION/TRAINING PROGRAM

## 2025-03-18 PROCEDURE — 3078F DIAST BP <80 MM HG: CPT | Performed by: STUDENT IN AN ORGANIZED HEALTH CARE EDUCATION/TRAINING PROGRAM

## 2025-03-18 ASSESSMENT — FIBROSIS 4 INDEX: FIB4 SCORE: 1.29

## 2025-03-18 NOTE — PROGRESS NOTES
"Subjective:     CC:   Chief Complaint   Patient presents with    Follow-Up     Fibromyalgia follow up.        HPI:     Verbal consent was acquired by the patient to use [employer] ambient listening note generation during this visit Yes      Savana Rubyeto Jose Lsulaiman monterroso, 64 y.o., female,  presents today to discuss:     History of Present Illness  The patient presents for follow-up regarding medication management and symptoms associated with neuropathy and osteoporosis. She was previously prescribed duloxetine but did not observe any improvement after three weeks of administration. She was also taking gabapentin but discontinued it due to severe adverse effects, including profound fatigue. The patient reports that a nerve conduction study indicated neuropathy in both lower extremities. She received a corticosteroid injection for heel pain from her podiatrist, which did not provide symptomatic relief. The patient has an upcoming appointment with her oncologist specialist to discuss the safest treatment for osteoporosis management. She has been experiencing fatigue, pain in her legs, heel, back, and shoulder. Additionally, the patient reports a sensation akin to cold water running down her arm and persistent pain in that region. She is currently taking vitamin D, omega-3 fatty acids, vitamin B12, and calcium supplements.  She has been referred for a colonoscopy but has not yet scheduled the appointment. She has been referred to various specialists for her conditions and is awaiting further consultations.       ROS:  See HPI    Medications, allergies, past medical history, family history, surgical history, and social history documented in chart and reviewed by me.       Objective:   Exam:  /70 (BP Location: Left arm, Patient Position: Sitting, BP Cuff Size: Adult)   Pulse 80   Temp 36.9 °C (98.4 °F) (Temporal)   Resp 14   Ht 1.575 m (5' 2\")   Wt 59.4 kg (131 lb)   SpO2 98%   BMI 23.96 kg/m²  "     General: In no acute distress. Normal appearance.   Head:   Atraumatic, normocephalic.   Neck: Supple without lymphadenopathy.   Pulmonary: Normal effort, clear to auscultation bilaterally, no wheezes, rhonchi, or rales  Cardiovascular:   Regular rate and rhythm. No murmurs, rubs, or gallops.  Musculoskeletal:  Normal ROM. No edema.    Skin: No visible rashes or lesions.  Neurological: Alert and oriented to person, place, and time. Gait normal.   Psychiatric: Normal mood and affect. Calm and friendly behavior. Speech clear. Normal judgement and insight.         Assessment & Plan:       Assessment & Plan    1. Fibromyalgia  Newly diagnosed. Uncontrolled. Patient discontinued Duloxetine 20mg because she did not notice benefit. Recommend to trial duloxetine for at least 4-6 weeks. If no benefit by then, recommend to increase her dose or referral to pain management. Recommend follow up in one month or sooner if needed.     2. Neuropathy  Chronic, established condition. Uncontrolled.  Gabapentin was ineffective. Will trial Duloxetine 20mg as stated above.  Recommend to follow-up with podiatry as recommended.  If duloxetine is ineffective, recommend referral to neurology.    3. Osteoporosis without current pathological fracture, unspecified osteoporosis type  Chronic, established condition.  Stable.  Unfortunately, insurance declined Evenity. Recommend to follow up with endo to discuss other alternatives.      Pt agreed with treatment plan and verbalized understanding.     A total of  23 minutes was spent today reviewing chart, assessing and examining the patient, and documenting. None of which was spent performing separately billing procedures or ancillary services.     Return in about 3 months (around 6/18/2025) for med check or sooner if needed.     Please note that this dictation was created using voice recognition software. I have made every reasonable attempt to correct obvious errors, but I expect that there  are errors of grammar and possibly content that I did not discover before finalizing the note.    Asya Flores PA-C  [facility]

## 2025-03-20 ENCOUNTER — TELEPHONE (OUTPATIENT)
Dept: ENDOCRINOLOGY | Facility: MEDICAL CENTER | Age: 65
End: 2025-03-20
Payer: COMMERCIAL

## 2025-03-20 NOTE — TELEPHONE ENCOUNTER
Spoke to pt and pt stated that Dr. Bourgeois wanted her to text a list of medications approved by insurance since insurance does not cover evenity. Pt texted Dr. Bourgeois on 03/06/25 and has not received a response back. Please advise

## 2025-03-24 ENCOUNTER — HOSPITAL ENCOUNTER (OUTPATIENT)
Dept: RADIOLOGY | Facility: MEDICAL CENTER | Age: 65
End: 2025-03-24
Payer: COMMERCIAL

## 2025-04-01 ENCOUNTER — HOSPITAL ENCOUNTER (OUTPATIENT)
Facility: MEDICAL CENTER | Age: 65
End: 2025-04-01
Attending: NURSE PRACTITIONER
Payer: COMMERCIAL

## 2025-04-01 ENCOUNTER — OFFICE VISIT (OUTPATIENT)
Dept: MEDICAL GROUP | Facility: IMAGING CENTER | Age: 65
End: 2025-04-01
Payer: COMMERCIAL

## 2025-04-01 VITALS
SYSTOLIC BLOOD PRESSURE: 118 MMHG | TEMPERATURE: 98.2 F | HEART RATE: 74 BPM | HEIGHT: 62 IN | RESPIRATION RATE: 14 BRPM | WEIGHT: 128.4 LBS | BODY MASS INDEX: 23.63 KG/M2 | OXYGEN SATURATION: 98 % | DIASTOLIC BLOOD PRESSURE: 70 MMHG

## 2025-04-01 DIAGNOSIS — N76.0 ACUTE VAGINITIS: ICD-10-CM

## 2025-04-01 DIAGNOSIS — R30.0 BURNING WITH URINATION: ICD-10-CM

## 2025-04-01 LAB
APPEARANCE UR: NORMAL
BILIRUB UR STRIP-MCNC: NEGATIVE MG/DL
COLOR UR AUTO: NORMAL
GLUCOSE UR STRIP.AUTO-MCNC: NEGATIVE MG/DL
KETONES UR STRIP.AUTO-MCNC: NEGATIVE MG/DL
LEUKOCYTE ESTERASE UR QL STRIP.AUTO: NORMAL
NITRITE UR QL STRIP.AUTO: NEGATIVE
PH UR STRIP.AUTO: 7 [PH] (ref 5–8)
PROT UR QL STRIP: NEGATIVE MG/DL
RBC UR QL AUTO: NORMAL
SP GR UR STRIP.AUTO: 1.01
UROBILINOGEN UR STRIP-MCNC: 0.2 MG/DL

## 2025-04-01 PROCEDURE — 87086 URINE CULTURE/COLONY COUNT: CPT

## 2025-04-01 PROCEDURE — 3078F DIAST BP <80 MM HG: CPT | Performed by: NURSE PRACTITIONER

## 2025-04-01 PROCEDURE — 87510 GARDNER VAG DNA DIR PROBE: CPT

## 2025-04-01 PROCEDURE — 3074F SYST BP LT 130 MM HG: CPT | Performed by: NURSE PRACTITIONER

## 2025-04-01 PROCEDURE — 99214 OFFICE O/P EST MOD 30 MIN: CPT | Performed by: NURSE PRACTITIONER

## 2025-04-01 PROCEDURE — 87480 CANDIDA DNA DIR PROBE: CPT

## 2025-04-01 PROCEDURE — 87660 TRICHOMONAS VAGIN DIR PROBE: CPT

## 2025-04-01 PROCEDURE — 81002 URINALYSIS NONAUTO W/O SCOPE: CPT | Performed by: NURSE PRACTITIONER

## 2025-04-01 RX ORDER — ESTRADIOL 0.1 MG/G
CREAM VAGINAL
Qty: 42.5 G | Refills: 2 | Status: SHIPPED | OUTPATIENT
Start: 2025-04-01

## 2025-04-01 ASSESSMENT — FIBROSIS 4 INDEX: FIB4 SCORE: 1.29

## 2025-04-01 NOTE — PROGRESS NOTES
Subjective:     History of Present Illness  The patient presents for evaluation of burning with urination    She reports experiencing dysuria, characterized by a burning sensation and pain, along with intermittent urinary incontinence. These symptoms have been present for approximately one week. She also notes an unpleasant odor associated with her urine but does not report any hematuria or cloudy urine. She has a history of recurrent urinary tract infections during her chemotherapy treatment for lymphoma, and the current symptoms are reminiscent of those episodes. She is no longer undergoing chemotherapy. She reports no abdominal or back pain. She experiences chills, a symptom that was also present during her chemotherapy, but has not had any fevers. She has been managing her pain with Tylenol, which provides some relief. She has no history of renal calculi but mentions that her lymphoma had previously exerted pressure on her kidney, necessitating a urostomy. She also reports external discomfort during urination, described as burning and painful, with visible redness in the middle of her vagina. She reports no vaginal discharge or itching. This is her first experience with these specific symptoms. She occasionally experiences vaginal dryness but has been abstinent from sexual activity for over a decade. She has no known allergies to antibiotics but reports an adverse reaction to TRAMADOL, which induces vomiting.    ALLERGIES  She is allergic to TRAMADOL.    MEDICATIONS  Current: Tylenol      Current medicines (including changes today)  Current Outpatient Medications   Medication Sig Dispense Refill    estradiol (ESTRACE) 0.1 MG/GM vaginal cream Insert 2 g daily intravaginally for 2 weeks, then reduce to 1 g daily for 2 weeks, followed by a maintenance dose of 1 g 1-3 times per week 42.5 g 2    ELIQUIS 2.5 MG Tab Take 2.5 mg by mouth every 12 hours.      DULoxetine (CYMBALTA) 20 MG Cap DR Particles Take 1 Capsule by  "mouth every day. 60 Capsule 1    CALCIUM PO Take 1 Tablet by mouth every day.      B Complex Vitamins (B COMPLEX PO) Take 1 Tablet by mouth every day.      vitamin D3 (CHOLECALCIFEROL) 5000 Unit (125 mcg) Tab Take 2,000 Units by mouth every day.      Omega-3 Fatty Acids (FISH OIL) 1000 MG Cap capsule Take 1,000 mg by mouth every day.       No current facility-administered medications for this visit.     She  has a past medical history of Arthritis of both hips (06/19/2024), Blood clotting disorder (HCC) (02/2023), Bowel habit changes (07/14/2023), Cancer (HCC) (07/14/2023), Dental disorder (07/14/2023), Fatty liver, Fatty liver (07/14/2023), Heart burn, High cholesterol (07/14/2023), Hypokalemia, Normocytic anemia, Pain (07/14/2023), Primary osteoarthritis of left foot (06/19/2024), and Renal disorder (07/14/2023).    ROS =  No chest pain, no shortness of breath, no abdominal pain  Positive ROS as per HPI.  All other systems reviewed and are negative.     Objective:     /70 (BP Location: Left arm, Patient Position: Sitting, BP Cuff Size: Adult)   Pulse 74   Temp 36.8 °C (98.2 °F) (Temporal)   Resp 14   Ht 1.575 m (5' 2\")   Wt 58.2 kg (128 lb 6.4 oz)   SpO2 98%  Body mass index is 23.48 kg/m².   Physical Exam  The vaginal area appears very irritated.  Constitutional: Alert, no distress.  Skin: Warm, dry, good turgor, no rashes in visible areas.  Eye: Equal, round and reactive, conjunctiva clear, lids normal.  ENMT: Lips without lesions, good dentition  Respiratory: Unlabored respiratory effort  Psych: Alert and oriented x3, normal affect and mood.  Pelvic Exam -  Normal external genitalia with no lesions. Vaginal Mucosa:  atrophic vaginal mucosa with friable tissue and scant bleeding      Results  Laboratory Studies  Urine test shows presence of white blood cells and a small amount of blood.        Assessment and Plan:   The following treatment plan was discussed    Assessment & Plan  1. Acute " vaginitis  UA + leukocytes and intact blood, sent for culture to rule out UTI which I do not suspect as her vaginal exam is consistent with atrophic vaginitis.  A vaginal pathogens swab was collected to evaluate for candida or BV. RX sent to pharmacy to topical vaginal estradiol. She does have history of DVT, however topical estradiol is safe with this and she is adequately anticoagulated. She is advised to continue taking Tylenol for pain management. If the urine culture confirms a bladder infection, an appropriate antibiotic will be prescribed. If the culture indicates a yeast infection, a suitable treatment plan will be initiated.    PROCEDURE  Vaginal swab was performed today.      ORDERS:  1. Burning with urination  - POCT Urinalysis  - URINE CULTURE(NEW); Future  - VAGINAL PATHOGENS DNA PANEL; Future    2. Acute vaginitis  - VAGINAL PATHOGENS DNA PANEL; Future  - estradiol (ESTRACE) 0.1 MG/GM vaginal cream; Insert 2 g daily intravaginally for 2 weeks, then reduce to 1 g daily for 2 weeks, followed by a maintenance dose of 1 g 1-3 times per week  Dispense: 42.5 g; Refill: 2          The MA is performing the above orders under the direction of Dr. Thomas or Dr. Braswell    Please note that this dictation was created using voice recognition software. I have made every reasonable attempt to correct obvious errors, but I expect that there are errors of grammar and possibly content that I did not discover before finalizing the note.      Attestation      Verbal consent was acquired by the patient to use [employer] ambient listening note generation during this visit Yes

## 2025-04-04 LAB
BACTERIA UR CULT: NORMAL
SIGNIFICANT IND 70042: NORMAL
SITE SITE: NORMAL
SOURCE SOURCE: NORMAL

## 2025-04-07 ENCOUNTER — TELEPHONE (OUTPATIENT)
Dept: MEDICAL GROUP | Facility: IMAGING CENTER | Age: 65
End: 2025-04-07
Payer: COMMERCIAL

## 2025-04-07 DIAGNOSIS — C83.30 DIFFUSE LARGE B-CELL LYMPHOMA, UNSPECIFIED BODY REGION (HCC): ICD-10-CM

## 2025-04-07 NOTE — TELEPHONE ENCOUNTER
[facility] -  office requesting new referrals for patient visits.  Brunilda can be reach at [phone] ext [phone], if any questions.

## 2025-04-10 ENCOUNTER — RESULTS FOLLOW-UP (OUTPATIENT)
Dept: MEDICAL GROUP | Facility: IMAGING CENTER | Age: 65
End: 2025-04-10

## 2025-04-10 ENCOUNTER — TELEPHONE (OUTPATIENT)
Dept: MEDICAL GROUP | Facility: IMAGING CENTER | Age: 65
End: 2025-04-10
Payer: COMMERCIAL

## 2025-04-10 DIAGNOSIS — Z12.11 SCREENING FOR COLON CANCER: ICD-10-CM

## 2025-04-24 ENCOUNTER — HOSPITAL ENCOUNTER (OUTPATIENT)
Dept: LAB | Facility: MEDICAL CENTER | Age: 65
End: 2025-04-24
Attending: PHYSICAL MEDICINE & REHABILITATION
Payer: COMMERCIAL

## 2025-04-24 LAB
ALBUMIN SERPL BCP-MCNC: 4.3 G/DL (ref 3.2–4.9)
ALP SERPL-CCNC: 62 U/L (ref 30–99)
ALT SERPL-CCNC: 27 U/L (ref 2–50)
AST SERPL-CCNC: 19 U/L (ref 12–45)
BILIRUB CONJ SERPL-MCNC: 0.2 MG/DL (ref 0.1–0.5)
BILIRUB INDIRECT SERPL-MCNC: 0.4 MG/DL (ref 0–1)
BILIRUB SERPL-MCNC: 0.6 MG/DL (ref 0.1–1.5)
C3 SERPL-MCNC: 140 MG/DL (ref 87–200)
C4 SERPL-MCNC: 25.5 MG/DL (ref 19–52)
CK SERPL-CCNC: 65 U/L (ref 0–154)
CRP SERPL HS-MCNC: <0.3 MG/DL (ref 0–0.75)
ERYTHROCYTE [SEDIMENTATION RATE] IN BLOOD BY WESTERGREN METHOD: 12 MM/HOUR (ref 0–25)
PROT SERPL-MCNC: 6.9 G/DL (ref 6–8.2)
RHEUMATOID FACT SER IA-ACNC: <10 IU/ML (ref 0–14)
URATE SERPL-MCNC: 2.4 MG/DL (ref 1.9–8.2)

## 2025-04-24 PROCEDURE — 80076 HEPATIC FUNCTION PANEL: CPT

## 2025-04-24 PROCEDURE — 86038 ANTINUCLEAR ANTIBODIES: CPT

## 2025-04-24 PROCEDURE — 84165 PROTEIN E-PHORESIS SERUM: CPT

## 2025-04-24 PROCEDURE — 86140 C-REACTIVE PROTEIN: CPT

## 2025-04-24 PROCEDURE — 84155 ASSAY OF PROTEIN SERUM: CPT

## 2025-04-24 PROCEDURE — 85652 RBC SED RATE AUTOMATED: CPT

## 2025-04-24 PROCEDURE — 86160 COMPLEMENT ANTIGEN: CPT

## 2025-04-24 PROCEDURE — 84550 ASSAY OF BLOOD/URIC ACID: CPT

## 2025-04-24 PROCEDURE — 36415 COLL VENOUS BLD VENIPUNCTURE: CPT

## 2025-04-24 PROCEDURE — 82550 ASSAY OF CK (CPK): CPT

## 2025-04-24 PROCEDURE — 86431 RHEUMATOID FACTOR QUANT: CPT

## 2025-04-24 PROCEDURE — 86200 CCP ANTIBODY: CPT

## 2025-04-24 PROCEDURE — 86812 HLA TYPING A B OR C: CPT

## 2025-04-26 LAB
CCP IGA+IGG SERPL IA-ACNC: 2 UNITS (ref 0–19)
HLA-B27 QL FC: NEGATIVE
NUCLEAR IGG SER QL IA: NORMAL

## 2025-04-28 LAB
ALBUMIN SERPL ELPH-MCNC: 4.21 G/DL (ref 3.75–5.01)
ALPHA1 GLOB SERPL ELPH-MCNC: 0.25 G/DL (ref 0.19–0.46)
ALPHA2 GLOB SERPL ELPH-MCNC: 0.64 G/DL (ref 0.48–1.05)
B-GLOBULIN SERPL ELPH-MCNC: 0.72 G/DL (ref 0.48–1.1)
GAMMA GLOB SERPL ELPH-MCNC: 0.68 G/DL (ref 0.62–1.51)
INTERPRETATION SERPL IFE-IMP: NORMAL
MONOCLON BAND OBS SERPL: NORMAL
MONOCLONAL PROTEIN NL11656: NORMAL G/DL
PATHOLOGY STUDY: NORMAL
PROT SERPL-MCNC: 6.5 G/DL (ref 6.3–8.2)

## 2025-05-08 ENCOUNTER — OFFICE VISIT (OUTPATIENT)
Dept: MEDICAL GROUP | Facility: IMAGING CENTER | Age: 65
End: 2025-05-08
Payer: COMMERCIAL

## 2025-05-08 VITALS
WEIGHT: 128 LBS | SYSTOLIC BLOOD PRESSURE: 112 MMHG | HEART RATE: 74 BPM | RESPIRATION RATE: 14 BRPM | TEMPERATURE: 98.8 F | HEIGHT: 62 IN | DIASTOLIC BLOOD PRESSURE: 78 MMHG | BODY MASS INDEX: 23.55 KG/M2 | OXYGEN SATURATION: 97 %

## 2025-05-08 DIAGNOSIS — G62.9 NEUROPATHY: ICD-10-CM

## 2025-05-08 DIAGNOSIS — M79.7 FIBROMYALGIA: ICD-10-CM

## 2025-05-08 DIAGNOSIS — J30.1 ALLERGIC RHINITIS DUE TO POLLEN, UNSPECIFIED SEASONALITY: ICD-10-CM

## 2025-05-08 DIAGNOSIS — R09.82 POST-NASAL DRIP: ICD-10-CM

## 2025-05-08 PROCEDURE — 3074F SYST BP LT 130 MM HG: CPT | Performed by: STUDENT IN AN ORGANIZED HEALTH CARE EDUCATION/TRAINING PROGRAM

## 2025-05-08 PROCEDURE — 3078F DIAST BP <80 MM HG: CPT | Performed by: STUDENT IN AN ORGANIZED HEALTH CARE EDUCATION/TRAINING PROGRAM

## 2025-05-08 PROCEDURE — 99214 OFFICE O/P EST MOD 30 MIN: CPT | Performed by: STUDENT IN AN ORGANIZED HEALTH CARE EDUCATION/TRAINING PROGRAM

## 2025-05-08 RX ORDER — DULOXETIN HYDROCHLORIDE 30 MG/1
30 CAPSULE, DELAYED RELEASE ORAL DAILY
Qty: 90 CAPSULE | Refills: 2 | Status: SHIPPED | OUTPATIENT
Start: 2025-05-08 | End: 2025-05-28

## 2025-05-08 ASSESSMENT — FIBROSIS 4 INDEX: FIB4 SCORE: 1.01

## 2025-05-08 NOTE — PROGRESS NOTES
Subjective:     CC:   Chief Complaint   Patient presents with    Other     Pain inside nose, feels pressure in forehead, watery eyes. SX 1 week.        HPI:     Verbal consent was acquired by the patient to use [employer] ambient listening note generation during this visit Yes      Savana Rubyeto Jose Lsulaiman monterroso, 64 y.o., female,  presents today to discuss:     History of Present Illness    Allergic rhinitis  She reports experiencing intermittent nasal discomfort, which is not constant but localized within the nasal cavity. She does not have any nasal congestion or mucus production, attributing the absence of mucus to her previous chemotherapy treatment. She has been experiencing rhinorrhea and morning ocular discharge. She also reports a sensation of pressure in her nasal cavity. She has a history of allergic rhinitis and has been self-medicating with loratadine since the previous Wednesday. Initially, she experienced some relief, but her symptoms worsened yesterday, with the onset of nasal pain and swelling. The swelling has since subsided, but the internal nasal pressure persists. She has tried cetirizine in the past, but it caused somnolence.Additionally, she reports yellow postnasal drip and a paper-like sound in her nasal cavity upon waking in the morning.    Tinnitus follow up   She had a tympanostomy tube inserted into her ear approximately 2 weeks ago, but it has not alleviated the tinnitus or discomfort. She describes the noise as similar to the sound of water entering her ear. She was informed that the tube would remain in place for 6 months. She also reports a noise akin to a saw cutting wood, which she believes is related to the tube. She was advised to consult her primary care physician regarding this issue.     Supplemental Information  She has been taking duloxetine since her last visit on 03/18/2025 but reports no improvement in her symptoms.            ROS:  See HPI    Medications,  "allergies, past medical history, family history, surgical history, and social history documented in chart and reviewed by me.       Objective:   Exam:  /78 (BP Location: Right arm, Patient Position: Sitting, BP Cuff Size: Adult)   Pulse 74   Temp 37.1 °C (98.8 °F) (Temporal)   Resp 14   Ht 1.575 m (5' 2\")   Wt 58.1 kg (128 lb)   SpO2 97%   BMI 23.41 kg/m²      Physical Exam  Vitals reviewed.   Constitutional:       General: She is not in acute distress.     Appearance: Normal appearance. She is not ill-appearing or toxic-appearing.   HENT:      Head: Normocephalic and atraumatic.      Right Ear: Tympanic membrane, ear canal and external ear normal. There is no impacted cerumen.      Left Ear: Tympanic membrane, ear canal and external ear normal. There is no impacted cerumen.      Nose: Congestion present. No rhinorrhea.      Mouth/Throat:      Mouth: Mucous membranes are moist.      Pharynx: Oropharynx is clear. No oropharyngeal exudate or posterior oropharyngeal erythema.      Tonsils: No tonsillar exudate.   Eyes:      General: No scleral icterus.        Right eye: No discharge.         Left eye: No discharge.      Conjunctiva/sclera: Conjunctivae normal.   Neck:      Thyroid: No thyroid mass or thyromegaly.   Cardiovascular:      Rate and Rhythm: Normal rate and regular rhythm.      Pulses: Normal pulses.      Heart sounds: Normal heart sounds. No murmur heard.  Pulmonary:      Effort: Pulmonary effort is normal. No respiratory distress.      Breath sounds: Normal breath sounds. No wheezing.   Musculoskeletal:         General: Normal range of motion.      Cervical back: Normal range of motion and neck supple. No tenderness.   Lymphadenopathy:      Cervical: No cervical adenopathy.   Skin:     General: Skin is warm and dry.      Coloration: Skin is not jaundiced.   Neurological:      General: No focal deficit present.      Mental Status: She is alert and oriented to person, place, and time.      Gait: " Gait normal.   Psychiatric:         Mood and Affect: Mood normal.         Behavior: Behavior normal.         Thought Content: Thought content normal.         Judgment: Judgment normal.              Assessment & Plan:       Assessment & Plan       1. Allergic rhinitis due to pollen, unspecified seasonality  Chronic, established condition with acute exacerbation.  Symptoms suggest a potential allergic reaction, particularly during the spring season. The use of a humidifier is recommended to maintain moisture levels in her room. She is advised to discontinue Claritin and instead try Allegra or Xyzal. Additionally, Flonase nasal spray should be used nightly, one spray per nostril. If there is no improvement after a week on the new medication, she should inform us.     2. Post-nasal drip  Acute. Stable. Likely secondary to allergies.  The use of Flonase nasal spray is expected to alleviate the inflammation and associated symptoms. She is advised to use the spray daily on each side of the nose.      3. Fibromyalgia  4. Neuropathy  Chronic, established conditions. Uncontrolled.  Duloxetine increased to 30 mg daily.  Recommend follow-up with pain management as recommended.  - DULoxetine (CYMBALTA) 30 MG Cap DR Particles; Take 1 Capsule by mouth every day.  Dispense: 90 Capsule; Refill: 2     Diagnosis and treatment plan explained to pt. Counseled pt on new medication(s) and potential side effects. Pt agreed with treatment plan and verbalized understanding.     Return for as scheduled 6/24/25 or sooner if needed.     Please note that this dictation was created using voice recognition software. I have made every reasonable attempt to correct obvious errors, but I expect that there are errors of grammar and possibly content that I did not discover before finalizing the note.    Asya Flores PA-C  [facility]

## 2025-05-09 ENCOUNTER — APPOINTMENT (OUTPATIENT)
Dept: ENDOCRINOLOGY | Facility: MEDICAL CENTER | Age: 65
End: 2025-05-09
Attending: STUDENT IN AN ORGANIZED HEALTH CARE EDUCATION/TRAINING PROGRAM
Payer: COMMERCIAL

## 2025-05-19 NOTE — PROGRESS NOTES
Follow up Endocrinology Visit  Initial consult on: 2/5/25  Last visit on: 3/5/25  Referred by: Asya Flores P.A.-C.    Chief complaint:  Savana Rendon, 65 y.o., female, who is here for follow up for osteoporosis management. Conversation with help of .    Interval history:   - overall doing well, but struggling with balance issues, had recent fall on 5/8/25, denies any fractures  - unfortunately, Evenity was declined by medical insurance  - patient will see her dentist tomorrow, will keep me posted about plans for dental work  Prior notes:  3/5/25  - patient struggles with L shoulder pain, back pain, R heel pain, got 3 steroid injections in her shoulder without significant symptoms improvement  - unclear if Evenity will be covered by patient's medical insurance  - it seems that patient's PCP ordered Denosumab, but patient states that she never got any Denosumab treatment as it was not covered by her medical insurance  - reviewed most recent labs    Osteoporosis:  - diagnosed based on DEXA in 2/2024  - history of low impact fractures: denies  - change in height: denies  - denies history of falls  - admits some balance problems due to pain in her back, knees, foot pains  - previous treatment: none  - daily Ca intake - 1g  - daily vit D intake - 5000 IU/day  - exercise - PT x 2/week, walks 30 min/day, doing exercises at home  RISK FACTORS:  - menopause at 54 yo  - R-CHOP chemotherapy for lymphoma x 6 mo, finished  in 7/2023  - denies history of Ca, phosphorus, vit D, thyroid/parathryoid function disorders, kidney stones, CKD, liver disease, chronic malabsorption/diarrhea, smoking, alcohol abuse  -  Fhx of fractures/osteoporosis - denies  Factors affecting osteoporosis treatment choice:  History of prior radiation - denies  GERD/dysphagia - yes, sometimes  Hx of teeth problems, upcoming dental work - denies  Hx CKD (baseline creatinine) - denies  Hx of CVA or MI  -  "denies    Medications:  Current Outpatient Medications:     NON SPECIFIED, Lidocaine path 5 percent. Apply 1 patch by topical route once daily, Disp: , Rfl:     DULoxetine (CYMBALTA) 30 MG Cap DR Particles, Take 1 Capsule by mouth every day., Disp: 90 Capsule, Rfl: 2    ELIQUIS 2.5 MG Tab, Take 2.5 mg by mouth every 12 hours., Disp: , Rfl:     CALCIUM PO, Take 1 Tablet by mouth every day., Disp: , Rfl:     B Complex Vitamins (B COMPLEX PO), Take 1 Tablet by mouth every day., Disp: , Rfl:     vitamin D3 (CHOLECALCIFEROL) 5000 Unit (125 mcg) Tab, Take 2,000 Units by mouth every day., Disp: , Rfl:     Omega-3 Fatty Acids (FISH OIL) 1000 MG Cap capsule, Take 1,000 mg by mouth every day., Disp: , Rfl:     Physical Examination:   Vital signs:/60   Pulse 70   Ht 1.575 m (5' 2\") Comment: pt stated  Wt 60.3 kg (132 lb 14.4 oz)   SpO2 99%   BMI 24.31 kg/m²   General: No distress, cooperative, well dressed and well nourished.   Eyes: No scleral icterus or discharge  ENMT: Normal on external inspection of nose, lips, No nasal drainage   Neck: No abnormal masses on inspection  Resp: Normal effort  CVS: Regular rate and rhythm  Extremities: No edema bilateral extremities  Neuro: Alert and oriented  Skin: No rash, No Ulcers  Psych: Normal mood and affect    Labs:  - reviewed  Osteoporosis work up:   Reference Range  03/15/24  09/11/24  3/1/25   Hb 12.0 - 16.0 g/dL 14.3 13.1    Hct 37.0 - 47.0 % 43.0 40.4    Sodium 135 - 145 mmol/L  137 140   Creatinine 0.50 - 1.40 mg/dL  0.68 0.57   GFR (CKD-EPI) >60 mL/min/1.73 m 2  98 102   Calcium 8.5 - 10.5 mg/dL  9.2 9.3   Correct Calcium 8.5 - 10.5 mg/dL  9.1 9.1   AST(SGOT) 12 - 45 U/L  35 23   ALT(SGPT) 2 - 50 U/L  41 22   ALP 30 - 99 U/L  71 69   Albumin 3.2 - 4.9 g/dL  4.1 4.2   Phosphorus 2.5 - 4.5 mg/dL   3.2   PTH 14.0 - 72.0 pg/mL   32.9   Vit D 30 - 100 ng/mL 59  70   HbA1C 4.0 - 5.6 % 5.1     TSH 0.380 - 5.330 uIU/mL 1.780     P1NP 16 - 96   104   NTX 6.2 - 19.0    19.6 "     24 hrs urine:    Reference Range  03/01/25    Total Volume mL 2700   Creatinine 800 - 1800 mg/24 Hr 932   Calcium  100 - 250 mg/d 267 (H)   Calcium Creat Ratio    20 - 300 mg/g 283     Celiac disease work up:   Reference Range 03/01/25    t-TG IgA 0.00 - 4.99 FLU <1.02   t-TG IgG 0.00 - 4.99 FLU <0.82     SPEP evaluation:   Reference Range  03/01/25    Albumin 3.75 - 5.01 g/dL 4.22   Gamma Globulin 0.62 - 1.51 g/dL 0.69   Alpha-1 Globulin 0.19 - 0.46 g/dL 0.22   Alpha-2 Globulin 0.48 - 1.05 g/dL 0.56   Beta Globulin 0.48 - 1.10 g/dL 0.70     ESR, rheumatologic conditions work up:   Reference Range 08/19/24  3/1/25   Rheumatoid Factor -Neph- 0 - 14 IU/mL <10    Cyclic Citrullinated Peptide Ab, IgG/A 0 - 19 Units 2    Stat C-Reactive Protein 0.00 - 0.75 mg/dL <0.30    ESR 0 - 25 mm/hr 11 10     Imaging:  - reviewed  DEXA scan:  Date Machine L1- L4  BMD/ T-score LFN  BMD/ T-score FRAX Rx    2/27/2024   [employer] unit   0.660 g/cm2 / - 4.3  0.580 g/cm2 / - 3.4  18.6 % / 8.0%      Assessment and Plan:  # Age-related osteoporosis   # Hypercalciuria  - unfortunately, Evenity was declined by medical insurance  - patient will see her dentist tomorrow, will keep me posted about plans for dental work  Prior notes:  2/5/25  - diagnosed based on DEXA scan in 2/2024  - as per latest DEXA in - lowest T-score = - 4.3 in lumbar spine  - identifiable risk factors: postmenopausal state, high dose steroids x 6 mo for lymphoma treatment  - will rule out secondary causes of osteoporosis: hypophosphatemia, hyperparathyroidism, hypercalciuria, Ca malabsorption  - discussed with the patient existing pharmacologic treatment options for osteoporosis; discussed pro and cons, routs, frequency, duration, side effects  and efficacy of the medications  - given Tscore < -3.1 patient would benefit from stating with bone forming agent > antiresorptive agent  - reported generalized muscle aches and bone pains are unlikely to be related to  osteoporosis  3/5/25  - no secondary causes noted except mild hypercalciuria  - recommended to decrease dose of vit D as vit D level is at higher upper range -> hopefully, it might lead to lower Ca in the urine  - patient states that she rarely use salt and not eating processed food - has low Na intake  - confirmed with the patient that she never was on any anti-osteoporotic medication including Prolia ordered by her PCP (was not covered by her medical insurance)  - encouraged patient to talk to her oncologist to confirm that she is ok to proceed with Evenity despite her history of lymphoma  - patient will see dentist prior starting the therapy  Plan:  Continue  vit D supplementation 2000 IU daily  Continue 500 mg Ca supplementation.  Ordered  Evenity 210 mg SC monthly x 12 mo. Will try to appeal.    Encouraged patient to talk to her oncologist to confirm that she is ok to proceed with Evenity despite her history of lymphoma   Proceed with dentist visit to now if any plans for significant dental work.   7.    Fall precautions.    RTC: 3 mo  Plan reviewed with the patient and agreed with plan.  All questions answered to patient's satisfaction.  Thank you kindly for allowing me to participate in the care plan for this patient  Nicole Bourgeois MD    CC:   Asya Flores P.A.-C.

## 2025-05-21 ENCOUNTER — OFFICE VISIT (OUTPATIENT)
Dept: ENDOCRINOLOGY | Facility: MEDICAL CENTER | Age: 65
End: 2025-05-21
Attending: STUDENT IN AN ORGANIZED HEALTH CARE EDUCATION/TRAINING PROGRAM
Payer: COMMERCIAL

## 2025-05-21 VITALS
HEIGHT: 62 IN | DIASTOLIC BLOOD PRESSURE: 60 MMHG | WEIGHT: 132.9 LBS | SYSTOLIC BLOOD PRESSURE: 114 MMHG | HEART RATE: 70 BPM | BODY MASS INDEX: 24.46 KG/M2 | OXYGEN SATURATION: 99 %

## 2025-05-21 DIAGNOSIS — M81.0 OSTEOPOROSIS WITHOUT CURRENT PATHOLOGICAL FRACTURE, UNSPECIFIED OSTEOPOROSIS TYPE: Primary | ICD-10-CM

## 2025-05-21 PROCEDURE — 3078F DIAST BP <80 MM HG: CPT | Performed by: STUDENT IN AN ORGANIZED HEALTH CARE EDUCATION/TRAINING PROGRAM

## 2025-05-21 PROCEDURE — 99212 OFFICE O/P EST SF 10 MIN: CPT | Performed by: STUDENT IN AN ORGANIZED HEALTH CARE EDUCATION/TRAINING PROGRAM

## 2025-05-21 PROCEDURE — 99214 OFFICE O/P EST MOD 30 MIN: CPT | Performed by: STUDENT IN AN ORGANIZED HEALTH CARE EDUCATION/TRAINING PROGRAM

## 2025-05-21 PROCEDURE — 3074F SYST BP LT 130 MM HG: CPT | Performed by: STUDENT IN AN ORGANIZED HEALTH CARE EDUCATION/TRAINING PROGRAM

## 2025-05-21 RX ORDER — PAROXETINE 20 MG/1
TABLET, FILM COATED ORAL
COMMUNITY
Start: 2025-03-13 | End: 2025-05-28

## 2025-05-21 ASSESSMENT — FIBROSIS 4 INDEX: FIB4 SCORE: 1.02

## 2025-05-27 ENCOUNTER — TELEPHONE (OUTPATIENT)
Dept: MEDICAL GROUP | Facility: IMAGING CENTER | Age: 65
End: 2025-05-27
Payer: COMMERCIAL

## 2025-05-27 NOTE — TELEPHONE ENCOUNTER
VOICEMAIL  1. Caller Name: Savana Rendon                        Call Back Number: There are no phone numbers on file.      2. Message: DULoxetine is giving pt an upset stomach and would like to switch medication. Pt stopped taking medication on Friday.     3. Patient approves office to leave a detailed voicemail/MyChart message: no

## 2025-05-28 ENCOUNTER — OFFICE VISIT (OUTPATIENT)
Dept: MEDICAL GROUP | Facility: IMAGING CENTER | Age: 65
End: 2025-05-28
Payer: COMMERCIAL

## 2025-05-28 VITALS
HEIGHT: 62 IN | OXYGEN SATURATION: 98 % | TEMPERATURE: 97.8 F | DIASTOLIC BLOOD PRESSURE: 70 MMHG | BODY MASS INDEX: 24.66 KG/M2 | WEIGHT: 134 LBS | HEART RATE: 82 BPM | SYSTOLIC BLOOD PRESSURE: 124 MMHG | RESPIRATION RATE: 16 BRPM

## 2025-05-28 DIAGNOSIS — M79.7 FIBROMYALGIA: Primary | ICD-10-CM

## 2025-05-28 DIAGNOSIS — J34.89 SINUS PAIN: ICD-10-CM

## 2025-05-28 DIAGNOSIS — M81.0 OSTEOPOROSIS WITHOUT CURRENT PATHOLOGICAL FRACTURE, UNSPECIFIED OSTEOPOROSIS TYPE: ICD-10-CM

## 2025-05-28 PROBLEM — R09.82 POST-NASAL DRIP: Status: RESOLVED | Noted: 2025-05-08 | Resolved: 2025-05-28

## 2025-05-28 PROCEDURE — 3078F DIAST BP <80 MM HG: CPT | Performed by: STUDENT IN AN ORGANIZED HEALTH CARE EDUCATION/TRAINING PROGRAM

## 2025-05-28 PROCEDURE — 3074F SYST BP LT 130 MM HG: CPT | Performed by: STUDENT IN AN ORGANIZED HEALTH CARE EDUCATION/TRAINING PROGRAM

## 2025-05-28 PROCEDURE — 99214 OFFICE O/P EST MOD 30 MIN: CPT | Performed by: STUDENT IN AN ORGANIZED HEALTH CARE EDUCATION/TRAINING PROGRAM

## 2025-05-28 RX ORDER — AMITRIPTYLINE HYDROCHLORIDE 10 MG/1
10 TABLET ORAL NIGHTLY
Qty: 60 TABLET | Refills: 1 | Status: SHIPPED | OUTPATIENT
Start: 2025-05-28

## 2025-05-28 ASSESSMENT — FIBROSIS 4 INDEX: FIB4 SCORE: 1.02

## 2025-05-28 NOTE — PROGRESS NOTES
"Subjective:     CC:   Chief Complaint   Patient presents with    Medication Problem     Cymbalta, causing dizziness and pain in stomach. Sx stared May 19th        HPI:     Verbal consent was acquired by the patient to use [employer] ambient listening note generation during this visit Yes      Savana Domingochristellewilliesulaiman, 65 y.o., female,  presents today to discuss:     History of Present Illness    Fibromyalgia follow-up  She experienced adverse reactions to Cymbalta, including nausea and severe abdominal pain, which subsided upon discontinuation. . Current medications include Eliquis, vitamin D3, omega-3, vitamin B, and calcium. Two steroid injections for arthritis have not improved her pain levels. She has transitioned from physical therapy to leg therapy.    Nasal pain  She reports intermittent internal nasal pain, occurring approximately five times daily, without sneezing or congestion. A previous episode of nasal pain with swelling and pressure resolved after a two-week course of medication. She is not using allergy medications and has not consulted an ENT specialist.    Osteoporosis follow up   Referred to an endocrinologist for osteoporosis, who recommended a daily injection. She has not started the treatment yet and has another appointment scheduled for August 2025.         ROS:  See HPI    Medications, allergies, past medical history, family history, surgical history, and social history documented in chart and reviewed by me.       Objective:   Exam:  /70 (BP Location: Right arm, Patient Position: Sitting, BP Cuff Size: Adult)   Pulse 82   Temp 36.6 °C (97.8 °F) (Temporal)   Resp 16   Ht 1.575 m (5' 2\")   Wt 60.8 kg (134 lb)   SpO2 98%   BMI 24.51 kg/m²      Physical Exam  Vitals reviewed.   Constitutional:       General: She is not in acute distress.     Appearance: Normal appearance. She is not ill-appearing or toxic-appearing.   HENT:      Head: Normocephalic and atraumatic. "      Right Ear: Tympanic membrane, ear canal and external ear normal. There is no impacted cerumen.      Left Ear: Tympanic membrane, ear canal and external ear normal. There is no impacted cerumen.      Ears:      Comments: Ear tube present in left ear.     Nose: Congestion present. No rhinorrhea.      Mouth/Throat:      Mouth: Mucous membranes are moist.      Pharynx: Oropharynx is clear. No oropharyngeal exudate or posterior oropharyngeal erythema.      Tonsils: No tonsillar exudate.   Eyes:      General: No scleral icterus.        Right eye: No discharge.         Left eye: No discharge.      Conjunctiva/sclera: Conjunctivae normal.   Neck:      Thyroid: No thyroid mass or thyromegaly.   Cardiovascular:      Rate and Rhythm: Normal rate and regular rhythm.      Pulses: Normal pulses.      Heart sounds: Normal heart sounds. No murmur heard.  Pulmonary:      Effort: Pulmonary effort is normal. No respiratory distress.      Breath sounds: Normal breath sounds. No wheezing.   Musculoskeletal:         General: Normal range of motion.      Cervical back: Normal range of motion and neck supple. No tenderness.   Lymphadenopathy:      Cervical: No cervical adenopathy.   Skin:     General: Skin is warm and dry.      Coloration: Skin is not jaundiced.   Neurological:      General: No focal deficit present.      Mental Status: She is alert and oriented to person, place, and time.      Gait: Gait normal.   Psychiatric:         Mood and Affect: Mood normal.         Behavior: Behavior normal.         Thought Content: Thought content normal.         Judgment: Judgment normal.              Assessment & Plan:       Assessment & Plan      1. Fibromyalgia (Primary)  Chronic, established condition.  Stable.  Reported side effects from Cymbalta resolved after discontinuation. Currently taking vitamin D3, omega-3, vitamin B, and calcium.  Discussed other options.  She would like to try amitriptyline.  Amitriptyline 10 mg nightly one  hour before sleep was initiated.  She has follow-up scheduled on June 24.  Recommend sooner follow-up if medication is ineffective or side effects occur.  Recommend healthy diet, sleep, and exercise.  - amitriptyline (ELAVIL) 10 MG Tab; Take 1 Tablet by mouth every evening.  Dispense: 60 Tablet; Refill: 1    2. Sinus pain  Acute.  Stable.  Examination remarkable for nasal congestion otherwise normal.  Recommend over-the-counter Flonase nasal spray, 1 spray in each nostril daily.  If pain persists recommend to schedule consultation with ENT.  - Referral to ENT    3. Osteoporosis without current pathological fracture, unspecified osteoporosis type  Chronic, established condition.  Stable.  Managed by endocrinology.  Patient is in the middle of doing dental work.  She will initiate osteoporosis treatment after completing her dental work.  Recommend to follow-up with endocrinology as recommended.           Diagnosis and treatment plan explained to pt. Counseled pt on new medication(s) and potential side effects. Pt agreed with treatment plan and verbalized understanding. All questions were answered to the best of my ability.     Return for as scheduled 6/24/25 or sooner if recommended.     Please note that this dictation was created using voice recognition software. I have made every reasonable attempt to correct obvious errors, but I expect that there are errors of grammar and possibly content that I did not discover before finalizing the note.    Asya Flores PA-C  [facility]

## 2025-06-19 ENCOUNTER — HOSPITAL ENCOUNTER (EMERGENCY)
Facility: MEDICAL CENTER | Age: 65
End: 2025-06-19
Attending: EMERGENCY MEDICINE
Payer: COMMERCIAL

## 2025-06-19 VITALS
SYSTOLIC BLOOD PRESSURE: 136 MMHG | TEMPERATURE: 97.8 F | WEIGHT: 134.92 LBS | BODY MASS INDEX: 24.68 KG/M2 | HEART RATE: 75 BPM | OXYGEN SATURATION: 95 % | DIASTOLIC BLOOD PRESSURE: 65 MMHG | RESPIRATION RATE: 18 BRPM

## 2025-06-19 DIAGNOSIS — S39.012A STRAIN OF LUMBAR REGION, INITIAL ENCOUNTER: Primary | ICD-10-CM

## 2025-06-19 PROCEDURE — 700111 HCHG RX REV CODE 636 W/ 250 OVERRIDE (IP): Performed by: EMERGENCY MEDICINE

## 2025-06-19 PROCEDURE — 99283 EMERGENCY DEPT VISIT LOW MDM: CPT

## 2025-06-19 PROCEDURE — A9270 NON-COVERED ITEM OR SERVICE: HCPCS | Performed by: EMERGENCY MEDICINE

## 2025-06-19 PROCEDURE — RXMED WILLOW AMBULATORY MEDICATION CHARGE: Performed by: EMERGENCY MEDICINE

## 2025-06-19 PROCEDURE — 700102 HCHG RX REV CODE 250 W/ 637 OVERRIDE(OP): Performed by: EMERGENCY MEDICINE

## 2025-06-19 RX ORDER — IBUPROFEN 600 MG/1
600 TABLET, FILM COATED ORAL ONCE
Status: COMPLETED | OUTPATIENT
Start: 2025-06-19 | End: 2025-06-19

## 2025-06-19 RX ORDER — CYCLOBENZAPRINE HCL 10 MG
10 TABLET ORAL 3 TIMES DAILY PRN
Qty: 30 TABLET | Refills: 0 | Status: SHIPPED | OUTPATIENT
Start: 2025-06-19

## 2025-06-19 RX ORDER — IBUPROFEN 800 MG/1
800 TABLET, FILM COATED ORAL EVERY 8 HOURS PRN
Qty: 30 TABLET | Refills: 0 | Status: SHIPPED | OUTPATIENT
Start: 2025-06-19 | End: 2025-06-24

## 2025-06-19 RX ORDER — OXYCODONE AND ACETAMINOPHEN 5; 325 MG/1; MG/1
1 TABLET ORAL EVERY 6 HOURS PRN
Qty: 14 TABLET | Refills: 0 | Status: SHIPPED | OUTPATIENT
Start: 2025-06-19 | End: 2025-06-25

## 2025-06-19 RX ORDER — CYCLOBENZAPRINE HCL 10 MG
10 TABLET ORAL ONCE
Status: COMPLETED | OUTPATIENT
Start: 2025-06-19 | End: 2025-06-19

## 2025-06-19 RX ORDER — ONDANSETRON 4 MG/1
4 TABLET, ORALLY DISINTEGRATING ORAL ONCE
Status: COMPLETED | OUTPATIENT
Start: 2025-06-19 | End: 2025-06-19

## 2025-06-19 RX ORDER — PREDNISONE 50 MG/1
50 TABLET ORAL DAILY
Qty: 5 TABLET | Refills: 0 | Status: SHIPPED | OUTPATIENT
Start: 2025-06-19 | End: 2025-06-25

## 2025-06-19 RX ORDER — OXYCODONE AND ACETAMINOPHEN 5; 325 MG/1; MG/1
1 TABLET ORAL ONCE
Refills: 0 | Status: COMPLETED | OUTPATIENT
Start: 2025-06-19 | End: 2025-06-19

## 2025-06-19 RX ADMIN — IBUPROFEN 600 MG: 600 TABLET, FILM COATED ORAL at 12:16

## 2025-06-19 RX ADMIN — OXYCODONE HYDROCHLORIDE AND ACETAMINOPHEN 1 TABLET: 5; 325 TABLET ORAL at 12:15

## 2025-06-19 RX ADMIN — ONDANSETRON 4 MG: 4 TABLET, ORALLY DISINTEGRATING ORAL at 12:15

## 2025-06-19 RX ADMIN — CYCLOBENZAPRINE 10 MG: 10 TABLET, FILM COATED ORAL at 12:15

## 2025-06-19 ASSESSMENT — FIBROSIS 4 INDEX: FIB4 SCORE: 1.02

## 2025-06-19 NOTE — ED TRIAGE NOTES
"Savana Lewis Nikolas Rendon  65 y.o. female  Chief Complaint   Patient presents with    Low Back Pain     Report 8/10\"sharp\"  non radiating left sided low back pain. Pt reports pain began after she went from a sitting to a standing position       Pt amb to triage with steady gait for above complaint. Pt denies dysuria, hematuria, or decreased urinary flow  Pt is alert and oriented, speaking in full sentences, follows commands and responds appropriately to questions. Not in any apparent distress. Respirations are even and unlabored.  Pt placed in ED Lobby. Pt educated on triage process. Pt encouraged to alert staff for any changes.    "

## 2025-06-19 NOTE — Clinical Note
Savana Rendon was seen and treated in our emergency department on 6/19/2025.  She may return to work on 06/23/2025.       If you have any questions or concerns, please don't hesitate to call.      Blaine Farfan D.O.

## 2025-06-19 NOTE — ED PROVIDER NOTES
"ED Provider Note    CHIEF COMPLAINT  Chief Complaint   Patient presents with    Low Back Pain     Report 8/10\"sharp\"  non radiating left sided low back pain. Pt reports pain began after she went from a sitting to a standing position       EXTERNAL RECORDS REVIEWED  Outpatient Notes none    HPI/ROS  LIMITATION TO HISTORY   none  OUTSIDE HISTORIAN(S):  none    Savana Rendon is a 65 y.o. female who presents here for evaluation of low back pain.  Patient states that she had back pains lower back from yesterday after she was seated and went to stand up.  She states that she then had some radiation of pain down to the left buttock.  She denies any fall or trauma.  She has no chest pain shortness breath or abdominal pain.  No headache.  Patient states that she has no incontinence of bowel bladder, no urinary retention and no saddle anesthesia.  Patient has no other medical concerns at this time.  She did take 2 Tylenol yesterday without relief.    PAST MEDICAL HISTORY   has a past medical history of Arthritis of both hips (06/19/2024), Blood clotting disorder (HCC) (02/2023), Bowel habit changes (07/14/2023), Cancer (HCC) (07/14/2023), Dental disorder (07/14/2023), Fatty liver, Fatty liver (07/14/2023), Heart burn, High cholesterol (07/14/2023), Hypokalemia, Normocytic anemia, Pain (07/14/2023), Primary osteoarthritis of left foot (06/19/2024), and Renal disorder (07/14/2023).    SURGICAL HISTORY   has a past surgical history that includes dx bone marrow aspirations (Left, 02/16/2023); dx bone marrow biopsies (Left, 02/16/2023); other; gyn surgery (07/14/2023); and primary c section.    FAMILY HISTORY  Family History   Problem Relation Age of Onset    No Known Problems Mother     Diabetes Father     Alcohol abuse Father     Cancer Sister         unknown    Clotting Disorder Neg Hx     DVT Neg Hx        SOCIAL HISTORY  Social History     Tobacco Use    Smoking status: Never    Smokeless tobacco: Never "   Vaping Use    Vaping status: Never Used   Substance and Sexual Activity    Alcohol use: Not Currently    Drug use: Never    Sexual activity: Not Currently       CURRENT MEDICATIONS  Home Medications    **Home medications have not yet been reviewed for this encounter**         ALLERGIES  Allergies[1]    PHYSICAL EXAM  VITAL SIGNS: BP (!) 149/91   Pulse 92   Temp 36.1 °C (96.9 °F) (Temporal)   Resp 18   Wt 61.2 kg (134 lb 14.7 oz)   SpO2 99%   BMI 24.68 kg/m²    Constitutional: Well developed, well nourished. No acute distress.  HEENT: Normocephalic, atraumatic. Posterior pharynx clear and moist.  Eyes:  EOMI. Normal sclera.  Neck: Supple, Full range of motion, nontender.  Chest/Pulmonary: clear to ausculation. Symmetrical expansion.   Cardio: Regular rate and rhythm with no murmur.   Abdomen: Soft, nontender. No peritoneal signs. No guarding. No palpable masses.  Back: No CVA tenderness, tenderness to L to L3 midline, no step offs.  Musculoskeletal: No deformity, no edema, neurovascular intact.  Positive straight leg raise on the left  Neuro: Clear speech, appropriate, cooperative, cranial nerves II-XII grossly intact.  Steady unassisted gait  Psych: Normal mood and affect      EKG/LABS  None    RADIOLOGY/PROCEDURES   None      COURSE & MEDICAL DECISION MAKING    ASSESSMENT, COURSE AND PLAN  Care Narrative: This is a 65 female here for evaluation of reproducible low back pain.  The patient has no incontinence of bowel or bladder, no urinary retention and no saddle anesthesia.  Patient denies any abdominal pain.  She has reproducible tenderness with ambulation and rotation.  Patient given medications here, in addition to medications as outpatient.        DISPOSITION AND DISCUSSIONS  I have discussed management of the patient with the following physicians and WOO's: None    Discussion of management with other QHP or appropriate source(s): None    Escalation of care considered, and ultimately not performed:  None    Barriers to care at this time, including but not limited to: None.     Decision tools and prescription drugs considered including, but not limited to: None.    FINAL DIAGNOSIS  1. Strain of lumbar region, initial encounter         Electronically signed by: Blaine Fafran D.O., 6/19/2025 12:14 PM           [1]   Allergies  Allergen Reactions    Gabapentin     Tramadol

## 2025-06-20 ENCOUNTER — PHARMACY VISIT (OUTPATIENT)
Dept: PHARMACY | Facility: MEDICAL CENTER | Age: 65
End: 2025-06-20
Payer: COMMERCIAL

## 2025-06-24 ENCOUNTER — OFFICE VISIT (OUTPATIENT)
Dept: MEDICAL GROUP | Facility: IMAGING CENTER | Age: 65
End: 2025-06-24
Payer: COMMERCIAL

## 2025-06-24 VITALS
SYSTOLIC BLOOD PRESSURE: 124 MMHG | RESPIRATION RATE: 14 BRPM | OXYGEN SATURATION: 95 % | WEIGHT: 140 LBS | HEIGHT: 62 IN | TEMPERATURE: 98.6 F | DIASTOLIC BLOOD PRESSURE: 70 MMHG | BODY MASS INDEX: 25.76 KG/M2 | HEART RATE: 76 BPM

## 2025-06-24 DIAGNOSIS — G89.29 CHRONIC BILATERAL LOW BACK PAIN WITHOUT SCIATICA: ICD-10-CM

## 2025-06-24 DIAGNOSIS — M54.50 CHRONIC BILATERAL LOW BACK PAIN WITHOUT SCIATICA: ICD-10-CM

## 2025-06-24 DIAGNOSIS — Z23 ENCOUNTER FOR ADMINISTRATION OF VACCINE: ICD-10-CM

## 2025-06-24 DIAGNOSIS — M79.641 RIGHT HAND PAIN: ICD-10-CM

## 2025-06-24 DIAGNOSIS — G62.0 CHEMOTHERAPY-INDUCED NEUROPATHY (HCC): ICD-10-CM

## 2025-06-24 DIAGNOSIS — R60.0 EDEMA OF RIGHT FOOT: ICD-10-CM

## 2025-06-24 DIAGNOSIS — M79.7 FIBROMYALGIA: Primary | ICD-10-CM

## 2025-06-24 DIAGNOSIS — T45.1X5A CHEMOTHERAPY-INDUCED NEUROPATHY (HCC): ICD-10-CM

## 2025-06-24 RX ORDER — GABAPENTIN 100 MG/1
100 CAPSULE ORAL 2 TIMES DAILY PRN
Qty: 60 CAPSULE | Refills: 1 | Status: SHIPPED | OUTPATIENT
Start: 2025-06-24

## 2025-06-24 ASSESSMENT — FIBROSIS 4 INDEX: FIB4 SCORE: 1.02

## 2025-06-24 NOTE — PROGRESS NOTES
Subjective:     CC:   Chief Complaint   Patient presents with    Other     Right ankle swelling        HPI:     Verbal consent was acquired by the patient to use [employer] ambient listening note generation during this visit Yes      Savana Rendonsulaiman, 65 y.o., female,  presents today to discuss:     History of Present Illness    Back pain  She experienced a sudden onset of severe back pain on 06/19/2025, which was so intense that it caused her to bend over. The pain was localized in the middle of her back and radiated to the left side. She sought emergency care where she was diagnosed with muscle inflammation and prescribed cyclobenzaprine, ibuprofen, Percocet, and prednisone. She has been adhering to this medication regimen, taking Percocet twice daily and has completed her course of prednisone. Her pain has since improved.    Fatigue follow up   She continues to experience fatigue. She has been taking amitriptyline since 05/30/2025 for fibromyalgia, as Cymbalta had previously caused adverse effects.    Swelling in right foot  She reported foot swelling that began a few days. Denies pain and injuries.     Neuropathy  She also reported a sensation of heat in her feet, which predates her chemotherapy treatment. She described the pain as severe and intermittent. She has previously consulted with Dr. Remigio Centeno at the [facility], who administered an injection into her ankle, which did not provide relief. Nerve studies revealed damage in both legs. She also experiences  neuropathy in upper extremities.  She has not had nerve conduction testing in upper extremities. She previously tried gabapentin but made her feel intoxicated.    Hand pain  She has a scheduled appointment for a colonoscopy on 07/30/2025. She has been under the care of Dr. Weiss for shoulder and hand issues. She was informed that surgery would not be approved until she is unable to place her hand flat on a table. She  "reported that her fingers have become bent and her arm feels frozen. She was advised to schedule an appointment if her shoulder pain does not improve within 3 months. She needs a new referral to see. Dr. Weiss.          ROS:  See HPI    Medications, allergies, past medical history, family history, surgical history, and social history documented in chart and reviewed by me.       Objective:   Exam:  /70 (BP Location: Left arm, Patient Position: Sitting, BP Cuff Size: Adult)   Pulse 76   Temp 37 °C (98.6 °F) (Temporal)   Resp 14   Ht 1.575 m (5' 2\")   Wt 63.5 kg (140 lb)   SpO2 95%   BMI 25.61 kg/m²      General: In no acute distress. Normal appearance.   Head:   Atraumatic, normocephalic.   Neck: Supple without lymphadenopathy.   Pulmonary: Normal effort, clear to auscultation bilaterally, no wheezes, rhonchi, or rales  Cardiovascular:   Regular rate and rhythm. No murmurs, rubs, or gallops.  Musculoskeletal:  Normal ROM. No edema.    Foot: mild nonpitting edema present over right foot.  There is no erythema, tenderness, warmth, or impaired mobility.  Skin: No visible rashes or lesions.  Neurological: Alert and oriented to person, place, and time. Gait normal.   Psychiatric: Normal mood and affect. Calm and friendly behavior. Speech clear. Normal judgement and insight.         Assessment & Plan:       Assessment & Plan      1. Fibromyalgia (Primary)  Chronic, established condition.  Uncontrolled.  The dosage of amitriptyline will be increased to 25 mg to be taken at night. She was informed that this medication might induce sleepiness.   - amitriptyline (ELAVIL) 25 MG Tab; Take 1 Tablet by mouth every evening.  Dispense: 90 Tablet; Refill: 3    2. Chemotherapy-induced neuropathy (HCC)  Chronic, uncontrolled.  Per patient she completed nerve conduction testing through podiatry for her lower extremities but she has not completed nerve conduction testing of her upper extremities.  Testing ordered to rule " out other nerve damage.  Informed patient that the options for neuropathy are limited.  She would like to consult with neurology.  Referral placed.  She is also interested in trying gabapentin again.  Prescription for gabapentin 100 mg twice daily as needed provided.  Counseled patient on medication and possible side effects such as sedation and drowsiness.  Recommend not to drink or drink alcohol while taking gabapentin.  - Referral to Neurology  - EMG/NCS; Future  - gabapentin (NEURONTIN) 100 MG Cap; Take 1 Capsule by mouth 2 times a day as needed (neuropathy).  Dispense: 60 Capsule; Refill: 1    3. Chronic bilateral low back pain without sciatica  Chronic, established condition with acute exacerbation.  She was evaluated at the emergency room.  No imaging was performed.  She completed prednisone 50 mg for a few days.  Pain has improved.  Recommend to follow-up with her back specialist as recommended.    4. Edema of right foot  Acute.  New finding.  Patient noticed edema in the last few days.  Informed patient that oral steroids can lead to edema.  Patient finished steroids today.  Informed patient to notify me if the edema does not resolved in the next few days.     5. Encounter for administration of vaccine  Patient received pneumococcal vaccine today with her consent.  Tolerated well.  - Pneumococcal Conjugate Vaccine 20-Valent (6 wks+)    6. Right hand pain  Chronic, stable.  Managed by Dr. Pérez at the [facility].  Patient needs a new referral.  Referral placed.  - Referral to Orthopedics              Billing : secondary to the complexity of this patient's illnesses and their interactions.  See assessment and plan above for the comprehensive evaluation and management of the patient's acute and chronic concerns.  As the patient's PCP, I am the continued focal point for all health care services for the patient's needs and ongoing subsequent medical care.  All problems listed were discussed during  the office visit, medications were evaluated and complexities were discussed, as well as plan for the future.       A total of  40 minutes was spent today reviewing chart, assessing and examining the patient, ordering tests, and documenting. None of which was spent performing separately billing procedures or ancillary services.       Diagnosis and treatment plan explained to pt. Counseled pt on new medication(s) and potential side effects. Pt agreed with treatment plan and verbalized understanding. All questions were answered to the best of my ability.     Return in about 2 months (around 8/24/2025) for med check.     Please note that this dictation was created using voice recognition software. I have made every reasonable attempt to correct obvious errors, but I expect that there are errors of grammar and possibly content that I did not discover before finalizing the note.    Asya Flores PA-C  [facility]

## 2025-06-24 NOTE — Clinical Note
Member Name: Savana Rendon   Member Number: [id number]   Reference Number: [id number]   Approved Services: Consultation   Approved Service Dates: 06/24/2025 - 06/24/2026   Requesting Provider: Asya Flores   Requested Provider: Ladarius Weiss     Dear Savana Rendon:     The following medical service(s) requested by Asya Flores have been approved:    Procedure Code Procedure Code Name Requested Quantity Approved Quantity Status   48237 (CPT®) [place] OFFICE/OUTPATIENT NEW MODERATE MDM 45 MINUTES 1 1 Authorized   90480 (CPT®) [place] OFFICE/OUTPATIENT ESTABLISHED MOD MDM 30 MIN 5 5 Authorized       Approved Quantity means the number of visits approved for medication treatments and/or medical services.    The services should be provided by Ladarius Weiss no later than 06/24/2026. Please contact the provider listed below with any questions.     Provider Information:  Ladarius Weiss  [phone]    Your plan benefit may require a deductible, co-payment or coinsurance for these services. This authorization does not guarantee [employer] will pay the claim for services that you receive. Payment by [employer] for these services is subject to the terms of your Evidence of Coverage or Summary Plan Description, your eligibility at the time of service, and confirmation of benefit coverage.    For any questions or additional information, please contact Customer Service:    [employer]  Customer Service: [phone] or toll free [phone]  TTY users dial: [phone]   Call Center Hours: Mon - Fri 7 AM to 8 PM PST   Office Hours: Mon - Fri 8 AM to 5 PM [facility]   E-mail: [email or web address]   Website: [email or web address]       This information is available for free in other languages. Please contact Customer Service at the phone number above for more information. [employer] complies with applicable Federal civil rights laws and does not discriminate on the  basis of race, color, national origin, age, disability or sex.      Sincerely,     Healthcare Utilization Management Department     Cc: Ladarius Flores

## 2025-06-24 NOTE — Clinical Note
REFERRAL APPROVAL NOTICE         Sent on June 24, 2025                   Savana Rendon  [address]  [place] [address]                   Dear Ms. Nikolas Rendon,    After a careful review of the medical information and benefit coverage, [facility] has processed your referral. See below for additional details.    If applicable, you must be actively enrolled with your insurance for coverage of the authorized service. If you have any questions regarding your coverage, please contact your insurance directly.    REFERRAL INFORMATION   Referral #:  [id number]  Referred-To Provider    Referred-By Provider:  Orthopedic Surgery    BRITTNEY Araujo MICHAEL B      [address] [place] [address]  [phone] [address]  [address]  [place] [id number] [phone]    Referral Start Date:  06/24/2025  Referral End Date:   06/24/2026             SCHEDULING  If you do not already have an appointment, please call [phone] to make an appointment.     MORE INFORMATION  If you do not already have a [employer] account, sign up at: [email or web address]  You can access your medical information, make appointments, see lab results, billing information, and more.  If you have questions regarding this referral, please contact  the [facility] Referrals department at:             [phone]. Monday - Friday 8:00AM - 5:00PM.     Sincerely,    [facility]

## 2025-07-01 NOTE — Clinical Note
Member Name: Savana Rendon   Member Number: [id number]   Reference Number: [id number]   Approved Services: Consultation   Approved Service Dates: 06/24/2025 - 06/24/2026   Requesting Provider: Asya Flores   Requested Provider: Brie Zhang     Dear Savana Rendon:     The following medical service(s) requested by Asya Flores have been approved:    Procedure Code Procedure Code Name Requested Quantity Approved Quantity Status   19239 (CPT®) [place] OFFICE/OUTPATIENT NEW MODERATE MDM 45 MINUTES 1 1 Authorized   28214 (CPT®) [place] OFFICE/OUTPATIENT ESTABLISHED MOD MDM 30 MIN 5 5 Authorized       Approved Quantity means the number of visits approved for medication treatments and/or medical services.    The services should be provided by Brie Zhang no later than 06/24/2026. Please contact the provider listed below with any questions.     Provider Information:  Brie Zhang  [phone]    Your plan benefit may require a deductible, co-payment or coinsurance for these services. This authorization does not guarantee [employer] will pay the claim for services that you receive. Payment by [employer] for these services is subject to the terms of your Evidence of Coverage or Summary Plan Description, your eligibility at the time of service, and confirmation of benefit coverage.    For any questions or additional information, please contact Customer Service:    [employer]  Customer Service: [phone] or toll free [phone]  TTY users dial: [phone]   Call Center Hours: Mon - Fri 7 AM to 8 PM PST   Office Hours: Mon - Fri 8 AM to 5 PM [facility]   E-mail: [email or web address]   Website: [email or web address]       This information is available for free in other languages. Please contact Customer Service at the phone number above for more information. [employer] complies with applicable Federal civil rights laws and does  not discriminate on the basis of race, color, national origin, age, disability or sex.      Sincerely,     Healthcare Utilization Management Department     Cc: Brie Flores

## 2025-07-08 ENCOUNTER — TELEPHONE (OUTPATIENT)
Dept: MEDICAL GROUP | Facility: IMAGING CENTER | Age: 65
End: 2025-07-08
Payer: COMMERCIAL

## 2025-07-08 DIAGNOSIS — H53.9 ABNORMAL VISION: Primary | ICD-10-CM

## 2025-07-08 NOTE — TELEPHONE ENCOUNTER
Caller Name: Savana Rendon    Call Back Number: [phone]        How would the patient prefer to be contacted with a response: Phone call do NOT leave a detailed message    Needs referral to [facility] due to history of dry. During regular eye appt they found floating black dots and recommenced referral to [facility]. Needs to be evaluated for surgery.     Reports worse swelling in legs/feet. Scheduled appt for 7/14/25. Recommended to go to urgent care or ER if swelling continues to get worse.

## 2025-07-14 ENCOUNTER — HOSPITAL ENCOUNTER (OUTPATIENT)
Facility: MEDICAL CENTER | Age: 65
End: 2025-07-14
Attending: STUDENT IN AN ORGANIZED HEALTH CARE EDUCATION/TRAINING PROGRAM
Payer: COMMERCIAL

## 2025-07-14 ENCOUNTER — RESULTS FOLLOW-UP (OUTPATIENT)
Dept: MEDICAL GROUP | Facility: IMAGING CENTER | Age: 65
End: 2025-07-14

## 2025-07-14 ENCOUNTER — OFFICE VISIT (OUTPATIENT)
Dept: MEDICAL GROUP | Facility: IMAGING CENTER | Age: 65
End: 2025-07-14
Payer: COMMERCIAL

## 2025-07-14 VITALS
RESPIRATION RATE: 14 BRPM | OXYGEN SATURATION: 97 % | WEIGHT: 137 LBS | SYSTOLIC BLOOD PRESSURE: 108 MMHG | HEART RATE: 80 BPM | BODY MASS INDEX: 25.21 KG/M2 | DIASTOLIC BLOOD PRESSURE: 64 MMHG | TEMPERATURE: 97.3 F | HEIGHT: 62 IN

## 2025-07-14 DIAGNOSIS — K64.8 INTERNAL HEMORRHOID: ICD-10-CM

## 2025-07-14 DIAGNOSIS — R60.0 EDEMA OF BOTH FEET: ICD-10-CM

## 2025-07-14 DIAGNOSIS — R30.0 DYSURIA: ICD-10-CM

## 2025-07-14 DIAGNOSIS — R30.0 DYSURIA: Primary | ICD-10-CM

## 2025-07-14 DIAGNOSIS — Z78.9 MEDICALLY COMPLEX PATIENT: ICD-10-CM

## 2025-07-14 LAB
APPEARANCE UR: CLEAR
BILIRUB UR STRIP-MCNC: NEGATIVE MG/DL
COLOR UR AUTO: YELLOW
GLUCOSE UR STRIP.AUTO-MCNC: NEGATIVE MG/DL
KETONES UR STRIP.AUTO-MCNC: NEGATIVE MG/DL
LEUKOCYTE ESTERASE UR QL STRIP.AUTO: NEGATIVE
NITRITE UR QL STRIP.AUTO: NEGATIVE
PH UR STRIP.AUTO: 7 [PH] (ref 5–8)
PROT UR QL STRIP: NEGATIVE MG/DL
RBC UR QL AUTO: NORMAL
SP GR UR STRIP.AUTO: 1.01
UROBILINOGEN UR STRIP-MCNC: NORMAL MG/DL

## 2025-07-14 PROCEDURE — 87086 URINE CULTURE/COLONY COUNT: CPT

## 2025-07-14 PROCEDURE — 99214 OFFICE O/P EST MOD 30 MIN: CPT | Performed by: STUDENT IN AN ORGANIZED HEALTH CARE EDUCATION/TRAINING PROGRAM

## 2025-07-14 PROCEDURE — 3078F DIAST BP <80 MM HG: CPT | Performed by: STUDENT IN AN ORGANIZED HEALTH CARE EDUCATION/TRAINING PROGRAM

## 2025-07-14 PROCEDURE — 3074F SYST BP LT 130 MM HG: CPT | Performed by: STUDENT IN AN ORGANIZED HEALTH CARE EDUCATION/TRAINING PROGRAM

## 2025-07-14 PROCEDURE — 81002 URINALYSIS NONAUTO W/O SCOPE: CPT | Performed by: STUDENT IN AN ORGANIZED HEALTH CARE EDUCATION/TRAINING PROGRAM

## 2025-07-14 RX ORDER — HYDROCORTISONE ACETATE 25 MG/1
25 SUPPOSITORY RECTAL 2 TIMES DAILY PRN
Qty: 14 SUPPOSITORY | Refills: 0 | Status: SHIPPED | OUTPATIENT
Start: 2025-07-14

## 2025-07-14 ASSESSMENT — FIBROSIS 4 INDEX: FIB4 SCORE: 1.02

## 2025-07-14 NOTE — Clinical Note
Member Name: Savana Rendon   Member Number: [id number]   Reference Number: [id number]   Approved Services: Consultation   Approved Service Dates: 07/08/2025 - 07/08/2026   Requesting Provider: Asya Flores   Requested Provider:  [facility]     Dear Savana Rendon:     The following medical service(s) requested by Asya Flores have been approved:    Procedure Code Procedure Code Name Requested Quantity Approved Quantity Status   16893 (CPT®) [place] OFFICE/OUTPATIENT NEW MODERATE MDM 45 MINUTES 1 1 Authorized   29245 (CPT®) [place] OFFICE/OUTPATIENT ESTABLISHED MOD MDM 30 MIN 5 5 Authorized       Approved Quantity means the number of visits approved for medication treatments and/or medical services.    The services should be provided by [facility] no later than 07/08/2026. Please contact the provider listed below with any questions.     Provider Information:  [facility]  [phone]    Your plan benefit may require a deductible, co-payment or coinsurance for these services. This authorization does not guarantee [facility] will pay the claim for services that you receive. Payment by [facility] for these services is subject to the terms of your Evidence of Coverage or Summary Plan Description, your eligibility at the time of service, and confirmation of benefit coverage.    For any questions or additional information, please contact Customer Service:    [facility]  Customer Service: [phone] or toll free [phone]  TTY users dial: [phone]   Call Center Hours: Mon - Fri 7 AM to 8 PM PST   Office Hours: Mon - Fri 8 AM to 5 PM [facility]   E-mail: [email or web address]   Website: [email or web address]       This information is available for free in other languages. Please contact Customer Service at the phone number above for more information. [facility] complies with applicable Federal civil rights laws and does not  discriminate on the basis of race, color, national origin, age, disability or sex.      Sincerely,     Healthcare Utilization Management Department     Cc:  [facility]   Asya Flores

## 2025-07-14 NOTE — Clinical Note
REFERRAL APPROVAL NOTICE         Sent on July 14, 2025                   Savana Rendon  [address]  [place] [address]                   Dear Ms. Nikolas Rendon,    After a careful review of the medical information and benefit coverage, [facility] has processed your referral. See below for additional details.    If applicable, you must be actively enrolled with your insurance for coverage of the authorized service. If you have any questions regarding your coverage, please contact your insurance directly.    REFERRAL INFORMATION   Referral #:  [id number]  Referred-To Provider    Referred-By Provider:  Ophthalmology    Asya Flores P.A.-C.    [facility]      [address] [place] [address] [place] [address]  [phone] [id number]  [place] [address]  [phone]    Referral Start Date:  07/08/2025  Referral End Date:   07/08/2026             SCHEDULING  If you do not already have an appointment, please call [phone] to make an appointment.     MORE INFORMATION  If you do not already have a [employer] account, sign up at: [email or web address]  You can access your medical information, make appointments, see lab results, billing information, and more.  If you have questions regarding this referral, please contact  the [facility] Referrals department at:             [phone]. Monday - Friday 8:00AM - 5:00PM.     Sincerely,    [facility]

## 2025-07-14 NOTE — Clinical Note
Member Name: Savana Rendon   Member Number: [id number]   Reference Number: [id number]   Approved Services: Consultation   Approved Service Dates: 07/14/2025 - 07/14/2026   Requesting Provider: Asya Flores   Requested Provider: [facility]     Dear Savana Rendon:     The following medical service(s) requested by Asya Flores have been approved:    Procedure Code Procedure Code Name Requested Quantity Approved Quantity Status   03342 (CPT®) [place] OFFICE/OUTPATIENT NEW MODERATE MDM 45 MINUTES 1 1 Authorized   03421 (CPT®) [place] OFFICE/OUTPATIENT ESTABLISHED MOD MDM 30 MIN 5 5 Authorized       Approved Quantity means the number of visits approved for medication treatments and/or medical services.    The services should be provided by [facility] no later than 07/14/2026. Please contact the provider listed below with any questions.     Provider Information:  [facility]  [phone]    Your plan benefit may require a deductible, co-payment or coinsurance for these services. This authorization does not guarantee [facility] will pay the claim for services that you receive. Payment by [facility] for these services is subject to the terms of your Evidence of Coverage or Summary Plan Description, your eligibility at the time of service, and confirmation of benefit coverage.    For any questions or additional information, please contact Customer Service:    [facility]  Customer Service: [phone] or toll free [phone]  TTY users dial: [phone]   Call Center Hours: Mon - Fri 7 AM to 8 PM PST   Office Hours: Mon - Fri 8 AM to 5 PM [facility]   E-mail: [email or web address]   Website: [email or web address]       This information is available for free in other languages. Please contact Customer Service at the phone number above for more information. [facility] complies with applicable Federal civil rights  laws and does not discriminate on the basis of race, color, national origin, age, disability or sex.      Sincerely,     Healthcare Utilization Management Department     Cc: [facility]   Asya Flores

## 2025-07-14 NOTE — PROGRESS NOTES
"Subjective:     CC:   Chief Complaint   Patient presents with    Other     Last Thursday Right ankle was swollen. Is a little better now.     UTI     Burning with urination 2 days. Also worried about Hemorids. Has apt July 30 with Gi for colonoscopy.        HPI:     Verbal consent was acquired by the patient to use [employer] ambient listening note generation during this visit Yes      Savana Rendon, sulaiman, 65 y.o., female,  presents today to discuss:     History of Present Illness      Possible UTI  Reports frequent urination and dysuria for the last 2 days.  Denies fever, chills, nausea, vomiting, and flank pain.    She also reports noticing an internal hemorrhoid.  She has upcoming appointment for colonoscopy.  Denies rectal bleeding.  She experiences intermittent rectal discomfort.    She continues to experience swelling in both feet.  Swelling has improved.  She is still experiencing pain and paresthesia in her feet.       ROS:  See HPI    Medications, allergies, past medical history, family history, surgical history, and social history documented in chart and reviewed by me.       Objective:   Exam:  /64 (BP Location: Left arm, Patient Position: Sitting, BP Cuff Size: Adult)   Pulse 80   Temp 36.3 °C (97.3 °F) (Temporal)   Resp 14   Ht 1.575 m (5' 2\")   Wt 62.1 kg (137 lb)   SpO2 97%   BMI 25.06 kg/m²      Physical Exam  Vitals reviewed.   Constitutional:       General: She is not in acute distress.     Appearance: Normal appearance. She is not toxic-appearing.   HENT:      Head: Normocephalic and atraumatic.   Eyes:      General: No scleral icterus.        Right eye: No discharge.         Left eye: No discharge.   Cardiovascular:      Rate and Rhythm: Normal rate and regular rhythm.      Heart sounds: No murmur heard.  Pulmonary:      Effort: Pulmonary effort is normal. No respiratory distress.      Breath sounds: Normal breath sounds. No wheezing or rales.   Abdominal:      " General: Bowel sounds are normal. There is no distension.      Palpations: Abdomen is soft. There is no mass.      Tenderness: There is no abdominal tenderness. There is no right CVA tenderness or left CVA tenderness.      Hernia: No hernia is present.   Musculoskeletal:         General: Normal range of motion.      Comments: Mild nonpitting edema present in both feet, worse over right ankle.  Tender to palpation.  Varicose veins present bilaterally.  There is no erythema or warmth.   Neurological:      Mental Status: She is alert and oriented to person, place, and time.      Gait: Gait normal.   Psychiatric:         Mood and Affect: Mood normal.         Behavior: Behavior normal.         Thought Content: Thought content normal.         Judgment: Judgment normal.           Latest Reference Range & Units 07/14/25 15:40   POC Color Negative  Yellow   POC Appearance Negative  Clear   POC Specific Gravity  1.010   POC Urine PH 5.0 - 8.0  7.0   POC Glucose Negative mg/dL Negative   POC Ketones Negative mg/dL Negative   POC Protein Negative mg/dL Negative   POC Nitrites Negative  Negative   POC Leukocyte Esterase Negative  Negative   POC Blood Negative  Trace-Intact   POC Bilirubin Negative mg/dL Negative   POC Urobiligen Negative (0.2) mg/dL 0.2 E.U. /dL       Assessment & Plan:       Assessment & Plan       1. Dysuria (Primary)  Acute. POCT urinalysis is negative for cystitis.  Sent for culture.  Will await results.  Recommend to increase her water intake.  - POCT Urinalysis  - URINE CULTURE(NEW); Future    2. Edema of both feet  Chronic with acute exacerbation.  Given her history of DVT, stat ultrasound was ordered to rule out DVT.  If edema or pain worsen or other symptoms develop such as erythema or warmth recommend to go to the emergency room immediately.  - US-EXTREMITY VENOUS LOWER BILAT; Future    3. Internal hemorrhoid  Acute.  New symptom reported by patient.  Recommend a trial of hydrocortisone suppository.   Patient has upcoming appointment for colonoscopy.  - hydrocortisone (ANUSOL-HC) 25 MG Suppos; Insert 1 Suppository into the rectum 2 times a day as needed (as needed for pain/itching).  Dispense: 14 Suppository; Refill: 0    4. Medically complex patient  Unfortunately, patient has a very extensive medical history. She has several uncontrolled chronic conditions.  Informed patient that it will be best for her to establish with an MD or DO.  She is agreeable.  Referral placed.  - Referral to establish with PCP      Pt agreed with treatment plan and verbalized understanding. All questions were answered to the best of my ability.     No follow-ups on file.     Please note that this dictation was created using voice recognition software. I have made every reasonable attempt to correct obvious errors, but I expect that there are errors of grammar and possibly content that I did not discover before finalizing the note.    Asya Flores PA-C  [facility]

## 2025-07-17 LAB
BACTERIA UR CULT: NORMAL
SIGNIFICANT IND 70042: NORMAL
SITE SITE: NORMAL
SOURCE SOURCE: NORMAL

## 2025-07-18 ENCOUNTER — HOSPITAL ENCOUNTER (OUTPATIENT)
Dept: RADIOLOGY | Facility: MEDICAL CENTER | Age: 65
End: 2025-07-18
Attending: STUDENT IN AN ORGANIZED HEALTH CARE EDUCATION/TRAINING PROGRAM
Payer: COMMERCIAL

## 2025-07-18 DIAGNOSIS — R60.0 EDEMA OF BOTH FEET: ICD-10-CM

## 2025-07-18 PROCEDURE — 93970 EXTREMITY STUDY: CPT

## 2025-08-21 ENCOUNTER — OFFICE VISIT (OUTPATIENT)
Dept: ENDOCRINOLOGY | Facility: MEDICAL CENTER | Age: 65
End: 2025-08-21
Attending: STUDENT IN AN ORGANIZED HEALTH CARE EDUCATION/TRAINING PROGRAM
Payer: MEDICARE

## 2025-08-21 VITALS
OXYGEN SATURATION: 98 % | HEART RATE: 80 BPM | WEIGHT: 137.7 LBS | HEIGHT: 62 IN | SYSTOLIC BLOOD PRESSURE: 120 MMHG | DIASTOLIC BLOOD PRESSURE: 70 MMHG | BODY MASS INDEX: 25.34 KG/M2

## 2025-08-21 DIAGNOSIS — M81.0 OSTEOPOROSIS WITHOUT CURRENT PATHOLOGICAL FRACTURE, UNSPECIFIED OSTEOPOROSIS TYPE: Primary | ICD-10-CM

## 2025-08-21 PROCEDURE — 99212 OFFICE O/P EST SF 10 MIN: CPT | Performed by: STUDENT IN AN ORGANIZED HEALTH CARE EDUCATION/TRAINING PROGRAM

## 2025-08-21 ASSESSMENT — FIBROSIS 4 INDEX: FIB4 SCORE: 1.02

## 2025-08-28 ENCOUNTER — OFFICE VISIT (OUTPATIENT)
Dept: MEDICAL GROUP | Facility: IMAGING CENTER | Age: 65
End: 2025-08-28
Payer: MEDICARE

## 2025-08-28 VITALS
RESPIRATION RATE: 16 BRPM | BODY MASS INDEX: 25.14 KG/M2 | DIASTOLIC BLOOD PRESSURE: 82 MMHG | HEART RATE: 68 BPM | OXYGEN SATURATION: 99 % | TEMPERATURE: 98 F | SYSTOLIC BLOOD PRESSURE: 120 MMHG | HEIGHT: 62 IN | WEIGHT: 136.6 LBS

## 2025-08-28 DIAGNOSIS — I82.813: ICD-10-CM

## 2025-08-28 DIAGNOSIS — M25.512 CHRONIC PAIN OF BOTH SHOULDERS: ICD-10-CM

## 2025-08-28 DIAGNOSIS — M54.50 CHRONIC BILATERAL LOW BACK PAIN WITHOUT SCIATICA: ICD-10-CM

## 2025-08-28 DIAGNOSIS — G89.29 CHRONIC BILATERAL LOW BACK PAIN WITHOUT SCIATICA: ICD-10-CM

## 2025-08-28 DIAGNOSIS — G89.29 CHRONIC PAIN OF BOTH SHOULDERS: ICD-10-CM

## 2025-08-28 DIAGNOSIS — E78.5 DYSLIPIDEMIA: ICD-10-CM

## 2025-08-28 DIAGNOSIS — Z13.1 SCREENING FOR DIABETES MELLITUS: ICD-10-CM

## 2025-08-28 DIAGNOSIS — I87.2 CHRONIC VENOUS INSUFFICIENCY: Primary | ICD-10-CM

## 2025-08-28 DIAGNOSIS — M25.511 CHRONIC PAIN OF BOTH SHOULDERS: ICD-10-CM

## 2025-08-28 PROCEDURE — 1125F AMNT PAIN NOTED PAIN PRSNT: CPT | Performed by: STUDENT IN AN ORGANIZED HEALTH CARE EDUCATION/TRAINING PROGRAM

## 2025-08-28 PROCEDURE — 3079F DIAST BP 80-89 MM HG: CPT | Performed by: STUDENT IN AN ORGANIZED HEALTH CARE EDUCATION/TRAINING PROGRAM

## 2025-08-28 PROCEDURE — 99214 OFFICE O/P EST MOD 30 MIN: CPT | Performed by: STUDENT IN AN ORGANIZED HEALTH CARE EDUCATION/TRAINING PROGRAM

## 2025-08-28 PROCEDURE — 3074F SYST BP LT 130 MM HG: CPT | Performed by: STUDENT IN AN ORGANIZED HEALTH CARE EDUCATION/TRAINING PROGRAM

## 2025-08-28 ASSESSMENT — LIFESTYLE VARIABLES
AUDIT-C TOTAL SCORE: 0
HOW OFTEN DO YOU HAVE SIX OR MORE DRINKS ON ONE OCCASION: NEVER
SKIP TO QUESTIONS 9-10: 1
HOW MANY STANDARD DRINKS CONTAINING ALCOHOL DO YOU HAVE ON A TYPICAL DAY: PATIENT DOES NOT DRINK
HOW OFTEN DO YOU HAVE A DRINK CONTAINING ALCOHOL: NEVER

## 2025-08-28 ASSESSMENT — PAIN SCALES - GENERAL: PAINLEVEL_OUTOF10: 6=MODERATE PAIN

## 2025-08-28 ASSESSMENT — FIBROSIS 4 INDEX: FIB4 SCORE: 1.02

## (undated) DEVICE — BANDAID SHEER STRIP 3/4 IN (100EA/BX 12BX/CA)

## (undated) DEVICE — SYRINGE NON SAFETY 5 CC 20 GA X 1-1/2 IN (100/BX 4BX/CA)

## (undated) DEVICE — SET LEADWIRE 5 LEAD BEDSIDE DISPOSABLE ECG (1SET OF 5/EA)

## (undated) DEVICE — SOD. CHL 10CC SYRINGE PREFILL - W/10 CC (30/BX)

## (undated) DEVICE — CANNULA DIVIDED ADULT CO2 - SAMPLE W/FEMALE CONNCT (25/CA)

## (undated) DEVICE — SYRINGE 3 CC 22 GA X 1-1/2 - NDL SAFETY (50/BX 8BX/CA)

## (undated) DEVICE — GOWN WARMING STANDARD FLEX - (30/CA)

## (undated) DEVICE — SENSOR OXIMETER ADULT SPO2 RD SET (20EA/BX)

## (undated) DEVICE — TOWEL STOP TIMEOUT SAFETY FLAG (40EA/CA)